# Patient Record
Sex: FEMALE | Race: WHITE | HISPANIC OR LATINO | Employment: FULL TIME | URBAN - METROPOLITAN AREA
[De-identification: names, ages, dates, MRNs, and addresses within clinical notes are randomized per-mention and may not be internally consistent; named-entity substitution may affect disease eponyms.]

---

## 2017-04-25 ENCOUNTER — OFFICE VISIT (OUTPATIENT)
Dept: PEDIATRICS | Facility: CLINIC | Age: 32
End: 2017-04-25
Payer: COMMERCIAL

## 2017-04-25 VITALS
HEIGHT: 69 IN | SYSTOLIC BLOOD PRESSURE: 138 MMHG | HEART RATE: 72 BPM | TEMPERATURE: 99 F | BODY MASS INDEX: 32.94 KG/M2 | WEIGHT: 222.4 LBS | DIASTOLIC BLOOD PRESSURE: 72 MMHG

## 2017-04-25 DIAGNOSIS — F33.1 MAJOR DEPRESSIVE DISORDER, RECURRENT EPISODE, MODERATE (H): Primary | ICD-10-CM

## 2017-04-25 DIAGNOSIS — F41.1 GAD (GENERALIZED ANXIETY DISORDER): ICD-10-CM

## 2017-04-25 PROCEDURE — 99203 OFFICE O/P NEW LOW 30 MIN: CPT | Performed by: INTERNAL MEDICINE

## 2017-04-25 RX ORDER — CETIRIZINE HYDROCHLORIDE, PSEUDOEPHEDRINE HYDROCHLORIDE 5; 120 MG/1; MG/1
TABLET, FILM COATED, EXTENDED RELEASE ORAL
COMMUNITY
Start: 2016-05-24 | End: 2017-09-05

## 2017-04-25 RX ORDER — ALBUTEROL SULFATE 90 UG/1
2 AEROSOL, METERED RESPIRATORY (INHALATION)
COMMUNITY
Start: 2016-12-13 | End: 2018-08-27

## 2017-04-25 RX ORDER — ALBUTEROL SULFATE 90 UG/1
AEROSOL, METERED RESPIRATORY (INHALATION)
COMMUNITY
Start: 2016-12-14 | End: 2017-04-25

## 2017-04-25 ASSESSMENT — ANXIETY QUESTIONNAIRES
5. BEING SO RESTLESS THAT IT IS HARD TO SIT STILL: MORE THAN HALF THE DAYS
7. FEELING AFRAID AS IF SOMETHING AWFUL MIGHT HAPPEN: MORE THAN HALF THE DAYS
3. WORRYING TOO MUCH ABOUT DIFFERENT THINGS: NEARLY EVERY DAY
1. FEELING NERVOUS, ANXIOUS, OR ON EDGE: MORE THAN HALF THE DAYS
IF YOU CHECKED OFF ANY PROBLEMS ON THIS QUESTIONNAIRE, HOW DIFFICULT HAVE THESE PROBLEMS MADE IT FOR YOU TO DO YOUR WORK, TAKE CARE OF THINGS AT HOME, OR GET ALONG WITH OTHER PEOPLE: EXTREMELY DIFFICULT
2. NOT BEING ABLE TO STOP OR CONTROL WORRYING: MORE THAN HALF THE DAYS
GAD7 TOTAL SCORE: 16
6. BECOMING EASILY ANNOYED OR IRRITABLE: NEARLY EVERY DAY

## 2017-04-25 ASSESSMENT — PATIENT HEALTH QUESTIONNAIRE - PHQ9: 5. POOR APPETITE OR OVEREATING: MORE THAN HALF THE DAYS

## 2017-04-25 NOTE — MR AVS SNAPSHOT
"              After Visit Summary   2017    Krystina Suazo    MRN: [id number]           Patient Information     Date Of Birth          1985        Visit Information        Provider Department      2017 2:30 PM Valeria Daly MD [facility]        Today's Diagnoses     Major depressive disorder, recurrent episode, moderate (H)    -  1    COURTNEY (generalized anxiety disorder)          Care Instructions    Let's start sertraline and follow up in 4-5 weeks.         Follow-ups after your visit        Who to contact     If you have questions or need follow up information about today's clinic visit or your schedule please contact [facility] directly at [phone].  Normal or non-critical lab and imaging results will be communicated to you by MyChart, letter or phone within 4 business days after the clinic has received the results. If you do not hear from us within 7 days, please contact the clinic through MyChart or phone. If you have a critical or abnormal lab result, we will notify you by phone as soon as possible.  Submit refill requests through imgfave or call your pharmacy and they will forward the refill request to us. Please allow 3 business days for your refill to be completed.          Additional Information About Your Visit        MyChart Information     imgfave lets you send messages to your doctor, view your test results, renew your prescriptions, schedule appointments and more. To sign up, go to [email or web address] . Click on \"Log in\" on the left side of the screen, which will take you to the Welcome page. Then click on \"Sign up Now\" on the right side of the page.     You will be asked to enter the access code listed below, as well as some personal information. Please follow the directions to create your username and password.     Your access code is: [email or web address]  Expires: 2017  3:31 PM     Your access code will  in 90 days. If you need help or a new " "code, please call your [facility] clinic or [phone].        Care EveryWhere ID     This is your Care EveryWhere ID. This could be used by other organizations to access your [facility] medical records  [id number]        Your Vitals Were     Pulse Temperature Height BMI (Body Mass Index)          72 99  F (37.2  C) (Oral) 5' 8.5\" (1.74 m) 33.32 kg/m2         Blood Pressure from Last 3 Encounters:   04/25/17 138/72    Weight from Last 3 Encounters:   04/25/17 222 lb 6.4 oz (100.9 kg)              Today, you had the following     No orders found for display         Today's Medication Changes          These changes are accurate as of: 4/25/17  3:31 PM.  If you have any questions, ask your nurse or doctor.               Start taking these medicines.        Dose/Directions    sertraline 50 MG tablet   Commonly known as:  ZOLOFT   Used for:  Major depressive disorder, recurrent episode, moderate (H), COURTNEY (generalized anxiety disorder)   Started by:  Valeria Daly MD        Take 1/2 tablet (25 mg) for 1-2 weeks, then increase to 1 tablet orally daily   Quantity:  30 tablet   Refills:  1            Where to get your medicines      These medications were sent to [facility] [address] [place], [place] - [address]  [address] [address]     Phone:  [phone]     sertraline 50 MG tablet                Primary Care Provider    None Specified       No primary provider on file.        Thank you!     Thank you for choosing [facility]  for your care. Our goal is always to provide you with excellent care. Hearing back from our patients is one way we can continue to improve our services. Please take a few minutes to complete the written survey that you may receive in the mail after your visit with us. Thank you!             Your Updated Medication List - Protect others around you: Learn how to safely use, store and throw away your medicines at www.disposemymeds.org.          This list is " accurate as of: 4/25/17  3:31 PM.  Always use your most recent med list.                   Brand Name Dispense Instructions for use    albuterol 108 (90 BASE) MCG/ACT Inhaler    PROAIR HFA/PROVENTIL HFA/VENTOLIN HFA     Inhale 2 puffs into the lungs       cetirizine-psuedoePHEDrine 5-120 MG per 12 hr tablet    zyrTEC-D         omeprazole 20 MG CR capsule    priLOSEC         sertraline 50 MG tablet    ZOLOFT    30 tablet    Take 1/2 tablet (25 mg) for 1-2 weeks, then increase to 1 tablet orally daily

## 2017-04-25 NOTE — PROGRESS NOTES
SUBJECTIVE:                                                    Krystina Suazo is a 31 year old female who presents to clinic today for the following health issues:      New Patient/Transfer of Care    Abnormal Mood Symptoms     Onset: 5-6 years    Description:   Depression: YES  Anxiety: YES    Accompanying Signs & Symptoms:  Still participating in activities that you used to enjoy: YES  Fatigue: no  Irritability: YES  Difficulty concentrating: no   Changes in appetite: no  Problems with sleep: YES- a little  Heart racing/beating fast : no  Thoughts of hurting yourself or others: none     History:   Recent stress: YES-  Job and fostering dog  Prior depression hospitalization: None  Family history of depression: YES- Mother and Father  Family history of anxiety: YES- possibly      Precipitating factors:   Alcohol/drug use: no    Alleviating factors:  Citalopram helped in the past but went off because would like to get preganant       Therapies Tried and outcome: Celexa (Citalopram) 30 mg.  Went off citalopram completely 2 months ago. Has noticed things have worsened.          Problem list and histories reviewed & adjusted, as indicated.  Additional history: as documented    There is no problem list on file for this patient.    No past surgical history on file.    Social History   Substance Use Topics     Smoking status: Never Smoker     Smokeless tobacco: Not on file     Alcohol use Yes     Family History   Problem Relation Age of Onset     Esophageal Cancer Mother 57     Asthma Father            Reviewed and updated as needed this visit by clinical staff  Allergies       Reviewed and updated as needed this visit by Provider         ROS:  Constitutional, HEENT, cardiovascular, pulmonary, GI, , musculoskeletal, neuro, skin, endocrine and psych systems are negative, except as otherwise noted.    OBJECTIVE:                                                    /72 (Cuff Size: Adult Large)  Pulse 72  Temp 99  " F (37.2  C) (Oral)  Ht 5' 8.5\" (1.74 m)  Wt 222 lb 6.4 oz (100.9 kg)  BMI 33.32 kg/m2  Body mass index is 33.32 kg/(m^2).  GENERAL: healthy, alert and no distress  NECK: no adenopathy, no asymmetry, masses, or scars and thyroid normal to palpation  RESP: lungs clear to auscultation - no rales, rhonchi or wheezes  CV: regular rate and rhythm, normal S1 S2, no S3 or S4, no murmur, click or rub, no peripheral edema and peripheral pulses strong  ABDOMEN: soft, nontender, no hepatosplenomegaly, no masses and bowel sounds normal  PSYCH: mentation appears normal, affect normal/bright    Diagnostic Test Results:  none      ASSESSMENT/PLAN:                                                      1. Major depressive disorder, recurrent episode, moderate (H)  Has been off sertraline and has not been doing well. Will restart  - sertraline (ZOLOFT) 50 MG tablet; Take 1/2 tablet (25 mg) for 1-2 weeks, then increase to 1 tablet orally daily  Dispense: 30 tablet; Refill: 1    2. COURTNEY (generalized anxiety disorder)    - sertraline (ZOLOFT) 50 MG tablet; Take 1/2 tablet (25 mg) for 1-2 weeks, then increase to 1 tablet orally daily  Dispense: 30 tablet; Refill: 1    Patient Instructions   Let's start sertraline and follow up in 4-5 weeks.       Valeria Biswas MD  [facility]  "

## 2017-04-25 NOTE — NURSING NOTE
"Chief Complaint   Patient presents with     Establish Care     depression       Initial /72 (Cuff Size: Adult Large)  Pulse 72  Temp 99  F (37.2  C) (Oral)  Ht 5' 8.5\" (1.74 m)  Wt 222 lb 6.4 oz (100.9 kg)  BMI 33.32 kg/m2 Estimated body mass index is 33.32 kg/(m^2) as calculated from the following:    Height as of this encounter: 5' 8.5\" (1.74 m).    Weight as of this encounter: 222 lb 6.4 oz (100.9 kg).  Medication Reconciliation: complete   Franca Pereira LPN    "

## 2017-04-26 ASSESSMENT — ASTHMA QUESTIONNAIRES: ACT_TOTALSCORE: 25

## 2017-04-26 ASSESSMENT — PATIENT HEALTH QUESTIONNAIRE - PHQ9: SUM OF ALL RESPONSES TO PHQ QUESTIONS 1-9: 15

## 2017-04-26 ASSESSMENT — ANXIETY QUESTIONNAIRES: GAD7 TOTAL SCORE: 16

## 2017-05-11 ENCOUNTER — TELEPHONE (OUTPATIENT)
Dept: NURSING | Facility: CLINIC | Age: 32
End: 2017-05-11

## 2017-05-11 NOTE — TELEPHONE ENCOUNTER
Type of outreach:  talked with patient   Health Maintenance Due   Topic Date Due     TETANUS IMMUNIZATION (SYSTEM ASSIGNED)  09/04/2001     PAP SCREENING Q3 YR (SYSTEM ASSIGNED)  09/04/2004     Pap was done 12/13/16 at [facility].  Franca Pereira LPN

## 2017-05-19 ENCOUNTER — RADIANT APPOINTMENT (OUTPATIENT)
Dept: GENERAL RADIOLOGY | Facility: CLINIC | Age: 32
End: 2017-05-19
Attending: PHYSICIAN ASSISTANT
Payer: COMMERCIAL

## 2017-05-19 ENCOUNTER — OFFICE VISIT (OUTPATIENT)
Dept: PEDIATRICS | Facility: CLINIC | Age: 32
End: 2017-05-19
Payer: COMMERCIAL

## 2017-05-19 VITALS
TEMPERATURE: 99.5 F | DIASTOLIC BLOOD PRESSURE: 70 MMHG | WEIGHT: 220 LBS | SYSTOLIC BLOOD PRESSURE: 125 MMHG | BODY MASS INDEX: 32.96 KG/M2 | HEART RATE: 98 BPM

## 2017-05-19 DIAGNOSIS — S96.912A STRAIN OF LEFT FOOT, INITIAL ENCOUNTER: Primary | ICD-10-CM

## 2017-05-19 DIAGNOSIS — M79.672 LEFT FOOT PAIN: ICD-10-CM

## 2017-05-19 PROCEDURE — 73630 X-RAY EXAM OF FOOT: CPT | Mod: LT

## 2017-05-19 PROCEDURE — 99213 OFFICE O/P EST LOW 20 MIN: CPT | Performed by: PHYSICIAN ASSISTANT

## 2017-05-19 ASSESSMENT — PAIN SCALES - GENERAL: PAINLEVEL: EXTREME PAIN (8)

## 2017-05-19 NOTE — PATIENT INSTRUCTIONS
Foot Sprain             What is a foot sprain?   A foot sprain is an injury that causes a stretch or tear in one or more ligaments in the foot. Ligaments are strong bands of tissue that connect bones to bones.   How does it occur?   A foot sprain occurs by twisting or bending the foot. This can happen if you stumble on an uneven surface, land awkwardly from a jump, or from kicking an object that doesn't move easily.   What are the symptoms?   Pain, swelling, and tenderness in the foot. You may have difficulty walking.   How is it diagnosed?   Your healthcare provider will review your symptoms, ask how you injured your foot, and examine you. Your provider may want to get an X-ray of your foot. The X-ray will be normal if you have a sprain.   How is it treated?   To treat this condition:   Put an ice pack, gel pack, or package of frozen vegetables, wrapped in a cloth on the area every 3 to 4 hours, for up to 20 minutes at a time.   Raise your foot on a pillow when you sit or lie down.   Use an elastic bandage around your foot as directed by your provider.   Use crutches until you can walk without pain.   Take an anti-inflammatory such as ibuprofen, or other medicine as directed by your provider. Nonsteroidal anti-inflammatory medicines (NSAIDs) may cause stomach bleeding and other problems. These risks increase with age. Read the label and take as directed. Unless recommended by your healthcare provider, do not take for more than 10 days.   Follow your provider's instructions for doing exercises to help you recover.   How long will the effects last?   The length of recovery depends on many factors such as your age, health, and if you have had a previous foot injury. Recovery time also depends on the severity of the sprain. A mild foot sprain may recover within a few weeks, whereas a severe foot sprain may take 6 weeks or longer to recover.   When can I return to my normal activities?   Everyone recovers  from an injury at a different rate. Return to your activity depends on how soon your foot recovers, not by how many days or weeks it has been since your injury has occurred. In general, the longer you have symptoms before you start treatment, the longer it will take to get better. The goal of rehabilitation is to return to your normal activities as soon as is safely possible. If you return too soon you may worsen your injury.   You may safely return to your normal activities when, starting from the top of the list and progressing to the end, each of the following is true:   You have full range of motion in the injured foot compared to the uninjured foot.   You have full strength of the injured foot compared to the uninjured foot.   You can walk straight ahead without pain or limping.   How can I prevent a foot sprain?   Unfortunately, most foot sprains occur during accidents that are not preventable. However, it is important to wear proper fitting footwear and to avoid running or playing on uneven surfaces.     Published by [employer].  This content is reviewed periodically and is subject to change as new health information becomes available. The information is intended to inform and educate and is not a replacement for medical evaluation, advice, diagnosis or treatment by a healthcare professional.

## 2017-05-19 NOTE — PROGRESS NOTES
SUBJECTIVE:                                                    Krystina Suazo is a 31 year old female who presents to clinic today for the following health issues:     Joint Pain     Onset: 05/18/2017    Description:   Location: lower left foot  Character: Sharp    Intensity: severe    Progression of Symptoms: same    Accompanying Signs & Symptoms:  Other symptoms: no swelling, redness, bruising  Painful with weight bearing and walking/movement     History:   Previous similar pain: no       Precipitating factors:   Trauma or overuse: yes--twisted left foot     Alleviating factors:  Improved by: ice       Therapies Tried and outcome: ibu    Desk job: sitting    Walking  ROS:  ROS otherwise negative    OBJECTIVE:                                                    /70 (BP Location: Right arm, Patient Position: Chair, Cuff Size: Adult Large)  Pulse 98  Temp 99.5  F (37.5  C)  Wt 220 lb (99.8 kg)  BMI 32.96 kg/m2  Body mass index is 32.96 kg/(m^2).   GENERAL: alert, no distress  Foot Exam:  LEFT  Inspection:  no swelling, redness, bruising  Tender: over the 1st MTP joint and 1st distal MT  Range of Motion: pain with ROM of toes    Diagnostic test results:  No results found for this or any previous visit (from the past 24 hour(s)).     ASSESSMENT/PLAN:                                                    (S96.913B) Strain of left foot, initial encounter  (primary encounter diagnosis)  Comment: RICE, wear post op shoe. Call if symptoms persist or worsen.   Plan: order for DME          (M79.672) Left foot pain  Comment:   Plan: XR Foot Left G/E 3 Views          See Patient Instructions    David Dean PA-C  [facility]

## 2017-05-19 NOTE — MR AVS SNAPSHOT
After Visit Summary   5/19/2017    Krystina Suazo    MRN: [id number]           Patient Information     Date Of Birth          1985        Visit Information        Provider Department      5/19/2017 12:50 PM David Dean PA-C [facility]        Today's Diagnoses     Left foot pain    -  1      Care Instructions                   Foot Sprain             What is a foot sprain?   A foot sprain is an injury that causes a stretch or tear in one or more ligaments in the foot. Ligaments are strong bands of tissue that connect bones to bones.   How does it occur?   A foot sprain occurs by twisting or bending the foot. This can happen if you stumble on an uneven surface, land awkwardly from a jump, or from kicking an object that doesn't move easily.   What are the symptoms?   Pain, swelling, and tenderness in the foot. You may have difficulty walking.   How is it diagnosed?   Your healthcare provider will review your symptoms, ask how you injured your foot, and examine you. Your provider may want to get an X-ray of your foot. The X-ray will be normal if you have a sprain.   How is it treated?   To treat this condition:   Put an ice pack, gel pack, or package of frozen vegetables, wrapped in a cloth on the area every 3 to 4 hours, for up to 20 minutes at a time.   Raise your foot on a pillow when you sit or lie down.   Use an elastic bandage around your foot as directed by your provider.   Use crutches until you can walk without pain.   Take an anti-inflammatory such as ibuprofen, or other medicine as directed by your provider. Nonsteroidal anti-inflammatory medicines (NSAIDs) may cause stomach bleeding and other problems. These risks increase with age. Read the label and take as directed. Unless recommended by your healthcare provider, do not take for more than 10 days.   Follow your provider's instructions for doing exercises to help you recover.   How long will the effects  last?   The length of recovery depends on many factors such as your age, health, and if you have had a previous foot injury. Recovery time also depends on the severity of the sprain. A mild foot sprain may recover within a few weeks, whereas a severe foot sprain may take 6 weeks or longer to recover.   When can I return to my normal activities?   Everyone recovers from an injury at a different rate. Return to your activity depends on how soon your foot recovers, not by how many days or weeks it has been since your injury has occurred. In general, the longer you have symptoms before you start treatment, the longer it will take to get better. The goal of rehabilitation is to return to your normal activities as soon as is safely possible. If you return too soon you may worsen your injury.   You may safely return to your normal activities when, starting from the top of the list and progressing to the end, each of the following is true:   You have full range of motion in the injured foot compared to the uninjured foot.   You have full strength of the injured foot compared to the uninjured foot.   You can walk straight ahead without pain or limping.   How can I prevent a foot sprain?   Unfortunately, most foot sprains occur during accidents that are not preventable. However, it is important to wear proper fitting footwear and to avoid running or playing on uneven surfaces.     Published by [employer].  This content is reviewed periodically and is subject to change as new health information becomes available. The information is intended to inform and educate and is not a replacement for medical evaluation, advice, diagnosis or treatment by a healthcare professional.        Follow-ups after your visit        Your next 10 appointments already scheduled     May 23, 2017  2:50 PM CDT   Office Visit with Valeria Daly MD   [facility] [place] ([facility] [place])    [address]  [address]  [place]  [place] [id number] [phone]           Bring a current list of meds and any records pertaining to this visit.  For Physicals, please bring immunization records and any forms needing to be filled out.  Please arrive 10 minutes early to complete paperwork.              Who to contact     If you have questions or need follow up information about today's clinic visit or your schedule please contact [facility] directly at [phone].  Normal or non-critical lab and imaging results will be communicated to you by [employer], letter or phone within 4 business days after the clinic has received the results. If you do not hear from us within 7 days, please contact the clinic through haku or phone. If you have a critical or abnormal lab result, we will notify you by phone as soon as possible.  Submit refill requests through haku or call your pharmacy and they will forward the refill request to us. Please allow 3 business days for your refill to be completed.          Additional Information About Your Visit        haku Information     haku gives you secure access to your electronic health record. If you see a primary care provider, you can also send messages to your care team and make appointments. If you have questions, please call your primary care clinic.  If you do not have a primary care provider, please call [phone] and they will assist you.        Care EveryWhere ID     This is your Care EveryWhere ID. This could be used by other organizations to access your [place] medical records  [id number]        Your Vitals Were     Pulse Temperature BMI (Body Mass Index)             98 99.5  F (37.5  C) 32.96 kg/m2          Blood Pressure from Last 3 Encounters:   05/19/17 125/70   04/25/17 138/72    Weight from Last 3 Encounters:   05/19/17 220 lb (99.8 kg)   04/25/17 222 lb 6.4 oz (100.9 kg)               Primary Care Provider Office Phone # Fax #    Valeria Daly [phone]        [place] [facility] [address] [place] [address]        Thank you!     Thank you for choosing [facility]  for your care. Our goal is always to provide you with excellent care. Hearing back from our patients is one way we can continue to improve our services. Please take a few minutes to complete the written survey that you may receive in the mail after your visit with us. Thank you!             Your Updated Medication List - Protect others around you: Learn how to safely use, store and throw away your medicines at www.disposemymeds.org.          This list is accurate as of: 5/19/17  1:59 PM.  Always use your most recent med list.                   Brand Name Dispense Instructions for use    albuterol 108 (90 BASE) MCG/ACT Inhaler    PROAIR HFA/PROVENTIL HFA/VENTOLIN HFA     Inhale 2 puffs into the lungs       cetirizine-psuedoePHEDrine 5-120 MG per 12 hr tablet    zyrTEC-D         omeprazole 20 MG CR capsule    priLOSEC         sertraline 50 MG tablet    ZOLOFT    30 tablet    Take 1/2 tablet (25 mg) for 1-2 weeks, then increase to 1 tablet orally daily

## 2017-05-23 ENCOUNTER — OFFICE VISIT (OUTPATIENT)
Dept: PEDIATRICS | Facility: CLINIC | Age: 32
End: 2017-05-23
Payer: COMMERCIAL

## 2017-05-23 ENCOUNTER — TELEPHONE (OUTPATIENT)
Dept: PEDIATRICS | Facility: CLINIC | Age: 32
End: 2017-05-23

## 2017-05-23 VITALS
BODY MASS INDEX: 32.98 KG/M2 | DIASTOLIC BLOOD PRESSURE: 64 MMHG | SYSTOLIC BLOOD PRESSURE: 118 MMHG | TEMPERATURE: 99.2 F | HEART RATE: 72 BPM | HEIGHT: 69 IN | WEIGHT: 222.7 LBS

## 2017-05-23 DIAGNOSIS — J31.0 CHRONIC RHINITIS: ICD-10-CM

## 2017-05-23 DIAGNOSIS — F33.1 MAJOR DEPRESSIVE DISORDER, RECURRENT EPISODE, MODERATE (H): Primary | ICD-10-CM

## 2017-05-23 DIAGNOSIS — Z88.7 HISTORY OF ALLERGY TO VACCINE: ICD-10-CM

## 2017-05-23 DIAGNOSIS — F41.1 GAD (GENERALIZED ANXIETY DISORDER): ICD-10-CM

## 2017-05-23 PROCEDURE — 99213 OFFICE O/P EST LOW 20 MIN: CPT | Performed by: INTERNAL MEDICINE

## 2017-05-23 RX ORDER — CETIRIZINE HYDROCHLORIDE 10 MG/1
10 TABLET ORAL EVERY EVENING
Qty: 90 TABLET | Refills: 11 | Status: SHIPPED | OUTPATIENT
Start: 2017-05-23 | End: 2018-08-27

## 2017-05-23 RX ORDER — CETIRIZINE HYDROCHLORIDE, PSEUDOEPHEDRINE HYDROCHLORIDE 5; 120 MG/1; MG/1
TABLET, FILM COATED, EXTENDED RELEASE ORAL
Qty: 28 TABLET | Status: CANCELLED | OUTPATIENT
Start: 2017-05-23

## 2017-05-23 ASSESSMENT — PATIENT HEALTH QUESTIONNAIRE - PHQ9: 5. POOR APPETITE OR OVEREATING: MORE THAN HALF THE DAYS

## 2017-05-23 ASSESSMENT — ANXIETY QUESTIONNAIRES
6. BECOMING EASILY ANNOYED OR IRRITABLE: SEVERAL DAYS
2. NOT BEING ABLE TO STOP OR CONTROL WORRYING: MORE THAN HALF THE DAYS
1. FEELING NERVOUS, ANXIOUS, OR ON EDGE: MORE THAN HALF THE DAYS
IF YOU CHECKED OFF ANY PROBLEMS ON THIS QUESTIONNAIRE, HOW DIFFICULT HAVE THESE PROBLEMS MADE IT FOR YOU TO DO YOUR WORK, TAKE CARE OF THINGS AT HOME, OR GET ALONG WITH OTHER PEOPLE: SOMEWHAT DIFFICULT
GAD7 TOTAL SCORE: 10
7. FEELING AFRAID AS IF SOMETHING AWFUL MIGHT HAPPEN: NOT AT ALL
5. BEING SO RESTLESS THAT IT IS HARD TO SIT STILL: SEVERAL DAYS
3. WORRYING TOO MUCH ABOUT DIFFERENT THINGS: MORE THAN HALF THE DAYS

## 2017-05-23 NOTE — TELEPHONE ENCOUNTER
Patient calls back, she is advised, referral info given and sent ot patient via my-chart per her request.    Lolis Dukes RN  Message handled by Nurse Triage.

## 2017-05-23 NOTE — TELEPHONE ENCOUNTER
We talked to [facility] and they do test for pertussis allergy.  Referral put in by Dr Daly for [facility] for Dr Bearden for testing for pertussis allergy.  Please give her number to call and schedule for this.  Franca Pereira LPN

## 2017-05-23 NOTE — NURSING NOTE
"Chief Complaint   Patient presents with     Recheck Medication       Initial /64 (Cuff Size: Adult Regular)  Pulse 72  Temp 99.2  F (37.3  C) (Oral)  Ht 5' 8.5\" (1.74 m)  Wt 222 lb 11.2 oz (101 kg)  BMI 33.37 kg/m2 Estimated body mass index is 33.37 kg/(m^2) as calculated from the following:    Height as of this encounter: 5' 8.5\" (1.74 m).    Weight as of this encounter: 222 lb 11.2 oz (101 kg).  Medication Reconciliation: complete   Franca Pereira LPN    "

## 2017-05-23 NOTE — PROGRESS NOTES
"  SUBJECTIVE:                                                    Krystina Suazo is a 31 year old female who presents to clinic today for the following health issues:        Depression and Anxiety Follow-Up    Status since last visit: Improved     Other associated symptoms:None    Complicating factors:     Significant life event: No     Current substance abuse: None    PHQ-9 SCORE 4/25/2017   Total Score 15     COURTNEY-7 SCORE 4/25/2017   Total Score 16        PHQ-9  English      PHQ-9   Any Language     GAD7       Amount of exercise or physical activity: 4-5 days/week for an average of 30-45 minutes    Problems taking medications regularly: No    Medication side effects: none    Diet: regular (no restrictions)    Problem list and histories reviewed & adjusted, as indicated.  Additional history: as documented    Patient Active Problem List   Diagnosis     Chronic rhinitis     COURTNEY (generalized anxiety disorder)     Major depressive disorder, recurrent episode, moderate (H)     History reviewed. No pertinent surgical history.    Social History   Substance Use Topics     Smoking status: Never Smoker     Smokeless tobacco: Not on file     Alcohol use Yes     Family History   Problem Relation Age of Onset     Esophageal Cancer Mother 57     Asthma Father            Reviewed and updated as needed this visit by clinical staff  Tobacco  Allergies  Meds  Med Hx  Surg Hx  Fam Hx  Soc Hx      Reviewed and updated as needed this visit by Provider         ROS:  Constitutional, cardiovascular, pulmonary, gi and gu systems are negative, except as otherwise noted.    OBJECTIVE:                                                    /64 (Cuff Size: Adult Regular)  Pulse 72  Temp 99.2  F (37.3  C) (Oral)  Ht 5' 8.5\" (1.74 m)  Wt 222 lb 11.2 oz (101 kg)  BMI 33.37 kg/m2  Body mass index is 33.37 kg/(m^2).  GENERAL: healthy, alert and no distress  PSYCH: mentation appears normal, affect normal/bright    Diagnostic Test " Results:  none      ASSESSMENT/PLAN:                                                      1. Major depressive disorder, recurrent episode, moderate (H)  Increase dose slightly.  - sertraline (ZOLOFT) 50 MG tablet; Take 1.5 tablets (75 mg) by mouth daily  Dispense: 45 tablet; Refill: 1    2. COURTNEY (generalized anxiety disorder)    - sertraline (ZOLOFT) 50 MG tablet; Take 1.5 tablets (75 mg) by mouth daily  Dispense: 45 tablet; Refill: 1    3. Chronic rhinitis    - cetirizine (ZYRTEC) 10 MG tablet; Take 1 tablet (10 mg) by mouth every evening  Dispense: 90 tablet; Refill: 11    4. History of allergy to vaccine  Had allergic reaction presumably to pertussis vaccine.  - ALLERGY/ASTHMA ADULT REFERRAL    Patient Instructions   Try plain zyrtec.  Increase sertraline to 75 mg daily.   Phone or office visit in 4 weeks.      Valeria Biswas MD  [facility]

## 2017-05-24 ENCOUNTER — TELEPHONE (OUTPATIENT)
Dept: PEDIATRICS | Facility: CLINIC | Age: 32
End: 2017-05-24

## 2017-05-24 ASSESSMENT — ANXIETY QUESTIONNAIRES: GAD7 TOTAL SCORE: 10

## 2017-05-24 ASSESSMENT — PATIENT HEALTH QUESTIONNAIRE - PHQ9: SUM OF ALL RESPONSES TO PHQ QUESTIONS 1-9: 7

## 2017-05-24 NOTE — TELEPHONE ENCOUNTER
Type of outreach:  talked to patient  Health Maintenance Due   Topic Date Due     TETANUS IMMUNIZATION (SYSTEM ASSIGNED)  09/04/2001     PAP SCREENING Q3 YR (SYSTEM ASSIGNED)  09/04/2004     Pap done 12/13/16   Will my chart to find out where?  Franca Pereira LPN

## 2017-06-12 ENCOUNTER — TRANSFERRED RECORDS (OUTPATIENT)
Dept: HEALTH INFORMATION MANAGEMENT | Facility: CLINIC | Age: 32
End: 2017-06-12

## 2017-06-28 ENCOUNTER — E-VISIT (OUTPATIENT)
Dept: PEDIATRICS | Facility: CLINIC | Age: 32
End: 2017-06-28
Payer: COMMERCIAL

## 2017-06-28 DIAGNOSIS — F41.1 GAD (GENERALIZED ANXIETY DISORDER): ICD-10-CM

## 2017-06-28 DIAGNOSIS — F33.1 MAJOR DEPRESSIVE DISORDER, RECURRENT EPISODE, MODERATE (H): ICD-10-CM

## 2017-06-28 PROCEDURE — 99444 ZZC PHYSICIAN ONLINE EVALUATION & MANAGEMENT SERVICE: CPT | Performed by: INTERNAL MEDICINE

## 2017-09-05 ENCOUNTER — OFFICE VISIT (OUTPATIENT)
Dept: PEDIATRICS | Facility: CLINIC | Age: 32
End: 2017-09-05
Payer: COMMERCIAL

## 2017-09-05 VITALS
HEART RATE: 83 BPM | SYSTOLIC BLOOD PRESSURE: 118 MMHG | WEIGHT: 232.1 LBS | TEMPERATURE: 98.7 F | DIASTOLIC BLOOD PRESSURE: 68 MMHG | HEIGHT: 69 IN | BODY MASS INDEX: 34.38 KG/M2

## 2017-09-05 DIAGNOSIS — L40.9 SCALP PSORIASIS: Primary | ICD-10-CM

## 2017-09-05 PROCEDURE — 99213 OFFICE O/P EST LOW 20 MIN: CPT | Performed by: INTERNAL MEDICINE

## 2017-09-05 RX ORDER — BETAMETHASONE DIPROPIONATE 0.5 MG/G
LOTION TOPICAL
Qty: 60 ML | Refills: 0 | Status: SHIPPED | OUTPATIENT
Start: 2017-09-05 | End: 2018-01-05 | Stop reason: ALTCHOICE

## 2017-09-05 NOTE — PROGRESS NOTES
"  SUBJECTIVE:   Krystina Suazo is a 32 year old female who presents to clinic today for the following health issues:  Eczema on scalp     Skin tag on anterior neck    Problem list and histories reviewed & adjusted, as indicated.  Additional history: as documented    Patient Active Problem List   Diagnosis     Chronic rhinitis     COURTNEY (generalized anxiety disorder)     Major depressive disorder, recurrent episode, moderate (H)     History reviewed. No pertinent surgical history.    Social History   Substance Use Topics     Smoking status: Never Smoker     Smokeless tobacco: Never Used     Alcohol use Yes     Family History   Problem Relation Age of Onset     Esophageal Cancer Mother 57     Asthma Father              Reviewed and updated as needed this visit by clinical staff       Reviewed and updated as needed this visit by Provider         ROS:  Constitutional, HEENT, cardiovascular, pulmonary, gi and gu systems are negative, except as otherwise noted.      OBJECTIVE:   /68 (Cuff Size: Adult Large)  Pulse 83  Temp 98.7  F (37.1  C) (Oral)  Ht 5' 8.5\" (1.74 m)  Wt 232 lb 1.6 oz (105.3 kg)  BMI 34.78 kg/m2  Body mass index is 34.78 kg/(m^2).  GENERAL: healthy, alert and no distress  SKIN: 3 mm papule anterior neck with flaking and irritation. Typical pattern of psoriasis over scalp with purplish base and silvery scale.  Diagnostic Test Results:  none     ASSESSMENT/PLAN:     1. Scalp psoriasis  Plan treatment with topical steroid and follow up with dermatologist.  - betamethasone dipropionate (DIPROSONE) 0.05 % lotion; Apply sparingly to affected area twice daily as needed.  Do not apply to face.  Dispense: 60 mL; Refill: 0  - DERMATOLOGY REFERRAL    Patient Instructions   Start using the steroid solution on your scalp.  Call for an appointment with dermatology. If you can get in within 2 weeks, keep using the solution.  If your appointment is further out, you may want to stop using the solution after " you are better so they can see the rash on you scalp.    I prefer to have derm remove your skin tag because it looks irritated.      Valeria Biswas MD  [facility] [place]

## 2017-09-05 NOTE — PATIENT INSTRUCTIONS
Start using the steroid solution on your scalp.  Call for an appointment with dermatology. If you can get in within 2 weeks, keep using the solution.  If your appointment is further out, you may want to stop using the solution after you are better so they can see the rash on you scalp.    I prefer to have derm remove your skin tag because it looks irritated.

## 2017-09-05 NOTE — MR AVS SNAPSHOT
After Visit Summary   9/5/2017    Krystina Suazo    MRN: [id number]           Patient Information     Date Of Birth          1985        Visit Information        Provider Department      9/5/2017 11:10 AM Valeria Daly MD [facility]        Today's Diagnoses     Scalp psoriasis    -  1      Care Instructions    Start using the steroid solution on your scalp.  Call for an appointment with dermatology. If you can get in within 2 weeks, keep using the solution.  If your appointment is further out, you may want to stop using the solution after you are better so they can see the rash on you scalp.    I prefer to have derm remove your skin tag because it looks irritated.          Follow-ups after your visit        Additional Services     DERMATOLOGY REFERRAL       Your provider has referred you to: FMG: [facility] - [place] [phone]  FM[facility]: [facility] - [place] [phone]   [email or web address]  FHN: [facility] - [place] [phone]   [email or web address]  N: [facility] - [place] [phone]   [email or web address]  [facility] - [place] [phone]  [email or web address]    Please be aware that coverage of these services is subject to the terms and limitations of your health insurance plan.  Call member services at your health plan with any benefit or coverage questions.      Please bring the following with you to your appointment:    (1) Any X-Rays, CTs or MRIs which have been performed.  Contact the facility where they were done to arrange for  prior to your scheduled appointment.    (2) List of current medications  (3) This referral request   (4) Any documents/labs given to you for this referral                  Who to contact     If you have questions or need follow up information about  "today's clinic visit or your schedule please contact [facility] [place] directly at [phone].  Normal or non-critical lab and imaging results will be communicated to you by MyChart, letter or phone within 4 business days after the clinic has received the results. If you do not hear from us within 7 days, please contact the clinic through [facility] or phone. If you have a critical or abnormal lab result, we will notify you by phone as soon as possible.  Submit refill requests through [facility] or call your pharmacy and they will forward the refill request to us. Please allow 3 business days for your refill to be completed.          Additional Information About Your Visit        [facility] Information     [facility] gives you secure access to your electronic health record. If you see a primary care provider, you can also send messages to your care team and make appointments. If you have questions, please call your primary care clinic.  If you do not have a primary care provider, please call [phone] and they will assist you.        Care EveryWhere ID     This is your Care EveryWhere ID. This could be used by other organizations to access your [facility] medical records  [id number]        Your Vitals Were     Pulse Temperature Height BMI (Body Mass Index)          83 98.7  F (37.1  C) (Oral) 5' 8.5\" (1.74 m) 34.78 kg/m2         Blood Pressure from Last 3 Encounters:   09/05/17 118/68   05/23/17 118/64   05/19/17 125/70    Weight from Last 3 Encounters:   09/05/17 232 lb 1.6 oz (105.3 kg)   05/23/17 222 lb 11.2 oz (101 kg)   05/19/17 220 lb (99.8 kg)              We Performed the Following     DERMATOLOGY REFERRAL          Today's Medication Changes          These changes are accurate as of: 9/5/17 12:22 PM.  If you have any questions, ask your nurse or doctor.               Start taking these medicines.        Dose/Directions    betamethasone dipropionate 0.05 % lotion   Commonly known as:  DIPROSONE   Used for:  " Scalp psoriasis   Started by:  Valeria Daly MD        Apply sparingly to affected area twice daily as needed.  Do not apply to face.   Quantity:  60 mL   Refills:  0            Where to get your medicines      These medications were sent to [facility] [place] - [place] - [address]   [address], [place] [address]     Phone:  [phone]     betamethasone dipropionate 0.05 % lotion                Primary Care Provider Office Phone # Fax #    Valeria Daly [phone]       [address] [place] [address]        Equal Access to Services     [facility]: Hadii aad ku hadasho Soomaali, waaxda luqadaha, qaybta kaalmada adeegyada, waxay abigailin hayaan adeheri guillaume . So [facility] [phone].    ATENCIÓN: Si habla español, tiene a wesley disposición servicios gratuitos de asistencia lingüística. Llame al [phone].    We comply with applicable federal civil rights laws and [place] laws. We do not discriminate on the basis of race, color, national origin, age, disability sex, sexual orientation or gender identity.            Thank you!     Thank you for choosing [facility]  for your care. Our goal is always to provide you with excellent care. Hearing back from our patients is one way we can continue to improve our services. Please take a few minutes to complete the written survey that you may receive in the mail after your visit with us. Thank you!             Your Updated Medication List - Protect others around you: Learn how to safely use, store and throw away your medicines at www.disposemymeds.org.          This list is accurate as of: 9/5/17 12:22 PM.  Always use your most recent med list.                   Brand Name Dispense Instructions for use Diagnosis    albuterol 108 (90 BASE) MCG/ACT Inhaler    PROAIR HFA/PROVENTIL HFA/VENTOLIN HFA     Inhale 2 puffs into the lungs        betamethasone dipropionate 0.05  % lotion    DIPROSONE    60 mL    Apply sparingly to affected area twice daily as needed.  Do not apply to face.    Scalp psoriasis       cetirizine 10 MG tablet    zyrTEC    90 tablet    Take 1 tablet (10 mg) by mouth every evening    Chronic rhinitis       omeprazole 20 MG CR capsule    priLOSEC          sertraline 50 MG tablet    ZOLOFT    135 tablet    Take 1.5 tablets (75 mg) by mouth daily    COURTNEY (generalized anxiety disorder), Major depressive disorder, recurrent episode, moderate (H)

## 2017-09-05 NOTE — NURSING NOTE
"Chief Complaint   Patient presents with     Derm Problem     skin tag on anterior neck, possible eczema on scalp       Initial /68 (Cuff Size: Adult Large)  Pulse 83  Temp 98.7  F (37.1  C) (Oral)  Ht 5' 8.5\" (1.74 m)  Wt 232 lb 1.6 oz (105.3 kg)  BMI 34.78 kg/m2 Estimated body mass index is 34.78 kg/(m^2) as calculated from the following:    Height as of this encounter: 5' 8.5\" (1.74 m).    Weight as of this encounter: 232 lb 1.6 oz (105.3 kg).  Medication Reconciliation: complete   Franca Pereira LPN    "

## 2017-09-19 ENCOUNTER — OFFICE VISIT (OUTPATIENT)
Dept: FAMILY MEDICINE | Facility: CLINIC | Age: 32
End: 2017-09-19
Payer: COMMERCIAL

## 2017-09-19 DIAGNOSIS — L40.9 SCALP PSORIASIS: Primary | ICD-10-CM

## 2017-09-19 DIAGNOSIS — L30.9 ECZEMA, UNSPECIFIED TYPE: ICD-10-CM

## 2017-09-19 PROCEDURE — 99214 OFFICE O/P EST MOD 30 MIN: CPT | Performed by: FAMILY MEDICINE

## 2017-09-19 RX ORDER — TRIAMCINOLONE ACETONIDE 1 MG/G
CREAM TOPICAL
Qty: 80 G | Refills: 0 | Status: SHIPPED | OUTPATIENT
Start: 2017-09-19 | End: 2017-11-20

## 2017-09-19 RX ORDER — KETOCONAZOLE 20 MG/ML
SHAMPOO TOPICAL
Qty: 120 ML | Refills: 11 | Status: SHIPPED | OUTPATIENT
Start: 2017-09-19 | End: 2017-11-20

## 2017-09-19 RX ORDER — CLOBETASOL PROPIONATE 0.5 MG/ML
SOLUTION TOPICAL 2 TIMES DAILY
Qty: 50 ML | Refills: 3 | Status: SHIPPED | OUTPATIENT
Start: 2017-09-19 | End: 2018-10-31

## 2017-09-19 NOTE — PATIENT INSTRUCTIONS
FUTURE APPOINTMENTS  Follow up in 3 week(s).    TOPICAL STEROID INSTRUCTIONS - for control of scalp psoriasis  Clobetasol propionate 0.05% solution.    Apply a few drops and rub into the affected areas on the scalp, two times per day for 1 week.    After the first week, decrease to once daily application for 2 weeks    Not to be used on face or groin.    SHAMPOO INSTRUCTIONS - for control of scalp psoriasis  Ketoconazole 2% shampoo. Wash hair twice a week with this shampoo, applying it to damp skin. Leave on for 5 minutes before rinsing.    During the other days of the week that you shampoo your hair, use one of the following shampoos. Consider alternating use of shampoos with different active ingredients every 4 week(s).   Coal Tar shampoos : Neutrogena T/Gel, Denorex, DHS Tar, Ionil-T, Tegrin, X-Seb T, Zetar  Zinc Pyrithione shampoos : Head & Shoulders, Denorex Advance Formula, DHS Zinc, Zincon  Salicylic Acid shampoos : Neutrogena T/Sal, DHS Sal, Ionil, P&S, Sebulex, X-Seb  Selenium Sulfide shampoos : Selsun Blue shampoo  Sulfur shampoos : Sebulex      DRY SKIN INSTRUCTIONS - for control of eczema on arms  Routine use of moisturizer is important for healthy, resilient skin.    Twice daily use of a moisturizer such as over-the-counter (OTC) CeraVe moisturizer cream (in the jar), OTC Cetaphil moisturizer cream or OTC Vanicream. CeraVe products contain ceramides and filaggrin proteins that can help to maintain the body's moisture layer.    Always apply moisturizer after washing, within 3 minutes of drying off for best effect.    Do not overuse soap. Just apply soap on the groin and armpits, unless you have been sweating extensively. Recommended products include OTC unscented Dove sensitive skin or OTC Vanicream cleansing bar.    Good facial cleansers include OTC CeraVe hydrating facial cleanser or OTC Cetaphil daily facial cleanser.    Avoid use of scented products and/or antistatic dryer sheets.    TOPICAL STEROID  "INSTRUCTIONS - for control of eczema   Triamcinolone 0.1% cream.    Apply an amount equal to half of a fingertip (0.25 g) to the affected area(s) on the arms/legs/trunk, two times per day for 10-14 days.    \"Fingertip Amount\"      Not to be used on face or groin.    Topical steroid use is for short-term treatment only. If after initial treatment, you are using this for prolonged periods of time, return to clinic for re-evaluation of treatment.    Keep in mind to also regularly use moisturizer, as this preventative measure can help maintain your skin's natural moisture barrier.  "

## 2017-09-19 NOTE — PROGRESS NOTES
[facility] - PRIMARY CARE SKIN    CC : Psoriasis   SUBJECTIVE:                                                    Krystina Suazo is a 32 year old female who presents to clinic today for referral consultation of suspected scalp psoriasis that started years ago, but she has had decreasing control since a flare-up over summer 2017. Other patchy areas have developed on the arms and legs, but these are not itchy.    Locations : Scalp.  Onset : in childhood.  Pruritic : extremely itchy of scalp at onset two weeks ago, but itchiness has improved.  Associated symptoms : flaking, itching, redness and scaling.  Symptoms appear to be : improving with use of topical steroid.  Aggravating factors : none identified.  Relieving factors : none identified.    Therapies tried : Rx: betamethasone dipropionate 0.05% lotion begun two weeks ago. OTC T/Gel T/Sal shampoos. Scalpicin to control itchiness.  Response to treatment : Topical steroid has alleviated much itchiness and rash.  Side effects : None.  Products used : Pantene shampoo - she typically washes her hair daily    Personal Medical History  Skin Cancer : NO  Eczema Psoriasis Rosacea Autoimmune   YES - eczema in childhood on elbows and knees and current involvement area on back of scalp ?YES NO NO     Family Medical History  Skin Cancer : NO  Eczema Psoriasis Rosacea Autoimmune   NO NO NO NO     Occupation : desk work (indoor).    Patient Active Problem List   Diagnosis     Chronic rhinitis     COURTNEY (generalized anxiety disorder)     Major depressive disorder, recurrent episode, moderate (H)       History reviewed. No pertinent past medical history. History reviewed. No pertinent surgical history.   Social History   Substance Use Topics     Smoking status: Never Smoker     Smokeless tobacco: Never Used     Alcohol use Yes    Family History     Problem (# of Occurrences) Relation (Name,Age of Onset)    Asthma (1) Father    Esophageal Cancer (1) Mother (57)            Prescription Medications as of 9/19/2017             clobetasol (TEMOVATE) 0.05 % external solution Apply topically 2 times daily Apply to affected areas on scalp bid as needed    ketoconazole (NIZORAL) 2 % shampoo Apply to the affected area on scalp and wash off after 5 minutes.    betamethasone dipropionate (DIPROSONE) 0.05 % lotion Apply sparingly to affected area twice daily as needed.  Do not apply to face.    sertraline (ZOLOFT) 50 MG tablet Take 1.5 tablets (75 mg) by mouth daily    cetirizine (ZYRTEC) 10 MG tablet Take 1 tablet (10 mg) by mouth every evening    omeprazole (PRILOSEC) 20 MG CR capsule     albuterol (PROAIR HFA/PROVENTIL HFA/VENTOLIN HFA) 108 (90 BASE) MCG/ACT Inhaler Inhale 2 puffs into the lungs          No Known Allergies     CONSTITUTIONAL:NEGATIVE for fever, chills, change in weight  INTEGUMENTARY/SKIN: POSITIVE for pruritis, rash and scaling  MUSCULOSKELETAL:NEGATIVE for arthralgias and myalgia  ROS : 14 point review of systems was negative except the symptoms listed above in the HPI.    This document serves as a record of the services and decisions personally performed and made by Ewa Bacon MD. It was created on her behalf by Rhys Jiang, a trained medical scribe.  The creation of this document is based on the scribe's personal observations and the provider's statements to the medical scribe.  Rhys Jiang, September 19, 2017 3:46 PM      OBJECTIVE:                                                    GENERAL: healthy, alert and no distress  SKIN: Briggs Skin Type - II.  Scalp, Face, Neck, Arms, Hands and Fingers were examined. The dermatoscope was used to help evaluate pigmented lesions.  Skin Pertinent Findings:  Scalp : Scaly, indurated plaque on occipital scalp and extending onto the parietal scalp.    Arms : Small maculopapular patches on the left and right flexor surfaces    Elbows, knees : Clear.    Nails : No pitting.    Diagnostic Test Results:  none     MDM : discussed  spectrum of psoriasis, treatment options for control, vitamin D supplementation.      ASSESSMENT:                                                      Encounter Diagnoses   Name Primary?     Scalp psoriasis Yes     Eczema, unspecified type          PLAN:                                                    Patient Instructions   FUTURE APPOINTMENTS  Follow up in 3 week(s).    TOPICAL STEROID INSTRUCTIONS - for control of scalp psoriasis  Clobetasol propionate 0.05% solution.    Apply a few drops and rub into the affected areas on the scalp, two times per day for 1 week.    After the first week, decrease to once daily application for 2 weeks    Not to be used on face or groin.    SHAMPOO INSTRUCTIONS - for control of scalp psoriasis  Ketoconazole 2% shampoo. Wash hair twice a week with this shampoo, applying it to damp skin. Leave on for 5 minutes before rinsing.    During the other days of the week that you shampoo your hair, use one of the following shampoos. Consider alternating use of shampoos with different active ingredients every 4 week(s).   Coal Tar shampoos : Neutrogena T/Gel, Denorex, DHS Tar, Ionil-T, Tegrin, X-Seb T, Zetar  Zinc Pyrithione shampoos : Head & Shoulders, Denorex Advance Formula, DHS Zinc, Zincon  Salicylic Acid shampoos : Neutrogena T/Sal, DHS Sal, Ionil, P&S, Sebulex, X-Seb  Selenium Sulfide shampoos : Selsun Blue shampoo  Sulfur shampoos : Sebulex      DRY SKIN INSTRUCTIONS - for control of eczema on arms  Routine use of moisturizer is important for healthy, resilient skin.    Twice daily use of a moisturizer such as over-the-counter (OTC) CeraVe moisturizer cream (in the jar), OTC Cetaphil moisturizer cream or OTC Vanicream. CeraVe products contain ceramides and filaggrin proteins that can help to maintain the body's moisture layer.    Always apply moisturizer after washing, within 3 minutes of drying off for best effect.    Do not overuse soap. Just apply soap on the groin and armpits,  "unless you have been sweating extensively. Recommended products include OTC unscented Dove sensitive skin or OTC Vanicream cleansing bar.    Good facial cleansers include OTC CeraVe hydrating facial cleanser or OTC Cetaphil daily facial cleanser.    Avoid use of scented products and/or antistatic dryer sheets.    TOPICAL STEROID INSTRUCTIONS - for control of eczema   Triamcinolone 0.1% cream.    Apply an amount equal to half of a fingertip (0.25 g) to the affected area(s) on the arms/legs/trunk, two times per day for 10-14 days.    \"Fingertip Amount\"      Not to be used on face or groin.    Topical steroid use is for short-term treatment only. If after initial treatment, you are using this for prolonged periods of time, return to clinic for re-evaluation of treatment.    Keep in mind to also regularly use moisturizer, as this preventative measure can help maintain your skin's natural moisture barrier.        PROCEDURES:                                                    None.    TT : 25 minutes.  CT : 15 minutes. Discussion regarding etiology, spectrum of psoriasis, treatment options, aggravating factors.      The information in this document, created by the medical scribe for me, accurately reflects the services I personally performed and the decisions made by me. I have reviewed and approved this document for accuracy prior to leaving the patient care area.  Ewa Bacon MD September 19, 2017 3:46 PM  [facility] - PRIMARY CARE SKIN  "

## 2017-10-30 ENCOUNTER — TELEPHONE (OUTPATIENT)
Dept: FAMILY MEDICINE | Facility: CLINIC | Age: 32
End: 2017-10-30

## 2017-10-30 ENCOUNTER — OFFICE VISIT (OUTPATIENT)
Dept: FAMILY MEDICINE | Facility: CLINIC | Age: 32
End: 2017-10-30
Payer: COMMERCIAL

## 2017-10-30 DIAGNOSIS — L40.9 SCALP PSORIASIS: Primary | ICD-10-CM

## 2017-10-30 PROCEDURE — 99213 OFFICE O/P EST LOW 20 MIN: CPT | Performed by: FAMILY MEDICINE

## 2017-10-30 RX ORDER — SALICYLIC ACID 6 MG/100ML
SHAMPOO TOPICAL
Qty: 177 ML | Refills: 3 | Status: SHIPPED | OUTPATIENT
Start: 2017-10-30 | End: 2017-11-20

## 2017-10-30 NOTE — TELEPHONE ENCOUNTER
[facility] sent a fax stating that Salicyclic AC 6 % Shampoo is non formulary and not available for them to order       They would lik to have this changed to a formulary alternative that they have  Access to.    please advise.    Tina FRAGOSO RN  [facility]  [phone]

## 2017-10-30 NOTE — PROGRESS NOTES
[facility] - PRIMARY CARE SKIN    CC : scalp psoriasis   SUBJECTIVE:                                                    Krystina Suazo is a 32 year old female who presents to clinic today for follow up of chronic scalp psoriasis that started years ago, but with decreasing control since a flare-up over summer 2017. Scalp itchiness and scaling have improved overall over the last 1 month. Some itchiness has recurred in the last couple of days with decreased frequency of clobetasol propionate 0.05% solution application and stress from recent travel.    Treatment plan since 9/19/17:  Scalp - clobetasol solution, ketoconazole 2% shampoo alternated with OTC T/Sal 3% salicylic acid shampoo and Head & Shoulders shampoo.  Arms - triamcinolone 0.1% cream     For complete history, please review office visit notes from 9/19/17    Personal Medical History  Skin Cancer : NO  Eczema Psoriasis Rosacea Autoimmune   YES - eczema in childhood on elbows and knees and current involvement area on back of scalp ?YES NO NO     Family Medical History  Skin Cancer : NO  Eczema Psoriasis Rosacea Autoimmune   NO NO NO NO     Occupation : desk work (indoor).    Patient Active Problem List   Diagnosis     Chronic rhinitis     COURTNEY (generalized anxiety disorder)     Major depressive disorder, recurrent episode, moderate (H)       History reviewed. No pertinent past medical history. History reviewed. No pertinent surgical history.   Social History   Substance Use Topics     Smoking status: Never Smoker     Smokeless tobacco: Never Used     Alcohol use Yes    Family History     Problem (# of Occurrences) Relation (Name,Age of Onset)    Asthma (1) Father    Esophageal Cancer (1) Mother (57)           Prescription Medications as of 10/30/2017             clobetasol (TEMOVATE) 0.05 % external solution Apply topically 2 times daily Apply to affected areas on scalp bid as needed    ketoconazole (NIZORAL) 2 % shampoo Apply to the affected area on  scalp and wash off after 5 minutes.    betamethasone dipropionate (DIPROSONE) 0.05 % lotion Apply sparingly to affected area twice daily as needed.  Do not apply to face.    sertraline (ZOLOFT) 50 MG tablet Take 1.5 tablets (75 mg) by mouth daily    cetirizine (ZYRTEC) 10 MG tablet Take 1 tablet (10 mg) by mouth every evening    omeprazole (PRILOSEC) 20 MG CR capsule     albuterol (PROAIR HFA/PROVENTIL HFA/VENTOLIN HFA) 108 (90 BASE) MCG/ACT Inhaler Inhale 2 puffs into the lungs    triamcinolone (KENALOG) 0.1 % cream Apply to affected areas on arms, legs or trunk bid 10-14 consecutive days, not to be used on face or groin          No Known Allergies     CONSTITUTIONAL:NEGATIVE for fever, chills, change in weight  INTEGUMENTARY/SKIN: POSITIVE for pruritis, rash and scaling  ROS : 14 point review of systems was negative except the symptoms listed above in the HPI.    This document serves as a record of the services and decisions personally performed and made by Ewa Bacon MD. It was created on her behalf by Rhys Jiang, a trained medical scribe.  The creation of this document is based on the scribe's personal observations and the provider's statements to the medical scribe.  Rhys Jiang, October 30, 2017 12:24 PM      OBJECTIVE:                                                    GENERAL: healthy, alert and no distress  SKIN: Briggs Skin Type - II.  Scalp, Face, Neck, Arms, Hands and Fingers were examined. The dermatoscope was used to help evaluate pigmented lesions.  Skin Pertinent Findings:  Scalp : Thick crusting on right and mid-occipital scalp. Lighter scaling on parietal scalp.    Diagnostic Test Results:  none           ASSESSMENT:                                                      Encounter Diagnosis   Name Primary?     Scalp psoriasis Yes         PLAN:                                                    Patient Instructions   FUTURE APPOINTMENTS  Follow up in 3 week(s).    SHAMPOO  Alternate between  salicylic acid 6% shampoo and T/Gel shampoo every other day for 3 weeks.  Discontinue ketoconazole 2% and T/Sal shampoos    TOPICAL STEROID  Continue applying clobetasol propionate 0.05% solution every other night.        PROCEDURES:                                                    None.    TT : 20 minutes.  CT : 15 minutes. Discussion regarding etiology, spectrum of psoriasis, treatment options, aggravating factors.      The information in this document, created by the medical scribe for me, accurately reflects the services I personally performed and the decisions made by me. I have reviewed and approved this document for accuracy prior to leaving the patient care area.  Ewa Bacon MD October 30, 2017 12:20 PM  [facility] - PRIMARY CARE SKIN

## 2017-10-30 NOTE — TELEPHONE ENCOUNTER
Pharmacist says they do not have any good options for the scalp- they only have gel or cream for psoriasis   Shampoo was 70$ even if they could get it and patient would not pay it  He can see that she has already tried ketoconazole shampoo-  Only option is steroids that pharmacy can recommend    Tina FRAGOSO RN  [facility] Skin [facility] [place] [facility]  [phone]

## 2017-10-30 NOTE — MR AVS SNAPSHOT
After Visit Summary   10/30/2017    Krystina Suazo    MRN: [id number]           Patient Information     Date Of Birth          1985        Visit Information        Provider Department      10/30/2017 12:20 PM Michelle Bacon MD [facility] Primary Care Skin        Today's Diagnoses     Scalp psoriasis    -  1      Care Instructions    FUTURE APPOINTMENTS  Follow up in 3 week(s).    SHAMPOO  Alternate between salicylic acid 6% shampoo and T/Gel shampoo every other day for 3 weeks.  Discontinue ketoconazole 2% and T/Sal shampoos    TOPICAL STEROID  Continue applying clobetasol propionate 0.05% solution every other night.          Follow-ups after your visit        Who to contact     If you have questions or need follow up information about today's clinic visit or your schedule please contact [facility] PRIMARY CARE SKIN directly at [phone].  Normal or non-critical lab and imaging results will be communicated to you by MyChart, letter or phone within 4 business days after the clinic has received the results. If you do not hear from us within 7 days, please contact the clinic through [employer] or phone. If you have a critical or abnormal lab result, we will notify you by phone as soon as possible.  Submit refill requests through [employer] or call your pharmacy and they will forward the refill request to us. Please allow 3 business days for your refill to be completed.          Additional Information About Your Visit        MyChart Information     [employer] gives you secure access to your electronic health record. If you see a primary care provider, you can also send messages to your care team and make appointments. If you have questions, please call your primary care clinic.  If you do not have a primary care provider, please call [phone] and they will assist you.        Care EveryWhere ID     This is your Care EveryWhere ID. This could be used by other organizations to  access your [place] medical records  [id number]         Blood Pressure from Last 3 Encounters:   09/05/17 118/68   05/23/17 118/64   05/19/17 125/70    Weight from Last 3 Encounters:   09/05/17 232 lb 1.6 oz (105.3 kg)   05/23/17 222 lb 11.2 oz (101 kg)   05/19/17 220 lb (99.8 kg)              Today, you had the following     No orders found for display         Today's Medication Changes          These changes are accurate as of: 10/30/17 12:29 PM.  If you have any questions, ask your nurse or doctor.               Start taking these medicines.        Dose/Directions    Salicylic Acid 6 % Sham   Used for:  Scalp psoriasis   Started by:  Michelle Bacon MD        Use to affected areas on scalp every other day   Quantity:  177 mL   Refills:  3            Where to get your medicines      These medications were sent to [id number]  [place] - [address]  [address], [place] [address]     Phone:  [phone]     Salicylic Acid 6 % Sham                Primary Care Provider Office Phone # Fax #    Valeria Daly [phone] [address] [place] [address]        Equal Access to Services     JOSE ANGEL KURTZ AH: Hadii sanjiv marion hadasho Soomaali, waaxda luqadaha, qaybta kaalmada adeegyada, ruth marquez. So [facility] [phone].    ATENCIÓN: Si habla español, tiene a wesley disposición servicios gratuitos de asistencia lingüística. Llame al [phone].    We comply with applicable federal civil rights laws and [place] laws. We do not discriminate on the basis of race, color, national origin, age, disability, sex, sexual orientation, or gender identity.            Thank you!     Thank you for choosing [facility] - PRIMARY CARE SKIN  for your care. Our goal is always to provide you with excellent care. Hearing back from our patients is one way we can continue to improve our services. Please take a few minutes to  complete the written survey that you may receive in the mail after your visit with us. Thank you!             Your Updated Medication List - Protect others around you: Learn how to safely use, store and throw away your medicines at www.disposemymeds.org.          This list is accurate as of: 10/30/17 12:29 PM.  Always use your most recent med list.                   Brand Name Dispense Instructions for use Diagnosis    albuterol 108 (90 BASE) MCG/ACT Inhaler    PROAIR HFA/PROVENTIL HFA/VENTOLIN HFA     Inhale 2 puffs into the lungs        betamethasone dipropionate 0.05 % lotion    DIPROSONE    60 mL    Apply sparingly to affected area twice daily as needed.  Do not apply to face.    Scalp psoriasis       cetirizine 10 MG tablet    zyrTEC    90 tablet    Take 1 tablet (10 mg) by mouth every evening    Chronic rhinitis       clobetasol 0.05 % external solution    TEMOVATE    50 mL    Apply topically 2 times daily Apply to affected areas on scalp bid as needed    Scalp psoriasis       ketoconazole 2 % shampoo    NIZORAL    120 mL    Apply to the affected area on scalp and wash off after 5 minutes.    Scalp psoriasis       omeprazole 20 MG CR capsule    priLOSEC          Salicylic Acid 6 % Sham     177 mL    Use to affected areas on scalp every other day    Scalp psoriasis       sertraline 50 MG tablet    ZOLOFT    135 tablet    Take 1.5 tablets (75 mg) by mouth daily    COURTNEY (generalized anxiety disorder), Major depressive disorder, recurrent episode, moderate (H)       triamcinolone 0.1 % cream    KENALOG    80 g    Apply to affected areas on arms, legs or trunk bid 10-14 consecutive days, not to be used on face or groin    Eczema, unspecified type

## 2017-10-30 NOTE — PATIENT INSTRUCTIONS
FUTURE APPOINTMENTS  Follow up in 3 week(s).    SHAMPOO  Alternate between salicylic acid 6% shampoo and T/Gel shampoo every other day for 3 weeks.  Discontinue ketoconazole 2% and T/Sal shampoos    TOPICAL STEROID  Continue applying clobetasol propionate 0.05% solution every other night.

## 2017-10-30 NOTE — LETTER
10/30/2017         RE: Krystina Suazo  [address]  [place] [address]        Dear Colleague,    Thank you for referring your patient, Krystina Suazo, to the [facility] - PRIMARY CARE SKIN. Please see a copy of my visit note below.    [facility] - PRIMARY CARE SKIN    CC : scalp psoriasis   SUBJECTIVE:                                                    Krystina Suazo is a 32 year old female who presents to clinic today for follow up of chronic scalp psoriasis that started years ago, but with decreasing control since a flare-up over summer 2017. Scalp itchiness and scaling have improved overall over the last 1 month. Some itchiness has recurred in the last couple of days with decreased frequency of clobetasol propionate 0.05% solution application and stress from recent travel.    Treatment plan since 9/19/17:  Scalp - clobetasol solution, ketoconazole 2% shampoo alternated with OTC T/Sal 3% salicylic acid shampoo and Head & Shoulders shampoo.  Arms - triamcinolone 0.1% cream     For complete history, please review office visit notes from 9/19/17    Personal Medical History  Skin Cancer : NO  Eczema Psoriasis Rosacea Autoimmune   YES - eczema in childhood on elbows and knees and current involvement area on back of scalp ?YES NO NO     Family Medical History  Skin Cancer : NO  Eczema Psoriasis Rosacea Autoimmune   NO NO NO NO     Occupation : desk work (indoor).    Patient Active Problem List   Diagnosis     Chronic rhinitis     COURTNEY (generalized anxiety disorder)     Major depressive disorder, recurrent episode, moderate (H)       History reviewed. No pertinent past medical history. History reviewed. No pertinent surgical history.   Social History   Substance Use Topics     Smoking status: Never Smoker     Smokeless tobacco: Never Used     Alcohol use Yes    Family History     Problem (# of Occurrences) Relation (Name,Age of Onset)    Asthma (1) Father    Esophageal Cancer (1) Mother (57)            Prescription Medications as of 10/30/2017             clobetasol (TEMOVATE) 0.05 % external solution Apply topically 2 times daily Apply to affected areas on scalp bid as needed    ketoconazole (NIZORAL) 2 % shampoo Apply to the affected area on scalp and wash off after 5 minutes.    betamethasone dipropionate (DIPROSONE) 0.05 % lotion Apply sparingly to affected area twice daily as needed.  Do not apply to face.    sertraline (ZOLOFT) 50 MG tablet Take 1.5 tablets (75 mg) by mouth daily    cetirizine (ZYRTEC) 10 MG tablet Take 1 tablet (10 mg) by mouth every evening    omeprazole (PRILOSEC) 20 MG CR capsule     albuterol (PROAIR HFA/PROVENTIL HFA/VENTOLIN HFA) 108 (90 BASE) MCG/ACT Inhaler Inhale 2 puffs into the lungs    triamcinolone (KENALOG) 0.1 % cream Apply to affected areas on arms, legs or trunk bid 10-14 consecutive days, not to be used on face or groin          No Known Allergies     CONSTITUTIONAL:NEGATIVE for fever, chills, change in weight  INTEGUMENTARY/SKIN: POSITIVE for pruritis, rash and scaling  ROS : 14 point review of systems was negative except the symptoms listed above in the HPI.    This document serves as a record of the services and decisions personally performed and made by Ewa Bacon MD. It was created on her behalf by Rhys Jiang, a trained medical scribe.  The creation of this document is based on the scribe's personal observations and the provider's statements to the medical scribe.  Rhys Jiang, October 30, 2017 12:24 PM      OBJECTIVE:                                                    GENERAL: healthy, alert and no distress  SKIN: Briggs Skin Type - II.  Scalp, Face, Neck, Arms, Hands and Fingers were examined. The dermatoscope was used to help evaluate pigmented lesions.  Skin Pertinent Findings:  Scalp : Thick crusting on right and mid-occipital scalp. Lighter scaling on parietal scalp.    Diagnostic Test Results:  none           ASSESSMENT:                                                       Encounter Diagnosis   Name Primary?     Scalp psoriasis Yes         PLAN:                                                    Patient Instructions   FUTURE APPOINTMENTS  Follow up in 3 week(s).    SHAMPOO  Alternate between salicylic acid 6% shampoo and T/Gel shampoo every other day for 3 weeks.  Discontinue ketoconazole 2% and T/Sal shampoos    TOPICAL STEROID  Continue applying clobetasol propionate 0.05% solution every other night.        PROCEDURES:                                                    None.    TT : 20 minutes.  CT : 15 minutes. Discussion regarding etiology, spectrum of psoriasis, treatment options, aggravating factors.      The information in this document, created by the medical scribe for me, accurately reflects the services I personally performed and the decisions made by me. I have reviewed and approved this document for accuracy prior to leaving the patient care area.  Eaw Bacon MD October 30, 2017 12:20 PM  [facility] - PRIMARY CARE SKIN    Again, thank you for allowing me to participate in the care of your patient.        Sincerely,        Michelle Bacon MD

## 2017-10-31 NOTE — TELEPHONE ENCOUNTER
Jordi, Michelle Singh MD  P En Nurse Pool       Caller: Unspecified (Yesterday,  3:02 PM)                     Tina,      Please call Krystina and let her know that they are no good alternatives that her insurance will cover.        Recommend : using the T Sal shampoo and alternate with the T gel shampoo.     Thank you,    Michelle Bacon M.D.       Left message on answering machine for patient to call back.  [employer]rogen[employer] T- Sal or T- gel    Tina FRAGOSO RN  [facility] Skin [facility]  [phone]

## 2017-11-01 NOTE — TELEPHONE ENCOUNTER
Patient notified of  PCP's message, no further questions.  Tina Bush RN  [facility]  [phone]

## 2017-11-01 NOTE — TELEPHONE ENCOUNTER
Left message on answering machine for patient to call back.    Tina FRAGOSORN  [facility] Skin [facility]  [phone]

## 2017-11-20 ENCOUNTER — OFFICE VISIT (OUTPATIENT)
Dept: FAMILY MEDICINE | Facility: CLINIC | Age: 32
End: 2017-11-20
Payer: COMMERCIAL

## 2017-11-20 DIAGNOSIS — L40.9 SCALP PSORIASIS: Primary | ICD-10-CM

## 2017-11-20 PROCEDURE — 99213 OFFICE O/P EST LOW 20 MIN: CPT | Performed by: FAMILY MEDICINE

## 2017-11-20 RX ORDER — CLOBETASOL PROPIONATE 0.5 MG/G
AEROSOL, FOAM TOPICAL
Qty: 50 G | Refills: 3 | Status: SHIPPED | OUTPATIENT
Start: 2017-11-20 | End: 2019-09-26

## 2017-11-20 NOTE — PROGRESS NOTES
"[facility] - PRIMARY CARE SKIN    CC : scalp psoriasis   SUBJECTIVE:                                                    Krystina Suazo is a 32 year old female who presents to clinic today for follow up of chronic scalp psoriasis that started years ago, but with decreasing control since a flare-up over summer 2017.     Scalp itchiness and scaling have continued to diminish but still present. She reports that symptoms appear to be \"managed\".    Treatment plan since 9/19/17:  Scalp - clobetasol propionate 0.05% solution applied every other night; alternated use of T/Gel and T/Sal shampoo    Previous therapies tried:  ketoconazole 2% shampoo alternated with OTC T/Sal 3% salicylic acid shampoo and Head & Shoulders shampoo.    For complete history, please review office visit notes from 9/19/17    Personal Medical History  Skin Cancer : NO  Eczema Psoriasis Rosacea Autoimmune   YES - eczema in childhood on elbows and knees and current involvement area on back of scalp ?YES NO NO     Family Medical History  Skin Cancer : NO  Eczema Psoriasis Rosacea Autoimmune   NO NO NO NO     Occupation : desk work (indoor).    Patient Active Problem List   Diagnosis     Chronic rhinitis     COURTNEY (generalized anxiety disorder)     Major depressive disorder, recurrent episode, moderate (H)       History reviewed. No pertinent past medical history. History reviewed. No pertinent surgical history.   Social History   Substance Use Topics     Smoking status: Never Smoker     Smokeless tobacco: Never Used     Alcohol use Yes    Family History     Problem (# of Occurrences) Relation (Name,Age of Onset)    Asthma (1) Father    Esophageal Cancer (1) Mother (57)           Prescription Medications as of 11/20/2017             clobetasol (TEMOVATE) 0.05 % external solution Apply topically 2 times daily Apply to affected areas on scalp bid as needed    betamethasone dipropionate (DIPROSONE) 0.05 % lotion Apply sparingly to affected area twice " daily as needed.  Do not apply to face.    sertraline (ZOLOFT) 50 MG tablet Take 1.5 tablets (75 mg) by mouth daily    cetirizine (ZYRTEC) 10 MG tablet Take 1 tablet (10 mg) by mouth every evening    omeprazole (PRILOSEC) 20 MG CR capsule     albuterol (PROAIR HFA/PROVENTIL HFA/VENTOLIN HFA) 108 (90 BASE) MCG/ACT Inhaler Inhale 2 puffs into the lungs          No Known Allergies     CONSTITUTIONAL:NEGATIVE for fever, chills, change in weight  INTEGUMENTARY/SKIN: POSITIVE for pruritis, rash and scaling  ROS : 14 point review of systems was negative except the symptoms listed above in the HPI.    This document serves as a record of the services and decisions personally performed and made by Ewa Bacon MD. It was created on her behalf by Rhys Jiang, a trained medical scribe.  The creation of this document is based on the scribe's personal observations and the provider's statements to the medical scribe.  Rhys Jiang, November 20, 2017 8:02 AM      OBJECTIVE:                                                    GENERAL: healthy, alert and no distress  SKIN: Briggs Skin Type - II.  Scalp, Face, Neck were examined. The dermatoscope was used to help evaluate pigmented lesions.  Skin Pertinent Findings:  Scalp : Erythema and moderate scaling on the right occipital scalp and on right side of scalp.    Diagnostic Test Results:  none           ASSESSMENT:                                                      Encounter Diagnosis   Name Primary?     Scalp psoriasis Yes         PLAN:                                                    Patient Instructions   FUTURE APPOINTMENTS  Follow up in 6 weeks.    Continue alternating use of T/Gel and T/Sal shampoos.    Pause use of clobetasol propionate 0.05% solution at this time, but do not discard it.    TOPICAL STEROID  1. Apply clobetasol propionate 0.05% foam twice daily for 7 days.  2. After 1 week, decrease frequency to once daily three days of the week (can be consecutive  days).        PROCEDURES:                                                    None.    TT : 20 minutes.  CT : 15 minutes. Discussion regarding etiology, spectrum of psoriasis, treatment options, aggravating factors.      The information in this document, created by the medical scribe for me, accurately reflects the services I personally performed and the decisions made by me. I have reviewed and approved this document for accuracy prior to leaving the patient care area.  Ewa Bacon MD November 20, 2017 8:02 AM  [facility] - PRIMARY CARE SKIN

## 2017-11-20 NOTE — PATIENT INSTRUCTIONS
FUTURE APPOINTMENTS  Follow up in 6 weeks.    Continue alternating use of T/Gel and T/Sal shampoos.    Pause use of clobetasol propionate 0.05% solution at this time, but do not discard it.    TOPICAL STEROID  1. Apply clobetasol propionate 0.05% foam twice daily for 7 days.  2. After 1 week, decrease frequency to once daily three days of the week (can be consecutive days).

## 2017-11-20 NOTE — LETTER
"    11/20/2017         RE: Krystina Suazo  [address] [place] [address]        Dear Colleague,    Thank you for referring your patient, Krystina Suazo, to the [facility] - PRIMARY CARE SKIN. Please see a copy of my visit note below.    [facility] - PRIMARY CARE SKIN    CC : scalp psoriasis   SUBJECTIVE:                                                    Krystina Suaoz is a 32 year old female who presents to clinic today for follow up of chronic scalp psoriasis that started years ago, but with decreasing control since a flare-up over summer 2017.     Scalp itchiness and scaling have continued to diminish but still present. She reports that symptoms appear to be \"managed\".    Treatment plan since 9/19/17:  Scalp - clobetasol propionate 0.05% solution applied every other night; alternated use of T/Gel and T/Sal shampoo    Previous therapies tried:  ketoconazole 2% shampoo alternated with OTC T/Sal 3% salicylic acid shampoo and Head & Shoulders shampoo.    For complete history, please review office visit notes from 9/19/17    Personal Medical History  Skin Cancer : NO  Eczema Psoriasis Rosacea Autoimmune   YES - eczema in childhood on elbows and knees and current involvement area on back of scalp ?YES NO NO     Family Medical History  Skin Cancer : NO  Eczema Psoriasis Rosacea Autoimmune   NO NO NO NO     Occupation : desk work (indoor).    Patient Active Problem List   Diagnosis     Chronic rhinitis     COURTNEY (generalized anxiety disorder)     Major depressive disorder, recurrent episode, moderate (H)       History reviewed. No pertinent past medical history. History reviewed. No pertinent surgical history.   Social History   Substance Use Topics     Smoking status: Never Smoker     Smokeless tobacco: Never Used     Alcohol use Yes    Family History     Problem (# of Occurrences) Relation (Name,Age of Onset)    Asthma (1) Father    Esophageal Cancer (1) Mother (57)           Prescription " Medications as of 11/20/2017             clobetasol (TEMOVATE) 0.05 % external solution Apply topically 2 times daily Apply to affected areas on scalp bid as needed    betamethasone dipropionate (DIPROSONE) 0.05 % lotion Apply sparingly to affected area twice daily as needed.  Do not apply to face.    sertraline (ZOLOFT) 50 MG tablet Take 1.5 tablets (75 mg) by mouth daily    cetirizine (ZYRTEC) 10 MG tablet Take 1 tablet (10 mg) by mouth every evening    omeprazole (PRILOSEC) 20 MG CR capsule     albuterol (PROAIR HFA/PROVENTIL HFA/VENTOLIN HFA) 108 (90 BASE) MCG/ACT Inhaler Inhale 2 puffs into the lungs          No Known Allergies     CONSTITUTIONAL:NEGATIVE for fever, chills, change in weight  INTEGUMENTARY/SKIN: POSITIVE for pruritis, rash and scaling  ROS : 14 point review of systems was negative except the symptoms listed above in the HPI.    This document serves as a record of the services and decisions personally performed and made by Ewa Bacon MD. It was created on her behalf by Rhys Jiang, a trained medical scribe.  The creation of this document is based on the scribe's personal observations and the provider's statements to the medical scribe.  Rhys Jiang, November 20, 2017 8:02 AM      OBJECTIVE:                                                    GENERAL: healthy, alert and no distress  SKIN: Briggs Skin Type - II.  Scalp, Face, Neck were examined. The dermatoscope was used to help evaluate pigmented lesions.  Skin Pertinent Findings:  Scalp : Erythema and moderate scaling on the right occipital scalp and on right side of scalp.    Diagnostic Test Results:  none           ASSESSMENT:                                                      Encounter Diagnosis   Name Primary?     Scalp psoriasis Yes         PLAN:                                                    Patient Instructions   FUTURE APPOINTMENTS  Follow up in 6 weeks.    Continue alternating use of T/Gel and T/Sal shampoos.    Pause use of  clobetasol propionate 0.05% solution at this time, but do not discard it.    TOPICAL STEROID  1. Apply clobetasol propionate 0.05% foam twice daily for 7 days.  2. After 1 week, decrease frequency to once daily three days of the week (can be consecutive days).        PROCEDURES:                                                    None.    TT : 20 minutes.  CT : 15 minutes. Discussion regarding etiology, spectrum of psoriasis, treatment options, aggravating factors.      The information in this document, created by the medical scribe for me, accurately reflects the services I personally performed and the decisions made by me. I have reviewed and approved this document for accuracy prior to leaving the patient care area.  Ewa Bacon MD November 20, 2017 8:02 AM  [facility] - PRIMARY CARE SKIN    Again, thank you for allowing me to participate in the care of your patient.        Sincerely,        Michelle Bacon MD

## 2017-11-20 NOTE — MR AVS SNAPSHOT
After Visit Summary   11/20/2017    Krystina Suazo    MRN: [id number]           Patient Information     Date Of Birth          1985        Visit Information        Provider Department      11/20/2017 8:00 AM Michelle Bacon MD [facility] Primary Care Skin        Today's Diagnoses     Scalp psoriasis    -  1      Care Instructions    FUTURE APPOINTMENTS  Follow up in 6 weeks.    Continue alternating use of T/Gel and T/Sal shampoos.    Pause use of clobetasol propionate 0.05% solution at this time, but do not discard it.    TOPICAL STEROID  1. Apply clobetasol propionate 0.05% foam twice daily for 7 days.  2. After 1 week, decrease frequency to once daily three days of the week (can be consecutive days).          Follow-ups after your visit        Who to contact     If you have questions or need follow up information about today's clinic visit or your schedule please contact [facility] PRIMARY CARE SKIN directly at [phone].  Normal or non-critical lab and imaging results will be communicated to you by TradeSynchart, letter or phone within 4 business days after the clinic has received the results. If you do not hear from us within 7 days, please contact the clinic through BioIQt or phone. If you have a critical or abnormal lab result, we will notify you by phone as soon as possible.  Submit refill requests through SecondMarket or call your pharmacy and they will forward the refill request to us. Please allow 3 business days for your refill to be completed.          Additional Information About Your Visit        TradeSynchart Information     Second[employer] gives you secure access to your electronic health record. If you see a primary care provider, you can also send messages to your care team and make appointments. If you have questions, please call your primary care clinic.  If you do not have a primary care provider, please call [phone] and they will assist you.        Care  EveryWhere ID     This is your Care EveryWhere ID. This could be used by other organizations to access your [facility] medical records  [id number]         Blood Pressure from Last 3 Encounters:   09/05/17 118/68   05/23/17 118/64   05/19/17 125/70    Weight from Last 3 Encounters:   09/05/17 232 lb 1.6 oz (105.3 kg)   05/23/17 222 lb 11.2 oz (101 kg)   05/19/17 220 lb (99.8 kg)              Today, you had the following     No orders found for display         Today's Medication Changes          These changes are accurate as of: 11/20/17  8:14 AM.  If you have any questions, ask your nurse or doctor.               These medicines have changed or have updated prescriptions.        Dose/Directions    * clobetasol 0.05 % external solution   Commonly known as:  TEMOVATE   This may have changed:  Another medication with the same name was added. Make sure you understand how and when to take each.   Used for:  Scalp psoriasis   Changed by:  Michelle Bacon MD        Apply topically 2 times daily Apply to affected areas on scalp bid as needed   Quantity:  50 mL   Refills:  3       * clobetasol propionate 0.05 % Foam   This may have changed:  You were already taking a medication with the same name, and this prescription was added. Make sure you understand how and when to take each.   Used for:  Scalp psoriasis   Changed by:  Michelle Bacon MD        Apply sparingly to affected area twice daily as needed.  Do not apply to face.   Quantity:  50 g   Refills:  3       * Notice:  This list has 2 medication(s) that are the same as other medications prescribed for you. Read the directions carefully, and ask your doctor or other care provider to review them with you.         Where to get your medicines      These medications were sent to [id number]  [place] - [address]  [address], [place] [address]     Phone:  [phone]     clobetasol propionate 0.05 % Foam                 Primary Care Provider Office Phone # Fax #    Valeria Daly [phone] [address] [place] [address]        Equal Access to Services     LEANDRAJOSE ANGEL GOMEZELDA : Hillary sanjiv marion indigo Velasquez, waarnaudda luqerik, qaybta kanoni fermin, ruth alonsowalt jimmy. So [facility] [phone].    ATENCIÓN: Si habla español, tiene a wesley disposición servicios gratuitos de asistencia lingüística. Llame al [phone].    We comply with applicable federal civil rights laws and [place] laws. We do not discriminate on the basis of race, color, national origin, age, disability, sex, sexual orientation, or gender identity.            Thank you!     Thank you for choosing [facility] - PRIMARY CARE SKIN  for your care. Our goal is always to provide you with excellent care. Hearing back from our patients is one way we can continue to improve our services. Please take a few minutes to complete the written survey that you may receive in the mail after your visit with us. Thank you!             Your Updated Medication List - Protect others around you: Learn how to safely use, store and throw away your medicines at www.disposemymeds.org.          This list is accurate as of: 11/20/17  8:14 AM.  Always use your most recent med list.                   Brand Name Dispense Instructions for use Diagnosis    albuterol 108 (90 BASE) MCG/ACT Inhaler    PROAIR HFA/PROVENTIL HFA/VENTOLIN HFA     Inhale 2 puffs into the lungs        betamethasone dipropionate 0.05 % lotion    DIPROSONE    60 mL    Apply sparingly to affected area twice daily as needed.  Do not apply to face.    Scalp psoriasis       cetirizine 10 MG tablet    zyrTEC    90 tablet    Take 1 tablet (10 mg) by mouth every evening    Chronic rhinitis       * clobetasol 0.05 % external solution    TEMOVATE    50 mL    Apply topically 2 times daily Apply to affected areas on scalp bid as needed    Scalp psoriasis       * clobetasol  propionate 0.05 % Foam     50 g    Apply sparingly to affected area twice daily as needed.  Do not apply to face.    Scalp psoriasis       omeprazole 20 MG CR capsule    priLOSEC          sertraline 50 MG tablet    ZOLOFT    135 tablet    Take 1.5 tablets (75 mg) by mouth daily    COURTNEY (generalized anxiety disorder), Major depressive disorder, recurrent episode, moderate (H)       * Notice:  This list has 2 medication(s) that are the same as other medications prescribed for you. Read the directions carefully, and ask your doctor or other care provider to review them with you.

## 2018-01-05 ENCOUNTER — OFFICE VISIT (OUTPATIENT)
Dept: FAMILY MEDICINE | Facility: CLINIC | Age: 33
End: 2018-01-05
Payer: COMMERCIAL

## 2018-01-05 DIAGNOSIS — L40.9 SCALP PSORIASIS: Primary | ICD-10-CM

## 2018-01-05 PROCEDURE — 99213 OFFICE O/P EST LOW 20 MIN: CPT | Performed by: FAMILY MEDICINE

## 2018-01-05 NOTE — PROGRESS NOTES
[facility] - PRIMARY CARE SKIN    CC : scalp psoriasis   SUBJECTIVE:                                                    Krystina Suazo is a 32 year old female who presents to clinic today for follow up of chronic scalp psoriasis that started years ago, but with decreasing control since a flare-up over summer 2017.    Current treatment : change on 11/20/17 from clobetasol propionate 0.05% solution to clobetasol propionate 0.05% foam applied every 3 days. Continued use of T/Gel and T/Sal shampoos on a daily basis, but recently every 2 days when weather has been cold.  Response to treatment : Scaling and itchiness symptoms have improved.  Side effects noted : None    Previous therapies tried:  ketoconazole 2% shampoo alternated with OTC T/Sal 3% salicylic acid shampoo and Head & Shoulders shampoo.    For complete history, please review office visit notes from 9/19/17    Personal Medical History  Skin Cancer : NO  Eczema Psoriasis Rosacea Autoimmune   YES - eczema in childhood on elbows and knees and current involvement area on back of scalp ?YES NO NO     Family Medical History  Skin Cancer : NO  Eczema Psoriasis Rosacea Autoimmune   NO NO NO NO     Occupation : desk work (indoor).    Patient Active Problem List   Diagnosis     Chronic rhinitis     COURTNEY (generalized anxiety disorder)     Major depressive disorder, recurrent episode, moderate (H)       History reviewed. No pertinent past medical history. History reviewed. No pertinent surgical history.   Social History   Substance Use Topics     Smoking status: Never Smoker     Smokeless tobacco: Never Used     Alcohol use Yes    Family History     Problem (# of Occurrences) Relation (Name,Age of Onset)    Asthma (1) Father    Esophageal Cancer (1) Mother (57)           Prescription Medications as of 1/5/2018             clobetasol propionate 0.05 % FOAM Apply sparingly to affected area twice daily as needed.  Do not apply to face.    clobetasol (TEMOVATE) 0.05 %  external solution Apply topically 2 times daily Apply to affected areas on scalp bid as needed    sertraline (ZOLOFT) 50 MG tablet Take 1.5 tablets (75 mg) by mouth daily    cetirizine (ZYRTEC) 10 MG tablet Take 1 tablet (10 mg) by mouth every evening    omeprazole (PRILOSEC) 20 MG CR capsule     albuterol (PROAIR HFA/PROVENTIL HFA/VENTOLIN HFA) 108 (90 BASE) MCG/ACT Inhaler Inhale 2 puffs into the lungs          No Known Allergies     CONSTITUTIONAL:NEGATIVE for fever, chills, change in weight  INTEGUMENTARY/SKIN: POSITIVE for pruritis, rash and scaling  ROS : 14 point review of systems was negative except the symptoms listed above in the HPI.    This document serves as a record of the services and decisions personally performed and made by Ewa Bacon MD. It was created on her behalf by Rhys Jiang, a trained medical scribe.  The creation of this document is based on the scribe's personal observations and the provider's statements to the medical scribe.  Rhys Jiang, January 5, 2018 3:59 PM      OBJECTIVE:                                                    GENERAL: healthy, alert and no distress  SKIN: Briggs Skin Type - II.  Scalp, Face, Neck were examined. The dermatoscope was used to help evaluate pigmented lesions.  Skin Pertinent Findings:  Scalp : Some erythema, minimal scaling on occipital scalp.          ASSESSMENT:                                                      Encounter Diagnosis   Name Primary?     Scalp psoriasis Yes         PLAN:                                                    Patient Instructions   FUTURE APPOINTMENTS  Follow up in 3 months for re-evaluation of control of psoriasis.    Continue applying clobetasol propionate 0.05% foam every 2-3 days as needed for control of symptoms.  Continue alternating between use of T/Gel and T/Sal shampoo.        PROCEDURES:                                                    None.    TT : 20 minutes.  CT : 15 minutes. Discussion regarding  etiology, spectrum of psoriasis, treatment options, aggravating factors.      The information in this document, created by the medical scribe for me, accurately reflects the services I personally performed and the decisions made by me. I have reviewed and approved this document for accuracy prior to leaving the patient care area.  Ewa Bacon MD January 5, 2018 3:59 PM  [facility] - PRIMARY CARE SKIN

## 2018-01-05 NOTE — LETTER
1/5/2018         RE: Krystina Suazo  [address] [place] [address]        Dear Colleague,    Thank you for referring your patient, Krystina Suazo, to the [facility] - PRIMARY CARE SKIN. Please see a copy of my visit note below.    [facility] - PRIMARY CARE SKIN    CC : scalp psoriasis   SUBJECTIVE:                                                    Krystina Suazo is a 32 year old female who presents to clinic today for follow up of chronic scalp psoriasis that started years ago, but with decreasing control since a flare-up over summer 2017.    Current treatment : change on 11/20/17 from clobetasol propionate 0.05% solution to clobetasol propionate 0.05% foam applied every 3 days. Continued use of T/Gel and T/Sal shampoos on a daily basis, but recently every 2 days when weather has been cold.  Response to treatment : Scaling and itchiness symptoms have improved.  Side effects noted : None    Previous therapies tried:  ketoconazole 2% shampoo alternated with OTC T/Sal 3% salicylic acid shampoo and Head & Shoulders shampoo.    For complete history, please review office visit notes from 9/19/17    Personal Medical History  Skin Cancer : NO  Eczema Psoriasis Rosacea Autoimmune   YES - eczema in childhood on elbows and knees and current involvement area on back of scalp ?YES NO NO     Family Medical History  Skin Cancer : NO  Eczema Psoriasis Rosacea Autoimmune   NO NO NO NO     Occupation : desk work (indoor).    Patient Active Problem List   Diagnosis     Chronic rhinitis     COURTNEY (generalized anxiety disorder)     Major depressive disorder, recurrent episode, moderate (H)       History reviewed. No pertinent past medical history. History reviewed. No pertinent surgical history.   Social History   Substance Use Topics     Smoking status: Never Smoker     Smokeless tobacco: Never Used     Alcohol use Yes    Family History     Problem (# of Occurrences) Relation (Name,Age of Onset)    Asthma (1)  Father    Esophageal Cancer (1) Mother (57)           Prescription Medications as of 1/5/2018             clobetasol propionate 0.05 % FOAM Apply sparingly to affected area twice daily as needed.  Do not apply to face.    clobetasol (TEMOVATE) 0.05 % external solution Apply topically 2 times daily Apply to affected areas on scalp bid as needed    sertraline (ZOLOFT) 50 MG tablet Take 1.5 tablets (75 mg) by mouth daily    cetirizine (ZYRTEC) 10 MG tablet Take 1 tablet (10 mg) by mouth every evening    omeprazole (PRILOSEC) 20 MG CR capsule     albuterol (PROAIR HFA/PROVENTIL HFA/VENTOLIN HFA) 108 (90 BASE) MCG/ACT Inhaler Inhale 2 puffs into the lungs          No Known Allergies     CONSTITUTIONAL:NEGATIVE for fever, chills, change in weight  INTEGUMENTARY/SKIN: POSITIVE for pruritis, rash and scaling  ROS : 14 point review of systems was negative except the symptoms listed above in the HPI.    This document serves as a record of the services and decisions personally performed and made by Ewa Bacon MD. It was created on her behalf by Rhys Jiang, a trained medical scribe.  The creation of this document is based on the scribe's personal observations and the provider's statements to the medical scribe.  Rhys Jiang, January 5, 2018 3:59 PM      OBJECTIVE:                                                    GENERAL: healthy, alert and no distress  SKIN: Briggs Skin Type - II.  Scalp, Face, Neck were examined. The dermatoscope was used to help evaluate pigmented lesions.  Skin Pertinent Findings:  Scalp : Some erythema, minimal scaling on occipital scalp.          ASSESSMENT:                                                      Encounter Diagnosis   Name Primary?     Scalp psoriasis Yes         PLAN:                                                    Patient Instructions   FUTURE APPOINTMENTS  Follow up in 3 months for re-evaluation of control of psoriasis.    Continue applying clobetasol propionate 0.05% foam  every 2-3 days as needed for control of symptoms.  Continue alternating between use of T/Gel and T/Sal shampoo.        PROCEDURES:                                                    None.    TT : 20 minutes.  CT : 15 minutes. Discussion regarding etiology, spectrum of psoriasis, treatment options, aggravating factors.      The information in this document, created by the medical scribe for me, accurately reflects the services I personally performed and the decisions made by me. I have reviewed and approved this document for accuracy prior to leaving the patient care area.  Ewa Bacon MD January 5, 2018 3:59 PM  [facility] - PRIMARY CARE SKIN    Again, thank you for allowing me to participate in the care of your patient.        Sincerely,        Michelle Bacon MD

## 2018-01-05 NOTE — PATIENT INSTRUCTIONS
FUTURE APPOINTMENTS  Follow up in 3 months for re-evaluation of control of psoriasis.    Continue applying clobetasol propionate 0.05% foam every 2-3 days as needed for control of symptoms.  Continue alternating between use of T/Gel and T/Sal shampoo.

## 2018-01-05 NOTE — MR AVS SNAPSHOT
After Visit Summary   1/5/2018    Krystina Suazo    MRN: [id number]           Patient Information     Date Of Birth          1985        Visit Information        Provider Department      1/5/2018 4:00 PM Michelle Bacon MD [facility]        Today's Diagnoses     Scalp psoriasis    -  1      Care Instructions    FUTURE APPOINTMENTS  Follow up in 3 months for re-evaluation of control of psoriasis.    Continue applying clobetasol propionate 0.05% foam every 2-3 days as needed for control of symptoms.  Continue alternating between use of T/Gel and T/Sal shampoo.          Follow-ups after your visit        Who to contact     If you have questions or need follow up information about today's clinic visit or your schedule please contact [facility] directly at [phone].  Normal or non-critical lab and imaging results will be communicated to you by MyChart, letter or phone within 4 business days after the clinic has received the results. If you do not hear from us within 7 days, please contact the clinic through MyChart or phone. If you have a critical or abnormal lab result, we will notify you by phone as soon as possible.  Submit refill requests through [employer] or call your pharmacy and they will forward the refill request to us. Please allow 3 business days for your refill to be completed.          Additional Information About Your Visit        MyChart Information     [employer] gives you secure access to your electronic health record. If you see a primary care provider, you can also send messages to your care team and make appointments. If you have questions, please call your primary care clinic.  If you do not have a primary care provider, please call [phone] and they will assist you.        Care EveryWhere ID     This is your Care EveryWhere ID. This could be used by other organizations to access your [facility]  records  [id number]         Blood Pressure from Last 3 Encounters:   09/05/17 118/68   05/23/17 118/64   05/19/17 125/70    Weight from Last 3 Encounters:   09/05/17 232 lb 1.6 oz (105.3 kg)   05/23/17 222 lb 11.2 oz (101 kg)   05/19/17 220 lb (99.8 kg)              Today, you had the following     No orders found for display         Today's Medication Changes          These changes are accurate as of: 1/5/18  4:05 PM.  If you have any questions, ask your nurse or doctor.               Stop taking these medicines if you haven't already. Please contact your care team if you have questions.     betamethasone dipropionate 0.05 % lotion   Commonly known as:  DIPROSONE   Stopped by:  Michelle Bacon MD                    Primary Care Provider Office Phone # Fax #    Valeria Daly [phone] [address] [place] [address]        Equal Access to Services     [facility] AH: Hadii aad ku hadasho Soomaali, waaxda luqadaha, qaybta kaalmada adeegyada, waxay idiin haydavidn christopher guillaume . So [facility] [phone].    ATENCIÓN: Si habla español, tiene a wesley disposición servicios gratuitos de asistencia lingüística. [facility] [phone].    We comply with applicable federal civil rights laws and [place] laws. We do not discriminate on the basis of race, color, national origin, age, disability, sex, sexual orientation, or gender identity.            Thank you!     Thank you for choosing [facility] - PRIMARY CARE [facility]  for your care. Our goal is always to provide you with excellent care. Hearing back from our patients is one way we can continue to improve our services. Please take a few minutes to complete the written survey that you may receive in the mail after your visit with us. Thank you!             Your Updated Medication List - Protect others around you: Learn how to safely use, store and throw away your medicines at www.disposemymeds.org.          This list  is accurate as of: 1/5/18  4:05 PM.  Always use your most recent med list.                   Brand Name Dispense Instructions for use Diagnosis    albuterol 108 (90 BASE) MCG/ACT Inhaler    PROAIR HFA/PROVENTIL HFA/VENTOLIN HFA     Inhale 2 puffs into the lungs        cetirizine 10 MG tablet    zyrTEC    90 tablet    Take 1 tablet (10 mg) by mouth every evening    Chronic rhinitis       * clobetasol 0.05 % external solution    TEMOVATE    50 mL    Apply topically 2 times daily Apply to affected areas on scalp bid as needed    Scalp psoriasis       * clobetasol propionate 0.05 % Foam     50 g    Apply sparingly to affected area twice daily as needed.  Do not apply to face.    Scalp psoriasis       omeprazole 20 MG CR capsule    priLOSEC          sertraline 50 MG tablet    ZOLOFT    135 tablet    Take 1.5 tablets (75 mg) by mouth daily    COURTNEY (generalized anxiety disorder), Major depressive disorder, recurrent episode, moderate (H)       * Notice:  This list has 2 medication(s) that are the same as other medications prescribed for you. Read the directions carefully, and ask your doctor or other care provider to review them with you.

## 2018-01-25 ENCOUNTER — MYC MEDICAL ADVICE (OUTPATIENT)
Dept: FAMILY MEDICINE | Facility: CLINIC | Age: 33
End: 2018-01-25

## 2018-01-25 DIAGNOSIS — L40.9 SCALP PSORIASIS: Primary | ICD-10-CM

## 2018-01-25 RX ORDER — FLUOCINOLONE ACETONIDE 0.11 MG/ML
2 OIL TOPICAL DAILY
Qty: 118 ML | Refills: 3 | Status: SHIPPED | OUTPATIENT
Start: 2018-01-25 | End: 2018-10-31

## 2018-01-27 DIAGNOSIS — F41.1 GAD (GENERALIZED ANXIETY DISORDER): ICD-10-CM

## 2018-01-27 DIAGNOSIS — F33.1 MAJOR DEPRESSIVE DISORDER, RECURRENT EPISODE, MODERATE (H): ICD-10-CM

## 2018-01-28 NOTE — TELEPHONE ENCOUNTER
"Requested Prescriptions   Pending Prescriptions Disp Refills     sertraline (ZOLOFT) 50 MG tablet [Pharmacy Med Name: SERTRALINE 50MG TAB]  Last Written Prescription Date:  7/3/2017  Last Fill Quantity: 135 tablet,  # refills: 1   Last Office Visit with [facility] provider:  1/5/2018   Future Office Visit:    Next 5 appointments (look out 90 days)     Feb 13, 2018  9:30 AM CST   New Prenatal with EA PRENATAL NURSE   [facility]    [address] [place] [address]  [address]  [facility] [address]   [phone]            Feb 23, 2018  2:45 PM CST   New Prenatal with Otilia Jacobo MD   [facility]    [address] [place] [address]  [address]  [facility] [address]   [phone]                  135 tablet 1     Sig: TAKE ONE & ONE-HALF TABLETS BY MOUTH ONCE DAILY    SSRIs Protocol Failed    1/27/2018  1:31 PM       Failed - PHQ-9 score less than 5 in past 6 months    The PHQ-9 criteria is meant to fail. It requires a PHQ-9 score review  PHQ-9 SCORE 4/25/2017 5/23/2017   Total Score 15 7     COURTNEY-7 SCORE 4/25/2017 5/23/2017   Total Score 16 10          Passed - Patient is age 18 or older       Passed - No active pregnancy on record       Passed - No positive pregnancy test in last 12 months       Passed - Recent (6 mo) or future visit with authorizing provider's specialty    Patient had office visit in the last 6 months or has a visit in the next 30 days with authorizing provider.  See \"Patient Info\" tab in inbasket, or \"Choose Columns\" in Meds & Orders section of the refill encounter.              "

## 2018-01-31 ENCOUNTER — MYC REFILL (OUTPATIENT)
Dept: PEDIATRICS | Facility: CLINIC | Age: 33
End: 2018-01-31

## 2018-01-31 ENCOUNTER — MYC MEDICAL ADVICE (OUTPATIENT)
Dept: PEDIATRICS | Facility: CLINIC | Age: 33
End: 2018-01-31

## 2018-01-31 DIAGNOSIS — F41.1 GAD (GENERALIZED ANXIETY DISORDER): ICD-10-CM

## 2018-01-31 DIAGNOSIS — F33.1 MAJOR DEPRESSIVE DISORDER, RECURRENT EPISODE, MODERATE (H): ICD-10-CM

## 2018-01-31 ASSESSMENT — PATIENT HEALTH QUESTIONNAIRE - PHQ9
10. IF YOU CHECKED OFF ANY PROBLEMS, HOW DIFFICULT HAVE THESE PROBLEMS MADE IT FOR YOU TO DO YOUR WORK, TAKE CARE OF THINGS AT HOME, OR GET ALONG WITH OTHER PEOPLE: SOMEWHAT DIFFICULT
SUM OF ALL RESPONSES TO PHQ QUESTIONS 1-9: 1
SUM OF ALL RESPONSES TO PHQ QUESTIONS 1-9: 1

## 2018-01-31 NOTE — TELEPHONE ENCOUNTER
Message from [place]:  Original authorizing provider: Valeria Biswas MD    Krystina Suazo would like a refill of the following medications:  sertraline (ZOLOFT) 50 MG tablet [Valeria Biswas MD]    Preferred pharmacy: [facility] - [place] - [address]    Comment:  Hello! Just checking in on a request for prescription renewal. I believe my pharmacy has been in touch, but suggested I let you know about this as well. Please let me know if you need anything more from me. Thank you! Krystina

## 2018-01-31 NOTE — TELEPHONE ENCOUNTER
PHQ-9 score:    PHQ-9 SCORE 1/31/2018   Total Score MyChart 1 (Minimal depression)   Total Score 1       Patient completed PHQ-9 today-patient is pregnant.  Routing refill request to provider for review/approval because:  Patient is pregnant.  Patient is due for appointment  ( was due in December) has an appointment scheduled with OB, not PCP, please advise.  Lolis Dukes RN  Message handled by Nurse Triage.

## 2018-01-31 NOTE — TELEPHONE ENCOUNTER
Per e-visit addressing sertraline on 6/28/17, appointment in person in 6 months-patient advise she is due for an appointment.  Patient advised appointment id due and PHQ-9 sent to patient.    Due for PHQ-9-sent to patient.  awaiting responses.  PHQ-9 SCORE 4/25/2017 5/23/2017   Total Score 15 7       Lolis Dukes RN  Message handled by Nurse Triage.

## 2018-01-31 NOTE — TELEPHONE ENCOUNTER
PHQ-9 sent to patient.  Appointment reminded sent to patient.  Lolis Dukes RN  Message handled by Nurse Triage.

## 2018-01-31 NOTE — TELEPHONE ENCOUNTER
PHQ-9 SCORE 4/25/2017 5/23/2017 1/31/2018   Total Score MyChart - - 1 (Minimal depression)   Total Score 15 7 1     Patient completed PHQ-9 , score 1 today as improved from previous.    Routing refill request to provider for review/approval because:  Patient is pregnant-due for an appointment, scheduled with OB, not PCP.  Already sent to PCP to address the refill.    Lolis Dukes RN  Message handled by Nurse Triage.

## 2018-02-01 ASSESSMENT — PATIENT HEALTH QUESTIONNAIRE - PHQ9: SUM OF ALL RESPONSES TO PHQ QUESTIONS 1-9: 1

## 2018-02-01 NOTE — TELEPHONE ENCOUNTER
Appointment scheduled for 2/13 with provider. This was refilled x 1 yesterday.  This is a 90 day request. Sent back as declined.  Mary Bridges, RN  Triage Nurse

## 2018-02-13 ENCOUNTER — OFFICE VISIT (OUTPATIENT)
Dept: PEDIATRICS | Facility: CLINIC | Age: 33
End: 2018-02-13
Payer: COMMERCIAL

## 2018-02-13 ENCOUNTER — PRENATAL OFFICE VISIT (OUTPATIENT)
Dept: NURSING | Facility: CLINIC | Age: 33
End: 2018-02-13
Payer: COMMERCIAL

## 2018-02-13 VITALS
SYSTOLIC BLOOD PRESSURE: 126 MMHG | HEART RATE: 75 BPM | HEIGHT: 69 IN | BODY MASS INDEX: 34.23 KG/M2 | TEMPERATURE: 99.3 F | OXYGEN SATURATION: 97 % | WEIGHT: 231.1 LBS | DIASTOLIC BLOOD PRESSURE: 70 MMHG

## 2018-02-13 VITALS
BODY MASS INDEX: 34.21 KG/M2 | HEIGHT: 69 IN | HEART RATE: 76 BPM | DIASTOLIC BLOOD PRESSURE: 40 MMHG | SYSTOLIC BLOOD PRESSURE: 84 MMHG | WEIGHT: 231 LBS

## 2018-02-13 DIAGNOSIS — F41.1 GAD (GENERALIZED ANXIETY DISORDER): ICD-10-CM

## 2018-02-13 DIAGNOSIS — R55 PRE-SYNCOPE: Primary | ICD-10-CM

## 2018-02-13 DIAGNOSIS — F33.1 MAJOR DEPRESSIVE DISORDER, RECURRENT EPISODE, MODERATE (H): ICD-10-CM

## 2018-02-13 DIAGNOSIS — Z34.01 ENCOUNTER FOR SUPERVISION OF NORMAL FIRST PREGNANCY IN FIRST TRIMESTER: Primary | ICD-10-CM

## 2018-02-13 DIAGNOSIS — Z23 NEED FOR PROPHYLACTIC VACCINATION AND INOCULATION AGAINST INFLUENZA: ICD-10-CM

## 2018-02-13 PROBLEM — Z34.00 SUPERVISION OF NORMAL FIRST PREGNANCY: Status: ACTIVE | Noted: 2018-02-13

## 2018-02-13 LAB
ABO + RH BLD: NORMAL
ABO + RH BLD: NORMAL
BLD GP AB SCN SERPL QL: NORMAL
BLOOD BANK CMNT PATIENT-IMP: NORMAL
ERYTHROCYTE [DISTWIDTH] IN BLOOD BY AUTOMATED COUNT: 11.3 % (ref 10–15)
HCT VFR BLD AUTO: 37.6 % (ref 35–47)
HGB BLD-MCNC: 13.3 G/DL (ref 11.7–15.7)
MCH RBC QN AUTO: 33.3 PG (ref 26.5–33)
MCHC RBC AUTO-ENTMCNC: 35.4 G/DL (ref 31.5–36.5)
MCV RBC AUTO: 94 FL (ref 78–100)
PLATELET # BLD AUTO: 230 10E9/L (ref 150–450)
RBC # BLD AUTO: 3.99 10E12/L (ref 3.8–5.2)
SPECIMEN EXP DATE BLD: NORMAL
WBC # BLD AUTO: 7.9 10E9/L (ref 4–11)

## 2018-02-13 PROCEDURE — 86762 RUBELLA ANTIBODY: CPT | Performed by: OBSTETRICS & GYNECOLOGY

## 2018-02-13 PROCEDURE — 93000 ELECTROCARDIOGRAM COMPLETE: CPT | Performed by: INTERNAL MEDICINE

## 2018-02-13 PROCEDURE — 87086 URINE CULTURE/COLONY COUNT: CPT | Performed by: OBSTETRICS & GYNECOLOGY

## 2018-02-13 PROCEDURE — 87340 HEPATITIS B SURFACE AG IA: CPT | Performed by: OBSTETRICS & GYNECOLOGY

## 2018-02-13 PROCEDURE — 90471 IMMUNIZATION ADMIN: CPT | Performed by: INTERNAL MEDICINE

## 2018-02-13 PROCEDURE — 86901 BLOOD TYPING SEROLOGIC RH(D): CPT | Performed by: OBSTETRICS & GYNECOLOGY

## 2018-02-13 PROCEDURE — 85027 COMPLETE CBC AUTOMATED: CPT | Performed by: OBSTETRICS & GYNECOLOGY

## 2018-02-13 PROCEDURE — 99207 ZZC NO CHARGE NURSE ONLY: CPT

## 2018-02-13 PROCEDURE — 86850 RBC ANTIBODY SCREEN: CPT | Performed by: OBSTETRICS & GYNECOLOGY

## 2018-02-13 PROCEDURE — 99214 OFFICE O/P EST MOD 30 MIN: CPT | Mod: 25 | Performed by: INTERNAL MEDICINE

## 2018-02-13 PROCEDURE — 86900 BLOOD TYPING SEROLOGIC ABO: CPT | Performed by: OBSTETRICS & GYNECOLOGY

## 2018-02-13 PROCEDURE — 87389 HIV-1 AG W/HIV-1&-2 AB AG IA: CPT | Performed by: OBSTETRICS & GYNECOLOGY

## 2018-02-13 PROCEDURE — 90686 IIV4 VACC NO PRSV 0.5 ML IM: CPT | Performed by: INTERNAL MEDICINE

## 2018-02-13 PROCEDURE — 86780 TREPONEMA PALLIDUM: CPT | Performed by: OBSTETRICS & GYNECOLOGY

## 2018-02-13 PROCEDURE — 36415 COLL VENOUS BLD VENIPUNCTURE: CPT | Performed by: OBSTETRICS & GYNECOLOGY

## 2018-02-13 RX ORDER — PRENATAL VIT/IRON FUM/FOLIC AC 27MG-0.8MG
1 TABLET ORAL DAILY
Qty: 100 TABLET | Refills: 3
Start: 2018-02-13 | End: 2022-09-07

## 2018-02-13 ASSESSMENT — ANXIETY QUESTIONNAIRES
7. FEELING AFRAID AS IF SOMETHING AWFUL MIGHT HAPPEN: NOT AT ALL
2. NOT BEING ABLE TO STOP OR CONTROL WORRYING: NOT AT ALL
1. FEELING NERVOUS, ANXIOUS, OR ON EDGE: SEVERAL DAYS
GAD7 TOTAL SCORE: 1
6. BECOMING EASILY ANNOYED OR IRRITABLE: NOT AT ALL
1. FEELING NERVOUS, ANXIOUS, OR ON EDGE: SEVERAL DAYS
IF YOU CHECKED OFF ANY PROBLEMS ON THIS QUESTIONNAIRE, HOW DIFFICULT HAVE THESE PROBLEMS MADE IT FOR YOU TO DO YOUR WORK, TAKE CARE OF THINGS AT HOME, OR GET ALONG WITH OTHER PEOPLE: NOT DIFFICULT AT ALL
5. BEING SO RESTLESS THAT IT IS HARD TO SIT STILL: NOT AT ALL
IF YOU CHECKED OFF ANY PROBLEMS ON THIS QUESTIONNAIRE, HOW DIFFICULT HAVE THESE PROBLEMS MADE IT FOR YOU TO DO YOUR WORK, TAKE CARE OF THINGS AT HOME, OR GET ALONG WITH OTHER PEOPLE: NOT DIFFICULT AT ALL
3. WORRYING TOO MUCH ABOUT DIFFERENT THINGS: NOT AT ALL
5. BEING SO RESTLESS THAT IT IS HARD TO SIT STILL: NOT AT ALL
3. WORRYING TOO MUCH ABOUT DIFFERENT THINGS: NOT AT ALL
2. NOT BEING ABLE TO STOP OR CONTROL WORRYING: NOT AT ALL
6. BECOMING EASILY ANNOYED OR IRRITABLE: NOT AT ALL
GAD7 TOTAL SCORE: 1
7. FEELING AFRAID AS IF SOMETHING AWFUL MIGHT HAPPEN: NOT AT ALL

## 2018-02-13 ASSESSMENT — PATIENT HEALTH QUESTIONNAIRE - PHQ9
5. POOR APPETITE OR OVEREATING: NOT AT ALL
5. POOR APPETITE OR OVEREATING: NOT AT ALL

## 2018-02-13 NOTE — PROGRESS NOTES
SUBJECTIVE:   Krystina Suazo is a 32 year old female who presents to clinic today for the following health issues:      Depression and Anxiety Follow-Up    Status since last visit: No change    Other associated symptoms:is pregnant, a little anxious about first child    Complicating factors:     Significant life event: No     Current substance abuse: None    PHQ-9 5/23/2017 1/31/2018 2/13/2018   Total Score 7 1 2   Q9: Suicide Ideation Not at all Not at all Not at all     COURTNEY-7 SCORE 4/25/2017 5/23/2017 2/13/2018   Total Score 16 10 1     doing well  PHQ-9  English  PHQ-9   Any Language  COURTNEY-7  Suicide Assessment Five-step Evaluation and Treatment (SAFE-T)    Amount of exercise or physical activity: pregnant, feeling nauseated, and sick    Problems taking medications regularly: No    Medication side effects: none    Diet: regular (no restrictions)    Tends to faint easily with blood work. Has been like this since a teenager. Was in today to see nurse for her first OB visit an was getting ready to go down for blood work. Was a little overwhelmed and felt like she was going to faint. Drank some juice and felt better. Did fine with blood draw in lab.     Happens usually only with bloodwork, even happened once at the eye doctor when her eyes were dilated. Had 1 episode of syncope when she found out bad news about a house she was buying that required significant expensive work.     Problem list and histories reviewed & adjusted, as indicated.  Additional history: as documented    Patient Active Problem List   Diagnosis     Chronic rhinitis     COURTNEY (generalized anxiety disorder)     Major depressive disorder, recurrent episode, moderate (H)     Supervision of normal first pregnancy     Past Surgical History:   Procedure Laterality Date     CARPAL TUNNEL RELEASE RT/LT Bilateral 2013     SINUS SURGERY  2014     TONSILLECTOMY  2014       Social History   Substance Use Topics     Smoking status: Never Smoker      "Smokeless tobacco: Never Used     Alcohol use No     Family History   Problem Relation Age of Onset     Esophageal Cancer Mother 57     DIABETES Mother      Hypertension Mother      Asthma Father          Current Outpatient Prescriptions   Medication Sig Dispense Refill     Prenatal Vit-Fe Fumarate-FA (PRENATAL MULTIVITAMIN PLUS IRON) 27-0.8 MG TABS per tablet Take 1 tablet by mouth daily 100 tablet 3     sertraline (ZOLOFT) 50 MG tablet Take 1.5 tablets (75 mg) by mouth daily 135 tablet 1     [DISCONTINUED] sertraline (ZOLOFT) 50 MG tablet Take 1.5 tablets (75 mg) by mouth daily 45 tablet 0     Fluocinolone Acetonide Scalp 0.01 % OIL oil Apply 2 mLs topically daily To scalp (Patient not taking: Reported on 2/13/2018) 118 mL 3     clobetasol propionate 0.05 % FOAM Apply sparingly to affected area twice daily as needed.  Do not apply to face. (Patient not taking: Reported on 2/13/2018) 50 g 3     clobetasol (TEMOVATE) 0.05 % external solution Apply topically 2 times daily Apply to affected areas on scalp bid as needed 50 mL 3     cetirizine (ZYRTEC) 10 MG tablet Take 1 tablet (10 mg) by mouth every evening (Patient not taking: Reported on 2/13/2018) 90 tablet 11     albuterol (PROAIR HFA/PROVENTIL HFA/VENTOLIN HFA) 108 (90 BASE) MCG/ACT Inhaler Inhale 2 puffs into the lungs         Reviewed and updated as needed this visit by clinical staff  Tobacco  Allergies  Meds  Med Hx  Surg Hx  Fam Hx  Soc Hx      Reviewed and updated as needed this visit by Provider         ROS:  Constitutional, HEENT, cardiovascular, pulmonary, GI, , musculoskeletal, neuro, skin, endocrine and psych systems are negative, except as otherwise noted.    OBJECTIVE:     /70 (Cuff Size: Adult Regular)  Pulse 75  Temp 99.3  F (37.4  C) (Oral)  Ht 5' 9\" (1.753 m)  Wt 231 lb 1.6 oz (104.8 kg)  LMP 12/20/2017  SpO2 97%  BMI 34.13 kg/m2  Body mass index is 34.13 kg/(m^2).  GENERAL: healthy, alert and no distress  EYES: Eyes grossly " normal to inspection, PERRL and conjunctivae and sclerae normal  HENT: ear canals and TM's normal, nose and mouth without ulcers or lesions  NECK: no adenopathy, no asymmetry, masses, or scars and thyroid normal to palpation  RESP: lungs clear to auscultation - no rales, rhonchi or wheezes  CV: regular rate and rhythm, normal S1 S2, no S3 or S4, no murmur, click or rub, no peripheral edema and peripheral pulses strong  ABDOMEN: soft, nontender, no hepatosplenomegaly, no masses and bowel sounds normal  MS: no gross musculoskeletal defects noted, no edema  NEURO: Normal strength and tone, mentation intact and speech normal  PSYCH: mentation appears normal, affect normal/bright    Diagnostic Test Results:    EKG: NSR, no st/t/changes    Results for orders placed or performed in visit on 02/13/18 (from the past 24 hour(s))   CBC with Platelets   Result Value Ref Range    WBC 7.9 4.0 - 11.0 10e9/L    RBC Count 3.99 3.8 - 5.2 10e12/L    Hemoglobin 13.3 11.7 - 15.7 g/dL    Hematocrit 37.6 35.0 - 47.0 %    MCV 94 78 - 100 fl    MCH 33.3 (H) 26.5 - 33.0 pg    MCHC 35.4 31.5 - 36.5 g/dL    RDW 11.3 10.0 - 15.0 %    Platelet Count 230 150 - 450 10e9/L   ABO/RH Type and Screen   Result Value Ref Range    ABO O     RH(D) Pos     Antibody Screen Neg     Test Valid Only At [facility]        Specimen Expires 02/16/2018        ASSESSMENT/PLAN:     1. Pre-syncope  Since her teenage years. Usually only happens with blood draws. Feels lightheaded and like she will pass out. Only had 1 syncopal episode years ago when she received bad news. EKG today unremarkable. No anemia. Given longstanding condition, will hold off on further evaluation at this time. Offered consultation with cardiologist, suspect vasovagal syncope primarily with blood draws, as today. If she has episodes of dizziness without explanation she will let me know.  - EKG 12-lead complete w/read - Clinics    2. COURTNEY (generalized anxiety disorder)  Plan continue  current dose. Symptoms well controlled, but is having some mild anxiety regarding her pregnancy. Discussed risks of SSRI in pregnancy balanced by risks of untreated depression and anxiety. Consider trial on lower dose in 2nd/3rd trimesters if she does well.  - sertraline (ZOLOFT) 50 MG tablet; Take 1.5 tablets (75 mg) by mouth daily  Dispense: 135 tablet; Refill: 1    3. Major depressive disorder, recurrent episode, moderate (H)  As above  - sertraline (ZOLOFT) 50 MG tablet; Take 1.5 tablets (75 mg) by mouth daily  Dispense: 135 tablet; Refill: 1    4. Need for prophylactic vaccination and inoculation against influenza  - FLU VAC, SPLIT VIRUS IM > 3 YO (QUADRIVALENT) [96089]  - Vaccine Administration, Initial [41334]    Patient Instructions   If you have more episodes of lightheadedness that are at unexpected times, let me know right away.    Let's keep your sertraline at 75 mg daily for now.  When you are into your second trimester, if you start feeling even better control, we could try decreasing to 50 mg through the rest of your pregnancy.   After delivery, we should strongly consider automatically increasing back to 75 mg daily.          Valeria Biswas MD  [facility]    Injectable Influenza Immunization Documentation    1.  Is the person to be vaccinated sick today?   No    2. Does the person to be vaccinated have an allergy to a component   of the vaccine?   No  Egg Allergy Algorithm Link    3. Has the person to be vaccinated ever had a serious reaction   to influenza vaccine in the past?   No    4. Has the person to be vaccinated ever had Guillain-Barré syndrome?   No    Form completed by Marquita BENJAMIN

## 2018-02-13 NOTE — PROGRESS NOTES
"Chief Complaint   Patient presents with     Prenatal Care     New Prenatal Nurse aliciat   7w6d  Estimated Date of Delivery: Sep 26, 2018      Initial BP (!) 84/40 (BP Location: Right arm, Cuff Size: Adult Large)  Pulse 76  Ht 5' 9\" (1.753 m)  Wt 231 lb (104.8 kg)  LMP 12/20/2017  BMI 34.11 kg/m2 Estimated body mass index is 34.11 kg/(m^2) as calculated from the following:    Height as of this encounter: 5' 9\" (1.753 m).    Weight as of this encounter: 231 lb (104.8 kg).  Medication Reconciliation: complete     Patient is accompanied by . Prenatal book and folder (containing standard educational hand-outs and brochures) given to patient. Information in folder reviewed. Questions answered.     Discussed and gave written information on options available for 1st and 2nd trimester screening, CVS, amniocentesis, AFP, and QUAD screen plus optimal time-frame for testing. Brochure given on optional screening available to determine Cystic Fibrosis carrier status. Pt instructed to check with insurance regarding coverage of these optional tests. Pt advised to call back if she desires testing or has any questions or concerns.    Prenatal labs obtained. New prenatal visit scheduled on 2/23/18 with Dr. Jacobo.  Sania Horn RN          "

## 2018-02-13 NOTE — PATIENT INSTRUCTIONS
If you have more episodes of lightheadedness that are at unexpected times, let me know right away.    Let's keep your sertraline at 75 mg daily for now.  When you are into your second trimester, if you start feeling even better control, we could try decreasing to 50 mg through the rest of your pregnancy.   After delivery, we should strongly consider automatically increasing back to 75 mg daily.

## 2018-02-13 NOTE — NURSING NOTE
"Chief Complaint   Patient presents with     RECHECK       Initial /70 (Cuff Size: Adult Regular)  Pulse 75  Temp 99.3  F (37.4  C) (Oral)  Ht 5' 9\" (1.753 m)  Wt 231 lb 1.6 oz (104.8 kg)  LMP 12/20/2017  SpO2 97%  BMI 34.13 kg/m2 Estimated body mass index is 34.13 kg/(m^2) as calculated from the following:    Height as of this encounter: 5' 9\" (1.753 m).    Weight as of this encounter: 231 lb 1.6 oz (104.8 kg).  Medication Reconciliation: complete   Franca Pereira LPN    "

## 2018-02-13 NOTE — MR AVS SNAPSHOT
After Visit Summary   2/13/2018    Krystina Suazo    MRN: [id number]           Patient Information     Date Of Birth          1985        Visit Information        Provider Department      2/13/2018 9:30 AM EA PRENATAL NURSE [facility]        Today's Diagnoses     Encounter for supervision of normal first pregnancy in first trimester    -  1       Follow-ups after your visit        Your next 10 appointments already scheduled     Feb 13, 2018 11:10 AM CST   MyChart Long with Valeria Daly MD   [facility] ([facility])    [address] [place] [address]  [address]  [facility] [address]   [phone]            Feb 15, 2018 10:30 AM CST   (Arrive by 10:15 AM)   Ultrasound with OXUS1   [facility] ([facility])    [address] [id number] [phone]            Feb 23, 2018  2:45 PM CST   New Prenatal with Otilia Jacobo MD   [facility] ([facility])    [address] [place] [address]  [address]  [facility] [address]   [phone]              Future tests that were ordered for you today     Open Future Orders        Priority Expected Expires Ordered    US OB < 14 weeks, single,  for dating ([id number]) Routine  5/14/2018 2/13/2018            Who to contact     If you have questions or need follow up information about today's clinic visit or your schedule please contact [facility] directly at [phone].  Normal or non-critical lab and imaging results will be communicated to you by MyChart, letter or phone within 4 business days after the clinic has received the results. If you do not hear from us within 7 days, please contact the clinic through MyChart or phone. If you have a critical or abnormal lab result, we will notify you by phone as soon as possible.  Submit refill requests through [employer] or call your pharmacy and they will forward the  "refill request to us. Please allow 3 business days for your refill to be completed.          Additional Information About Your Visit        [employer] Information     [employer] gives you secure access to your electronic health record. If you see a primary care provider, you can also send messages to your care team and make appointments. If you have questions, please call your primary care clinic.  If you do not have a primary care provider, please call [phone] and they will assist you.        Care EveryWhere ID     This is your Care EveryWhere ID. This could be used by other organizations to access your [place] medical records  [id number]        Your Vitals Were     Pulse Height Last Period BMI (Body Mass Index)          76 5' 9\" (1.753 m) 12/20/2017 34.11 kg/m2         Blood Pressure from Last 3 Encounters:   02/13/18 (!) 84/40   09/05/17 118/68   05/23/17 118/64    Weight from Last 3 Encounters:   02/13/18 231 lb (104.8 kg)   09/05/17 232 lb 1.6 oz (105.3 kg)   05/23/17 222 lb 11.2 oz (101 kg)              We Performed the Following     ABO/RH Type and Screen     Anti Treponema     CBC with Platelets     Hepatitis B surface antigen     HIV Antigen Antibody Combo     Rubella Antibody IgG Quantitative     Urine Culture Aerobic Bacterial          Today's Medication Changes          These changes are accurate as of 2/13/18 10:55 AM.  If you have any questions, ask your nurse or doctor.               Start taking these medicines.        Dose/Directions    prenatal multivitamin plus iron 27-0.8 MG Tabs per tablet   Used for:  Encounter for supervision of normal first pregnancy in first trimester        Dose:  1 tablet   Take 1 tablet by mouth daily   Quantity:  100 tablet   Refills:  3            Where to get your medicines      Some of these will need a paper prescription and others can be bought over the counter.  Ask your nurse if you have questions.     You don't need a prescription for these medications     " prenatal multivitamin plus iron 27-0.8 MG Tabs per tablet                Primary Care Provider Office Phone # Fax #    Valeria Daly [phone]       [facility] [place] [address]        Equal Access to Services     LEANDRAJOSE ANGEL TESSIE : Hadii aad ku hadnayano Soomaali, waaxda luqadaha, qaybta kaalmada adeegyada, ruth abigailin hayaan lennoxheri wilson latomerwalt . So [facility] [phone].    ATENCIÓN: Si habla español, tiene a wesley disposición servicios gratuitos de asistencia lingüística. [facility] [phone].    We comply with applicable federal civil rights laws and [place] laws. We do not discriminate on the basis of race, color, national origin, age, disability, sex, sexual orientation, or gender identity.            Thank you!     Thank you for choosing [facility] [place]  for your care. Our goal is always to provide you with excellent care. Hearing back from our patients is one way we can continue to improve our services. Please take a few minutes to complete the written survey that you may receive in the mail after your visit with us. Thank you!             Your Updated Medication List - Protect others around you: Learn how to safely use, store and throw away your medicines at www.disposemymeds.org.          This list is accurate as of 2/13/18 10:55 AM.  Always use your most recent med list.                   Brand Name Dispense Instructions for use Diagnosis    albuterol 108 (90 BASE) MCG/ACT Inhaler    PROAIR HFA/PROVENTIL HFA/VENTOLIN HFA     Inhale 2 puffs into the lungs        cetirizine 10 MG tablet    zyrTEC    90 tablet    Take 1 tablet (10 mg) by mouth every evening    Chronic rhinitis       * clobetasol 0.05 % external solution    TEMOVATE    50 mL    Apply topically 2 times daily Apply to affected areas on scalp bid as needed    Scalp psoriasis       * clobetasol propionate 0.05 % Foam     50 g    Apply sparingly to affected area twice daily as needed.  Do not apply to face.     Scalp psoriasis       Fluocinolone Acetonide Scalp 0.01 % Oil oil     118 mL    Apply 2 mLs topically daily To scalp    Scalp psoriasis       prenatal multivitamin plus iron 27-0.8 MG Tabs per tablet     100 tablet    Take 1 tablet by mouth daily    Encounter for supervision of normal first pregnancy in first trimester       sertraline 50 MG tablet    ZOLOFT    45 tablet    Take 1.5 tablets (75 mg) by mouth daily    COURTNEY (generalized anxiety disorder), Major depressive disorder, recurrent episode, moderate (H)       * Notice:  This list has 2 medication(s) that are the same as other medications prescribed for you. Read the directions carefully, and ask your doctor or other care provider to review them with you.

## 2018-02-13 NOTE — MR AVS SNAPSHOT
After Visit Summary   2/13/2018    Krystina Suazo    MRN: [id number]           Patient Information     Date Of Birth          1985        Visit Information        Provider Department      2/13/2018 11:10 AM Valeria Daly MD [facility]        Today's Diagnoses     Pre-syncope    -  1    Need for prophylactic vaccination and inoculation against influenza        COURTNEY (generalized anxiety disorder)        Major depressive disorder, recurrent episode, moderate (H)          Care Instructions    If you have more episodes of lightheadedness that are at unexpected times, let me know right away.    Let's keep your sertraline at 75 mg daily for now.  When you are into your second trimester, if you start feeling even better control, we could try decreasing to 50 mg through the rest of your pregnancy.   After delivery, we should strongly consider automatically increasing back to 75 mg daily.              Follow-ups after your visit        Your next 10 appointments already scheduled     Feb 15, 2018 10:30 AM CST   (Arrive by 10:15 AM)   Ultrasound with OXUS1   [facility] ([facility])    [address] [address] [phone]            Feb 23, 2018  2:45 PM CST   New Prenatal with Otilia Jacobo MD   [facility] ([facility])    [address] [address] [phone]              Future tests that were ordered for you today     Open Future Orders        Priority Expected Expires Ordered    US OB < 14 weeks, single,  for dating ([id number]) Routine  5/14/2018 2/13/2018            Who to contact     If you have questions or need follow up information about today's clinic visit or your schedule please contact [facility] directly at [phone].  Normal or non-critical lab and imaging results will be communicated to you by MyChart, letter or phone  "within 4 business days after the clinic has received the results. If you do not hear from us within 7 days, please contact the clinic through [employer] or phone. If you have a critical or abnormal lab result, we will notify you by phone as soon as possible.  Submit refill requests through [employer] or call your pharmacy and they will forward the refill request to us. Please allow 3 business days for your refill to be completed.          Additional Information About Your Visit        [employer] Information     [employer] gives you secure access to your electronic health record. If you see a primary care provider, you can also send messages to your care team and make appointments. If you have questions, please call your primary care clinic.  If you do not have a primary care provider, please call [phone] and they will assist you.        Care EveryWhere ID     This is your Care EveryWhere ID. This could be used by other organizations to access your [place] medical records  [id number]        Your Vitals Were     Pulse Temperature Height Last Period Pulse Oximetry BMI (Body Mass Index)    75 99.3  F (37.4  C) (Oral) 5' 9\" (1.753 m) 12/20/2017 97% 34.13 kg/m2       Blood Pressure from Last 3 Encounters:   02/13/18 126/70   02/13/18 (!) 84/40   09/05/17 118/68    Weight from Last 3 Encounters:   02/13/18 231 lb 1.6 oz (104.8 kg)   02/13/18 231 lb (104.8 kg)   09/05/17 232 lb 1.6 oz (105.3 kg)              We Performed the Following     EKG 12-lead complete w/read - Clinics     FLU VAC, SPLIT VIRUS IM > 3 YO (QUADRIVALENT) [99596]     Vaccine Administration, Initial [85774]          Today's Medication Changes          These changes are accurate as of 2/13/18  1:13 PM.  If you have any questions, ask your nurse or doctor.               Start taking these medicines.        Dose/Directions    prenatal multivitamin plus iron 27-0.8 MG Tabs per tablet   Used for:  Encounter for supervision of normal first pregnancy in first " trimester        Dose:  1 tablet   Take 1 tablet by mouth daily   Quantity:  100 tablet   Refills:  3            Where to get your medicines      These medications were sent to [id number] - [place], [place] - [address]  [address] [facility] [address]     Phone:  [phone]     sertraline 50 MG tablet         Some of these will need a paper prescription and others can be bought over the counter.  Ask your nurse if you have questions.     You don't need a prescription for these medications     prenatal multivitamin plus iron 27-0.8 MG Tabs per tablet                Primary Care Provider Office Phone # Fax #    Valeria Daly [phone] [address] [place] [address]        Equal Access to Services     [facility] : Hadii sanjiv lunao Somadelyn, waaxda luqadaha, qaybta kaalmada adeegyada, ruth guillaume . So [facility] [phone].    ATENCIÓN: Si habla español, tiene a wesley disposición servicios gratuitos de asistencia lingüística. [facility] [phone].    We comply with applicable federal civil rights laws and [place] laws. We do not discriminate on the basis of race, color, national origin, age, disability, sex, sexual orientation, or gender identity.            Thank you!     Thank you for choosing [facility]  for your care. Our goal is always to provide you with excellent care. Hearing back from our patients is one way we can continue to improve our services. Please take a few minutes to complete the written survey that you may receive in the mail after your visit with us. Thank you!             Your Updated Medication List - Protect others around you: Learn how to safely use, store and throw away your medicines at www.disposemymeds.org.          This list is accurate as of 2/13/18  1:13 PM.  Always use your most recent med list.                   Brand Name Dispense Instructions for use Diagnosis    albuterol  108 (90 BASE) MCG/ACT Inhaler    PROAIR HFA/PROVENTIL HFA/VENTOLIN HFA     Inhale 2 puffs into the lungs        cetirizine 10 MG tablet    zyrTEC    90 tablet    Take 1 tablet (10 mg) by mouth every evening    Chronic rhinitis       * clobetasol 0.05 % external solution    TEMOVATE    50 mL    Apply topically 2 times daily Apply to affected areas on scalp bid as needed    Scalp psoriasis       * clobetasol propionate 0.05 % Foam     50 g    Apply sparingly to affected area twice daily as needed.  Do not apply to face.    Scalp psoriasis       Fluocinolone Acetonide Scalp 0.01 % Oil oil     118 mL    Apply 2 mLs topically daily To scalp    Scalp psoriasis       prenatal multivitamin plus iron 27-0.8 MG Tabs per tablet     100 tablet    Take 1 tablet by mouth daily    Encounter for supervision of normal first pregnancy in first trimester       sertraline 50 MG tablet    ZOLOFT    135 tablet    Take 1.5 tablets (75 mg) by mouth daily    COURTNEY (generalized anxiety disorder), Major depressive disorder, recurrent episode, moderate (H)       * Notice:  This list has 2 medication(s) that are the same as other medications prescribed for you. Read the directions carefully, and ask your doctor or other care provider to review them with you.

## 2018-02-14 LAB
BACTERIA SPEC CULT: NORMAL
HBV SURFACE AG SERPL QL IA: NONREACTIVE
HIV 1+2 AB+HIV1 P24 AG SERPL QL IA: NONREACTIVE
RUBV IGG SERPL IA-ACNC: 70 IU/ML
SPECIMEN SOURCE: NORMAL
T PALLIDUM IGG+IGM SER QL: NEGATIVE

## 2018-02-14 ASSESSMENT — PATIENT HEALTH QUESTIONNAIRE - PHQ9: SUM OF ALL RESPONSES TO PHQ QUESTIONS 1-9: 2

## 2018-02-14 ASSESSMENT — ANXIETY QUESTIONNAIRES: GAD7 TOTAL SCORE: 1

## 2018-02-15 DIAGNOSIS — Z34.01 ENCOUNTER FOR SUPERVISION OF NORMAL FIRST PREGNANCY IN FIRST TRIMESTER: ICD-10-CM

## 2018-02-19 ENCOUNTER — MYC MEDICAL ADVICE (OUTPATIENT)
Dept: PEDIATRICS | Facility: CLINIC | Age: 33
End: 2018-02-19

## 2018-02-19 NOTE — TELEPHONE ENCOUNTER
Risk cannot be ruled out    Either studies in animals have revealed adverse effects on the fetus (teratogenic or embryocidal effects or other) and there are no controlled studies in women or studies in women and animals are not available. Drugs should be given only if the potential benefits justify the potential risk to the fetus.

## 2018-02-23 ENCOUNTER — PRENATAL OFFICE VISIT (OUTPATIENT)
Dept: OBGYN | Facility: CLINIC | Age: 33
End: 2018-02-23
Payer: COMMERCIAL

## 2018-02-23 VITALS
HEIGHT: 69 IN | WEIGHT: 234 LBS | HEART RATE: 76 BPM | DIASTOLIC BLOOD PRESSURE: 60 MMHG | BODY MASS INDEX: 34.66 KG/M2 | SYSTOLIC BLOOD PRESSURE: 122 MMHG

## 2018-02-23 DIAGNOSIS — O09.529 SUPERVISION OF HIGH-RISK PREGNANCY OF ELDERLY MULTIGRAVIDA: Primary | ICD-10-CM

## 2018-02-23 DIAGNOSIS — D25.9 UTERINE LEIOMYOMA, UNSPECIFIED LOCATION: ICD-10-CM

## 2018-02-23 PROBLEM — R55 VASOVAGAL SYNCOPE: Status: ACTIVE | Noted: 2018-02-23

## 2018-02-23 PROCEDURE — 87491 CHLMYD TRACH DNA AMP PROBE: CPT | Performed by: OBSTETRICS & GYNECOLOGY

## 2018-02-23 PROCEDURE — 87591 N.GONORRHOEAE DNA AMP PROB: CPT | Performed by: OBSTETRICS & GYNECOLOGY

## 2018-02-23 PROCEDURE — 99207 ZZC FIRST OB VISIT: CPT | Performed by: OBSTETRICS & GYNECOLOGY

## 2018-02-23 NOTE — MR AVS SNAPSHOT
After Visit Summary   2/23/2018    Krystina Suazo    MRN: [id number]           Patient Information     Date Of Birth          1985        Visit Information        Provider Department      2/23/2018 2:45 PM Otilia Jacobo MD [facility]        Today's Diagnoses     Supervision of high-risk pregnancy of elderly multigravida    -  1    Uterine leiomyoma, unspecified location           Follow-ups after your visit        Your next 10 appointments already scheduled     Mar 21, 2018 11:15 AM CDT   ESTABLISHED PRENATAL with Otilia Jacobo MD   [facility]    [address]  [facility] [address]   [phone]            Apr 20, 2018  1:45 PM CDT   ESTABLISHED PRENATAL with Otilia Jacobo MD   [facility] ([facility]    [address] [place] [address]  [address]  [facility] [id number] [phone]            May 24, 2018  4:00 PM CDT   ESTABLISHED PRENATAL with Otilia Jacobo MD   [facility] ([facility])    [address] [place] [address]  [address]  [facility] [address] [phone]              Who to contact     If you have questions or need follow up information about today's clinic visit or your schedule please contact [facility] directly at [phone].  Normal or non-critical lab and imaging results will be communicated to you by MyChart, letter or phone within 4 business days after the clinic has received the results. If you do not hear from us within 7 days, please contact the clinic through MyChart or phone. If you have a critical or abnormal lab result, we will notify you by phone as soon as possible.  Submit refill requests through [facility] or call your pharmacy and they will forward the refill request to us. Please allow 3 business days for your refill to be completed.          Additional Information About Your Visit        [facility]  "Information     [employer] gives you secure access to your electronic health record. If you see a primary care provider, you can also send messages to your care team and make appointments. If you have questions, please call your primary care clinic.  If you do not have a primary care provider, please call [phone] and they will assist you.        Care EveryWhere ID     This is your Care EveryWhere ID. This could be used by other organizations to access your [place] medical records  [id number]        Your Vitals Were     Pulse Height Last Period BMI (Body Mass Index)          76 5' 9\" (1.753 m) 12/20/2017 34.56 kg/m2         Blood Pressure from Last 3 Encounters:   02/23/18 122/60   02/13/18 126/70   02/13/18 (!) 84/40    Weight from Last 3 Encounters:   02/23/18 234 lb (106.1 kg)   02/13/18 231 lb 1.6 oz (104.8 kg)   02/13/18 231 lb (104.8 kg)              We Performed the Following     CHLAMYDIA TRACHOMATIS PCR     NEISSERIA GONORRHOEA PCR        Primary Care Provider Office Phone # Fax #    Valeria Daly [phone] [address] [place] [address]        Equal Access to Services     [facility] AH: Hadii aad ku hadasho Soomaali, waaxda luqadaha, qaybta kaalmada adeegyada, ruth zhao haydavidn christopher guillaume . So [facility] [phone].    ATENCIÓN: Si habla español, tiene a wesley disposición servicios gratuitos de asistencia lingüística. Llame al [phone].    We comply with applicable federal civil rights laws and [place] laws. We do not discriminate on the basis of race, color, national origin, age, disability, sex, sexual orientation, or gender identity.            Thank you!     Thank you for choosing [facility]  for your care. Our goal is always to provide you with excellent care. Hearing back from our patients is one way we can continue to improve our services. Please take a few minutes to complete the written survey that you may receive in the mail " after your visit with us. Thank you!             Your Updated Medication List - Protect others around you: Learn how to safely use, store and throw away your medicines at www.disposemymeds.org.          This list is accurate as of 2/23/18  4:56 PM.  Always use your most recent med list.                   Brand Name Dispense Instructions for use Diagnosis    albuterol 108 (90 BASE) MCG/ACT Inhaler    PROAIR HFA/PROVENTIL HFA/VENTOLIN HFA     Inhale 2 puffs into the lungs        cetirizine 10 MG tablet    zyrTEC    90 tablet    Take 1 tablet (10 mg) by mouth every evening    Chronic rhinitis       * clobetasol 0.05 % external solution    TEMOVATE    50 mL    Apply topically 2 times daily Apply to affected areas on scalp bid as needed    Scalp psoriasis       * clobetasol propionate 0.05 % Foam     50 g    Apply sparingly to affected area twice daily as needed.  Do not apply to face.    Scalp psoriasis       Fluocinolone Acetonide Scalp 0.01 % Oil oil     118 mL    Apply 2 mLs topically daily To scalp    Scalp psoriasis       omeprazole 20 MG CR capsule    priLOSEC     TAKE ONE CAPSULE BY MOUTH ONCE DAILY        prenatal multivitamin plus iron 27-0.8 MG Tabs per tablet     100 tablet    Take 1 tablet by mouth daily    Encounter for supervision of normal first pregnancy in first trimester       sertraline 50 MG tablet    ZOLOFT    135 tablet    Take 1.5 tablets (75 mg) by mouth daily    COURTNEY (generalized anxiety disorder), Major depressive disorder, recurrent episode, moderate (H)       * Notice:  This list has 2 medication(s) that are the same as other medications prescribed for you. Read the directions carefully, and ask your doctor or other care provider to review them with you.

## 2018-02-23 NOTE — PROGRESS NOTES
SUBJECTIVE: Krystina Suazo is an 32 year old female   Obstetric History       T0      L0     SAB0   TAB0   Ectopic0   Multiple0   Live Births0     here for initial OB visit.    Past Medical History of Father of Baby: No significant medical history    Employment: , full time at the [employer]    Dating: consistent with dates on first trimester scan     Past Medical History:   Diagnosis Date     Asthma      Depression           Past Surgical History:   Procedure Laterality Date     CARPAL TUNNEL RELEASE RT/LT Bilateral 2013     SINUS SURGERY  2014     TONSILLECTOMY  2014          Family History   Problem Relation Age of Onset     Esophageal Cancer Mother 57     DIABETES Mother      Hypertension Mother      Asthma Father          Social History     Social History     Marital status:      Spouse name: Yakov     Number of children: N/A     Years of education: 16     Occupational History           Social History Main Topics     Smoking status: Never Smoker     Smokeless tobacco: Never Used     Alcohol use No     Drug use: No     Sexual activity: Yes     Partners: Male     Other Topics Concern     Not on file     Social History Narrative       Review of Systems:   CONSTITUTIONAL:NEGATIVE for fever, chills, change in weight  INTEGUMENTARY/SKIN: NEGATIVE for worrisome rashes, moles or lesions  RESP:NEGATIVE for significant cough or SOB  BREAST: NEGATIVE for masses, tenderness or discharge  CV: NEGATIVE for chest pain, palpitations or peripheral edema  GI: NEGATIVE for abdominal pain, heartburn, or change in bowel habits: +nausea  : negative for, dysuria, vaginal discharge and bleeding  MUSCULOSKELETAL: NEGATIVE for significant arthralgias or myalgia  NEURO: NEGATIVE for weakness, dizziness or paresthesias  PSYCHIATRIC: NEGATIVE for changes in mood or affect    EXAM: LMP 2017  GENERAL APPEARANCE: healthy, alert and no distress  NECK: no adenopathy, no  asymmetry, masses, or scars and thyroid normal to palpation  RESP: lungs clear to auscultation - no rales, rhonchi or wheezes  BREAST: normal without masses, tenderness or nipple discharge and no palpable axillary masses or adenopathy  CV: regular rates and rhythm, normal S1 S2, no S3 or S4 and no murmur, click or rub -  ABDOMEN:  soft, nontender, no HSM or masses and bowel sounds normal      [employer] exam:  Perineum: Intact;   Vulva: Normal;  Vagina: Normal mucosa, no discharge,   Cervix: Nulliparous, closed, mobile,  no discharge;  Uterus: 10-12  weeks,  Normal shape, position and consistency;   Adnexa: Normal;  Rectum: Normal without lesion or mass;   Bony Pelvis: Adequate.     ASSESSMENT/ PLAN:  Follow up in 4 weeks.  Normal exercise.  Normal sexual activity.  Prenatal vitamins.  Anticipated weight gain: obese BMI (>30): 11-20 lbs (0.5lbs/wk)  Patient counselled on prenatal testing options to include 1st trimester screening, cell-free fetal DNA testing, quad screen, cystic fibrosis screening, and age related testing as appropriate for aneuploiding including possibly chorionic villus sampling, amniocentesis, level II ultrasound.   Patient undecided; will notify us with desired testing or follow-up at next ob visit.     This encounter was dictated using voice-recognition software; undetected errors may be present.    Otilia Jacobo M.D.

## 2018-02-23 NOTE — NURSING NOTE
"Chief Complaint   Patient presents with     Prenatal Care     NPN provider visit   10w5d  Discuss lotions/ scalp treatment rx'd by Derm    initial /60  Pulse 76  Ht 5' 9\" (1.753 m)  Wt 234 lb (106.1 kg)  LMP 12/20/2017  BMI 34.56 kg/m2 Estimated body mass index is 34.56 kg/(m^2) as calculated from the following:    Height as of this encounter: 5' 9\" (1.753 m).    Weight as of this encounter: 234 lb (106.1 kg).  BP completed using cuff size large.  Anjali Hamilton CMA    "

## 2018-02-25 LAB
C TRACH DNA SPEC QL NAA+PROBE: NEGATIVE
N GONORRHOEA DNA SPEC QL NAA+PROBE: NEGATIVE
SPECIMEN SOURCE: NORMAL
SPECIMEN SOURCE: NORMAL

## 2018-03-09 ENCOUNTER — ALLIED HEALTH/NURSE VISIT (OUTPATIENT)
Dept: NURSING | Facility: CLINIC | Age: 33
End: 2018-03-09
Payer: COMMERCIAL

## 2018-03-09 DIAGNOSIS — O09.529 SUPERVISION OF HIGH-RISK PREGNANCY OF ELDERLY MULTIGRAVIDA: Primary | ICD-10-CM

## 2018-03-09 PROCEDURE — 36415 COLL VENOUS BLD VENIPUNCTURE: CPT | Performed by: OBSTETRICS & GYNECOLOGY

## 2018-03-09 PROCEDURE — 99000 SPECIMEN HANDLING OFFICE-LAB: CPT | Performed by: OBSTETRICS & GYNECOLOGY

## 2018-03-09 PROCEDURE — 40000791 ZZHCL STATISTIC VERIFI PRENATAL TRISOMY 21,18,13: Mod: 90 | Performed by: OBSTETRICS & GYNECOLOGY

## 2018-03-09 PROCEDURE — 99207 ZZC NO CHARGE NURSE ONLY: CPT

## 2018-03-15 ENCOUNTER — TELEPHONE (OUTPATIENT)
Dept: OBGYN | Facility: CLINIC | Age: 33
End: 2018-03-15

## 2018-03-15 NOTE — TELEPHONE ENCOUNTER
Results received from [facility] testing in [facility]..  Testing done:  Innatal Prenatal Screen    Action:  LM for pt to cb to report NORMAL results.    Gender:  Will ask pt if they wish to know the gender.  **gender is MALE**      Please route to MD as an FYI after patient has been advised.      Sanjuanita Del Toro RN

## 2018-03-15 NOTE — TELEPHONE ENCOUNTER
Patient called back.  Notified of normal results.  Does not want to know gender at this time.  Sania Horn RN

## 2018-03-21 ENCOUNTER — MYC MEDICAL ADVICE (OUTPATIENT)
Dept: OBGYN | Facility: CLINIC | Age: 33
End: 2018-03-21

## 2018-03-21 ENCOUNTER — PRENATAL OFFICE VISIT (OUTPATIENT)
Dept: OBGYN | Facility: CLINIC | Age: 33
End: 2018-03-21
Payer: COMMERCIAL

## 2018-03-21 VITALS
DIASTOLIC BLOOD PRESSURE: 60 MMHG | SYSTOLIC BLOOD PRESSURE: 120 MMHG | WEIGHT: 232 LBS | HEART RATE: 78 BPM | BODY MASS INDEX: 34.26 KG/M2

## 2018-03-21 DIAGNOSIS — D25.9 UTERINE LEIOMYOMA, UNSPECIFIED LOCATION: ICD-10-CM

## 2018-03-21 DIAGNOSIS — O09.529 SUPERVISION OF HIGH-RISK PREGNANCY OF ELDERLY MULTIGRAVIDA: Primary | ICD-10-CM

## 2018-03-21 DIAGNOSIS — Z20.828 PARVOVIRUS EXPOSURE: Primary | ICD-10-CM

## 2018-03-21 PROCEDURE — 99207 ZZC PRENATAL VISIT: CPT | Performed by: OBSTETRICS & GYNECOLOGY

## 2018-03-21 NOTE — NURSING NOTE
"Chief Complaint   Patient presents with     Prenatal Care     14 weeks 3 days- concerns of headaches        Initial /60 (BP Location: Left arm, Patient Position: Sitting, Cuff Size: Adult Large)  Pulse 78  Wt 232 lb (105.2 kg)  LMP 12/20/2017  Breastfeeding? No  BMI 34.26 kg/m2 Estimated body mass index is 34.26 kg/(m^2) as calculated from the following:    Height as of 2/23/18: 5' 9\" (1.753 m).    Weight as of this encounter: 232 lb (105.2 kg).  Medication Reconciliation: complete     14w3d  Mac Duran CMA       "

## 2018-03-21 NOTE — MR AVS SNAPSHOT
After Visit Summary   3/21/2018    Krystina Suazo    MRN: [id number]           Patient Information     Date Of Birth          1985        Visit Information        Provider Department      3/21/2018 11:15 AM Otilia Jacobo MD [facility]        Today's Diagnoses     Supervision of high-risk pregnancy of elderly multigravida    -  1    Uterine leiomyoma, unspecified location           Follow-ups after your visit        Additional Services     MAT FETAL MED CTR REFERRAL-PREGNANCY       >> Patient may proceed with recommendations for further testing as directed by the Maternal Fetal Medicine Specialist >>    >> If requesting Fetal Echo: MFM will determine appropriate location for exam due to indication.    >> If requesting Lung Maturity Amnio:  If results indicate fetal lung maturity, induction or C/S is recommended within 36 hours.  Please schedule accordingly.     Dear Patient:   Please be aware that coverage of these services is subject to the terms and limitations of your health insurance plan.  Call member services at your health plan with any benefit or coverage questions.      Please bring the following to your appointment:    >>  Any x-rays, CTs or MRIs which have been performed.  Contact the facility where they were done to arrange for  prior to your scheduled appointment.  Any new CT, MRI or other procedures ordered by your specialist must be performed at a [facility] facility or coordinated by your clinic's referral office.  >>  List of current medications   >>  This referral request   >>  Any documents/labs given to you for this referral                  Your next 10 appointments already scheduled     Apr 20, 2018 11:00 AM CDT   Genetic Counseling with RH GEN COUNSELOR 1   [facility] Maternal Fetal Medicine [facility] ([facility])    [address] [facility] [address]  [facility] [address]   [phone]            Apr 20, 2018 11:45 AM CDT   (Arrive  by 11:30 AM)   VASILIY US COMP with [id number]   [facility] Maternal Fetal Medicine Ultrasound - [facility] ([facility])    [address]  [facility] [id number] [phone]           Wear comfortable clothes and leave your valuables at home.            Apr 20, 2018 12:15 PM CDT   Radiology [facility]   [facility] Maternal Fetal Medicine - [facility])    [address]  [facility] [id number] [phone]           Please arrive at the time given for your first appointment. This visit is used internally to schedule the physician's time during your ultrasound.            Apr 20, 2018  1:45 PM CDT   ESTABLISHED PRENATAL with Otilia Jacobo MD   [facility] ([facility]    [address] [place] [address]  [address]  [place] [address] [phone]            May 24, 2018  4:00 PM CDT   ESTABLISHED PRENATAL with Otilia Jacobo MD   [facility] ([facility]    [address] [place] [address]  [address]  [place] [address] [phone]              Who to contact     If you have questions or need follow up information about today's clinic visit or your schedule please contact [facility] directly at [phone].  Normal or non-critical lab and imaging results will be communicated to you by MyChart, letter or phone within 4 business days after the clinic has received the results. If you do not hear from us within 7 days, please contact the clinic through [employer] or phone. If you have a critical or abnormal lab result, we will notify you by phone as soon as possible.  Submit refill requests through [employer] or call your pharmacy and they will forward the refill request to us. Please allow 3 business days for your refill to be completed.          Additional Information About Your Visit        TopChalksharFactor 14 Information     [employer] gives you secure access to your electronic health  record. If you see a primary care provider, you can also send messages to your care team and make appointments. If you have questions, please call your primary care clinic.  If you do not have a primary care provider, please call [phone] and they will assist you.        Care EveryWhere ID     This is your Care EveryWhere ID. This could be used by other organizations to access your [facility] medical records  [id number]        Your Vitals Were     Pulse Last Period Breastfeeding? BMI (Body Mass Index)          78 12/20/2017 No 34.26 kg/m2         Blood Pressure from Last 3 Encounters:   03/21/18 120/60   02/23/18 122/60   02/13/18 126/70    Weight from Last 3 Encounters:   03/21/18 232 lb (105.2 kg)   02/23/18 234 lb (106.1 kg)   02/13/18 231 lb 1.6 oz (104.8 kg)              We Performed the Following     MAT FETAL MED CTR REFERRAL-PREGNANCY        Primary Care Provider Office Phone # Fax #    Valeria Daly [phone] [address] [place] [address]        Equal Access to Services     [facility]: Hadii aad ku hadashneela Somadelyn, waaxda luqadaha, qaybta kaalmada zander, ruth guillaume . So [facility] [phone].    ATENCIÓN: Si habla español, tiene a wesley disposición servicios gratuitos de asistencia lingüística. [facility] [phone].    We comply with applicable federal civil rights laws and [place] laws. We do not discriminate on the basis of race, color, national origin, age, disability, sex, sexual orientation, or gender identity.            Thank you!     Thank you for choosing [facility]  for your care. Our goal is always to provide you with excellent care. Hearing back from our patients is one way we can continue to improve our services. Please take a few minutes to complete the written survey that you may receive in the mail after your visit with us. Thank you!             Your Updated Medication List - Protect others  around you: Learn how to safely use, store and throw away your medicines at www.disposemymeds.org.          This list is accurate as of 3/21/18 11:59 PM.  Always use your most recent med list.                   Brand Name Dispense Instructions for use Diagnosis    albuterol 108 (90 BASE) MCG/ACT Inhaler    PROAIR HFA/PROVENTIL HFA/VENTOLIN HFA     Inhale 2 puffs into the lungs        cetirizine 10 MG tablet    zyrTEC    90 tablet    Take 1 tablet (10 mg) by mouth every evening    Chronic rhinitis       * clobetasol 0.05 % external solution    TEMOVATE    50 mL    Apply topically 2 times daily Apply to affected areas on scalp bid as needed    Scalp psoriasis       * clobetasol propionate 0.05 % Foam     50 g    Apply sparingly to affected area twice daily as needed.  Do not apply to face.    Scalp psoriasis       Fluocinolone Acetonide Scalp 0.01 % Oil oil     118 mL    Apply 2 mLs topically daily To scalp    Scalp psoriasis       prenatal multivitamin plus iron 27-0.8 MG Tabs per tablet     100 tablet    Take 1 tablet by mouth daily    Encounter for supervision of normal first pregnancy in first trimester       sertraline 50 MG tablet    ZOLOFT    135 tablet    Take 1.5 tablets (75 mg) by mouth daily    COURTNEY (generalized anxiety disorder), Major depressive disorder, recurrent episode, moderate (H)       * Notice:  This list has 2 medication(s) that are the same as other medications prescribed for you. Read the directions carefully, and ask your doctor or other care provider to review them with you.

## 2018-03-23 DIAGNOSIS — Z20.828 PARVOVIRUS EXPOSURE: ICD-10-CM

## 2018-03-23 PROCEDURE — 36415 COLL VENOUS BLD VENIPUNCTURE: CPT | Performed by: OBSTETRICS & GYNECOLOGY

## 2018-03-23 PROCEDURE — 86747 PARVOVIRUS ANTIBODY: CPT | Mod: 90 | Performed by: OBSTETRICS & GYNECOLOGY

## 2018-03-23 PROCEDURE — 99000 SPECIMEN HANDLING OFFICE-LAB: CPT | Performed by: OBSTETRICS & GYNECOLOGY

## 2018-03-24 LAB
B19V IGG SER IA-ACNC: 6.04 IV
B19V IGM SER IA-ACNC: 0.1 IV

## 2018-04-09 NOTE — PROGRESS NOTES
CC: Here for routine prenatal visit @ 14w3d       HPI:  Doing well, denies bleeding, cramping.      Exam:   See OB flowsheet    ASSESSMENT/PLAN  Krystina Suazo is a 32 year old   @ 14w3d.   AMA, nl NIPT.    Fibroid uterus, 7.5 cm fundal myoma.   Will order comprehensive scan for evaluation due to AMA, fibroid uterus.    Declines MSAFP.    1)  Return to clinic 4 wk       This encounter was dictated using voice-recognition software; undetected errors may be present.    Otilia Jacobo M.D.

## 2018-04-17 ENCOUNTER — PRE VISIT (OUTPATIENT)
Dept: MATERNAL FETAL MEDICINE | Facility: CLINIC | Age: 33
End: 2018-04-17

## 2018-04-20 ENCOUNTER — PRENATAL OFFICE VISIT (OUTPATIENT)
Dept: OBGYN | Facility: CLINIC | Age: 33
End: 2018-04-20
Payer: COMMERCIAL

## 2018-04-20 ENCOUNTER — HOSPITAL ENCOUNTER (OUTPATIENT)
Dept: ULTRASOUND IMAGING | Facility: CLINIC | Age: 33
Discharge: HOME | End: 2018-04-20
Attending: OBSTETRICS & GYNECOLOGY | Admitting: OBSTETRICS & GYNECOLOGY
Payer: COMMERCIAL

## 2018-04-20 ENCOUNTER — OFFICE VISIT (OUTPATIENT)
Dept: MATERNAL FETAL MEDICINE | Facility: CLINIC | Age: 33
End: 2018-04-20
Attending: OBSTETRICS & GYNECOLOGY
Payer: COMMERCIAL

## 2018-04-20 VITALS
SYSTOLIC BLOOD PRESSURE: 104 MMHG | HEART RATE: 84 BPM | WEIGHT: 239.4 LBS | BODY MASS INDEX: 35.35 KG/M2 | DIASTOLIC BLOOD PRESSURE: 66 MMHG

## 2018-04-20 DIAGNOSIS — O09.519 SUPERVISION OF HIGH-RISK PREGNANCY OF ELDERLY PRIMIGRAVIDA: Primary | ICD-10-CM

## 2018-04-20 DIAGNOSIS — O26.90 PREGNANCY RELATED CONDITION, ANTEPARTUM: ICD-10-CM

## 2018-04-20 DIAGNOSIS — O43.199 MARGINAL INSERTION OF UMBILICAL CORD AFFECTING MANAGEMENT OF MOTHER: Primary | ICD-10-CM

## 2018-04-20 PROCEDURE — 76811 OB US DETAILED SNGL FETUS: CPT

## 2018-04-20 PROCEDURE — 99207 ZZC COMPLICATED OB VISIT: CPT | Performed by: OBSTETRICS & GYNECOLOGY

## 2018-04-20 PROCEDURE — 96040 ZZH GENETIC COUNSELING, EACH 30 MINUTES: CPT | Mod: ZF | Performed by: GENETIC COUNSELOR, MS

## 2018-04-20 NOTE — MR AVS SNAPSHOT
After Visit Summary   4/20/2018    Krystina Suazo    MRN: [id number]           Patient Information     Date Of Birth          1985        Visit Information        Provider Department      4/20/2018 11:00 AM Janet Balderas GC [facility] Maternal Fetal Medicine [facility]        Today's Diagnoses     Pregnancy related condition, antepartum           Follow-ups after your visit        Your next 10 appointments already scheduled     Jun 29, 2018  1:30 PM CDT   (Arrive by 1:15 PM)   [facility] US COMPRE SINGLE F/U with MFMUSR2   [facility] Maternal Fetal Medicine Ultrasound - [facility] ([facility])    [address]  [facility] [address] [phone]           Wear comfortable clothes and leave your valuables at home.            Jun 29, 2018  2:00 PM CDT   [facility]iology [facility]   [facility] Maternal Fetal Medicine [facility] ([facility])    [address]  [facility] [address] [phone]           Please arrive at the time given for your first appointment. This visit is used internally to schedule the physician's time during your ultrasound.              Future tests that were ordered for you today     Open Future Orders        Priority Expected Expires Ordered    M US Comprehensive Single F/U Routine  4/20/2019 4/20/2018            Who to contact     If you have questions or need follow up information about today's clinic visit or your schedule please contact [facility] MATERNAL FETAL [facility] directly at [phone].  Normal or non-critical lab and imaging results will be communicated to you by MyChart, letter or phone within 4 business days after the clinic has received the results. If you do not hear from us within 7 days, please contact the clinic through MyChart or phone. If you have a critical or abnormal lab result, we will notify you by phone as soon as possible.  Submit refill requests through [employer] or call your  pharmacy and they will forward the refill request to us. Please allow 3 business days for your refill to be completed.          Additional Information About Your Visit        [employer]art Information     [employer] gives you secure access to your electronic health record. If you see a primary care provider, you can also send messages to your care team and make appointments. If you have questions, please call your primary care clinic.  If you do not have a primary care provider, please call [phone] and they will assist you.        Care EveryWhere ID     This is your Care EveryWhere ID. This could be used by other organizations to access your [place] medical records  [id number]        Your Vitals Were     Last Period                   12/20/2017            Blood Pressure from Last 3 Encounters:   04/20/18 104/66   03/21/18 120/60   02/23/18 122/60    Weight from Last 3 Encounters:   04/20/18 108.6 kg (239 lb 6.4 oz)   03/21/18 105.2 kg (232 lb)   02/23/18 106.1 kg (234 lb)              We Performed the Following     [facility] Genetic Counseling        Primary Care Provider Office Phone # Fax #    Valeria Daly [phone] [address] [place] [address]        Equal Access to Services     [facility]: Hadii sanjiv lunao Somadelyn, waaxda luqadaha, qaybta kaalmada adeheriyada, ruth marquez. So [facility] [phone].    ATENCIÓN: Si habla español, tiene a wesley disposición servicios gratuitos de asistencia lingüística. Llame al [phone].    We comply with applicable federal civil rights laws and [place] laws. We do not discriminate on the basis of race, color, national origin, age, disability, sex, sexual orientation, or gender identity.            Thank you!     Thank you for choosing [facility]  for your care. Our goal is always to provide you with excellent care. Hearing back from our patients is one way we can continue to  improve our services. Please take a few minutes to complete the written survey that you may receive in the mail after your visit with us. Thank you!             Your Updated Medication List - Protect others around you: Learn how to safely use, store and throw away your medicines at www.disposemymeds.org.          This list is accurate as of 4/20/18  2:01 PM.  Always use your most recent med list.                   Brand Name Dispense Instructions for use Diagnosis    albuterol 108 (90 Base) MCG/ACT Inhaler    PROAIR HFA/PROVENTIL HFA/VENTOLIN HFA     Inhale 2 puffs into the lungs        cetirizine 10 MG tablet    zyrTEC    90 tablet    Take 1 tablet (10 mg) by mouth every evening    Chronic rhinitis       * clobetasol 0.05 % external solution    TEMOVATE    50 mL    Apply topically 2 times daily Apply to affected areas on scalp bid as needed    Scalp psoriasis       * clobetasol propionate 0.05 % Foam     50 g    Apply sparingly to affected area twice daily as needed.  Do not apply to face.    Scalp psoriasis       Fluocinolone Acetonide Scalp 0.01 % Oil oil     118 mL    Apply 2 mLs topically daily To scalp    Scalp psoriasis       prenatal multivitamin plus iron 27-0.8 MG Tabs per tablet     100 tablet    Take 1 tablet by mouth daily    Encounter for supervision of normal first pregnancy in first trimester       sertraline 50 MG tablet    ZOLOFT    135 tablet    Take 1.5 tablets (75 mg) by mouth daily    COURTNEY (generalized anxiety disorder), Major depressive disorder, recurrent episode, moderate (H)       * Notice:  This list has 2 medication(s) that are the same as other medications prescribed for you. Read the directions carefully, and ask your doctor or other care provider to review them with you.

## 2018-04-20 NOTE — PROGRESS NOTES
[facility]  Genetic Counseling Consult    Patient:  Krystina Suazo YOB: 1985   Date of Service:  18      Krystina Suazo was seen at the [facility] for genetic consultation as part of her appointment for comprehensive ultrasound.  The indication for genetic counseling is advanced maternal age.       Impression/Plan:   Krystina had a cell-free fetal DNA test earlier in pregnancy, which was normal. Krystina had a comprehensive (level II) ultrasound today.  Please see the ultrasound report for further details.The patient declines genetic amniocentesis today.    Pregnancy History:   /Parity:    Age at Delivery: 33 year old  PABLO: 2018, by Ultrasound  Gestational Age: 18w5d    No significant complications or exposures were reported in the current pregnancy.    Family History:   A three-generation pedigree was obtained, and is scanned under the  Media  tab. The reported family history is negative for multiple miscarriages, stillbirths, birth defects, mental retardation, known genetic conditions, and consanguinity.       Carrier Screening:   The patient and her partner are of  ancestry. The patient also reports  ancestry.    Cystic fibrosis is an autosomal recessive genetic condition that occurs with increased frequency in individuals of  ancestry and carrier screening for this condition is available.  In addition,  screening in the [facility] includes cystic fibrosis.    Expanded carrier screening for mutations in a large panel of genes associated with autosomal recessive conditions including cystic fibrosis, spinal muscular atrophy, and others, is now available.    The patient has declined the carrier screening options reviewed today.       Risk Assessment for Chromosome Conditions:   We explained that the risk for fetal chromosome abnormalities increases with  maternal age. We discussed specific features of common chromosome abnormalities, including Down syndrome, trisomy 13, trisomy 18, and sex chromosome trisomies.    At age 33 at delivery, the midtrimester risk to have a baby with Down syndrome is 1 in 274.     At age 33 at delivery, the midtrimester risk to have a baby with any chromosome abnormality is 1 in 135.     Krystina had aneuploidy screening earlier in pregnancy.    Non-invasive Prenatal Testing (NIPT)    Maternal plasma cell-free DNA testing    Screens for fetal trisomy 21, trisomy 13, trisomy 18, and sex chromosome aneuploidy    Krystina had an Innatal test earlier in pregnancy; we reviewed the results today, which are normal for chromosome 13, chromosome 18 and chromosome 21 (no aneuploidy detected)    Given the accuracy of this test, these results greatly decrease the chance for certain fetal chromosome abnormalities    We discussed the limitations of normal NIPT results       Testing Options:   We discussed the following options:  Comprehensive (Level II) ultrasound: Detailed ultrasound performed between 18-22 weeks gestation to screen for major birth defects and markers for aneuploidy.     Genetic Amniocentesis    Invasive procedure typically performed in the second trimester by which amniotic fluid is obtained for the purpose of chromosome analysis and/or other prenatal genetic analysis    Diagnostic results; >99% sensitivity for fetal chromosome abnormalities    AFAFP measurement tests for open neural tube defects     We reviewed the benefits and limitations of this testing.  Screening tests provide a risk assessment specific to the pregnancy for certain fetal chromosome abnormalities, but cannot definitively diagnose or exclude a fetal chromosome abnormality.  Follow-up genetic counseling and consideration of diagnostic testing is recommended with any abnormal screening result.Diagnostic tests carry inherent risks- including risk of miscarriage-  that require careful consideration.  These tests can detect fetal chromosome abnormalities with greater than 99% certainty.  Results can be compromised by maternal cell contamination or mosaicism, and are limited by the resolution of cytogenetic G-banding technology.  There is no screening nor diagnostic test that can detect all forms of birth defects or mental disability.    It was a pleasure to be involved with Krystina s care. Face-to-face time of the meeting was 25 minutes.    Janet Balderas MS, [facility]  Maternal Fetal Medicine  [facility]  Phone:[phone]  Email: [email or web address]

## 2018-04-20 NOTE — MR AVS SNAPSHOT
After Visit Summary   4/20/2018    Krystina Suazo    MRN: [id number]           Patient Information     Date Of Birth          1985        Visit Information        Provider Department      4/20/2018 12:15 PM Sofiya Hawk MD [facility] Maternal Fetal Medicine [facility]        Today's Diagnoses     Marginal insertion of umbilical cord affecting management of mother    -  1       Follow-ups after your visit        Your next 10 appointments already scheduled     Apr 20, 2018  1:45 PM CDT   ESTABLISHED PRENATAL with Otilia Jacobo MD   [facility] ([facility])    [address] [place] [address]  [address]  [facility] [address]   [phone]            Jun 29, 2018  1:30 PM CDT   (Arrive by 1:15 PM)   MFM US COMPRE SINGLE F/U with [id number]   [facility] Maternal Fetal Medicine Ultrasound - [facility])    [address]  [facility] [id number] [phone]           Wear comfortable clothes and leave your valuables at home.            Jun 29, 2018  2:00 PM CDT   Radiology [facility]   [facility] Maternal Fetal Medicine [facility] ([facility])    [address]  [facility] [address] [phone]           Please arrive at the time given for your first appointment. This visit is used internally to schedule the physician's time during your ultrasound.              Future tests that were ordered for you today     Open Future Orders        Priority Expected Expires Ordered    MFM US Comprehensive Single F/U Routine  4/20/2019 4/20/2018            Who to contact     If you have questions or need follow up information about today's clinic visit or your schedule please contact [facility] MATERNAL FETAL MEDICINE [facility] directly at [phone].  Normal or non-critical lab and imaging results will be communicated to you by MyChart, letter or phone within 4 business days after the clinic has received the  results. If you do not hear from us within 7 days, please contact the clinic through H-art (WPP) or phone. If you have a critical or abnormal lab result, we will notify you by phone as soon as possible.  Submit refill requests through H-art (WPP) or call your pharmacy and they will forward the refill request to us. Please allow 3 business days for your refill to be completed.          Additional Information About Your Visit        LOG607harSecond & Fourth Information     H-art (WPP) gives you secure access to your electronic health record. If you see a primary care provider, you can also send messages to your care team and make appointments. If you have questions, please call your primary care clinic.  If you do not have a primary care provider, please call [phone] and they will assist you.        Care EveryWhere ID     This is your Care EveryWhere ID. This could be used by other organizations to access your [place] medical records  [id number]        Your Vitals Were     Last Period                   12/20/2017            Blood Pressure from Last 3 Encounters:   03/21/18 120/60   02/23/18 122/60   02/13/18 126/70    Weight from Last 3 Encounters:   03/21/18 105.2 kg (232 lb)   02/23/18 106.1 kg (234 lb)   02/13/18 104.8 kg (231 lb 1.6 oz)               Primary Care Provider Office Phone # Fax #    Valeria Daly [phone] [address] [place] [address]        Equal Access to Services     [facility] AH: Hadii aad ku hadasho Soomaali, waaxda luqadaha, qaybta kaalmada adeegyada, ruth guillaume . So [facility] [phone].    ATENCIÓN: Si habla español, tiene a wesley disposición servicios gratuitos de asistencia lingüística. Llame al [phone].    We comply with applicable federal civil rights laws and [place] laws. We do not discriminate on the basis of race, color, national origin, age, disability, sex, sexual orientation, or gender identity.            Thank you!     Thank  you for choosing [facility]  for your care. Our goal is always to provide you with excellent care. Hearing back from our patients is one way we can continue to improve our services. Please take a few minutes to complete the written survey that you may receive in the mail after your visit with us. Thank you!             Your Updated Medication List - Protect others around you: Learn how to safely use, store and throw away your medicines at www.disposemymeds.org.          This list is accurate as of 4/20/18  1:38 PM.  Always use your most recent med list.                   Brand Name Dispense Instructions for use Diagnosis    albuterol 108 (90 Base) MCG/ACT Inhaler    PROAIR HFA/PROVENTIL HFA/VENTOLIN HFA     Inhale 2 puffs into the lungs        cetirizine 10 MG tablet    zyrTEC    90 tablet    Take 1 tablet (10 mg) by mouth every evening    Chronic rhinitis       * clobetasol 0.05 % external solution    TEMOVATE    50 mL    Apply topically 2 times daily Apply to affected areas on scalp bid as needed    Scalp psoriasis       * clobetasol propionate 0.05 % Foam     50 g    Apply sparingly to affected area twice daily as needed.  Do not apply to face.    Scalp psoriasis       Fluocinolone Acetonide Scalp 0.01 % Oil oil     118 mL    Apply 2 mLs topically daily To scalp    Scalp psoriasis       prenatal multivitamin plus iron 27-0.8 MG Tabs per tablet     100 tablet    Take 1 tablet by mouth daily    Encounter for supervision of normal first pregnancy in first trimester       sertraline 50 MG tablet    ZOLOFT    135 tablet    Take 1.5 tablets (75 mg) by mouth daily    COUTRNEY (generalized anxiety disorder), Major depressive disorder, recurrent episode, moderate (H)       * Notice:  This list has 2 medication(s) that are the same as other medications prescribed for you. Read the directions carefully, and ask your doctor or other care provider to review them with you.

## 2018-04-20 NOTE — PROGRESS NOTES
CC: Here for routine prenatal visit @ 18w5d       HPI:  Doing well, denies bleeding, cramping.      Exam:   See OB flowsheet    ASSESSMENT/PLAN  Krystina Suazo is a 32 year old   @ 18w5d.   AMA, normal NIPT.   Comprehensive scan done today with normal fetus; marginal cord insertion seen and repeat scan at [facility] scheduled at 28 wks.    Fibroid not noted on report; no fibroid noted on images.    Findings discussed with patient, likely some significant regression in size.    Will have another opportunity for evaluation at 28 wks.     I discussed with the patient that I will be leaving  in ; alternate providers for her future care were discussed.        1)  Return to clinic 4 wks      This encounter was dictated using voice-recognition software; undetected errors may be present.    Otilia Jacobo M.D.

## 2018-04-20 NOTE — MR AVS SNAPSHOT
After Visit Summary   4/20/2018    Krystina Suazo    MRN: [id number]           Patient Information     Date Of Birth          1985        Visit Information        Provider Department      4/20/2018 1:45 PM Otilia Jacobo MD [facility]        Today's Diagnoses     Supervision of high-risk pregnancy of elderly primigravida    -  1      Care Instructions    Call the clinic ([facility] [phone], [place] [phone]) right away with any of the following concerns:    1.   Regular tightening or contractions every 10 minutes, that do not go away with rest and hydration    2.   Vaginal bleeding.      Continue your prenatal vitamins daily.    Avoid lying flat on your back for a prolonged period.    Please call with any additional concerns that your have, and continue your prenatal visits every four weeks!              Follow-ups after your visit        Your next 10 appointments already scheduled     May 11, 2018  1:15 PM CDT   ESTABLISHED PRENATAL with Otilia Jacobo MD   [facility] ([facility])    [address]  [address]  [facility] [id number] [phone]            Jun 29, 2018  1:30 PM CDT   (Arrive by 1:15 PM)   MFM US COMPRE SINGLE F/U with [id number]   [facility] Maternal Fetal Medicine Ultrasound - [facility])    [address]  [facility] [address] [phone]           Wear comfortable clothes and leave your valuables at home.            Jun 29, 2018  2:00 PM CDT   Radiology MD with  VASILIY BELL   [facility] Maternal Fetal Medicine - [facility])    [address]  [facility] [address] [phone]           Please arrive at the time given for your first appointment. This visit is used internally to schedule the physician's time during your ultrasound.              Future tests that were ordered for you today     Open Future Orders        Priority  Expected Expires Ordered    MFM US Comprehensive Single F/U Routine  4/20/2019 4/20/2018            Who to contact     If you have questions or need follow up information about today's clinic visit or your schedule please contact [facility] directly at [phone].  Normal or non-critical lab and imaging results will be communicated to you by MyChart, letter or phone within 4 business days after the clinic has received the results. If you do not hear from us within 7 days, please contact the clinic through Recloghart or phone. If you have a critical or abnormal lab result, we will notify you by phone as soon as possible.  Submit refill requests through LeanWagon or call your pharmacy and they will forward the refill request to us. Please allow 3 business days for your refill to be completed.          Additional Information About Your Visit        Recloghart Information     LeanWagon gives you secure access to your electronic health record. If you see a primary care provider, you can also send messages to your care team and make appointments. If you have questions, please call your primary care clinic.  If you do not have a primary care provider, please call [phone] and they will assist you.        Care EveryWhere ID     This is your Care EveryWhere ID. This could be used by other organizations to access your [place] medical records  [id number]        Your Vitals Were     Pulse Last Period BMI (Body Mass Index)             84 12/20/2017 35.35 kg/m2          Blood Pressure from Last 3 Encounters:   04/20/18 104/66   03/21/18 120/60   02/23/18 122/60    Weight from Last 3 Encounters:   04/20/18 239 lb 6.4 oz (108.6 kg)   03/21/18 232 lb (105.2 kg)   02/23/18 234 lb (106.1 kg)              Today, you had the following     No orders found for display       Primary Care Provider Office Phone # Fax #    Valeria Daly [phone] [address] [place] [address]         Equal Access to Services     [facility] : Hadii aad ku hadnayanneela Annali, waarnaudda luqerik, qaybta kaemyruth michael. So [facility] [phone].    ATENCIÓN: Si habla bhavin, tiene a wesley disposición servicios gratuitos de asistencia lingüística. Charmaineame al [phone].    We comply with applicable federal civil rights laws and [place] laws. We do not discriminate on the basis of race, color, national origin, age, disability, sex, sexual orientation, or gender identity.            Thank you!     Thank you for choosing [facility]  for your care. Our goal is always to provide you with excellent care. Hearing back from our patients is one way we can continue to improve our services. Please take a few minutes to complete the written survey that you may receive in the mail after your visit with us. Thank you!             Your Updated Medication List - Protect others around you: Learn how to safely use, store and throw away your medicines at www.disposemymeds.org.          This list is accurate as of 4/20/18  2:37 PM.  Always use your most recent med list.                   Brand Name Dispense Instructions for use Diagnosis    albuterol 108 (90 Base) MCG/ACT Inhaler    PROAIR HFA/PROVENTIL HFA/VENTOLIN HFA     Inhale 2 puffs into the lungs        cetirizine 10 MG tablet    zyrTEC    90 tablet    Take 1 tablet (10 mg) by mouth every evening    Chronic rhinitis       * clobetasol 0.05 % external solution    TEMOVATE    50 mL    Apply topically 2 times daily Apply to affected areas on scalp bid as needed    Scalp psoriasis       * clobetasol propionate 0.05 % Foam     50 g    Apply sparingly to affected area twice daily as needed.  Do not apply to face.    Scalp psoriasis       Fluocinolone Acetonide Scalp 0.01 % Oil oil     118 mL    Apply 2 mLs topically daily To scalp    Scalp psoriasis       prenatal multivitamin plus iron 27-0.8 MG Tabs per tablet     100 tablet     Take 1 tablet by mouth daily    Encounter for supervision of normal first pregnancy in first trimester       sertraline 50 MG tablet    ZOLOFT    135 tablet    Take 1.5 tablets (75 mg) by mouth daily    COURTNEY (generalized anxiety disorder), Major depressive disorder, recurrent episode, moderate (H)       * Notice:  This list has 2 medication(s) that are the same as other medications prescribed for you. Read the directions carefully, and ask your doctor or other care provider to review them with you.

## 2018-04-20 NOTE — PATIENT INSTRUCTIONS
Call the clinic ([facility] [phone], [facility] [phone]) right away with any of the following concerns:    1.   Regular tightening or contractions every 10 minutes, that do not go away with rest and hydration    2.   Vaginal bleeding.      Continue your prenatal vitamins daily.    Avoid lying flat on your back for a prolonged period.    Please call with any additional concerns that your have, and continue your prenatal visits every four weeks!

## 2018-04-20 NOTE — NURSING NOTE
"Chief Complaint   Patient presents with     Prenatal Care   u/s done today at [facility]  Here today with spouse    initial /66  Pulse 84  Wt 239 lb 6.4 oz (108.6 kg)  LMP 12/20/2017  BMI 35.35 kg/m2 Estimated body mass index is 35.35 kg/(m^2) as calculated from the following:    Height as of 2/23/18: 5' 9\" (1.753 m).    Weight as of this encounter: 239 lb 6.4 oz (108.6 kg).  BP completed using cuff size large.  Anjali Hamilton CMA    "

## 2018-05-11 ENCOUNTER — PRENATAL OFFICE VISIT (OUTPATIENT)
Dept: OBGYN | Facility: CLINIC | Age: 33
End: 2018-05-11
Payer: COMMERCIAL

## 2018-05-11 VITALS
SYSTOLIC BLOOD PRESSURE: 124 MMHG | BODY MASS INDEX: 34.87 KG/M2 | WEIGHT: 236.1 LBS | HEART RATE: 76 BPM | DIASTOLIC BLOOD PRESSURE: 56 MMHG

## 2018-05-11 DIAGNOSIS — O09.529 SUPERVISION OF HIGH-RISK PREGNANCY OF ELDERLY MULTIGRAVIDA: Primary | ICD-10-CM

## 2018-05-11 PROCEDURE — 99207 ZZC COMPLICATED OB VISIT: CPT | Performed by: OBSTETRICS & GYNECOLOGY

## 2018-05-11 NOTE — PROGRESS NOTES
CC: Here for routine prenatal visit @ 21w5d.      HPI:  Doing well, denies bleeding, cramping.      Exam:   See OB flowsheet    ASSESSMENT/PLAN  Krystina Suazo is a 32 year old   @ 21w5d.   AMA, normal NIPT.   Comprehensive scan done today with normal fetus; marginal cord insertion seen and repeat scan at [facility] scheduled at 28 wks.    Fibroid not noted on report; no fibroid noted on images.    Findings discussed with patient, likely some significant regression in size vs uterine contraction noted on previous scan.    Will have another opportunity for evaluation at 28 wks.    1)  Return to clinic 4 wks      This encounter was dictated using voice-recognition software; undetected errors may be present.    Otilia Jacobo M.D.

## 2018-05-11 NOTE — NURSING NOTE
"Chief Complaint   Patient presents with     Prenatal Care     Forms     [facility] forms to be faxed [phone]   21w5d  Planning to travel for work next week  Here with spouse    initial /56  Pulse 76  Wt 236 lb 1.6 oz (107.1 kg)  LMP 12/20/2017  BMI 34.87 kg/m2 Estimated body mass index is 34.87 kg/(m^2) as calculated from the following:    Height as of 2/23/18: 5' 9\" (1.753 m).    Weight as of this encounter: 236 lb 1.6 oz (107.1 kg).  BP completed using cuff size large.  Anjali Hamilton CMA    "

## 2018-05-11 NOTE — MR AVS SNAPSHOT
After Visit Summary   5/11/2018    Krystina Suazo    MRN: [id number]           Patient Information     Date Of Birth          1985        Visit Information        Provider Department      5/11/2018 1:15 PM Otilia Jacobo MD [facility]        Today's Diagnoses     Supervision of high-risk pregnancy of elderly multigravida    -  1       Follow-ups after your visit        Your next 10 appointments already scheduled     Jun 14, 2018  2:45 PM CDT   [facility]   [facility]    [address] [facility] [address]   [phone]            Jun 29, 2018  1:30 PM CDT   MFM US COMPRE SINGLE F/U with VASILIYUSFORTINO   [facility] Maternal Fetal Medicine Ultrasound - [facility])    [address]  [facility] [address]   [phone]           Wear comfortable clothes and leave your valuables at home.            Jun 29, 2018  2:00 PM CDT   Radiology MD with  VASILIY BELL   [facility] Maternal Fetal Medicine [facility])    [address]  [facility] [id number] [phone]           Please arrive at the time given for your first appointment. This visit is used internally to schedule the physician's time during your ultrasound.            Jul 17, 2018  3:30 PM CDT   [facility] Established Prenatal with [facility] Tran MD   [facility]    [address] [facility] [address]   [phone]              Who to contact     If you have questions or need follow up information about today's clinic visit or your schedule please contact [facility] directly at [phone].  Normal or non-critical lab and imaging results will be communicated to you by MyChart, letter or phone within 4 business days after the clinic has received the results. If you  do not hear from us within 7 days, please contact the clinic through Invodo or phone. If you have a critical or abnormal lab result, we will notify you by phone as soon as possible.  Submit refill requests through Invodo or call your pharmacy and they will forward the refill request to us. Please allow 3 business days for your refill to be completed.          Additional Information About Your Visit        Tomo Claseshart Information     Invodo gives you secure access to your electronic health record. If you see a primary care provider, you can also send messages to your care team and make appointments. If you have questions, please call your primary care clinic.  If you do not have a primary care provider, please call [phone] and they will assist you.        Care EveryWhere ID     This is your Care EveryWhere ID. This could be used by other organizations to access your [place] medical records  [id number]        Your Vitals Were     Pulse Last Period BMI (Body Mass Index)             76 12/20/2017 34.87 kg/m2          Blood Pressure from Last 3 Encounters:   05/11/18 124/56   04/20/18 104/66   03/21/18 120/60    Weight from Last 3 Encounters:   05/11/18 236 lb 1.6 oz (107.1 kg)   04/20/18 239 lb 6.4 oz (108.6 kg)   03/21/18 232 lb (105.2 kg)              Today, you had the following     No orders found for display       Primary Care Provider Office Phone # Fax #    Valeria Daly [phone] [address] [place] [address]        Equal Access to Services     [facility] : Hadii aad ku hadasho Soomaali, waaxda luqadaha, qaybta kaalmada adeegyada, ruth marquez. So [facility] [phone].    ATENCIÓN: Si habla español, tiene a wesley disposición servicios gratuitos de asistencia lingüística. Llame al [phone].    We comply with applicable federal civil rights laws and [place] laws. We do not discriminate on the basis of race, color, national origin,  age, disability, sex, sexual orientation, or gender identity.            Thank you!     Thank you for choosing [facility]  for your care. Our goal is always to provide you with excellent care. Hearing back from our patients is one way we can continue to improve our services. Please take a few minutes to complete the written survey that you may receive in the mail after your visit with us. Thank you!             Your Updated Medication List - Protect others around you: Learn how to safely use, store and throw away your medicines at www.disposemymeds.org.          This list is accurate as of 5/11/18  1:42 PM.  Always use your most recent med list.                   Brand Name Dispense Instructions for use Diagnosis    albuterol 108 (90 Base) MCG/ACT Inhaler    PROAIR HFA/PROVENTIL HFA/VENTOLIN HFA     Inhale 2 puffs into the lungs        cetirizine 10 MG tablet    zyrTEC    90 tablet    Take 1 tablet (10 mg) by mouth every evening    Chronic rhinitis       * clobetasol 0.05 % external solution    TEMOVATE    50 mL    Apply topically 2 times daily Apply to affected areas on scalp bid as needed    Scalp psoriasis       * clobetasol propionate 0.05 % Foam     50 g    Apply sparingly to affected area twice daily as needed.  Do not apply to face.    Scalp psoriasis       Fluocinolone Acetonide Scalp 0.01 % Oil oil     118 mL    Apply 2 mLs topically daily To scalp    Scalp psoriasis       prenatal multivitamin plus iron 27-0.8 MG Tabs per tablet     100 tablet    Take 1 tablet by mouth daily    Encounter for supervision of normal first pregnancy in first trimester       sertraline 50 MG tablet    ZOLOFT    135 tablet    Take 1.5 tablets (75 mg) by mouth daily    COURTNEY (generalized anxiety disorder), Major depressive disorder, recurrent episode, moderate (H)       * Notice:  This list has 2 medication(s) that are the same as other medications prescribed for you. Read the directions carefully, and ask your doctor  or other care provider to review them with you.

## 2018-05-21 ENCOUNTER — MYC MEDICAL ADVICE (OUTPATIENT)
Dept: PEDIATRICS | Facility: CLINIC | Age: 33
End: 2018-05-21

## 2018-05-21 DIAGNOSIS — M54.5 ACUTE LOW BACK PAIN, UNSPECIFIED BACK PAIN LATERALITY, WITH SCIATICA PRESENCE UNSPECIFIED: Primary | ICD-10-CM

## 2018-05-24 ENCOUNTER — THERAPY VISIT (OUTPATIENT)
Dept: PHYSICAL THERAPY | Facility: CLINIC | Age: 33
End: 2018-05-24
Payer: COMMERCIAL

## 2018-05-24 DIAGNOSIS — O26.899 LOW BACK PAIN DURING PREGNANCY: Primary | ICD-10-CM

## 2018-05-24 DIAGNOSIS — M54.50 LOW BACK PAIN DURING PREGNANCY: Primary | ICD-10-CM

## 2018-05-24 PROCEDURE — 97112 NEUROMUSCULAR REEDUCATION: CPT | Mod: GP | Performed by: PHYSICAL THERAPIST

## 2018-05-24 PROCEDURE — 97110 THERAPEUTIC EXERCISES: CPT | Mod: GP | Performed by: PHYSICAL THERAPIST

## 2018-05-24 PROCEDURE — 97161 PT EVAL LOW COMPLEX 20 MIN: CPT | Mod: GP | Performed by: PHYSICAL THERAPIST

## 2018-05-24 NOTE — PROGRESS NOTES
[facility] Initial Evaluation  Subjective:  Patient is a 32 year old female presenting with rehab back hpi. The history is provided by the patient. No  was used.   Krystina Suazo is a 32 year old female with a lumbar condition.  Condition occurred with:  A fall/slip (and current pregnancy).  Condition occurred: for unknown reasons.  This is a recurrent condition  History of low back injury as a teenager so has had on/off pain over the years. However, now is currently pregnant 24 weeks (PABLO 09/16/18) and the week of 05/17/18 was traveling for work, standing prolonged by rivera, different bed and LBP flared-up. Was needing help with getting dressed etc. Now is starting to feel better and moving better. WORSE with transitional movements in bed, sitting in certain chairs, walking. BETTER with rest. Pain was ~ 8-9/10 last week, this week 2-4/10. Goal is to get a HEP to manage LBP so doesn't keep re-occurring. .    Patient reports pain:  Central lumbar spine, lumbar spine left and lumbar spine right.    Pain is described as aching and sharp and is constant and reported as 2/10 and 4/10.  Associated symptoms:  Pregnancy. Pain is worse in the P.M..     Since onset symptoms are rapidly improving.        General health as reported by patient is good.  Pertinent medical history includes:  Currently pregnant, depression and asthma.  Medical allergies: no.  Other surgeries include:  Orthopedic surgery and other (B carpal tunnel, tonsils, sinus).  Current medications:  Anti-depressants (prenatal vitamins).  Current occupation is [employer].                                    Objective:  Standing Alignment:    Cervical/Thoracic:  Thoracic kyphosis increased  Shoulder/UE:  Rounded shoulders  Lumbar:  Lordosis decr                           Lumbar/SI Evaluation  ROM:    AROM Lumbar:   Flexion:          To shins, mild strain, better with 5 reps.   Ext:                    Max  loss, sharp central LBP   Side Bend:        Left:  WNL    Right:  WNL  Rotation:           Left:     Right:   Side Glide:        Left:     Right:         Strength: Posterior pelvic tilt and bridge not painful, fair control   Lumbar Myotomes:  not assessed            Lumbar DTR's:  not assessed        Lumbar Dermtomes:  not assessed                Neural Tension/Mobility:  Lumbar:  Normal        Lumbar Palpation:    Tenderness present at Left:    Erector Spinae and PSIS  Tenderness present at Right: Erector Spinae and PSIS                                                     General     ROS    Assessment/Plan:    Patient is a 32 year old female with lumbar complaints.    Patient has the following significant findings with corresponding treatment plan.                Diagnosis 1:  LBP pregnancy  Pain -  hot/cold therapy, manual therapy, splint/taping/bracing/orthotics, self management, education, directional preference exercise and home program  Decreased ROM/flexibility - manual therapy and therapeutic exercise  Decreased joint mobility - manual therapy and therapeutic exercise  Decreased strength - therapeutic exercise and therapeutic activities  Decreased proprioception - neuro re-education and therapeutic activities  Inflammation - cold therapy and self management/home program  Impaired gait - gait training  Impaired muscle performance - neuro re-education  Decreased function - therapeutic activities  Impaired posture - neuro re-education    Therapy Evaluation Codes:   1) History comprised of:   Personal factors that impact the plan of care:      Time since onset of symptoms.    Comorbidity factors that impact the plan of care are:      Current pregnancy.     Medications impacting care: None.  2) Examination of Body Systems comprised of:   Body structures and functions that impact the plan of care:      Lumbar spine.   Activity limitations that impact the plan of care are:      Sitting, Standing, Walking and  transitional movements.  3) Clinical presentation characteristics are:   Stable/Uncomplicated.  4) Decision-Making    Low complexity using standardized patient assessment instrument and/or measureable assessment of functional outcome.  Cumulative Therapy Evaluation is: Low complexity.    Previous and current functional limitations:  (See Goal Flow Sheet for this information)    Short term and Long term goals: (See Goal Flow Sheet for this information)     Communication ability:  Patient appears to be able to clearly communicate and understand verbal and written communication and follow directions correctly.  Treatment Explanation - The following has been discussed with the patient:   RX ordered/plan of care  Anticipated outcomes  Possible risks and side effects  This patient would benefit from PT intervention to resume normal activities.   Rehab potential is excellent.    Frequency:  2 X a month, once daily  Duration:  for 2 months  Discharge Plan:  Achieve all LTG.  Independent in home treatment program.  Reach maximal therapeutic benefit.    Please refer to the daily flowsheet for treatment today, total treatment time and time spent performing 1:1 timed codes.

## 2018-05-24 NOTE — MR AVS SNAPSHOT
After Visit Summary   5/24/2018    Krystina Suazo    MRN: [id number]           Patient Information     Date Of Birth          1985        Visit Information        Provider Department      5/24/2018 10:00 AM Madhavi William, PT [facility]        Today's Diagnoses     Low back pain during pregnancy    -  1       Follow-ups after your visit        Your next 10 appointments already scheduled     Jun 14, 2018  9:40 AM CDT   [facility] For Women Only with Isa East PTA   [facility] ([facility] [place]  )    [address]  [address]  [facility] [address]   [phone]            Jun 14, 2018  2:45 PM CDT   MyChart OB-GYN Established Prenatal with Sandy Tran MD   [facility] ([facility])    [address] [facility] [address] [phone]            Jun 28, 2018  9:40 AM CDT   [facility] For Women Only with ZAID VcaaM [facility] PT ([facility] [place]  )    [address]  [address] [place] [address]   [phone]            Jun 29, 2018  1:30 PM CDT   MFM US COMPRE SINGLE F/U with LOLYFMUSFORTINO   [facility] Ultrasound - [facility])    [address] [place] [id number] [phone]           Wear comfortable clothes and leave your valuables at home.            Jun 29, 2018  2:00 PM CDT   Radiology MD with  VASILIY BELL   [facility])    [address]  [facility] [address] [phone]           Please arrive at the time given for your first appointment. This visit is used internally to schedule the physician's time during your ultrasound.            Jul 10, 2018  2:30 PM CDT   ESTABLISHED PRENATAL with Sandy Tran MD   [facility] ([facility])    [address]  [facility] [address]   [phone]            Jul 16, 2018  10:25 AM CDT   [facility] For Women Only with Madhaviher William, PT   [facility] ([facility] [place]  )    [address]  [facility] [address]   [phone]            Jul 30, 2018 10:25 AM CDT   [facility] For Women Only with Madhavi William, PT   [facility] ([facility] [place]  )    [address]  [address]  [facility] [address]   [phone]              Who to contact     If you have questions or need follow up information about today's clinic visit or your schedule please contact [facility] directly at [phone].  Normal or non-critical lab and imaging results will be communicated to you by AFreezehart, letter or phone within 4 business days after the clinic has received the results. If you do not hear from us within 7 days, please contact the clinic through Content Ravent or phone. If you have a critical or abnormal lab result, we will notify you by phone as soon as possible.  Submit refill requests through Pebbles Interfaces or call your pharmacy and they will forward the refill request to us. Please allow 3 business days for your refill to be completed.          Additional Information About Your Visit        Pebbles Interfaces Information     P[employer] Interfaces gives you secure access to your electronic health record. If you see a primary care provider, you can also send messages to your care team and make appointments. If you have questions, please call your primary care clinic.  If you do not have a primary care provider, please call [phone] and they will assist you.        Care EveryWhere ID     This is your Care EveryWhere ID. This could be used by other organizations to access your [facility] medical records  [id number]        Your Vitals Were     Last Period                   12/20/2017            Blood Pressure from Last 3 Encounters:   05/11/18 124/56   04/20/18 104/66   03/21/18 120/60    Weight from Last 3 Encounters:   05/11/18 107.1 kg (236 lb 1.6 oz)   04/20/18 108.6 kg (239 lb 6.4 oz)    03/21/18 105.2 kg (232 lb)              We Performed the Following     HC PT EVAL, LOW COMPLEXITY     [facility] INITIAL EVAL REPORT     NEUROMUSCULAR RE-EDUCATION     THERAPEUTIC EXERCISES        Primary Care Provider Office Phone # Fax #    Valeria Daly [phone] [address] [place] [address]        Equal Access to Services     [facility]: Hadii aad ku hadasho Soomaali, waaxda luqadaha, qaybta kaalmada adeegyada, waxay idiin hayaan adeeg kharash la'aan . So [facility] [phone].    ATENCIÓN: Si habla español, tiene a wesley disposición servicios gratuitos de asistencia lingüística. Llame al [phone].    We comply with applicable federal civil rights laws and [place] laws. We do not discriminate on the basis of race, color, national origin, age, disability, sex, sexual orientation, or gender identity.            Thank you!     Thank you for choosing [facility]  for your care. Our goal is always to provide you with excellent care. Hearing back from our patients is one way we can continue to improve our services. Please take a few minutes to complete the written survey that you may receive in the mail after your visit with us. Thank you!             Your Updated Medication List - Protect others around you: Learn how to safely use, store and throw away your medicines at www.disposemymeds.org.          This list is accurate as of 5/24/18 10:52 AM.  Always use your most recent med list.                   Brand Name Dispense Instructions for use Diagnosis    albuterol 108 (90 Base) MCG/ACT Inhaler    PROAIR HFA/PROVENTIL HFA/VENTOLIN HFA     Inhale 2 puffs into the lungs        cetirizine 10 MG tablet    zyrTEC    90 tablet    Take 1 tablet (10 mg) by mouth every evening    Chronic rhinitis       * clobetasol 0.05 % external solution    TEMOVATE    50 mL    Apply topically 2 times daily Apply to affected areas on scalp bid as needed    Scalp psoriasis       *  clobetasol propionate 0.05 % Foam     50 g    Apply sparingly to affected area twice daily as needed.  Do not apply to face.    Scalp psoriasis       Fluocinolone Acetonide Scalp 0.01 % Oil oil     118 mL    Apply 2 mLs topically daily To scalp    Scalp psoriasis       prenatal multivitamin plus iron 27-0.8 MG Tabs per tablet     100 tablet    Take 1 tablet by mouth daily    Encounter for supervision of normal first pregnancy in first trimester       sertraline 50 MG tablet    ZOLOFT    135 tablet    Take 1.5 tablets (75 mg) by mouth daily    COURTNEY (generalized anxiety disorder), Major depressive disorder, recurrent episode, moderate (H)       * Notice:  This list has 2 medication(s) that are the same as other medications prescribed for you. Read the directions carefully, and ask your doctor or other care provider to review them with you.

## 2018-06-12 ENCOUNTER — OFFICE VISIT (OUTPATIENT)
Dept: OPTOMETRY | Facility: CLINIC | Age: 33
End: 2018-06-12
Payer: COMMERCIAL

## 2018-06-12 DIAGNOSIS — H10.13 ALLERGIC CONJUNCTIVITIS, BILATERAL: ICD-10-CM

## 2018-06-12 DIAGNOSIS — H52.13 MYOPIA OF BOTH EYES: Primary | ICD-10-CM

## 2018-06-12 PROCEDURE — 92004 COMPRE OPH EXAM NEW PT 1/>: CPT | Performed by: OPTOMETRIST

## 2018-06-12 PROCEDURE — 92015 DETERMINE REFRACTIVE STATE: CPT | Performed by: OPTOMETRIST

## 2018-06-12 RX ORDER — AZELASTINE HYDROCHLORIDE 0.5 MG/ML
1 SOLUTION/ DROPS OPHTHALMIC 2 TIMES DAILY
Qty: 1 BOTTLE | Refills: 6 | Status: SHIPPED | OUTPATIENT
Start: 2018-06-12 | End: 2018-08-27

## 2018-06-12 ASSESSMENT — EXTERNAL EXAM - RIGHT EYE: OD_EXAM: NORMAL

## 2018-06-12 ASSESSMENT — REFRACTION_WEARINGRX
OD_AXIS: 075
OS_SPHERE: -0.75
SPECS_TYPE: SVL
OD_SPHERE: -0.75
OD_CYLINDER: +0.50

## 2018-06-12 ASSESSMENT — REFRACTION_MANIFEST
OD_AXIS: 100
OD_CYLINDER: +0.25
OD_SPHERE: -1.00
OS_SPHERE: -0.75
OD_CYLINDER: +0.25
METHOD_AUTOREFRACTION: 1
OS_CYLINDER: SPHERE
OD_SPHERE: -1.25
OS_SPHERE: -1.00
OD_AXIS: 078

## 2018-06-12 ASSESSMENT — VISUAL ACUITY
OS_SC: 20/20
OD_CC: 20/20
OD_SC: 20/20
METHOD: SNELLEN - LINEAR
OS_CC: 20/20
OS_CC: 20/20
CORRECTION_TYPE: GLASSES
OD_CC: 20/20

## 2018-06-12 ASSESSMENT — EXTERNAL EXAM - LEFT EYE: OS_EXAM: NORMAL

## 2018-06-12 ASSESSMENT — CUP TO DISC RATIO
OD_RATIO: 0.2
OS_RATIO: 0.2

## 2018-06-12 ASSESSMENT — SLIT LAMP EXAM - LIDS
COMMENTS: NORMAL
COMMENTS: NORMAL

## 2018-06-12 ASSESSMENT — TONOMETRY
IOP_METHOD: APPLANATION
OD_IOP_MMHG: 14
OS_IOP_MMHG: 14

## 2018-06-12 ASSESSMENT — CONF VISUAL FIELD
OS_NORMAL: 1
OD_NORMAL: 1

## 2018-06-12 NOTE — PROGRESS NOTES
Chief Complaint   Patient presents with     COMPREHENSIVE EYE EXAM     Headaches and night driving      Here because glasses are broke    Last Eye Exam: 2yrs  Dilated Previously: Yes    What are you currently using to see?  glasses       Distance Vision Acuity: Satisfied with vision    Near Vision Acuity: Satisfied with vision while reading  with glasses    Eye Comfort: itchy  Do you use eye drops? : Yes: OTC allergy  Occupation or Hobbies: Computer/Reading  Works at [employer] as a        HPI    Symptoms:     Blurred vision   Itching                      Medical, surgical and family histories reviewed and updated 6/12/2018.       OBJECTIVE: See Ophthalmology exam    ASSESSMENT:    ICD-10-CM    1. Myopia of both eyes H52.13 EYE EXAM (SIMPLE-NONBILLABLE)     REFRACTION   2. Allergic conjunctivitis, bilateral H10.13 azelastine (OPTIVAR) 0.05 % SOLN ophthalmic solution      PLAN:   Discontinue cleareyes, use topical antihistamine two times daily or over the counter chilled artificial tears       Cleo Duran OD

## 2018-06-12 NOTE — LETTER
6/12/2018         RE: Krystina Suazo  [address]  [place] [address]        Dear Colleague,    Thank you for referring your patient, Krystina Suazo, to the [facility]. Please see a copy of my visit note below.    Chief Complaint   Patient presents with     COMPREHENSIVE EYE EXAM     Headaches and night driving      Here because glasses are broke    Last Eye Exam: 2yrs  Dilated Previously: Yes    What are you currently using to see?  glasses       Distance Vision Acuity: Satisfied with vision    Near Vision Acuity: Satisfied with vision while reading  with glasses    Eye Comfort: itchy  Do you use eye drops? : Yes: OTC allergy  Occupation or Hobbies: Computer/Reading  Works at [employer] as a        HPI    Symptoms:     Blurred vision   Itching                      Medical, surgical and family histories reviewed and updated 6/12/2018.       OBJECTIVE: See Ophthalmology exam    ASSESSMENT:    ICD-10-CM    1. Myopia of both eyes H52.13 EYE EXAM (SIMPLE-NONBILLABLE)     REFRACTION   2. Allergic conjunctivitis, bilateral H10.13 azelastine (OPTIVAR) 0.05 % SOLN ophthalmic solution      PLAN:   Discontinue cleareyes, use topical antihistamine two times daily or over the counter chilled artificial tears       Cleo Duran OD     Again, thank you for allowing me to participate in the care of your patient.        Sincerely,        Cleo Duran, OD

## 2018-06-12 NOTE — PATIENT INSTRUCTIONS
Using OTC Zaditor or Alaway ( or prescription pending cost)- 1 drop in both eyes 2 x day along with cold compresses  And frequent eye/ brow washing will help for itchy, watery eyes. Discontinue visine or clear eyes  Using continuously for 2 weeks will have better efficacy    You may also use chilled artificial tears during the day a couple of times   Very little prescription change in right eye, no change left eye

## 2018-06-12 NOTE — MR AVS SNAPSHOT
After Visit Summary   6/12/2018    Krystina Suazo    MRN: [id number]           Patient Information     Date Of Birth          1985        Visit Information        Provider Department      6/12/2018 10:40 AM Cleo Duran OD [facility]        Today's Diagnoses     Myopia of both eyes    -  1    Allergic conjunctivitis, bilateral          Care Instructions    Using OTC Zaditor or Alaway ( or prescription pending cost)- 1 drop in both eyes 2 x day along with cold compresses  And frequent eye/ brow washing will help for itchy, watery eyes. Discontinue visine or clear eyes  Using continuously for 2 weeks will have better efficacy    You may also use chilled artificial tears during the day a couple of times   Very little prescription change in right eye, no change left eye              Follow-ups after your visit        Your next 10 appointments already scheduled     Jun 14, 2018  2:45 PM CDT   MyChart OB-GYN Established Prenatal with Sandy Tran MD   [facility]    [address]  [facility] [address] [phone]            Jun 28, 2018  9:40 AM CDT   [facility] For Women Only with Isa East PTA   [facility] [place] [facility] ([facility] [place]  )    [address]  [address]  [facility] [address]   [phone]            Jun 29, 2018  1:30 PM CDT   MFM US COMPRE SINGLE F/U with [id number]   [facility]aternal [facility]etal Medicine Ultrasound - [facility])    [address]  [facility] [address] [phone]           Wear comfortable clothes and leave your valuables at home.            Jun 29, 2018  2:00 PM CDT   Radiology MD with  VASILIY BELL   [facility] - [facility])    [address]  [facility] [address] [phone]           Please arrive at the time given for your first appointment. This visit is used internally to  schedule the physician's time during your ultrasound.            Jul 10, 2018  2:30 PM CDT   ESTABLISHED PRENATAL with Sandy Tran MD   [facility]    [address]  [facility] [address] [phone]            Jul 16, 2018 10:25 AM CDT   [facility] For Women Only with Madhaviher Jimenezde, PT   [facility] [place] [facility] ([facility] [place]  )    [address]  [facility] [address]   [phone]            Jul 30, 2018 10:25 AM CDT   [facility] For Women Only with Madhavi Ivonne Condede, PT   [facility] [place] [facility] ([facility] [place]  )    [address]  [address]  [facility] [address]   [phone]              Who to contact     If you have questions or need follow up information about today's clinic visit or your schedule please contact [facility] directly at [phone].  Normal or non-critical lab and imaging results will be communicated to you by Staxxonhart, letter or phone within 4 business days after the clinic has received the results. If you do not hear from us within 7 days, please contact the clinic through SiSense or phone. If you have a critical or abnormal lab result, we will notify you by phone as soon as possible.  Submit refill requests through SiSense or call your pharmacy and they will forward the refill request to us. Please allow 3 business days for your refill to be completed.          Additional Information About Your Visit        SiSense Information     SiS[employer] gives you secure access to your electronic health record. If you see a primary care provider, you can also send messages to your care team and make appointments. If you have questions, please call your primary care clinic.  If you do not have a primary care provider, please call [phone] and they will assist you.        Care EveryWhere ID     This is your Care EveryWhere ID. This could be used by other organizations to access your [facility]  records  [id number]        Your Vitals Were     Last Period                   12/20/2017            Blood Pressure from Last 3 Encounters:   05/11/18 124/56   04/20/18 104/66   03/21/18 120/60    Weight from Last 3 Encounters:   05/11/18 107.1 kg (236 lb 1.6 oz)   04/20/18 108.6 kg (239 lb 6.4 oz)   03/21/18 105.2 kg (232 lb)              We Performed the Following     EYE EXAM (SIMPLE-NONBILLABLE)     REFRACTION          Today's Medication Changes          These changes are accurate as of 6/12/18 11:22 AM.  If you have any questions, ask your nurse or doctor.               Start taking these medicines.        Dose/Directions    azelastine 0.05 % Soln ophthalmic solution   Commonly known as:  OPTIVAR   Used for:  Allergic conjunctivitis, bilateral   Started by:  Cleo Duran, OD        Dose:  1 drop   Apply 1 drop to eye 2 times daily   Quantity:  1 Bottle   Refills:  6            Where to get your medicines      Some of these will need a paper prescription and others can be bought over the counter.  Ask your nurse if you have questions.     Bring a paper prescription for each of these medications     azelastine 0.05 % Soln ophthalmic solution                Primary Care Provider Office Phone # Fax #    Valeria Daly [phone]       [facility] [place] [address]        Equal Access to Services     [facility]: Hadii sanjiv marion hadasho Soomaali, waaxda luqadaha, qaybta kaalmada adeherber, ruth marquez. So [facility] [phone].    ATENCIÓN: Si habla español, tiene a wesley disposición servicios gratuitos de asistencia lingüística. Llame al [phone].    We comply with applicable federal civil rights laws and [place] laws. We do not discriminate on the basis of race, color, national origin, age, disability, sex, sexual orientation, or gender identity.            Thank you!     Thank you for choosing [facility] [place]  for your care. Our  goal is always to provide you with excellent care. Hearing back from our patients is one way we can continue to improve our services. Please take a few minutes to complete the written survey that you may receive in the mail after your visit with us. Thank you!             Your Updated Medication List - Protect others around you: Learn how to safely use, store and throw away your medicines at www.disposemymeds.org.          This list is accurate as of 6/12/18 11:22 AM.  Always use your most recent med list.                   Brand Name Dispense Instructions for use Diagnosis    albuterol 108 (90 Base) MCG/ACT Inhaler    PROAIR HFA/PROVENTIL HFA/VENTOLIN HFA     Inhale 2 puffs into the lungs        azelastine 0.05 % Soln ophthalmic solution    OPTIVAR    1 Bottle    Apply 1 drop to eye 2 times daily    Allergic conjunctivitis, bilateral       cetirizine 10 MG tablet    zyrTEC    90 tablet    Take 1 tablet (10 mg) by mouth every evening    Chronic rhinitis       * clobetasol 0.05 % external solution    TEMOVATE    50 mL    Apply topically 2 times daily Apply to affected areas on scalp bid as needed    Scalp psoriasis       * clobetasol propionate 0.05 % Foam     50 g    Apply sparingly to affected area twice daily as needed.  Do not apply to face.    Scalp psoriasis       Fluocinolone Acetonide Scalp 0.01 % Oil oil     118 mL    Apply 2 mLs topically daily To scalp    Scalp psoriasis       prenatal multivitamin plus iron 27-0.8 MG Tabs per tablet     100 tablet    Take 1 tablet by mouth daily    Encounter for supervision of normal first pregnancy in first trimester       sertraline 50 MG tablet    ZOLOFT    135 tablet    Take 1.5 tablets (75 mg) by mouth daily    COURTNEY (generalized anxiety disorder), Major depressive disorder, recurrent episode, moderate (H)       * Notice:  This list has 2 medication(s) that are the same as other medications prescribed for you. Read the directions carefully, and ask your doctor or other  care provider to review them with you.

## 2018-06-14 ENCOUNTER — PRENATAL OFFICE VISIT (OUTPATIENT)
Dept: OBGYN | Facility: CLINIC | Age: 33
End: 2018-06-14
Payer: COMMERCIAL

## 2018-06-14 VITALS — SYSTOLIC BLOOD PRESSURE: 128 MMHG | DIASTOLIC BLOOD PRESSURE: 60 MMHG | BODY MASS INDEX: 36.33 KG/M2 | WEIGHT: 246 LBS

## 2018-06-14 DIAGNOSIS — Z34.02 ENCOUNTER FOR SUPERVISION OF NORMAL FIRST PREGNANCY IN SECOND TRIMESTER: Primary | ICD-10-CM

## 2018-06-14 LAB
ERYTHROCYTE [DISTWIDTH] IN BLOOD BY AUTOMATED COUNT: 12.1 % (ref 10–15)
HCT VFR BLD AUTO: 32.3 % (ref 35–47)
HGB BLD-MCNC: 10.9 G/DL (ref 11.7–15.7)
MCH RBC QN AUTO: 33.2 PG (ref 26.5–33)
MCHC RBC AUTO-ENTMCNC: 33.7 G/DL (ref 31.5–36.5)
MCV RBC AUTO: 99 FL (ref 78–100)
PLATELET # BLD AUTO: 193 10E9/L (ref 150–450)
RBC # BLD AUTO: 3.28 10E12/L (ref 3.8–5.2)
WBC # BLD AUTO: 9.6 10E9/L (ref 4–11)

## 2018-06-14 PROCEDURE — 82950 GLUCOSE TEST: CPT | Performed by: OBSTETRICS & GYNECOLOGY

## 2018-06-14 PROCEDURE — 85027 COMPLETE CBC AUTOMATED: CPT | Performed by: OBSTETRICS & GYNECOLOGY

## 2018-06-14 PROCEDURE — 99207 ZZC PRENATAL VISIT: CPT | Performed by: OBSTETRICS & GYNECOLOGY

## 2018-06-14 PROCEDURE — 36415 COLL VENOUS BLD VENIPUNCTURE: CPT | Performed by: OBSTETRICS & GYNECOLOGY

## 2018-06-14 PROCEDURE — 86780 TREPONEMA PALLIDUM: CPT | Performed by: OBSTETRICS & GYNECOLOGY

## 2018-06-14 NOTE — NURSING NOTE
"Chief Complaint   Patient presents with     Prenatal Care       Initial /60  Wt 246 lb (111.6 kg)  LMP 2017  Breastfeeding? No  BMI 36.33 kg/m2 Estimated body mass index is 36.33 kg/(m^2) as calculated from the following:    Height as of 18: 5' 9\" (1.753 m).    Weight as of this encounter: 246 lb (111.6 kg).  BP completed using cuff size: regular          26w4d  + FM daily  - cramping or bleeding  - headache or vision changes  + heartburn occas.  + edema in feet - wearing compression socks  Marquita Ramirez LPN               "

## 2018-06-14 NOTE — PROGRESS NOTES
PROBLEM LIST  LABS: Opos/RI/normal NIPT    1. AMA (35 at [facility]): normal level II but marginal cord insertion. Follow up ultrasound planned at 28 weeks in [facility].    Good fetal movement, no leakage of fluid, no contractions or vaginal bleeding. Transfer of care form Dr. Jacobo. Glucose tolerance test today, Tdap next time.    Sandy Tran MD

## 2018-06-14 NOTE — MR AVS SNAPSHOT
After Visit Summary   6/14/2018    Krystina Suazo    MRN: [id number]           Patient Information     Date Of Birth          1985        Visit Information        Provider Department      6/14/2018 2:45 PM Sandy Tran MD [facility]        Today's Diagnoses     Encounter for supervision of normal first pregnancy in second trimester    -  1       Follow-ups after your visit        Your next 10 appointments already scheduled     Jun 28, 2018  9:40 AM CDT   LUIS ANTONIO For Women Only with Isa East PTA   LUIS ANTONIO CONOR BURNSYAMILET PT ([facility]  )    [address] [place] [address]  [address]  [facility] [address]   [phone]            Jun 29, 2018  1:30 PM CDT   MFM US COMPRE SINGLE F/U with MFMUSFORTINO   [facility]aternal Fetal Medicine Ultrasound - [facility])    [address]  [facility] [address]   [phone]           Wear comfortable clothes and leave your valuables at home.            Jun 29, 2018  2:00 PM CDT   Radiology MD with  VASILIY BELL   [facility]etal Medicine - [facility])    [address]  [facility] [address]   [phone]           Please arrive at the time given for your first appointment. This visit is used internally to schedule the physician's time during your ultrasound.            Jul 10, 2018  2:30 PM CDT   ESTABLISHED PRENATAL with Sandy Tran MD   [facility]    [address] [facility] [address]   [phone]            Jul 16, 2018 10:25 AM CDT   LUIS ANTONIO For Women Only with Madhavi Forestdale Eltonde, PT   LUIS ANTOINO CONOR AARON PT ([facility] [place]  )    [address]  [address]  [facility] [address]   [phone]            Jul 30, 2018 10:25 AM CDT   LUIS ANTONIO For Women Only with Madhavi Forestdale Weide, PT   LUIS ANTONIO RS GALEN PT ([facility] [place]  )    [address]  [facility] [address]   [phone]               Who to contact     If you have questions or need follow up information about today's clinic visit or your schedule please contact [facility] directly at [phone].  Normal or non-critical lab and imaging results will be communicated to you by MyChart, letter or phone within 4 business days after the clinic has received the results. If you do not hear from us within 7 days, please contact the clinic through Pyroliahart or phone. If you have a critical or abnormal lab result, we will notify you by phone as soon as possible.  Submit refill requests through MDCapsule or call your pharmacy and they will forward the refill request to us. Please allow 3 business days for your refill to be completed.          Additional Information About Your Visit        [employer] Information     MDCapsule gives you secure access to your electronic health record. If you see a primary care provider, you can also send messages to your care team and make appointments. If you have questions, please call your primary care clinic.  If you do not have a primary care provider, please call [phone] and they will assist you.        Care EveryWhere ID     This is your Care EveryWhere ID. This could be used by other organizations to access your [place] medical records  [id number]        Your Vitals Were     Last Period Breastfeeding? BMI (Body Mass Index)             12/20/2017 No 36.33 kg/m2          Blood Pressure from Last 3 Encounters:   06/14/18 128/60   05/11/18 124/56   04/20/18 104/66    Weight from Last 3 Encounters:   06/14/18 246 lb (111.6 kg)   05/11/18 236 lb 1.6 oz (107.1 kg)   04/20/18 239 lb 6.4 oz (108.6 kg)              We Performed the Following     CBC with platelets     Glucose tolerance, gest screen, 1 hour     Treponema Abs w Reflex to RPR and Titer        Primary Care Provider Office Phone # Fax #    Valeria Daly [phone] [address] [place] [address]         Equal Access to Services     [facility] : Hadii sanjiv marion indigo Velasquez, waarnaudda luqadaha, qaybta kaalmayobani fermin, ruth forrestolgasilvio marquez. So [facility] [phone].    ATENCIÓN: Si sidla bhavin, tiene a wesley disposición servicios gratuitos de asistencia lingüística. Charmaineame al [phone].    We comply with applicable federal civil rights laws and [place] laws. We do not discriminate on the basis of race, color, national origin, age, disability, sex, sexual orientation, or gender identity.            Thank you!     Thank you for choosing [facility]  for your care. Our goal is always to provide you with excellent care. Hearing back from our patients is one way we can continue to improve our services. Please take a few minutes to complete the written survey that you may receive in the mail after your visit with us. Thank you!             Your Updated Medication List - Protect others around you: Learn how to safely use, store and throw away your medicines at www.disposemymeds.org.          This list is accurate as of 6/14/18  4:50 PM.  Always use your most recent med list.                   Brand Name Dispense Instructions for use Diagnosis    albuterol 108 (90 Base) MCG/ACT Inhaler    PROAIR HFA/PROVENTIL HFA/VENTOLIN HFA     Inhale 2 puffs into the lungs        azelastine 0.05 % Soln ophthalmic solution    OPTIVAR    1 Bottle    Apply 1 drop to eye 2 times daily    Allergic conjunctivitis, bilateral       cetirizine 10 MG tablet    zyrTEC    90 tablet    Take 1 tablet (10 mg) by mouth every evening    Chronic rhinitis       * clobetasol 0.05 % external solution    TEMOVATE    50 mL    Apply topically 2 times daily Apply to affected areas on scalp bid as needed    Scalp psoriasis       * clobetasol propionate 0.05 % Foam     50 g    Apply sparingly to affected area twice daily as needed.  Do not apply to face.    Scalp psoriasis       Fluocinolone Acetonide Scalp 0.01 % Oil oil      118 mL    Apply 2 mLs topically daily To scalp    Scalp psoriasis       prenatal multivitamin plus iron 27-0.8 MG Tabs per tablet     100 tablet    Take 1 tablet by mouth daily    Encounter for supervision of normal first pregnancy in first trimester       sertraline 50 MG tablet    ZOLOFT    135 tablet    Take 1.5 tablets (75 mg) by mouth daily    COURTNEY (generalized anxiety disorder), Major depressive disorder, recurrent episode, moderate (H)       * Notice:  This list has 2 medication(s) that are the same as other medications prescribed for you. Read the directions carefully, and ask your doctor or other care provider to review them with you.

## 2018-06-15 LAB
GLUCOSE 1H P 50 G GLC PO SERPL-MCNC: 97 MG/DL (ref 60–129)
T PALLIDUM AB SER QL: NONREACTIVE

## 2018-06-21 ENCOUNTER — TELEPHONE (OUTPATIENT)
Dept: OBGYN | Facility: CLINIC | Age: 33
End: 2018-06-21

## 2018-06-21 NOTE — TELEPHONE ENCOUNTER
Patient calling with some concerns. She is 27w4d. She says she still feels movements, but she isn't sure if they are as often or as big of movements as before. She says she has been very stressed at work lately and that could be why. Talked patient though kick counts. She will call us if the counts are abnormal, or if she would like to stop by the clinic for a doppler check.      Sanjuanita Del Toro RN

## 2018-06-22 ENCOUNTER — HOSPITAL ENCOUNTER (OUTPATIENT)
Facility: CLINIC | Age: 33
Discharge: HOME | End: 2018-06-22
Attending: FAMILY MEDICINE | Admitting: FAMILY MEDICINE
Payer: COMMERCIAL

## 2018-06-22 VITALS
SYSTOLIC BLOOD PRESSURE: 117 MMHG | DIASTOLIC BLOOD PRESSURE: 60 MMHG | HEIGHT: 69 IN | BODY MASS INDEX: 35.99 KG/M2 | WEIGHT: 243 LBS | HEART RATE: 73 BPM | RESPIRATION RATE: 16 BRPM | TEMPERATURE: 98.3 F

## 2018-06-22 PROCEDURE — G0463 HOSPITAL OUTPT CLINIC VISIT: HCPCS

## 2018-06-22 RX ORDER — ONDANSETRON 2 MG/ML
4 INJECTION INTRAMUSCULAR; INTRAVENOUS EVERY 6 HOURS PRN
Status: DISCONTINUED | OUTPATIENT
Start: 2018-06-22 | End: 2018-06-22 | Stop reason: HOSPADM

## 2018-06-22 NOTE — TELEPHONE ENCOUNTER
27w5d    Pt calls back today.  Pt states she has been feeling movements, she just has felt less than what she was used to.  Not sure if it is due to stress.  She states she had about 4 movements in one hour but she is not sure if they are kicks or hiccups.  No bleeding or pains.      Pt mentions wanting to come for doppler.  Dr Tran, would you prefer pt to come in for FHT check with nurse, see you, or go to L&D?    Neda SWEET R.N.  [facility]

## 2018-06-22 NOTE — IP AVS SNAPSHOT
[facility] Labor and Delivery    [address]    [facility] [address]    Phone:  [phone]    Fax:  [phone]                                       After Visit Summary   6/22/2018    Krystina Suazo    MRN: [id number]           After Visit Summary Signature Page     I have received my discharge instructions, and my questions have been answered. I have discussed any challenges I see with this plan with the nurse or doctor.    ..........................................................................................................................................  Patient/Patient Representative Signature      ..........................................................................................................................................  Patient Representative Print Name and Relationship to Patient    ..................................................               ................................................  Date                                            Time    ..........................................................................................................................................  Reviewed by Signature/Title    ...................................................              ..............................................  Date                                                            Time

## 2018-06-22 NOTE — DISCHARGE INSTRUCTIONS
Discharge Instruction for Undelivered Patients      You were seen for: decreased fetal movement  We Consulted:  Dr Zamudio  You had (Test or Medicine): fetal monitoring    Diet:   Regular      Activity:  Usual activity     Call your provider if you notice:  Swelling in your face or increased swelling in your hands or legs.  Headaches that are not relieved by Tylenol (acetaminophen).  Changes in your vision (blurring: seeing spots or stars.)  Nausea (sick to your stomach) and vomiting (throwing up).   Weight gain of 5 pounds or more per week.  Heartburn that doesn't go away.  Signs of bladder infection: pain when you urinate (use the toilet), need to go more often and more urgently.  The bag of givens (rupture of membranes) breaks, or you notice leaking in your underwear.  Bright red blood in your underwear.  Abdominal (lower belly) or stomach pain.  For first baby: Contractions (tightening) less than 5 minutes apart for one hour or more.  *If less than 34 weeks: Contractions (tightenings) more than 6 times in one hour.  Increase or change in vaginal discharge (note the color and amount)    Follow-up:  As scheduled

## 2018-06-22 NOTE — TELEPHONE ENCOUNTER
Spoke with pt, she will go to L&D.  L&D aware.    Routed to on call as james.    Neda SWEET R.N.  [facility]

## 2018-06-22 NOTE — PLAN OF CARE
Data: [facility] at 1255  Reason for maternal/fetal assessment per patient is Decreased Fetal Movement  . Patient is a . Prenatal record reviewed.      Obstetric History       T0      L0     SAB0   TAB0   Ectopic0   Multiple0   Live Births0       # Outcome Date GA Lbr Daryn/2nd Weight Sex Delivery Anes PTL Lv   1 Current                  Medical History:   Past Medical History:   Diagnosis Date     Asthma      Depression    . Gestational Age 27w5d. VSS. Cervix: not assessed.  Fetal movement present. Patient denies cramping, backache, vaginal discharge, pelvic pressure, UTI symptoms, GI problems, bloody show, vaginal bleeding, edema, headache, visual disturbances, epigastric or URQ pain, abdominal pain, rupture of membranes. Support persons Yakov is present.  Action: EFM on Triage assessment completed.  moderate variability, patient denies contractions abdomen is soft  Response: .Dr Zamudio in department, strip reviewed patient now is feeling baby movement ok to discharge,All printed out discharge papers verbally reviewed Patient verbalized understanding of education and verbalized agreement with plan. Discharged ambulatory at 1450

## 2018-06-22 NOTE — IP AVS SNAPSHOT
MRN:[id number]                      After Visit Summary   6/22/2018    Krystina Suazo    MRN: [id number]           Thank you!     Thank you for choosing [facility] for your care. Our goal is always to provide you with excellent care. Hearing back from our patients is one way we can continue to improve our services. Please take a few minutes to complete the written survey that you may receive in the mail after you visit. If you would like to speak to someone directly about your visit please contact Patient Relations at [phone]. Thank you!          Patient Information     Date Of Birth          1985        About your hospital stay     You were admitted on:  June 22, 2018 You last received care in the:  [facility] Labor and Delivery    You were discharged on:  June 22, 2018       Who to Call     For medical emergencies, please call 911.  For non-urgent questions about your medical care, please call your primary care provider or clinic, [phone]          Attending Provider     Provider Specialty    Mora Zamudio,  OB/Gyn       Primary Care Provider Office Phone # Fax #    Valeria Daly [phone]      Your next 10 appointments already scheduled     Jun 28, 2018  9:40 AM CDT   LUIS ANTONIO For Women Only with Isa East PTA   LUIS ANTONIO RS Sheridan PT ([facility]  )    [address]  [address]  [facility] [address]   [phone]            Jun 29, 2018  1:30 PM CDT   MFM US COMPRE SINGLE F/U with [id number]   [facility])    [address]  [facility] [address] [phone]           Wear comfortable clothes and leave your valuables at home.            Jun 29, 2018  2:00 PM CDT   Radiology MD with  VASILIY BELL   [facility])    [address]  [facility] [address] [phone]            Please arrive at the time given for your first appointment. This visit is used internally to schedule the physician's time during your ultrasound.            Jun 29, 2018  3:45 PM CDT   ESTABLISHED PRENATAL with Zita Kmi DO   [facility]    [address]  [facility] [address]   [phone]            Jul 10, 2018  2:30 PM CDT   ESTABLISHED PRENATAL with Sandy Tran MD   [facility])    [address]  [facility] [address]   [phone]            Jul 16, 2018 10:25 AM CDT   LUIS ANTONIO For Women Only with Madhaviher William, PT   LUIS ANTONIO RS BURNSYAMILET PT ([facility] [place]  )    [address] [address]   [phone]            Jul 26, 2018  3:45 PM CDT   ESTABLISHED PRENATAL with Sandy Tran MD   [facility])    [address]  [facility] [address]   [phone]            Jul 30, 2018 10:25 AM CDT   LUIS ANTONIO For Women Only with Madhaviher William, PT   LUIS ANTONIO RS BURNSYAMILET PT ([facility] [place]  )    [address] [facility]  [address] [place] [address]   [phone]            Aug 09, 2018  3:15 PM CDT   ESTABLISHED PRENATAL with Sandy Tran MD   [facility]    [address]  [facility] [address] [phone]              Further instructions from your care team       Discharge Instruction for Undelivered Patients      You were seen for: decreased fetal movement  We Consulted:  Dr Zamudio  You had (Test or Medicine): fetal monitoring    Diet:   Regular      Activity:  Usual activity     Call your provider if you notice:  Swelling in your face or increased swelling in your hands or legs.  Headaches that are not relieved by Tylenol (acetaminophen).  Changes in your vision (blurring: seeing spots or stars.)  Nausea (sick to your stomach) and  "vomiting (throwing up).   Weight gain of 5 pounds or more per week.  Heartburn that doesn't go away.  Signs of bladder infection: pain when you urinate (use the toilet), need to go more often and more urgently.  The bag of givens (rupture of membranes) breaks, or you notice leaking in your underwear.  Bright red blood in your underwear.  Abdominal (lower belly) or stomach pain.  For first baby: Contractions (tightening) less than 5 minutes apart for one hour or more.  *If less than 34 weeks: Contractions (tightenings) more than 6 times in one hour.  Increase or change in vaginal discharge (note the color and amount)    Follow-up:  As scheduled          Pending Results     No orders found from 6/20/2018 to 6/23/2018.            Admission Information     Date & Time Provider Department Dept. Phone    6/22/2018 Mora Zamudio, [facility] Labor and Delivery [phone]      Your Vitals Were     Blood Pressure Pulse Temperature Respirations Height Weight    117/60 73 98.3  F (36.8  C) (Oral) 16 1.753 m (5' 9\") 110.2 kg (243 lb)    Last Period BMI (Body Mass Index)                12/20/2017 35.88 kg/m2          "[employer] Information     [employer] gives you secure access to your electronic health record. If you see a primary care provider, you can also send messages to your care team and make appointments. If you have questions, please call your primary care clinic.  If you do not have a primary care provider, please call [phone] and they will assist you.        Care EveryWhere ID     This is your Care EveryWhere ID. This could be used by other organizations to access your [place] medical records  [id number]        Equal Access to Services     [facility] : Hadii sanjiv Velasquez, saman whitlock, qaruth brown. So [facility] [phone].    ATENCIÓN: Si habla español, tiene a wesley disposición servicios gratuitos de asistencia lingüística. Llame al " [phone].    We comply with applicable federal civil rights laws and [place] laws. We do not discriminate on the basis of race, color, national origin, age, disability, sex, sexual orientation, or gender identity.               Review of your medicines      UNREVIEWED medicines. Ask your doctor about these medicines        Dose / Directions    albuterol 108 (90 Base) MCG/ACT Inhaler   Commonly known as:  PROAIR HFA/PROVENTIL HFA/VENTOLIN HFA        Dose:  2 puff   Inhale 2 puffs into the lungs   Refills:  0       azelastine 0.05 % Soln ophthalmic solution   Commonly known as:  OPTIVAR   Used for:  Allergic conjunctivitis, bilateral        Dose:  1 drop   Apply 1 drop to eye 2 times daily   Quantity:  1 Bottle   Refills:  6       cetirizine 10 MG tablet   Commonly known as:  zyrTEC   Used for:  Chronic rhinitis        Dose:  10 mg   Take 1 tablet (10 mg) by mouth every evening   Quantity:  90 tablet   Refills:  11       * clobetasol 0.05 % external solution   Commonly known as:  TEMOVATE   Used for:  Scalp psoriasis        Apply topically 2 times daily Apply to affected areas on scalp bid as needed   Quantity:  50 mL   Refills:  3       * clobetasol propionate 0.05 % Foam   Used for:  Scalp psoriasis        Apply sparingly to affected area twice daily as needed.  Do not apply to face.   Quantity:  50 g   Refills:  3       Fluocinolone Acetonide Scalp 0.01 % Oil oil   Used for:  Scalp psoriasis        Dose:  2 mL   Apply 2 mLs topically daily To scalp   Quantity:  118 mL   Refills:  3       prenatal multivitamin plus iron 27-0.8 MG Tabs per tablet   Used for:  Encounter for supervision of normal first pregnancy in first trimester        Dose:  1 tablet   Take 1 tablet by mouth daily   Quantity:  100 tablet   Refills:  3       sertraline 50 MG tablet   Commonly known as:  ZOLOFT   Used for:  COURTNEY (generalized anxiety disorder), Major depressive disorder, recurrent episode, moderate (H)        Dose:  75 mg   Take  1.5 tablets (75 mg) by mouth daily   Quantity:  135 tablet   Refills:  1       * Notice:  This list has 2 medication(s) that are the same as other medications prescribed for you. Read the directions carefully, and ask your doctor or other care provider to review them with you.             Protect others around you: Learn how to safely use, store and throw away your medicines at www.disposemymeds.org.             Medication List: This is a list of all your medications and when to take them. Check marks below indicate your daily home schedule. Keep this list as a reference.      Medications           Morning Afternoon Evening Bedtime As Needed    albuterol 108 (90 Base) MCG/ACT Inhaler   Commonly known as:  PROAIR HFA/PROVENTIL HFA/VENTOLIN HFA   Inhale 2 puffs into the lungs                                azelastine 0.05 % Soln ophthalmic solution   Commonly known as:  OPTIVAR   Apply 1 drop to eye 2 times daily                                cetirizine 10 MG tablet   Commonly known as:  zyrTEC   Take 1 tablet (10 mg) by mouth every evening                                * clobetasol 0.05 % external solution   Commonly known as:  TEMOVATE   Apply topically 2 times daily Apply to affected areas on scalp bid as needed                                * clobetasol propionate 0.05 % Foam   Apply sparingly to affected area twice daily as needed.  Do not apply to face.                                Fluocinolone Acetonide Scalp 0.01 % Oil oil   Apply 2 mLs topically daily To scalp                                prenatal multivitamin plus iron 27-0.8 MG Tabs per tablet   Take 1 tablet by mouth daily                                sertraline 50 MG tablet   Commonly known as:  ZOLOFT   Take 1.5 tablets (75 mg) by mouth daily                                * Notice:  This list has 2 medication(s) that are the same as other medications prescribed for you. Read the directions carefully, and ask your doctor or other care provider  to review them with you.

## 2018-06-28 ENCOUNTER — THERAPY VISIT (OUTPATIENT)
Dept: PHYSICAL THERAPY | Facility: CLINIC | Age: 33
End: 2018-06-28
Payer: COMMERCIAL

## 2018-06-28 DIAGNOSIS — O26.893 LOW BACK PAIN DURING PREGNANCY IN THIRD TRIMESTER: ICD-10-CM

## 2018-06-28 DIAGNOSIS — M54.50 LOW BACK PAIN DURING PREGNANCY IN THIRD TRIMESTER: ICD-10-CM

## 2018-06-28 PROCEDURE — 97110 THERAPEUTIC EXERCISES: CPT | Mod: GP | Performed by: PHYSICAL THERAPY ASSISTANT

## 2018-06-28 NOTE — MR AVS SNAPSHOT
After Visit Summary   6/28/2018    Krystina Suazo    MRN: [id number]           Patient Information     Date Of Birth          1985        Visit Information        Provider Department      6/28/2018 9:40 AM Isa East PTA I[facility] PT        Today's Diagnoses     Low back pain during pregnancy in third trimester           Follow-ups after your visit        Your next 10 appointments already scheduled     Jun 29, 2018  1:30 PM CDT   MFM US COMPRE SINGLE F/U with [id number]   [facility] Maternal Fetal Medicine Ultrasound - [facility])    [address]  [facility] [id number] [phone]           Wear comfortable clothes and leave your valuables at home.            Jun 29, 2018  2:00 PM CDT   Radiology MD with  MFM MD   [facility] Maternal Fetal Medicine [facility] [place])    [address]  [facility] [id number] [phone]           Please arrive at the time given for your first appointment. This visit is used internally to schedule the physician's time during your ultrasound.            Jun 29, 2018  3:45 PM CDT   ESTABLISHED PRENATAL with Zita Kim DO   [facility]    [address]  [facility] [address]   [phone]            Jul 10, 2018  2:30 PM CDT   ESTABLISHED PRENATAL with Sandy Tran MD   [facility]    [address]  [facility] [address]   [phone]            Jul 26, 2018  3:45 PM CDT   ESTABLISHED PRENATAL with Sandy Tran MD   [facility]    [address]  [facility] [address]   [phone]            Aug 09, 2018  3:15 PM CDT   ESTABLISHED PRENATAL with Sandy Tran MD   [facility]    [address]  [facility] [address]    [phone]              Who to contact     If you have questions or need follow up information about today's clinic visit or your schedule please contact [facility] directly at [phone].  Normal or non-critical lab and imaging results will be communicated to you by MyChart, letter or phone within 4 business days after the clinic has received the results. If you do not hear from us within 7 days, please contact the clinic through MyChart or phone. If you have a critical or abnormal lab result, we will notify you by phone as soon as possible.  Submit refill requests through MyScreen or call your pharmacy and they will forward the refill request to us. Please allow 3 business days for your refill to be completed.          Additional Information About Your Visit        MyScreen Information     MyScreen gives you secure access to your electronic health record. If you see a primary care provider, you can also send messages to your care team and make appointments. If you have questions, please call your primary care clinic.  If you do not have a primary care provider, please call [phone] and they will assist you.        Care EveryWhere ID     This is your Care EveryWhere ID. This could be used by other organizations to access your [place] medical records  [id number]        Your Vitals Were     Last Period                   12/20/2017            Blood Pressure from Last 3 Encounters:   06/22/18 117/60   06/14/18 128/60   05/11/18 124/56    Weight from Last 3 Encounters:   06/22/18 110.2 kg (243 lb)   06/14/18 111.6 kg (246 lb)   05/11/18 107.1 kg (236 lb 1.6 oz)              We Performed the Following     Therapeutic Exercises        Primary Care Provider Office Phone # Fax #    Valeria Daly [phone] [address] [place] [address]        Equal Access to Services     CLAUDIA KURTZ : saman Galo, esau fermin,  ruht zhao daniel ornelas'aan ah. So [facility] [place] [phone].    ATENCIÓN: Si sidla bhavin, tiene a wesley disposición servicios gratuitos de asistencia lingüística. Marcelina odonnell [phone].    We comply with applicable federal civil rights laws and [place] laws. We do not discriminate on the basis of race, color, national origin, age, disability, sex, sexual orientation, or gender identity.            Thank you!     Thank you for choosing [facility]  for your care. Our goal is always to provide you with excellent care. Hearing back from our patients is one way we can continue to improve our services. Please take a few minutes to complete the written survey that you may receive in the mail after your visit with us. Thank you!             Your Updated Medication List - Protect others around you: Learn how to safely use, store and throw away your medicines at www.disposemymeds.org.          This list is accurate as of 6/28/18 10:04 AM.  Always use your most recent med list.                   Brand Name Dispense Instructions for use Diagnosis    albuterol 108 (90 Base) MCG/ACT Inhaler    PROAIR HFA/PROVENTIL HFA/VENTOLIN HFA     Inhale 2 puffs into the lungs        azelastine 0.05 % Soln ophthalmic solution    OPTIVAR    1 Bottle    Apply 1 drop to eye 2 times daily    Allergic conjunctivitis, bilateral       cetirizine 10 MG tablet    zyrTEC    90 tablet    Take 1 tablet (10 mg) by mouth every evening    Chronic rhinitis       * clobetasol 0.05 % external solution    TEMOVATE    50 mL    Apply topically 2 times daily Apply to affected areas on scalp bid as needed    Scalp psoriasis       * clobetasol propionate 0.05 % Foam     50 g    Apply sparingly to affected area twice daily as needed.  Do not apply to face.    Scalp psoriasis       Fluocinolone Acetonide Scalp 0.01 % Oil oil     118 mL    Apply 2 mLs topically daily To scalp    Scalp psoriasis       prenatal multivitamin plus iron 27-0.8 MG Tabs per  tablet     100 tablet    Take 1 tablet by mouth daily    Encounter for supervision of normal first pregnancy in first trimester       sertraline 50 MG tablet    ZOLOFT    135 tablet    Take 1.5 tablets (75 mg) by mouth daily    COURTNEY (generalized anxiety disorder), Major depressive disorder, recurrent episode, moderate (H)       * Notice:  This list has 2 medication(s) that are the same as other medications prescribed for you. Read the directions carefully, and ask your doctor or other care provider to review them with you.

## 2018-06-28 NOTE — PROGRESS NOTES
Subjective:  HPI  Oswestry Score: 14 %                 Objective:  System    Physical Exam    General     ROS    Assessment/Plan:    DISCHARGE REPORT    Progress reporting period is from 5/24/18 to 6/28/18.      SUBJECTIVE  Subjective changes noted by patient:  Patient reports that she has been feeling good for the last month even with traveling.  She states that she has a baby belt and that has been also helpful  Has not done many exercises due to traveling for work. No radiating pain in the lower extremities.  Current pain level is .  Current Pain level: 0/10    Previous pain level was:   Initial Pain level: 8/10   Changes in function:  Yes (See Goal flowsheet attached for changes in current functional level)     Adverse reaction to treatment or activity: None     OBJECTIVE  Changes noted in objective findings:  Yes, Patient is in her 3 trimester for pregnancy.  Reviewed all of her HEP.  BERYL score is a 14 and the Rashid is low.  Patient does have a good HEP.

## 2018-06-29 ENCOUNTER — HOSPITAL ENCOUNTER (OUTPATIENT)
Dept: ULTRASOUND IMAGING | Facility: CLINIC | Age: 33
Discharge: HOME | End: 2018-06-29
Attending: OBSTETRICS & GYNECOLOGY | Admitting: OBSTETRICS & GYNECOLOGY
Payer: COMMERCIAL

## 2018-06-29 ENCOUNTER — OFFICE VISIT (OUTPATIENT)
Dept: MATERNAL FETAL MEDICINE | Facility: CLINIC | Age: 33
End: 2018-06-29
Attending: OBSTETRICS & GYNECOLOGY
Payer: COMMERCIAL

## 2018-06-29 ENCOUNTER — PRENATAL OFFICE VISIT (OUTPATIENT)
Dept: OBGYN | Facility: CLINIC | Age: 33
End: 2018-06-29
Payer: COMMERCIAL

## 2018-06-29 VITALS
WEIGHT: 247 LBS | BODY MASS INDEX: 36.48 KG/M2 | DIASTOLIC BLOOD PRESSURE: 60 MMHG | HEART RATE: 84 BPM | SYSTOLIC BLOOD PRESSURE: 110 MMHG

## 2018-06-29 DIAGNOSIS — O43.199 MARGINAL INSERTION OF UMBILICAL CORD AFFECTING MANAGEMENT OF MOTHER: ICD-10-CM

## 2018-06-29 DIAGNOSIS — O43.193 MARGINAL INSERTION OF UMBILICAL CORD AFFECTING MANAGEMENT OF MOTHER IN THIRD TRIMESTER: ICD-10-CM

## 2018-06-29 DIAGNOSIS — D25.9 UTERINE LEIOMYOMA, UNSPECIFIED LOCATION: ICD-10-CM

## 2018-06-29 DIAGNOSIS — O43.199 MARGINAL INSERTION OF UMBILICAL CORD AFFECTING MANAGEMENT OF MOTHER: Primary | ICD-10-CM

## 2018-06-29 DIAGNOSIS — O09.529 SUPERVISION OF HIGH-RISK PREGNANCY OF ELDERLY MULTIGRAVIDA: Primary | ICD-10-CM

## 2018-06-29 PROCEDURE — 90471 IMMUNIZATION ADMIN: CPT | Performed by: OBSTETRICS & GYNECOLOGY

## 2018-06-29 PROCEDURE — 76816 OB US FOLLOW-UP PER FETUS: CPT

## 2018-06-29 PROCEDURE — 99207 ZZC PRENATAL VISIT: CPT | Performed by: OBSTETRICS & GYNECOLOGY

## 2018-06-29 PROCEDURE — 90715 TDAP VACCINE 7 YRS/> IM: CPT | Performed by: OBSTETRICS & GYNECOLOGY

## 2018-06-29 NOTE — MR AVS SNAPSHOT
After Visit Summary   6/29/2018    Krystina Suazo    MRN: [id number]           Patient Information     Date Of Birth          1985        Visit Information        Provider Department      6/29/2018 3:45 PM Zita Kim DO [facility]        Today's Diagnoses     Supervision of high-risk pregnancy of elderly multigravida    -  1    Marginal insertion of umbilical cord affecting management of mother in third trimester        Uterine leiomyoma, unspecified location          Care Instructions    Dietary sources of iron are found in meat, grains, fruits, and vegetables. For people who do not eat meat, good plant sources of iron include whole or enriched breads or grains, iron-fortified cereals, legumes, green leafy vegetables, dried fruits, soy products. To maximize absorption, iron-rich foods should not be consumed with coffee or tea. Taking vitamin C or drinking orange juice with high iron foods can further enhance absorption.            Follow-ups after your visit        Follow-up notes from your care team     Return in about 2 weeks (around 7/13/2018).      Your next 10 appointments already scheduled     Jul 10, 2018  2:30 PM CDT   ESTABLISHED PRENATAL with Sandy Tran MD   [facility] ([facility])    [address]  [facility] [address]   [phone]            Jul 26, 2018  3:45 PM CDT   ESTABLISHED PRENATAL with Sandy Tran MD   [facility] ([facility])    [address]  [facility] [address]   [phone]            Aug 09, 2018  3:15 PM CDT   ESTABLISHED PRENATAL with Sandy Tran MD   [facility] ([facility])    [address]  [facility] [address]   [phone]              Who to contact     If you have questions or need follow up information about today's clinic visit or your schedule please contact [facility] directly at [phone].  Normal or non-critical lab and imaging results will be communicated to you by MyChart, letter or phone within 4 business days after the clinic has received the results. If you do not hear from us within 7 days, please contact the clinic through Jootahart or phone. If you have a critical or abnormal lab result, we will notify you by phone as soon as possible.  Submit refill requests through Netheos or call your pharmacy and they will forward the refill request to us. Please allow 3 business days for your refill to be completed.          Additional Information About Your Visit        Jootahart Information     Netheos gives you secure access to your electronic health record. If you see a primary care provider, you can also send messages to your care team and make appointments. If you have questions, please call your primary care clinic.  If you do not have a primary care provider, please call [phone] and they will assist you.        Care EveryWhere ID     This is your Care EveryWhere ID. This could be used by other organizations to access your [place] medical records  [id number]        Your Vitals Were     Pulse Last Period BMI (Body Mass Index)             84 12/20/2017 36.48 kg/m2          Blood Pressure from Last 3 Encounters:   06/29/18 110/60   06/22/18 117/60   06/14/18 128/60    Weight from Last 3 Encounters:   06/29/18 247 lb (112 kg)   06/22/18 243 lb (110.2 kg)   06/14/18 246 lb (111.6 kg)              We Performed the Following     TDAP, IM (10 - 64 YRS) - Adacel        Primary Care Provider Office Phone # Fax #    Valeria Daly [phone] [address] [place] [address]        Equal Access to Services     [facility] : Hadii sanjiv Velasquez, watom whitlock, qaybta sylviaalruth chang. So [facility] [phone].    ATENCIÓN: Si habla español, tiene a wesley disposición servicios  laverne de asistencia lingüística. Marcelina odonnell [phone].    We comply with applicable federal civil rights laws and [place] laws. We do not discriminate on the basis of race, color, national origin, age, disability, sex, sexual orientation, or gender identity.            Thank you!     Thank you for choosing [facility]  for your care. Our goal is always to provide you with excellent care. Hearing back from our patients is one way we can continue to improve our services. Please take a few minutes to complete the written survey that you may receive in the mail after your visit with us. Thank you!             Your Updated Medication List - Protect others around you: Learn how to safely use, store and throw away your medicines at www.disposemymeds.org.          This list is accurate as of 6/29/18  4:32 PM.  Always use your most recent med list.                   Brand Name Dispense Instructions for use Diagnosis    albuterol 108 (90 Base) MCG/ACT Inhaler    PROAIR HFA/PROVENTIL HFA/VENTOLIN HFA     Inhale 2 puffs into the lungs        azelastine 0.05 % Soln ophthalmic solution    OPTIVAR    1 Bottle    Apply 1 drop to eye 2 times daily    Allergic conjunctivitis, bilateral       cetirizine 10 MG tablet    zyrTEC    90 tablet    Take 1 tablet (10 mg) by mouth every evening    Chronic rhinitis       * clobetasol 0.05 % external solution    TEMOVATE    50 mL    Apply topically 2 times daily Apply to affected areas on scalp bid as needed    Scalp psoriasis       * clobetasol propionate 0.05 % Foam     50 g    Apply sparingly to affected area twice daily as needed.  Do not apply to face.    Scalp psoriasis       Fluocinolone Acetonide Scalp 0.01 % Oil oil     118 mL    Apply 2 mLs topically daily To scalp    Scalp psoriasis       prenatal multivitamin plus iron 27-0.8 MG Tabs per tablet     100 tablet    Take 1 tablet by mouth daily    Encounter for supervision of normal first pregnancy in first  trimester       sertraline 50 MG tablet    ZOLOFT    135 tablet    Take 1.5 tablets (75 mg) by mouth daily    COURTNEY (generalized anxiety disorder), Major depressive disorder, recurrent episode, moderate (H)       * Notice:  This list has 2 medication(s) that are the same as other medications prescribed for you. Read the directions carefully, and ask your doctor or other care provider to review them with you.

## 2018-06-29 NOTE — PROGRESS NOTES
"Please see \"Imaging\" tab under \"Chart Review\" for details of today's US.    Meka Swann, DO    "

## 2018-06-29 NOTE — MR AVS SNAPSHOT
After Visit Summary   6/29/2018    Krystina Suazo    MRN: [id number]           Patient Information     Date Of Birth          1985        Visit Information        Provider Department      6/29/2018 2:00 PM Meka Swann,  [facility] Maternal Fetal Medicine [facility]        Today's Diagnoses     Marginal insertion of umbilical cord affecting management of mother    -  1       Follow-ups after your visit        Your next 10 appointments already scheduled     Jun 29, 2018  3:45 PM CDT   ESTABLISHED PRENATAL with Zita Kim DO   [facility]    [address]  [facility] [address]   [phone]            Jul 10, 2018  2:30 PM CDT   ESTABLISHED PRENATAL with Sandy Tran MD   [facility])    [address]  [facility] [address]   [phone]            Jul 26, 2018  3:45 PM CDT   ESTABLISHED PRENATAL with Sandy Tran MD   [facility])    [address]  [facility] [address]   [phone]            Aug 09, 2018  3:15 PM CDT   ESTABLISHED PRENATAL with Sandy Tran MD   [facility])    [address]  [facility] [address]   [phone]              Who to contact     If you have questions or need follow up information about today's clinic visit or your schedule please contact [facility] MATERNAL FETAL MEDIC[facility] directly at [phone].  Normal or non-critical lab and imaging results will be communicated to you by MyChart, letter or phone within 4 business days after the clinic has received the results. If you do not hear from us within 7 days, please contact the clinic through Airbritehart or phone. If you have a critical or abnormal lab result, we will notify you by phone as soon as possible.  Submit refill requests through [employer] or call  your pharmacy and they will forward the refill request to us. Please allow 3 business days for your refill to be completed.          Additional Information About Your Visit        SpaBookerhart Information     SpaBookerhart gives you secure access to your electronic health record. If you see a primary care provider, you can also send messages to your care team and make appointments. If you have questions, please call your primary care clinic.  If you do not have a primary care provider, please call [phone] and they will assist you.        Care EveryWhere ID     This is your Care EveryWhere ID. This could be used by other organizations to access your [place] medical records  [id number]        Your Vitals Were     Last Period                   12/20/2017            Blood Pressure from Last 3 Encounters:   06/22/18 117/60   06/14/18 128/60   05/11/18 124/56    Weight from Last 3 Encounters:   06/22/18 110.2 kg (243 lb)   06/14/18 111.6 kg (246 lb)   05/11/18 107.1 kg (236 lb 1.6 oz)              Today, you had the following     No orders found for display       Primary Care Provider Office Phone # Fax #    Valeria Daly [phone] [address] [place] [address]        Equal Access to Services     JOSE ANGEL KURTZ AH: Hadii aad ku hadasho Soomaali, waaxda luqadaha, qaybta kaalmada adeegyada, ruth marquez. So [facility] [phone].    ATENCIÓN: Si habla español, tiene a wesley disposición servicios gratuitos de asistencia lingüística. Llame al [phone].    We comply with applicable federal civil rights laws and [place] laws. We do not discriminate on the basis of race, color, national origin, age, disability, sex, sexual orientation, or gender identity.            Thank you!     Thank you for choosing [facility]  for your care. Our goal is always to provide you with excellent care. Hearing back from our patients is one way we can  continue to improve our services. Please take a few minutes to complete the written survey that you may receive in the mail after your visit with us. Thank you!             Your Updated Medication List - Protect others around you: Learn how to safely use, store and throw away your medicines at www.disposemymeds.org.          This list is accurate as of 6/29/18  2:21 PM.  Always use your most recent med list.                   Brand Name Dispense Instructions for use Diagnosis    albuterol 108 (90 Base) MCG/ACT Inhaler    PROAIR HFA/PROVENTIL HFA/VENTOLIN HFA     Inhale 2 puffs into the lungs        azelastine 0.05 % Soln ophthalmic solution    OPTIVAR    1 Bottle    Apply 1 drop to eye 2 times daily    Allergic conjunctivitis, bilateral       cetirizine 10 MG tablet    zyrTEC    90 tablet    Take 1 tablet (10 mg) by mouth every evening    Chronic rhinitis       * clobetasol 0.05 % external solution    TEMOVATE    50 mL    Apply topically 2 times daily Apply to affected areas on scalp bid as needed    Scalp psoriasis       * clobetasol propionate 0.05 % Foam     50 g    Apply sparingly to affected area twice daily as needed.  Do not apply to face.    Scalp psoriasis       Fluocinolone Acetonide Scalp 0.01 % Oil oil     118 mL    Apply 2 mLs topically daily To scalp    Scalp psoriasis       prenatal multivitamin plus iron 27-0.8 MG Tabs per tablet     100 tablet    Take 1 tablet by mouth daily    Encounter for supervision of normal first pregnancy in first trimester       sertraline 50 MG tablet    ZOLOFT    135 tablet    Take 1.5 tablets (75 mg) by mouth daily    COURTNEY (generalized anxiety disorder), Major depressive disorder, recurrent episode, moderate (H)       * Notice:  This list has 2 medication(s) that are the same as other medications prescribed for you. Read the directions carefully, and ask your doctor or other care provider to review them with you.

## 2018-06-29 NOTE — PATIENT INSTRUCTIONS
Dietary sources of iron are found in meat, grains, fruits, and vegetables. For people who do not eat meat, good plant sources of iron include whole or enriched breads or grains, iron-fortified cereals, legumes, green leafy vegetables, dried fruits, soy products. To maximize absorption, iron-rich foods should not be consumed with coffee or tea. Taking vitamin C or drinking orange juice with high iron foods can further enhance absorption.

## 2018-06-29 NOTE — NURSING NOTE
"Chief Complaint   Patient presents with     Prenatal Care     baby active and moving - M for follow-up US earlier today - Tdap     28w5d    Initial /60 (BP Location: Left arm, Patient Position: Chair, Cuff Size: Adult Large)  Pulse 84  Wt 247 lb (112 kg)  LMP 12/20/2017  BMI 36.48 kg/m2 Estimated body mass index is 36.48 kg/(m^2) as calculated from the following:    Height as of 6/22/18: 5' 9\" (1.753 m).    Weight as of this encounter: 247 lb (112 kg).  Medication Reconciliation: complete     Nurse assisted visit.  Ria Bauer MA.        "

## 2018-06-29 NOTE — PROGRESS NOTES
32 year old  at 28w5d weeks presents to the clinic for a routine prenatal visit.  Feeling well.  No vaginal bleeding, leakage of fluid, or contractions.    1. Supervision of high-risk pregnancy of elderly multigravida  - TDAP given  - Will be 33 years of age at due date  - Passed glucola, Hgb 10.9 -- discussed diet options high in iron, Vitamin C    2. Marginal insertion of umbilical cord affecting management of mother in third trimester  - Had MFM scan today, normal findings; future ultrasounds as clinical indicated    RTC 2 weeks.    Zita Kim,

## 2018-07-03 PROBLEM — M54.50 LOW BACK PAIN DURING PREGNANCY: Status: RESOLVED | Noted: 2018-05-24 | Resolved: 2018-07-03

## 2018-07-03 PROBLEM — O26.899 LOW BACK PAIN DURING PREGNANCY: Status: RESOLVED | Noted: 2018-05-24 | Resolved: 2018-07-03

## 2018-07-03 NOTE — PROGRESS NOTES
Subjective:  HPI  Oswestry Score: 14 %                 Objective:  System    Physical Exam    General     ROS    Assessment/Plan:    ASSESSMENT/PLAN  Updated problem list and treatment plan:   STG/LTGs have been met:  Yes (See Goal flow sheet completed today.)  Progress toward STG/LTGs have been made:  Yes (See Goal flow sheet completed today.)  Assessment of Progress: The patient's condition is improving.  Self Management Plans:  Patient is independent in a home treatment program.  Patient is independent in self management of symptoms.    Krystina continues to require the following intervention to meet STG and LT's:  PT intervention is no longer required to meet STG/LTG.    Recommendations:  This patient is ready to be discharged from therapy and continue their home treatment program.    Please refer to the daily flowsheet for treatment today, total treatment time and time spent performing 1:1 timed codes.

## 2018-07-10 ENCOUNTER — PRENATAL OFFICE VISIT (OUTPATIENT)
Dept: OBGYN | Facility: CLINIC | Age: 33
End: 2018-07-10
Payer: COMMERCIAL

## 2018-07-10 VITALS — BODY MASS INDEX: 37.07 KG/M2 | SYSTOLIC BLOOD PRESSURE: 130 MMHG | WEIGHT: 251 LBS | DIASTOLIC BLOOD PRESSURE: 70 MMHG

## 2018-07-10 DIAGNOSIS — O09.529 SUPERVISION OF HIGH-RISK PREGNANCY OF ELDERLY MULTIGRAVIDA: Primary | ICD-10-CM

## 2018-07-10 PROCEDURE — 99207 ZZC PRENATAL VISIT: CPT | Performed by: OBSTETRICS & GYNECOLOGY

## 2018-07-10 NOTE — MR AVS SNAPSHOT
After Visit Summary   7/10/2018    Krystina Suazo    MRN: [id number]           Patient Information     Date Of Birth          1985        Visit Information        Provider Department      7/10/2018 2:30 PM Sandy Tran MD [facility]        Today's Diagnoses     Supervision of high-risk pregnancy of elderly multigravida    -  1       Follow-ups after your visit        Your next 10 appointments already scheduled     Jul 26, 2018  3:45 PM CDT   [facility]    [address]  [facility] [id number] [phone]            Aug 09, 2018  3:15 PM CDT   ESTABLISHED PRENATAL with [facility]an MD   [facility]    [address]  [facility] [id number] [phone]              Who to contact     If you have questions or need follow up information about today's clinic visit or your schedule please contact [facility] directly at [phone].  Normal or non-critical lab and imaging results will be communicated to you by [employer], letter or phone within 4 business days after the clinic has received the results. If you do not hear from us within 7 days, please contact the clinic through [employer] or phone. If you have a critical or abnormal lab result, we will notify you by phone as soon as possible.  Submit refill requests through [employer] or call your pharmacy and they will forward the refill request to us. Please allow 3 business days for your refill to be completed.          Additional Information About Your Visit        [employer] Information     [employer] gives you secure access to your electronic health record. If you see a primary care provider, you can also send messages to your care team and make appointments. If you have questions, please call your primary care clinic.  If you do not have a primary  care provider, please call [phone] and they will assist you.        Care EveryWhere ID     This is your Care EveryWhere ID. This could be used by other organizations to access your [place] medical records  [id number]        Your Vitals Were     Last Period Breastfeeding? BMI (Body Mass Index)             12/20/2017 No 37.07 kg/m2          Blood Pressure from Last 3 Encounters:   07/10/18 130/70   06/29/18 110/60   06/22/18 117/60    Weight from Last 3 Encounters:   07/10/18 251 lb (113.9 kg)   06/29/18 247 lb (112 kg)   06/22/18 243 lb (110.2 kg)              Today, you had the following     No orders found for display       Primary Care Provider Office Phone # Fax #    Valeria Daly [phone] [address] [place] [address]        Equal Access to Services     [facility]: Hadii aad ku hadasho Soenriqueali, waaxda luqadaha, qaybta kaalmada adeegyada, ruth zhao haydavidn christopher guillaume . So [facility] [phone].    ATENCIÓN: Si habla español, tiene a wesley disposición servicios gratuitos de asistencia lingüística. Marcelina al [phone].    We comply with applicable federal civil rights laws and [place] laws. We do not discriminate on the basis of race, color, national origin, age, disability, sex, sexual orientation, or gender identity.            Thank you!     Thank you for choosing [facility]  for your care. Our goal is always to provide you with excellent care. Hearing back from our patients is one way we can continue to improve our services. Please take a few minutes to complete the written survey that you may receive in the mail after your visit with us. Thank you!             Your Updated Medication List - Protect others around you: Learn how to safely use, store and throw away your medicines at www.disposemymeds.org.          This list is accurate as of 7/10/18  4:00 PM.  Always use your most recent med list.                   Brand Name  Dispense Instructions for use Diagnosis    albuterol 108 (90 Base) MCG/ACT Inhaler    PROAIR HFA/PROVENTIL HFA/VENTOLIN HFA     Inhale 2 puffs into the lungs        azelastine 0.05 % Soln ophthalmic solution    OPTIVAR    1 Bottle    Apply 1 drop to eye 2 times daily    Allergic conjunctivitis, bilateral       cetirizine 10 MG tablet    zyrTEC    90 tablet    Take 1 tablet (10 mg) by mouth every evening    Chronic rhinitis       * clobetasol 0.05 % external solution    TEMOVATE    50 mL    Apply topically 2 times daily Apply to affected areas on scalp bid as needed    Scalp psoriasis       * clobetasol propionate 0.05 % Foam     50 g    Apply sparingly to affected area twice daily as needed.  Do not apply to face.    Scalp psoriasis       Fluocinolone Acetonide Scalp 0.01 % Oil oil     118 mL    Apply 2 mLs topically daily To scalp    Scalp psoriasis       prenatal multivitamin plus iron 27-0.8 MG Tabs per tablet     100 tablet    Take 1 tablet by mouth daily    Encounter for supervision of normal first pregnancy in first trimester       sertraline 50 MG tablet    ZOLOFT    135 tablet    Take 1.5 tablets (75 mg) by mouth daily    COURTNEY (generalized anxiety disorder), Major depressive disorder, recurrent episode, moderate (H)       * Notice:  This list has 2 medication(s) that are the same as other medications prescribed for you. Read the directions carefully, and ask your doctor or other care provider to review them with you.

## 2018-07-10 NOTE — NURSING NOTE
"Chief Complaint   Patient presents with     Prenatal Care       Initial /70  Wt 251 lb (113.9 kg)  LMP 2017  Breastfeeding? No  BMI 37.07 kg/m2 Estimated body mass index is 37.07 kg/(m^2) as calculated from the following:    Height as of 18: 5' 9\" (1.753 m).    Weight as of this encounter: 251 lb (113.9 kg).  BP completed using cuff size: regular        30w2d  + FM daily  - cramping or bleeding  + headache occas  + heartburn  + edema in feet/legs  - discharge or leaking of fluid  Marquita Ramirez LPN                "

## 2018-07-10 NOTE — PROGRESS NOTES
PROBLEM LIST  LABS: Opos/RI/normal NIPT    1. AMA (35 at EDC): normal level II but marginal cord insertion. Follow up ultrasound at 28 weeks within normal limits. Further US only as needed clinically per MFM.    Good fetal movement, no leakage of fluid, no contractions or vaginal bleeding. Follow up in 2 weeks.    Sandy Tran MD

## 2018-07-13 ENCOUNTER — DOCUMENTATION ONLY (OUTPATIENT)
Dept: OBGYN | Facility: CLINIC | Age: 33
End: 2018-07-13

## 2018-07-26 ENCOUNTER — PRENATAL OFFICE VISIT (OUTPATIENT)
Dept: OBGYN | Facility: CLINIC | Age: 33
End: 2018-07-26
Payer: COMMERCIAL

## 2018-07-26 VITALS — WEIGHT: 250 LBS | BODY MASS INDEX: 36.92 KG/M2 | SYSTOLIC BLOOD PRESSURE: 130 MMHG | DIASTOLIC BLOOD PRESSURE: 72 MMHG

## 2018-07-26 DIAGNOSIS — O09.529 SUPERVISION OF HIGH-RISK PREGNANCY OF ELDERLY MULTIGRAVIDA: Primary | ICD-10-CM

## 2018-07-26 PROCEDURE — 99207 ZZC PRENATAL VISIT: CPT | Performed by: OBSTETRICS & GYNECOLOGY

## 2018-07-26 NOTE — PROGRESS NOTES
"PROBLEM LIST  LABS: Opos/RI/normal NIPT    1. AMA (35 at EDC): normal level II but marginal cord insertion. Follow up ultrasound at 28 weeks within normal limits. Further US only as needed clinically per MFM.    Good fetal movement, no leakage of fluid, no contractions or vaginal bleeding. Concerned about \"easy fainting.\"  Discussed in detail for L&D. Follow up in 2 weeks.    Sandy Tran MD        "

## 2018-07-26 NOTE — NURSING NOTE
"Chief Complaint   Patient presents with     Prenatal Care       Initial /72  Wt 250 lb (113.4 kg)  LMP 2017  Breastfeeding? No  BMI 36.92 kg/m2 Estimated body mass index is 36.92 kg/(m^2) as calculated from the following:    Height as of 18: 5' 9\" (1.753 m).    Weight as of this encounter: 250 lb (113.4 kg).  BP completed using cuff size: regular          32w4d  + FM daily  - cramping or bleeding  - contractions  - headache or heartburn  Marquita Ramirez LPN               "

## 2018-07-26 NOTE — MR AVS SNAPSHOT
After Visit Summary   7/26/2018    Krystina Suazo    MRN: [id number]           Patient Information     Date Of Birth          1985        Visit Information        Provider Department      7/26/2018 3:45 PM Sandy Tran MD [facility]        Today's Diagnoses     Supervision of high-risk pregnancy of elderly multigravida    -  1       Follow-ups after your visit        Your next 10 appointments already scheduled     Aug 09, 2018  3:15 PM CDT   ESTABLISHED PRENATAL with Sandy Tran MD   [facility] ([facility])    [address]  [facility] [address]   [phone]            Aug 16, 2018  2:15 PM CDT   ESTABLISHED PRENATAL with Sandy Tran MD   [facility] ([facility])    [address]  [facility] [address]   [phone]            Aug 23, 2018  2:00 PM CDT   ESTABLISHED PRENATAL with Sandy Tran MD   [facility] ([facility])    [address]  [facility] [address]   [phone]            Aug 30, 2018  3:30 PM CDT   ESTABLISHED PRENATAL with Sandy Tran MD   [facility] ([facility])    [address]  [facility] [address]   [phone]            Sep 04, 2018  2:00 PM CDT   MyChart OB-GYN Established Prenatal with Sandy Tran MD   [facility] ([facility])    [address]  [facility] [address]   [phone]            Sep 13, 2018  3:00 PM CDT   MyChart OB-GYN Established Prenatal with Sandy Tran MD   [facility] ([facility])    [address]  [facility] [address]   [phone]              Who to contact     If you have questions or need follow up information about today's clinic visit or your schedule please contact [facility] directly at  [phone].  Normal or non-critical lab and imaging results will be communicated to you by [employer], letter or phone within 4 business days after the clinic has received the results. If you do not hear from us within 7 days, please contact the clinic through [employer] or phone. If you have a critical or abnormal lab result, we will notify you by phone as soon as possible.  Submit refill requests through [employer] or call your pharmacy and they will forward the refill request to us. Please allow 3 business days for your refill to be completed.          Additional Information About Your Visit        [employer] Information     [employer] gives you secure access to your electronic health record. If you see a primary care provider, you can also send messages to your care team and make appointments. If you have questions, please call your primary care clinic.  If you do not have a primary care provider, please call [phone] and they will assist you.        Care EveryWhere ID     This is your Care EveryWhere ID. This could be used by other organizations to access your [place] medical records  [id number]        Your Vitals Were     Last Period Breastfeeding? BMI (Body Mass Index)             12/20/2017 No 36.92 kg/m2          Blood Pressure from Last 3 Encounters:   07/26/18 130/72   07/10/18 130/70   06/29/18 110/60    Weight from Last 3 Encounters:   07/26/18 250 lb (113.4 kg)   07/10/18 251 lb (113.9 kg)   06/29/18 247 lb (112 kg)              Today, you had the following     No orders found for display       Primary Care Provider Office Phone # Fax #    Valeria Daly [phone] [address] [place] [address]        Equal Access to Services     [facility] : Hadii sanjiv Velasquez, saman whitlock, ruth han. So [facility] [phone].    ATENCIÓN: Si habla español, tiene a wesley disposición servicios gratuitos de  asistencia lingüística. Marcelina al [phone].    We comply with applicable federal civil rights laws and [place] laws. We do not discriminate on the basis of race, color, national origin, age, disability, sex, sexual orientation, or gender identity.            Thank you!     Thank you for choosing [facility]  for your care. Our goal is always to provide you with excellent care. Hearing back from our patients is one way we can continue to improve our services. Please take a few minutes to complete the written survey that you may receive in the mail after your visit with us. Thank you!             Your Updated Medication List - Protect others around you: Learn how to safely use, store and throw away your medicines at www.disposemymeds.org.          This list is accurate as of 7/26/18  4:29 PM.  Always use your most recent med list.                   Brand Name Dispense Instructions for use Diagnosis    albuterol 108 (90 Base) MCG/ACT Inhaler    PROAIR HFA/PROVENTIL HFA/VENTOLIN HFA     Inhale 2 puffs into the lungs        azelastine 0.05 % Soln ophthalmic solution    OPTIVAR    1 Bottle    Apply 1 drop to eye 2 times daily    Allergic conjunctivitis, bilateral       cetirizine 10 MG tablet    zyrTEC    90 tablet    Take 1 tablet (10 mg) by mouth every evening    Chronic rhinitis       * clobetasol 0.05 % external solution    TEMOVATE    50 mL    Apply topically 2 times daily Apply to affected areas on scalp bid as needed    Scalp psoriasis       * clobetasol propionate 0.05 % Foam     50 g    Apply sparingly to affected area twice daily as needed.  Do not apply to face.    Scalp psoriasis       Fluocinolone Acetonide Scalp 0.01 % Oil oil     118 mL    Apply 2 mLs topically daily To scalp    Scalp psoriasis       prenatal multivitamin plus iron 27-0.8 MG Tabs per tablet     100 tablet    Take 1 tablet by mouth daily    Encounter for supervision of normal first pregnancy in first trimester        sertraline 50 MG tablet    ZOLOFT    135 tablet    Take 1.5 tablets (75 mg) by mouth daily    COURTNEY (generalized anxiety disorder), Major depressive disorder, recurrent episode, moderate (H)       * Notice:  This list has 2 medication(s) that are the same as other medications prescribed for you. Read the directions carefully, and ask your doctor or other care provider to review them with you.

## 2018-08-09 ENCOUNTER — PRENATAL OFFICE VISIT (OUTPATIENT)
Dept: OBGYN | Facility: CLINIC | Age: 33
End: 2018-08-09
Payer: COMMERCIAL

## 2018-08-09 VITALS — WEIGHT: 257 LBS | DIASTOLIC BLOOD PRESSURE: 76 MMHG | SYSTOLIC BLOOD PRESSURE: 134 MMHG | BODY MASS INDEX: 37.95 KG/M2

## 2018-08-09 DIAGNOSIS — O09.529 SUPERVISION OF HIGH-RISK PREGNANCY OF ELDERLY MULTIGRAVIDA: Primary | ICD-10-CM

## 2018-08-09 DIAGNOSIS — O43.193 MARGINAL INSERTION OF UMBILICAL CORD AFFECTING MANAGEMENT OF MOTHER IN THIRD TRIMESTER: ICD-10-CM

## 2018-08-09 PROCEDURE — 99207 ZZC PRENATAL VISIT: CPT | Performed by: OBSTETRICS & GYNECOLOGY

## 2018-08-09 NOTE — PROGRESS NOTES
PROBLEM LIST  LABS: Opos/RI/normal NIPT    1. AMA (35 at EDC): normal level II but marginal cord insertion. Follow up ultrasound at 28 weeks within normal limits. Further US only as needed clinically per MFM.    Good fetal movement, no leakage of fluid, no contractions or vaginal bleeding. Discussed signs and symptoms of preeclampsia and labor. Follow up in 1 week.    Sandy Tran MD

## 2018-08-09 NOTE — NURSING NOTE
"Chief Complaint   Patient presents with     Prenatal Care       Initial /76  Wt 257 lb (116.6 kg)  LMP 2017  Breastfeeding? No  BMI 37.95 kg/m2 Estimated body mass index is 37.95 kg/(m^2) as calculated from the following:    Height as of 18: 5' 9\" (1.753 m).    Weight as of this encounter: 257 lb (116.6 kg).  BP completed using cuff size: regular        34w4d  + FM daily  + cramping  - bleeding or leaking of fluid  + mild heartburn  Marquita Ramirez LPN               "

## 2018-08-09 NOTE — MR AVS SNAPSHOT
After Visit Summary   8/9/2018    Krystina Suazo    MRN: [id number]           Patient Information     Date Of Birth          1985        Visit Information        Provider Department      8/9/2018 3:15 PM Sandy Tran MD [facility]        Today's Diagnoses     Supervision of high-risk pregnancy of elderly multigravida    -  1    Marginal insertion of umbilical cord affecting management of mother in third trimester           Follow-ups after your visit        Your next 10 appointments already scheduled     Aug 16, 2018  2:15 PM CDT   ESTABLISHED PRENATAL with Sandy Tran MD   [facility] ([facility])    [address]  [facility] [address]   [phone]            Aug 23, 2018  2:00 PM CDT   ESTABLISHED PRENATAL with Sandy Tran MD   [facility] ([facility])    [address]  [facility] [address]   [phone]            Aug 30, 2018  3:30 PM CDT   ESTABLISHED PRENATAL with Sandy Tran MD   [facility] ([facility])    [address]  [facility] [address]   [phone]            Sep 04, 2018  2:00 PM CDT   MyChart OB-GYN Established Prenatal with Sandy Tran MD   [facility] ([facility])    [address]  [facility] [address]   [phone]            Sep 13, 2018  3:00 PM CDT   MyChart OB-GYN Established Prenatal with Sandy Tran MD   [facility] ([facility])    [address]  [facility] [address]   [phone]              Who to contact     If you have questions or need follow up information about today's clinic visit or your schedule please contact [facility] directly at [phone].  Normal or non-critical lab and imaging results will be communicated to you by MyChart, letter or phone within 4  business days after the clinic has received the results. If you do not hear from us within 7 days, please contact the clinic through [employer] or phone. If you have a critical or abnormal lab result, we will notify you by phone as soon as possible.  Submit refill requests through [employer] or call your pharmacy and they will forward the refill request to us. Please allow 3 business days for your refill to be completed.          Additional Information About Your Visit        [employer] Information     [employer] gives you secure access to your electronic health record. If you see a primary care provider, you can also send messages to your care team and make appointments. If you have questions, please call your primary care clinic.  If you do not have a primary care provider, please call [phone] and they will assist you.        Care EveryWhere ID     This is your Care EveryWhere ID. This could be used by other organizations to access your [place] medical records  [id number]        Your Vitals Were     Last Period Breastfeeding? BMI (Body Mass Index)             12/20/2017 No 37.95 kg/m2          Blood Pressure from Last 3 Encounters:   08/09/18 134/76   07/26/18 130/72   07/10/18 130/70    Weight from Last 3 Encounters:   08/09/18 257 lb (116.6 kg)   07/26/18 250 lb (113.4 kg)   07/10/18 251 lb (113.9 kg)              Today, you had the following     No orders found for display       Primary Care Provider Office Phone # Fax #    Valeria Daly [phone] [address] [place] [address]        Equal Access to Services     [facility]: Hadii aad ku hadasho Soomaali, waaxda luqadaha, qaybta kaalmada adeegyada, ruth marquez. So [facility] [phone].    ATENCIÓN: Si habla español, tiene a wesley disposición servicios gratuitos de asistencia lingüística. Llame al [phone].    We comply with applicable federal civil rights laws and [place] laws. We do not  discriminate on the basis of race, color, national origin, age, disability, sex, sexual orientation, or gender identity.            Thank you!     Thank you for choosing [facility]  for your care. Our goal is always to provide you with excellent care. Hearing back from our patients is one way we can continue to improve our services. Please take a few minutes to complete the written survey that you may receive in the mail after your visit with us. Thank you!             Your Updated Medication List - Protect others around you: Learn how to safely use, store and throw away your medicines at www.disposemymeds.org.          This list is accurate as of 8/9/18  3:37 PM.  Always use your most recent med list.                   Brand Name Dispense Instructions for use Diagnosis    albuterol 108 (90 Base) MCG/ACT Inhaler    PROAIR HFA/PROVENTIL HFA/VENTOLIN HFA     Inhale 2 puffs into the lungs        azelastine 0.05 % Soln ophthalmic solution    OPTIVAR    1 Bottle    Apply 1 drop to eye 2 times daily    Allergic conjunctivitis, bilateral       cetirizine 10 MG tablet    zyrTEC    90 tablet    Take 1 tablet (10 mg) by mouth every evening    Chronic rhinitis       * clobetasol 0.05 % external solution    TEMOVATE    50 mL    Apply topically 2 times daily Apply to affected areas on scalp bid as needed    Scalp psoriasis       * clobetasol propionate 0.05 % Foam     50 g    Apply sparingly to affected area twice daily as needed.  Do not apply to face.    Scalp psoriasis       Fluocinolone Acetonide Scalp 0.01 % Oil oil     118 mL    Apply 2 mLs topically daily To scalp    Scalp psoriasis       prenatal multivitamin plus iron 27-0.8 MG Tabs per tablet     100 tablet    Take 1 tablet by mouth daily    Encounter for supervision of normal first pregnancy in first trimester       sertraline 50 MG tablet    ZOLOFT    135 tablet    Take 1.5 tablets (75 mg) by mouth daily    COURTNEY (generalized anxiety disorder), Major  depressive disorder, recurrent episode, moderate (H)       * Notice:  This list has 2 medication(s) that are the same as other medications prescribed for you. Read the directions carefully, and ask your doctor or other care provider to review them with you.

## 2018-08-13 ENCOUNTER — TELEPHONE (OUTPATIENT)
Dept: OBGYN | Facility: CLINIC | Age: 33
End: 2018-08-13

## 2018-08-13 ENCOUNTER — ALLIED HEALTH/NURSE VISIT (OUTPATIENT)
Dept: NURSING | Facility: CLINIC | Age: 33
End: 2018-08-13
Payer: COMMERCIAL

## 2018-08-13 VITALS — SYSTOLIC BLOOD PRESSURE: 132 MMHG | DIASTOLIC BLOOD PRESSURE: 62 MMHG

## 2018-08-13 DIAGNOSIS — O09.529 SUPERVISION OF HIGH-RISK PREGNANCY OF ELDERLY MULTIGRAVIDA: Primary | ICD-10-CM

## 2018-08-13 PROCEDURE — 99207 ZZC NO CHARGE NURSE ONLY: CPT

## 2018-08-13 NOTE — TELEPHONE ENCOUNTER
35w1d    Pt calls to state she had HA last night, she said it was pretty bad.  Does not have HA today just a little nauseous.     Scheduled for nurse only BP check today in [place].    Neda SWEET R.N.  [facility]

## 2018-08-13 NOTE — MR AVS SNAPSHOT
After Visit Summary   8/13/2018    Krystina Suazo    MRN: [id number]           Patient Information     Date Of Birth          1985        Visit Information        Provider Department      8/13/2018 2:45 PM RI OB NURSE [facility]        Today's Diagnoses     Supervision of high-risk pregnancy of elderly multigravida    -  1       Follow-ups after your visit        Your next 10 appointments already scheduled     Aug 16, 2018  2:15 PM CDT   ESTABLISHED PRENATAL with Sandy Tran MD   [facility] ([facility])    [address]  [address] [place] [address]   [phone]            Aug 23, 2018  2:00 PM CDT   ESTABLISHED PRENATAL with Sandy Tran MD   [facility] ([facility])    [address]  [address] [place] [id number] [phone]            Aug 27, 2018  2:30 PM CDT   SHORT with Valeria Daly MD   [facility] ([facility])    [address] [place] [address] [address]  [facility] [address]   [phone]            Aug 30, 2018  3:30 PM CDT   ESTABLISHED PRENATAL with Sandy Tran MD   [facility] ([facility])    [address]  [address] [place] [id number] [phone]            Sep 04, 2018  2:00 PM CDT   MyChart OB-GYN Established Prenatal with Sandy Tran MD   [facility] ([facility])    [address]  [address] [place] [id number] [phone]            Sep 13, 2018  3:00 PM CDT   MyChart OB-GYN Established Prenatal with Sandy Tran MD   [facility] ([facility])    [address]  [address] [place] [id number] [phone]              Who to contact     If you have questions or need follow up information about today's clinic visit or your schedule please contact [place]  [facility] directly at [phone].  Normal or non-critical lab and imaging results will be communicated to you by MyChart, letter or phone within 4 business days after the clinic has received the results. If you do not hear from us within 7 days, please contact the clinic through [employer] or phone. If you have a critical or abnormal lab result, we will notify you by phone as soon as possible.  Submit refill requests through [employer] or call your pharmacy and they will forward the refill request to us. Please allow 3 business days for your refill to be completed.          Additional Information About Your Visit        [employer] Information     [employer] gives you secure access to your electronic health record. If you see a primary care provider, you can also send messages to your care team and make appointments. If you have questions, please call your primary care clinic.  If you do not have a primary care provider, please call [phone] and they will assist you.        Care EveryWhere ID     This is your Care EveryWhere ID. This could be used by other organizations to access your [place] medical records  [id number]        Your Vitals Were     Last Period                   12/20/2017            Blood Pressure from Last 3 Encounters:   08/13/18 132/62   08/09/18 134/76   07/26/18 130/72    Weight from Last 3 Encounters:   08/09/18 257 lb (116.6 kg)   07/26/18 250 lb (113.4 kg)   07/10/18 251 lb (113.9 kg)              Today, you had the following     No orders found for display       Primary Care Provider Office Phone # Fax #    Valeria Daly [phone] [address] [place] [address]        Equal Access to Services     [facility] AH: Hadii sanjiv Velasquez, waaxda luqadaha, qaybta kaalruth chang. So [facility] [phone].    ATENCIÓN: Si habla español, tiene a wesley disposición servicios gratuitos de asistencia  lingüísticaMeka Hewitt al [phone].    We comply with applicable federal civil rights laws and [place] laws. We do not discriminate on the basis of race, color, national origin, age, disability, sex, sexual orientation, or gender identity.            Thank you!     Thank you for choosing [facility]  for your care. Our goal is always to provide you with excellent care. Hearing back from our patients is one way we can continue to improve our services. Please take a few minutes to complete the written survey that you may receive in the mail after your visit with us. Thank you!             Your Updated Medication List - Protect others around you: Learn how to safely use, store and throw away your medicines at www.disposemymeds.org.          This list is accurate as of 8/13/18  3:37 PM.  Always use your most recent med list.                   Brand Name Dispense Instructions for use Diagnosis    albuterol 108 (90 Base) MCG/ACT inhaler    PROAIR HFA/PROVENTIL HFA/VENTOLIN HFA     Inhale 2 puffs into the lungs        azelastine 0.05 % ophthalmic solution    OPTIVAR    1 Bottle    Apply 1 drop to eye 2 times daily    Allergic conjunctivitis, bilateral       cetirizine 10 MG tablet    zyrTEC    90 tablet    Take 1 tablet (10 mg) by mouth every evening    Chronic rhinitis       * clobetasol 0.05 % external solution    TEMOVATE    50 mL    Apply topically 2 times daily Apply to affected areas on scalp bid as needed    Scalp psoriasis       * clobetasol propionate 0.05 % Foam     50 g    Apply sparingly to affected area twice daily as needed.  Do not apply to face.    Scalp psoriasis       Fluocinolone Acetonide Scalp 0.01 % Oil oil     118 mL    Apply 2 mLs topically daily To scalp    Scalp psoriasis       prenatal multivitamin plus iron 27-0.8 MG Tabs per tablet     100 tablet    Take 1 tablet by mouth daily    Encounter for supervision of normal first pregnancy in first trimester       sertraline 50 MG  tablet    ZOLOFT    135 tablet    Take 1.5 tablets (75 mg) by mouth daily    COURTNEY (generalized anxiety disorder), Major depressive disorder, recurrent episode, moderate (H)       * Notice:  This list has 2 medication(s) that are the same as other medications prescribed for you. Read the directions carefully, and ask your doctor or other care provider to review them with you.

## 2018-08-14 NOTE — TELEPHONE ENCOUNTER
Pt calling back. She is feeling good today and denies HA or nausea. She has an appt on Thursday.      Sanjuanita Del Toro RN

## 2018-08-14 NOTE — TELEPHONE ENCOUNTER
Please call her this morning and see how she is feeling. If any headache or nausea, I'd like her to be seen today. Thanks.  Sandy Tran MD

## 2018-08-16 ENCOUNTER — PRENATAL OFFICE VISIT (OUTPATIENT)
Dept: OBGYN | Facility: CLINIC | Age: 33
End: 2018-08-16
Payer: COMMERCIAL

## 2018-08-16 VITALS — WEIGHT: 258 LBS | DIASTOLIC BLOOD PRESSURE: 72 MMHG | BODY MASS INDEX: 38.1 KG/M2 | SYSTOLIC BLOOD PRESSURE: 136 MMHG

## 2018-08-16 DIAGNOSIS — Z34.03 ENCOUNTER FOR SUPERVISION OF NORMAL FIRST PREGNANCY IN THIRD TRIMESTER: Primary | ICD-10-CM

## 2018-08-16 PROCEDURE — 87186 SC STD MICRODIL/AGAR DIL: CPT | Performed by: OBSTETRICS & GYNECOLOGY

## 2018-08-16 PROCEDURE — 87653 STREP B DNA AMP PROBE: CPT | Performed by: OBSTETRICS & GYNECOLOGY

## 2018-08-16 PROCEDURE — 99207 ZZC PRENATAL VISIT: CPT | Performed by: OBSTETRICS & GYNECOLOGY

## 2018-08-16 NOTE — PROGRESS NOTES
PROBLEM LIST  LABS: Opos/RI/normal NIPT    1. AMA (35 at EDC): normal level II but marginal cord insertion. Follow up ultrasound at 28 weeks within normal limits. Further US only as needed clinically per MFM.    Good fetal movement, no leakage of fluid, no contractions or vaginal bleeding. Discussed signs and symptoms of preeclampsia and labor. Headache this week once, nothing since. Group B Strep today. Signs of symptoms of labor reviewed, including when to call or come in for evaluation. Follow up in 1 week.    Sandy Tran MD

## 2018-08-16 NOTE — MR AVS SNAPSHOT
After Visit Summary   8/16/2018    Krystina Suazo    MRN: [id number]           Patient Information     Date Of Birth          1985        Visit Information        Provider Department      8/16/2018 2:15 PM Sandy Tran MD [facility]        Today's Diagnoses     Encounter for supervision of normal first pregnancy in third trimester    -  1       Follow-ups after your visit        Your next 10 appointments already scheduled     Aug 23, 2018  2:00 PM CDT   ESTABLISHED PRENATAL with Sandy Tran MD   [facility] ([facility])    [address]  [address]  [facility] [address]   [phone]            Aug 27, 2018  2:30 PM CDT   SHORT with Valeria Daly MD   [facility] ([facility])    [address]  [address]  [facility] [address]   [phone]            Aug 30, 2018  3:30 PM CDT   ESTABLISHED PRENATAL with Sandy Tran MD   [facility] ([facility])    [address]  [address]  [facility] [address]   [phone]            Sep 04, 2018  2:00 PM CDT   MyChart OB-GYN Established Prenatal with Sandy Tran MD   [facility] ([facility])    [address]  [address]  [facility] [address] [phone]            Sep 13, 2018  3:00 PM CDT   MyChart OB-GYN Established Prenatal with Sandy Tran MD   [facility] ([facility])    [address]  [address]  [facility] [address] [phone]              Who to contact     If you have questions or need follow up information about today's clinic visit or your schedule please contact [facility] directly at [phone].  Normal or non-critical lab and imaging results will be communicated to you by MyChart, letter or phone within 4 business days after the clinic has received  the results. If you do not hear from us within 7 days, please contact the clinic through [employer] or phone. If you have a critical or abnormal lab result, we will notify you by phone as soon as possible.  Submit refill requests through [employer] or call your pharmacy and they will forward the refill request to us. Please allow 3 business days for your refill to be completed.          Additional Information About Your Visit        Information [employer] Information     [employer] gives you secure access to your electronic health record. If you see a primary care provider, you can also send messages to your care team and make appointments. If you have questions, please call your primary care clinic.  If you do not have a primary care provider, please call [phone] and they will assist you.        Care EveryWhere ID     This is your Care EveryWhere ID. This could be used by other organizations to access your [place] medical records  [id number]        Your Vitals Were     Last Period Breastfeeding? BMI (Body Mass Index)             12/20/2017 No 38.1 kg/m2          Blood Pressure from Last 3 Encounters:   08/16/18 136/72   08/13/18 132/62   08/09/18 134/76    Weight from Last 3 Encounters:   08/16/18 258 lb (117 kg)   08/09/18 257 lb (116.6 kg)   07/26/18 250 lb (113.4 kg)              We Performed the Following     Group B strep PCR        Primary Care Provider Office Phone # Fax #    Valeria SALTY MD [place] [phone] [address] [place] [address]        Equal Access to Services     [facility] : Hadii aad ku hadasho Soomaali, waaxda luqadaha, qaybta kaalmada zander, ruth guillaume . So [facility] [phone].    ATENCIÓN: Si habla español, tiene a wesley disposición servicios gratuitos de asistencia lingüística. Llame al [phone].    We comply with applicable federal civil rights laws and [place] laws. We do not discriminate on the basis of race, color, national origin,  age, disability, sex, sexual orientation, or gender identity.            Thank you!     Thank you for choosing [facility]  for your care. Our goal is always to provide you with excellent care. Hearing back from our patients is one way we can continue to improve our services. Please take a few minutes to complete the written survey that you may receive in the mail after your visit with us. Thank you!             Your Updated Medication List - Protect others around you: Learn how to safely use, store and throw away your medicines at www.disposemymeds.org.          This list is accurate as of 8/16/18  3:17 PM.  Always use your most recent med list.                   Brand Name Dispense Instructions for use Diagnosis    albuterol 108 (90 Base) MCG/ACT inhaler    PROAIR HFA/PROVENTIL HFA/VENTOLIN HFA     Inhale 2 puffs into the lungs        azelastine 0.05 % ophthalmic solution    OPTIVAR    1 Bottle    Apply 1 drop to eye 2 times daily    Allergic conjunctivitis, bilateral       cetirizine 10 MG tablet    zyrTEC    90 tablet    Take 1 tablet (10 mg) by mouth every evening    Chronic rhinitis       * clobetasol 0.05 % external solution    TEMOVATE    50 mL    Apply topically 2 times daily Apply to affected areas on scalp bid as needed    Scalp psoriasis       * clobetasol propionate 0.05 % Foam     50 g    Apply sparingly to affected area twice daily as needed.  Do not apply to face.    Scalp psoriasis       Fluocinolone Acetonide Scalp 0.01 % Oil oil     118 mL    Apply 2 mLs topically daily To scalp    Scalp psoriasis       prenatal multivitamin plus iron 27-0.8 MG Tabs per tablet     100 tablet    Take 1 tablet by mouth daily    Encounter for supervision of normal first pregnancy in first trimester       sertraline 50 MG tablet    ZOLOFT    135 tablet    Take 1.5 tablets (75 mg) by mouth daily    COURTNEY (generalized anxiety disorder), Major depressive disorder, recurrent episode, moderate (H)       *  Notice:  This list has 2 medication(s) that are the same as other medications prescribed for you. Read the directions carefully, and ask your doctor or other care provider to review them with you.

## 2018-08-16 NOTE — NURSING NOTE
"Chief Complaint   Patient presents with     Prenatal Care       Initial /72  Wt 258 lb (117 kg)  LMP 2017  Breastfeeding? No  BMI 38.1 kg/m2 Estimated body mass index is 38.1 kg/(m^2) as calculated from the following:    Height as of 18: 5' 9\" (1.753 m).    Weight as of this encounter: 258 lb (117 kg).  BP completed using cuff size: regular        35w4d  + FM daily  - bleeding  + cramping  - contractions  - leaking of fluid  + edema  + headache on  - migraine  Marquita Ramirez LPN               "

## 2018-08-17 LAB
GP B STREP DNA SPEC QL NAA+PROBE: POSITIVE
SPECIMEN SOURCE: ABNORMAL

## 2018-08-20 LAB
BACTERIA SPEC CULT: ABNORMAL
SPECIMEN SOURCE: ABNORMAL

## 2018-08-23 ENCOUNTER — PRENATAL OFFICE VISIT (OUTPATIENT)
Dept: OBGYN | Facility: CLINIC | Age: 33
End: 2018-08-23
Payer: COMMERCIAL

## 2018-08-23 VITALS — BODY MASS INDEX: 38.4 KG/M2 | SYSTOLIC BLOOD PRESSURE: 134 MMHG | DIASTOLIC BLOOD PRESSURE: 70 MMHG | WEIGHT: 260 LBS

## 2018-08-23 DIAGNOSIS — O09.529 SUPERVISION OF HIGH-RISK PREGNANCY OF ELDERLY MULTIGRAVIDA: Primary | ICD-10-CM

## 2018-08-23 PROCEDURE — 99207 ZZC PRENATAL VISIT: CPT | Performed by: OBSTETRICS & GYNECOLOGY

## 2018-08-23 NOTE — NURSING NOTE
"Chief Complaint   Patient presents with     Prenatal Care       Initial /70  Wt 260 lb (117.9 kg)  LMP 2017  Breastfeeding? No  BMI 38.4 kg/m2 Estimated body mass index is 38.4 kg/(m^2) as calculated from the following:    Height as of 18: 5' 9\" (1.753 m).    Weight as of this encounter: 260 lb (117.9 kg).  BP completed using cuff size: regular        36w4d  + FM daily  - bleeding  + mild cramping  + edema in hands/feet including pain  - leaking of fluid  + heartburn  - headache  Marquita Ramirez LPN                 "

## 2018-08-23 NOTE — PROGRESS NOTES
PROBLEM LIST  LABS: Opos/RI/normal noninvasive prenatal testing Group B Strep positive.    1. AMA (33 at EDC): normal level II but marginal cord insertion. Follow up ultrasound at 28 weeks within normal limits. Further US only as needed clinically per MFM.    Good fetal movement, no leakage of fluid, no contractions or vaginal bleeding.  Discussed Group B Strep positive. Follow up weekly.    Sandy Tran MD

## 2018-08-23 NOTE — MR AVS SNAPSHOT
After Visit Summary   8/23/2018    Krystina Suazo    MRN: [id number]           Patient Information     Date Of Birth          1985        Visit Information        Provider Department      8/23/2018 2:00 PM Sandy Tran MD [facility]        Today's Diagnoses     Supervision of high-risk pregnancy of elderly multigravida    -  1       Follow-ups after your visit        Your next 10 appointments already scheduled     Aug 27, 2018  2:30 PM CDT   SHORT with Valeria Daly MD   [facility] ([facility])    [address]  [address]  [facility] [address]   [phone]            Aug 30, 2018  3:30 PM CDT   ESTABLISHED PRENATAL with Sandy Tran MD   [facility] ([facility])    [address]  [address] [place] [address]   [phone]            Sep 04, 2018  2:00 PM CDT   MyChart OB-GYN Established Prenatal with Sandy Tran MD   [facility] ([facility])    [address]  [address] [place] [id number] [phone]            Sep 13, 2018  3:00 PM CDT   MyChart OB-GYN Established Prenatal with Sandy Tran MD   [facility] ([facility])    [address]  [address] [place] [id number] [phone]              Who to contact     If you have questions or need follow up information about today's clinic visit or your schedule please contact [facility] directly at [phone].  Normal or non-critical lab and imaging results will be communicated to you by MyChart, letter or phone within 4 business days after the clinic has received the results. If you do not hear from us within 7 days, please contact the clinic through MyChart or phone. If you have a critical or abnormal lab result, we will notify you by phone as soon as possible.  Submit refill requests through  "[employer] or call your pharmacy and they will forward the refill request to us. Please allow 3 business days for your refill to be completed.          Additional Information About Your Visit        MyChart Information     "[employer] gives you secure access to your electronic health record. If you see a primary care provider, you can also send messages to your care team and make appointments. If you have questions, please call your primary care clinic.  If you do not have a primary care provider, please call [phone] and they will assist you.        Care EveryWhere ID     This is your Care EveryWhere ID. This could be used by other organizations to access your [place] medical records  [id number]        Your Vitals Were     Last Period Breastfeeding? BMI (Body Mass Index)             12/20/2017 No 38.4 kg/m2          Blood Pressure from Last 3 Encounters:   08/23/18 134/70   08/16/18 136/72   08/13/18 132/62    Weight from Last 3 Encounters:   08/23/18 260 lb (117.9 kg)   08/16/18 258 lb (117 kg)   08/09/18 257 lb (116.6 kg)              Today, you had the following     No orders found for display       Primary Care Provider Office Phone # Fax #    Valeria Daly [phone] [address] [place] [address]        Equal Access to Services     [facility] : Hadii sanjiv ku hadasho Soomaali, waaxda luqadaha, qaybta kaalmada adeegyada, ruth marquez. So [place] [phone].    ATENCIÓN: Si habla español, tiene a wesley disposición servicios gratuitos de asistencia lingüística. ame al [phone].    We comply with applicable federal civil rights laws and [place] laws. We do not discriminate on the basis of race, color, national origin, age, disability, sex, sexual orientation, or gender identity.            Thank you!     Thank you for choosing [facility]  for your care. Our goal is always to provide you with excellent care. Hearing back  from our patients is one way we can continue to improve our services. Please take a few minutes to complete the written survey that you may receive in the mail after your visit with us. Thank you!             Your Updated Medication List - Protect others around you: Learn how to safely use, store and throw away your medicines at www.disposemymeds.org.          This list is accurate as of 8/23/18  2:28 PM.  Always use your most recent med list.                   Brand Name Dispense Instructions for use Diagnosis    albuterol 108 (90 Base) MCG/ACT inhaler    PROAIR HFA/PROVENTIL HFA/VENTOLIN HFA     Inhale 2 puffs into the lungs        azelastine 0.05 % ophthalmic solution    OPTIVAR    1 Bottle    Apply 1 drop to eye 2 times daily    Allergic conjunctivitis, bilateral       cetirizine 10 MG tablet    zyrTEC    90 tablet    Take 1 tablet (10 mg) by mouth every evening    Chronic rhinitis       * clobetasol 0.05 % external solution    TEMOVATE    50 mL    Apply topically 2 times daily Apply to affected areas on scalp bid as needed    Scalp psoriasis       * clobetasol propionate 0.05 % Foam     50 g    Apply sparingly to affected area twice daily as needed.  Do not apply to face.    Scalp psoriasis       Fluocinolone Acetonide Scalp 0.01 % Oil oil     118 mL    Apply 2 mLs topically daily To scalp    Scalp psoriasis       prenatal multivitamin plus iron 27-0.8 MG Tabs per tablet     100 tablet    Take 1 tablet by mouth daily    Encounter for supervision of normal first pregnancy in first trimester       sertraline 50 MG tablet    ZOLOFT    135 tablet    Take 1.5 tablets (75 mg) by mouth daily    COURTNEY (generalized anxiety disorder), Major depressive disorder, recurrent episode, moderate (H)       * Notice:  This list has 2 medication(s) that are the same as other medications prescribed for you. Read the directions carefully, and ask your doctor or other care provider to review them with you.

## 2018-08-27 ENCOUNTER — OFFICE VISIT (OUTPATIENT)
Dept: PEDIATRICS | Facility: CLINIC | Age: 33
End: 2018-08-27
Payer: COMMERCIAL

## 2018-08-27 VITALS
WEIGHT: 261.9 LBS | HEART RATE: 81 BPM | DIASTOLIC BLOOD PRESSURE: 74 MMHG | BODY MASS INDEX: 38.79 KG/M2 | TEMPERATURE: 98.8 F | OXYGEN SATURATION: 97 % | HEIGHT: 69 IN | SYSTOLIC BLOOD PRESSURE: 134 MMHG

## 2018-08-27 DIAGNOSIS — F41.1 GAD (GENERALIZED ANXIETY DISORDER): ICD-10-CM

## 2018-08-27 DIAGNOSIS — J45.20 MILD INTERMITTENT ASTHMA WITHOUT COMPLICATION: ICD-10-CM

## 2018-08-27 DIAGNOSIS — F33.1 MAJOR DEPRESSIVE DISORDER, RECURRENT EPISODE, MODERATE (H): ICD-10-CM

## 2018-08-27 PROCEDURE — 99213 OFFICE O/P EST LOW 20 MIN: CPT | Performed by: INTERNAL MEDICINE

## 2018-08-27 RX ORDER — ALBUTEROL SULFATE 90 UG/1
2 AEROSOL, METERED RESPIRATORY (INHALATION) EVERY 4 HOURS PRN
Qty: 1 INHALER | Refills: 11 | Status: SHIPPED | OUTPATIENT
Start: 2018-08-27 | End: 2021-04-26

## 2018-08-27 ASSESSMENT — ANXIETY QUESTIONNAIRES
6. BECOMING EASILY ANNOYED OR IRRITABLE: NOT AT ALL
7. FEELING AFRAID AS IF SOMETHING AWFUL MIGHT HAPPEN: NOT AT ALL
1. FEELING NERVOUS, ANXIOUS, OR ON EDGE: NOT AT ALL
IF YOU CHECKED OFF ANY PROBLEMS ON THIS QUESTIONNAIRE, HOW DIFFICULT HAVE THESE PROBLEMS MADE IT FOR YOU TO DO YOUR WORK, TAKE CARE OF THINGS AT HOME, OR GET ALONG WITH OTHER PEOPLE: NOT DIFFICULT AT ALL
3. WORRYING TOO MUCH ABOUT DIFFERENT THINGS: NOT AT ALL
5. BEING SO RESTLESS THAT IT IS HARD TO SIT STILL: NOT AT ALL
GAD7 TOTAL SCORE: 1
2. NOT BEING ABLE TO STOP OR CONTROL WORRYING: NOT AT ALL

## 2018-08-27 ASSESSMENT — PATIENT HEALTH QUESTIONNAIRE - PHQ9: 5. POOR APPETITE OR OVEREATING: SEVERAL DAYS

## 2018-08-27 NOTE — PROGRESS NOTES
SUBJECTIVE:   Krystina Suazo is a 32 year old female who presents to clinic today for the following health issues:      Depression and Anxiety Follow-Up    Status since last visit: No change    Other associated symptoms:None    Complicating factors:     Significant life event: No     Current substance abuse: None    PHQ-9 1/31/2018 2/13/2018 2/13/2018   Total Score 1 2 2   Q9: Suicide Ideation Not at all Not at all Not at all     COURTNEY-7 SCORE 5/23/2017 2/13/2018 2/13/2018   Total Score 10 1 1     In the past two weeks have you had thoughts of suicide or self-harm?  No.    Do you have concerns about your personal safety or the safety of others?   No  PHQ-9  English  PHQ-9   Any Language  COURTNEY-7  Suicide Assessment Five-step Evaluation and Treatment (SAFE-T)  Asthma Follow-Up    Was ACT completed today?    Yes    ACT Total Scores 8/27/2018   ACT TOTAL SCORE (Goal Greater than or Equal to 20) 25   In the past 12 months, how many times did you visit the emergency room for your asthma without being admitted to the hospital? 0   In the past 12 months, how many times were you hospitalized overnight because of your asthma? 0       Recent asthma triggers that patient is dealing with: None        Amount of exercise or physical activity: 6-7 days/week for an average of 30-minutes, walking dog twice per day    Problems taking medications regularly: No    Medication side effects: none    Diet: healthy diet    Problem list and histories reviewed & adjusted, as indicated.  Additional history: as documented    Patient Active Problem List   Diagnosis     Chronic rhinitis     COURTNEY (generalized anxiety disorder)     Major depressive disorder, recurrent episode, moderate (H)     Supervision of normal first pregnancy     Supervision of high-risk pregnancy of elderly multigravida     Uterine leiomyoma, unspecified location     Allergic rhinitis     Mild intermittent asthma without complication     Vasovagal syncope     Past  "Surgical History:   Procedure Laterality Date     CARPAL TUNNEL RELEASE RT/LT Bilateral 2013     SINUS SURGERY  2014     TONSILLECTOMY  2014       Social History   Substance Use Topics     Smoking status: Never Smoker     Smokeless tobacco: Never Used     Alcohol use No     Family History   Problem Relation Age of Onset     Esophageal Cancer Mother 57     Diabetes Mother      Hypertension Mother      Asthma Father      Glaucoma No family hx of      Macular Degeneration No family hx of            Reviewed and updated as needed this visit by clinical staff  Tobacco  Allergies  Meds  Med Hx  Surg Hx  Fam Hx  Soc Hx      Reviewed and updated as needed this visit by Provider         ROS:  Constitutional, HEENT, cardiovascular, pulmonary, GI, , musculoskeletal, neuro, skin, endocrine and psych systems are negative, except as otherwise noted.    OBJECTIVE:     /74  Pulse 81  Temp 98.8  F (37.1  C) (Oral)  Ht 5' 9\" (1.753 m)  Wt 261 lb 14.4 oz (118.8 kg)  LMP 12/20/2017  SpO2 97%  BMI 38.68 kg/m2  Body mass index is 38.68 kg/(m^2).  GENERAL: healthy, alert and no distress  CV: regular rate and rhythm, normal S1 S2, no S3 or S4, no murmur, click or rub, no peripheral edema with compression socks in place  PSYCH: mentation appears normal, affect normal/bright    Diagnostic Test Results:  none     ASSESSMENT/PLAN:     1. COURTNEY (generalized anxiety disorder)  Stable and doing well. Plan continue current dose. Will be bringing back to clinic here. Plan close follow up postpartum of mood.  - sertraline (ZOLOFT) 50 MG tablet; Take 1.5 tablets (75 mg) by mouth daily Patient will call when needs filled.  Dispense: 135 tablet; Refill: 1    2. Major depressive disorder, recurrent episode, moderate (H)    - sertraline (ZOLOFT) 50 MG tablet; Take 1.5 tablets (75 mg) by mouth daily Patient will call when needs filled.  Dispense: 135 tablet; Refill: 1    3. Mild intermittent asthma without complication  Refilled. No " symptoms at all during pregnancy.  - albuterol (PROAIR HFA/PROVENTIL HFA/VENTOLIN HFA) 108 (90 Base) MCG/ACT inhaler; Inhale 2 puffs into the lungs every 4 hours as needed for shortness of breath / dyspnea or wheezing  Dispense: 1 Inhaler; Refill: 11    Patient Instructions   Let's keep your medication the same.        Valeria Daly MD  [facility]

## 2018-08-27 NOTE — LETTER
My Asthma Action Plan  Name: Krystina Suazo   YOB: 1985  Date: 8/27/2018   My doctor: Valeria Daly MD   My clinic: [facility]        My Control Medicine: None  My Rescue Medicine: Albuterol (Proair/Ventolin/Proventil) inhaler as needed   My Asthma Severity: intermittent  Avoid your asthma triggers: none at present  None            GREEN ZONE   Good Control    I feel good    No cough or wheeze    Can work, sleep and play without asthma symptoms       Take your asthma control medicine every day.     1. If exercise triggers your asthma, take your rescue medication    15 minutes before exercise or sports, and    During exercise if you have asthma symptoms  2. Spacer to use with inhaler: If you have a spacer, make sure to use it with your inhaler             YELLOW ZONE Getting Worse  I have ANY of these:    I do not feel good    Cough or wheeze    Chest feels tight    Wake up at night   1. Keep taking your Green Zone medications  2. Start taking your rescue medicine:    every 20 minutes for up to 1 hour. Then every 4 hours for 24-48 hours.  3. If you stay in the Yellow Zone for more than 12-24 hours, contact your doctor.  4. If you do not return to the Green Zone in 12-24 hours or you get worse, start taking your oral steroid medicine if prescribed by your provider.           RED ZONE Medical Alert - Get Help  I have ANY of these:    I feel awful    Medicine is not helping    Breathing getting harder    Trouble walking or talking    Nose opens wide to breathe       1. Take your rescue medicine NOW  2. If your provider has prescribed an oral steroid medicine, start taking it NOW  3. Call your doctor NOW  4. If you are still in the Red Zone after 20 minutes and you have not reached your doctor:    Take your rescue medicine again and    Call 911 or go to the emergency room right away    See your regular doctor within 2 weeks of an Emergency Room or Urgent Care visit for follow-up  treatment.          Annual Reminders:  Meet with Asthma Educator,  Flu Shot in the Fall, consider Pneumonia Vaccination for patients with asthma (aged 19 and older).    Pharmacy: [facility] [place] [facility] [address]                      Asthma Triggers  How To Control Things That Make Your Asthma Worse    Triggers are things that make your asthma worse.  Look at the list below to help you find your triggers and what you can do about them.  You can help prevent asthma flare-ups by staying away from your triggers.      Trigger                                                          What you can do   Cigarette Smoke  Tobacco smoke can make asthma worse. Do not allow smoking in your home, car or around you.  Be sure no one smokes at a child s day care or school.  If you smoke, ask your health care provider for ways to help you quit.  Ask family members to quit too.  Ask your health care provider for a referral to Quit Plan to help you quit smoking, or call 8-072-534-PLAN.     Colds, Flu, Bronchitis  These are common triggers of asthma. Wash your hands often.  Don t touch your eyes, nose or mouth.  Get a flu shot every year.     Dust Mites  These are tiny bugs that live in cloth or carpet. They are too small to see. Wash sheets and blankets in hot water every week.   Encase pillows and mattress in dust mite proof covers.  Avoid having carpet if you can. If you have carpet, vacuum weekly.   Use a dust mask and HEPA vacuum.   Pollen and Outdoor Mold  Some people are allergic to trees, grass, or weed pollen, or molds. Try to keep your windows closed.  Limit time out doors when pollen count is high.   Ask you health care provider about taking medicine during allergy season.     Animal Dander  Some people are allergic to skin flakes, urine or saliva from pets with fur or feathers. Keep pets with fur or feathers out of your home.    If you can t keep the pet outdoors, then keep the pet out of your  bedroom.  Keep the bedroom door closed.  Keep pets off cloth furniture and away from stuffed toys.     Mice, Rats, and Cockroaches  Some people are allergic to the waste from these pests.   Cover food and garbage.  Clean up spills and food crumbs.  Store grease in the refrigerator.   Keep food out of the bedroom.   Indoor Mold  This can be a trigger if your home has high moisture. Fix leaking faucets, pipes, or other sources of water.   Clean moldy surfaces.  Dehumidify basement if it is damp and smelly.   Smoke, Strong Odors, and Sprays  These can reduce air quality. Stay away from strong odors and sprays, such as perfume, powder, hair spray, paints, smoke incense, paint, cleaning products, candles and new carpet.   Exercise or Sports  Some people with asthma have this trigger. Be active!  Ask your doctor about taking medicine before sports or exercise to prevent symptoms.    Warm up for 5-10 minutes before and after sports or exercise.     Other Triggers of Asthma  Cold air:  Cover your nose and mouth with a scarf.  Sometimes laughing or crying can be a trigger.  Some medicines and food can trigger asthma.

## 2018-08-27 NOTE — MR AVS SNAPSHOT
After Visit Summary   8/27/2018    Krystina Suazo    MRN: [id number]           Patient Information     Date Of Birth          1985        Visit Information        Provider Department      8/27/2018 2:30 PM Valeria Daly MD [facility]        Today's Diagnoses     COURTNEY (generalized anxiety disorder)        Major depressive disorder, recurrent episode, moderate (H)        Mild intermittent asthma without complication          Care Instructions    Let's keep your medication the same.            Follow-ups after your visit        Follow-up notes from your care team     Return in about 3 months (around 11/27/2018) for Follow Up Visit.      Your next 10 appointments already scheduled     Aug 30, 2018  3:30 PM CDT   [facility]    [address]  [address] [place] [id number] [phone]            Sep 04, 2018  2:00 PM CDT   MyChart OB-GYN Established Prenatal with Sandy Tran MD   [facility]    [address]  [address] [place] [id number] [phone]            Sep 13, 2018  3:00 PM CDT   MyChart OB-GYN Established Prenatal with Sandy Tran MD   [facility]    [address]  [address] [place] [id number] [phone]              Who to contact     If you have questions or need follow up information about today's clinic visit or your schedule please contact [facility] directly at [phone].  Normal or non-critical lab and imaging results will be communicated to you by MyChart, letter or phone within 4 business days after the clinic has received the results. If you do not hear from us within 7 days, please contact the clinic through MyChart or phone. If you have a critical or abnormal lab result, we will notify you by  "phone as soon as possible.  Submit refill requests through [employer] or call your pharmacy and they will forward the refill request to us. Please allow 3 business days for your refill to be completed.          Additional Information About Your Visit        [employer] Information     [employer] gives you secure access to your electronic health record. If you see a primary care provider, you can also send messages to your care team and make appointments. If you have questions, please call your primary care clinic.  If you do not have a primary care provider, please call [phone] and they will assist you.        Care EveryWhere ID     This is your Care EveryWhere ID. This could be used by other organizations to access your [place] medical records  [id number]        Your Vitals Were     Pulse Temperature Height Last Period Pulse Oximetry BMI (Body Mass Index)    81 98.8  F (37.1  C) (Oral) 5' 9\" (1.753 m) 12/20/2017 97% 38.68 kg/m2       Blood Pressure from Last 3 Encounters:   08/27/18 134/74   08/23/18 134/70   08/16/18 136/72    Weight from Last 3 Encounters:   08/27/18 261 lb 14.4 oz (118.8 kg)   08/23/18 260 lb (117.9 kg)   08/16/18 258 lb (117 kg)              Today, you had the following     No orders found for display         Today's Medication Changes          These changes are accurate as of 8/27/18  3:46 PM.  If you have any questions, ask your nurse or doctor.               These medicines have changed or have updated prescriptions.        Dose/Directions    albuterol 108 (90 Base) MCG/ACT inhaler   Commonly known as:  PROAIR HFA/PROVENTIL HFA/VENTOLIN HFA   This may have changed:    - when to take this  - reasons to take this   Used for:  Mild intermittent asthma without complication   Changed by:  Valeria Daly MD        Dose:  2 puff   Inhale 2 puffs into the lungs every 4 hours as needed for shortness of breath / dyspnea or wheezing   Quantity:  1 Inhaler   Refills:  11       sertraline 50 MG tablet "   Commonly known as:  ZOLOFT   This may have changed:  additional instructions   Used for:  COURTNEY (generalized anxiety disorder), Major depressive disorder, recurrent episode, moderate (H)   Changed by:  Valeria Daly MD        Dose:  75 mg   Take 1.5 tablets (75 mg) by mouth daily Patient will call when needs filled.   Quantity:  135 tablet   Refills:  1            Where to get your medicines      These medications were sent to [facility] [place] [facility] [place] - [address]   [address], [place] [address]     Phone:  [phone]     albuterol 108 (90 Base) MCG/ACT inhaler    sertraline 50 MG tablet                Primary Care Provider Office Phone # Fax #    Valeria Daly [phone]       [address] [place] [address]        Equal Access to Services     : Hadii sanjiv marion hadasho Soomaali, waaxda luqadaha, qaybta kaalmada adeegyada, ruth guillaume . So [facility] [phone].    ATENCIÓN: Si habla español, tiene a wesley disposición servicios gratuitos de asistencia lingüística. [facility] [phone].    We comply with applicable federal civil rights laws and [place] laws. We do not discriminate on the basis of race, color, national origin, age, disability, sex, sexual orientation, or gender identity.            Thank you!     Thank you for choosing [facility]  for your care. Our goal is always to provide you with excellent care. Hearing back from our patients is one way we can continue to improve our services. Please take a few minutes to complete the written survey that you may receive in the mail after your visit with us. Thank you!             Your Updated Medication List - Protect others around you: Learn how to safely use, store and throw away your medicines at www.disposemymeds.org.          This list is accurate as of 8/27/18  3:46 PM.  Always use your most recent med list.                    Brand Name Dispense Instructions for use Diagnosis    albuterol 108 (90 Base) MCG/ACT inhaler    PROAIR HFA/PROVENTIL HFA/VENTOLIN HFA    1 Inhaler    Inhale 2 puffs into the lungs every 4 hours as needed for shortness of breath / dyspnea or wheezing    Mild intermittent asthma without complication       * clobetasol 0.05 % external solution    TEMOVATE    50 mL    Apply topically 2 times daily Apply to affected areas on scalp bid as needed    Scalp psoriasis       * clobetasol propionate 0.05 % Foam     50 g    Apply sparingly to affected area twice daily as needed.  Do not apply to face.    Scalp psoriasis       Fluocinolone Acetonide Scalp 0.01 % Oil oil     118 mL    Apply 2 mLs topically daily To scalp    Scalp psoriasis       prenatal multivitamin plus iron 27-0.8 MG Tabs per tablet     100 tablet    Take 1 tablet by mouth daily    Encounter for supervision of normal first pregnancy in first trimester       sertraline 50 MG tablet    ZOLOFT    135 tablet    Take 1.5 tablets (75 mg) by mouth daily Patient will call when needs filled.    COURTNEY (generalized anxiety disorder), Major depressive disorder, recurrent episode, moderate (H)       * Notice:  This list has 2 medication(s) that are the same as other medications prescribed for you. Read the directions carefully, and ask your doctor or other care provider to review them with you.

## 2018-08-27 NOTE — LETTER
My Depression Action Plan  Name: Krystina Suazo   Date of Birth 1985  Date: 8/27/2018    My doctor: Valeria Daly   My clinic: [address]  [address]  [facility] [address] [phone]          GREEN    ZONE   Good Control    What it looks like:     Things are going generally well. You have normal up s and down s. You may even feel depressed from time to time, but bad moods usually last less than a day.   What you need to do:  1. Continue to care for yourself (see self care plan)  2. Check your depression survival kit and update it as needed  3. Follow your physician s recommendations including any medication.  4. Do not stop taking medication unless you consult with your physician first.           YELLOW         ZONE Getting Worse    What it looks like:     Depression is starting to interfere with your life.     It may be hard to get out of bed; you may be starting to isolate yourself from others.    Symptoms of depression are starting to last most all day and this has happened for several days.     You may have suicidal thoughts but they are not constant.   What you need to do:     1. Call your care team, your response to treatment will improve if you keep your care team informed of your progress. Yellow periods are signs an adjustment may need to be made.     2. Continue your self-care, even if you have to fake it!    3. Talk to someone in your support network    4. Open up your depression survival kit           RED    ZONE Medical Alert - Get Help    What it looks like:     Depression is seriously interfering with your life.     You may experience these or other symptoms: You can t get out of bed most days, can t work or engage in other necessary activities, you have trouble taking care of basic hygiene, or basic responsibilities, thoughts of suicide or death that will not go away, self-injurious behavior.     What you need to  do:  1. Call your care team and request a same-day appointment. If they are not available (weekends or after hours) call your local crisis line, emergency room or 911.            Depression Self Care Plan / Survival Kit    Self-Care for Depression  Here s the deal. Your body and mind are really not as separate as most people think.  What you do and think affects how you feel and how you feel influences what you do and think. This means if you do things that people who feel good do, it will help you feel better.  Sometimes this is all it takes.  There is also a place for medication and therapy depending on how severe your depression is, so be sure to consult with your medical provider and/ or Behavioral Health Consultant if your symptoms are worsening or not improving.     In order to better manage my stress, I will:    Exercise  Get some form of exercise, every day. This will help reduce pain and release endorphins, the  feel good  chemicals in your brain. This is almost as good as taking antidepressants!  This is not the same as joining a gym and then never going! (they count on that by the way ) It can be as simple as just going for a walk or doing some gardening, anything that will get you moving.      Hygiene   Maintain good hygiene (Get out of bed in the morning, Make your bed, Brush your teeth, Take a shower, and Get dressed like you were going to work, even if you are unemployed).  If your clothes don't fit try to get ones that do.    Diet  I will strive to eat foods that are good for me, drink plenty of water, and avoid excessive sugar, caffeine, alcohol, and other mood-altering substances.  Some foods that are helpful in depression are: complex carbohydrates, B vitamins, flaxseed, fish or fish oil, fresh fruits and vegetables.    Psychotherapy  I agree to participate in Individual Therapy (if recommended).    Medication  If prescribed medications, I agree to take them.  Missing doses can result in serious  side effects.  I understand that drinking alcohol, or other illicit drug use, may cause potential side effects.  I will not stop my medication abruptly without first discussing it with my provider.    Staying Connected With Others  I will stay in touch with my friends, family members, and my primary care provider/team.    Use your imagination  Be creative.  We all have a creative side; it doesn t matter if it s oil painting, sand castles, or mud pies! This will also kick up the endorphins.    Witness Beauty  (AKA stop and smell the roses) Take a look outside, even in mid-winter. Notice colors, textures. Watch the squirrels and birds.     Service to others  Be of service to others.  There is always someone else in need.  By helping others we can  get out of ourselves  and remember the really important things.  This also provides opportunities for practicing all the other parts of the program.    Humor  Laugh and be silly!  Adjust your TV habits for less news and crime-drama and more comedy.    Control your stress  Try breathing deep, massage therapy, biofeedback, and meditation. Find time to relax each day.     My support system    Clinic Contact:  Phone number:    Contact 1:  Phone number:    Contact 2:  Phone number:    Yazdanism/:  Phone number:    Therapist:  Phone number:    Local crisis center:    Phone number:    Other community support:  Phone number:

## 2018-08-28 ASSESSMENT — ASTHMA QUESTIONNAIRES: ACT_TOTALSCORE: 25

## 2018-08-28 ASSESSMENT — PATIENT HEALTH QUESTIONNAIRE - PHQ9: SUM OF ALL RESPONSES TO PHQ QUESTIONS 1-9: 2

## 2018-08-28 ASSESSMENT — ANXIETY QUESTIONNAIRES: GAD7 TOTAL SCORE: 1

## 2018-08-30 ENCOUNTER — PRENATAL OFFICE VISIT (OUTPATIENT)
Dept: OBGYN | Facility: CLINIC | Age: 33
End: 2018-08-30
Payer: COMMERCIAL

## 2018-08-30 ENCOUNTER — HOSPITAL ENCOUNTER (OUTPATIENT)
Facility: CLINIC | Age: 33
Discharge: HOME | End: 2018-08-30
Attending: OBSTETRICS & GYNECOLOGY | Admitting: OBSTETRICS & GYNECOLOGY
Payer: COMMERCIAL

## 2018-08-30 VITALS
HEART RATE: 88 BPM | TEMPERATURE: 98.8 F | DIASTOLIC BLOOD PRESSURE: 67 MMHG | BODY MASS INDEX: 38.95 KG/M2 | WEIGHT: 263 LBS | RESPIRATION RATE: 20 BRPM | SYSTOLIC BLOOD PRESSURE: 142 MMHG | HEIGHT: 69 IN

## 2018-08-30 VITALS — SYSTOLIC BLOOD PRESSURE: 154 MMHG | BODY MASS INDEX: 38.84 KG/M2 | WEIGHT: 263 LBS | DIASTOLIC BLOOD PRESSURE: 68 MMHG

## 2018-08-30 DIAGNOSIS — O09.529 SUPERVISION OF HIGH-RISK PREGNANCY OF ELDERLY MULTIGRAVIDA: Primary | ICD-10-CM

## 2018-08-30 PROBLEM — I10 HYPERTENSION: Status: ACTIVE | Noted: 2018-08-30

## 2018-08-30 LAB
ALT SERPL W P-5'-P-CCNC: 19 U/L (ref 0–50)
ANION GAP SERPL CALCULATED.3IONS-SCNC: 7 MMOL/L (ref 3–14)
AST SERPL W P-5'-P-CCNC: 15 U/L (ref 0–45)
BUN SERPL-MCNC: 13 MG/DL (ref 7–30)
CALCIUM SERPL-MCNC: 8.5 MG/DL (ref 8.5–10.1)
CHLORIDE SERPL-SCNC: 107 MMOL/L (ref 94–109)
CO2 SERPL-SCNC: 22 MMOL/L (ref 20–32)
CREAT SERPL-MCNC: 0.64 MG/DL (ref 0.52–1.04)
CREAT UR-MCNC: 42 MG/DL
ERYTHROCYTE [DISTWIDTH] IN BLOOD BY AUTOMATED COUNT: 12.7 % (ref 10–15)
GFR SERPL CREATININE-BSD FRML MDRD: >90 ML/MIN/1.7M2
GLUCOSE SERPL-MCNC: 98 MG/DL (ref 70–99)
HCT VFR BLD AUTO: 37.1 % (ref 35–47)
HGB BLD-MCNC: 12.7 G/DL (ref 11.7–15.7)
MCH RBC QN AUTO: 33.8 PG (ref 26.5–33)
MCHC RBC AUTO-ENTMCNC: 34.2 G/DL (ref 31.5–36.5)
MCV RBC AUTO: 99 FL (ref 78–100)
PLATELET # BLD AUTO: 205 10E9/L (ref 150–450)
POTASSIUM SERPL-SCNC: 4.2 MMOL/L (ref 3.4–5.3)
PROT UR-MCNC: 0.08 G/L
PROT/CREAT 24H UR: 0.19 G/G CR (ref 0–0.2)
RBC # BLD AUTO: 3.76 10E12/L (ref 3.8–5.2)
SODIUM SERPL-SCNC: 136 MMOL/L (ref 133–144)
WBC # BLD AUTO: 8.1 10E9/L (ref 4–11)

## 2018-08-30 PROCEDURE — 85027 COMPLETE CBC AUTOMATED: CPT | Performed by: OBSTETRICS & GYNECOLOGY

## 2018-08-30 PROCEDURE — 80048 BASIC METABOLIC PNL TOTAL CA: CPT | Performed by: OBSTETRICS & GYNECOLOGY

## 2018-08-30 PROCEDURE — 84450 TRANSFERASE (AST) (SGOT): CPT | Performed by: OBSTETRICS & GYNECOLOGY

## 2018-08-30 PROCEDURE — 99207 ZZC PRENATAL VISIT: CPT | Performed by: OBSTETRICS & GYNECOLOGY

## 2018-08-30 PROCEDURE — 36415 COLL VENOUS BLD VENIPUNCTURE: CPT | Performed by: OBSTETRICS & GYNECOLOGY

## 2018-08-30 PROCEDURE — G0463 HOSPITAL OUTPT CLINIC VISIT: HCPCS

## 2018-08-30 PROCEDURE — 84460 ALANINE AMINO (ALT) (SGPT): CPT | Performed by: OBSTETRICS & GYNECOLOGY

## 2018-08-30 PROCEDURE — 84156 ASSAY OF PROTEIN URINE: CPT | Performed by: OBSTETRICS & GYNECOLOGY

## 2018-08-30 NOTE — PROGRESS NOTES
PROBLEM LIST  LABS: Opos/RI/normal noninvasive prenatal testing Group B Strep positive.    1. AMA (33 at EDC): normal level II but marginal cord insertion. Follow up ultrasound at 28 weeks within normal limits. Further US only as needed clinically per [facility].    Good fetal movement, no leakage of fluid, no contractions or vaginal bleeding. Hypertensive today with brisk reflexes. To L&D for evaluation, labs, serial blood pressures, monitoring. IOL versus close outpatient follow up discussed, and decision will be made based on labs and blood pressure readings.    Sandy Tran MD

## 2018-08-30 NOTE — PLAN OF CARE
Data: [facility] at 1615   Reason for maternal/fetal assessment per patient is elevated BP's and increased swelling. Patient is a . Prenatal record reviewed.      Obstetric History       T0      L0     SAB0   TAB0   Ectopic0   Multiple0   Live Births0       # Outcome Date GA Lbr Daryn/2nd Weight Sex Delivery Anes PTL Lv   1 Current                  Medical History:   Past Medical History:   Diagnosis Date     Asthma      Depression    . Gestational Age 37w4d. VSS. Cervix: exam done in office  Fetal movement present. Patient denies cramping, backache, vaginal discharge, pelvic pressure, UTI symptoms, GI problems, bloody show, vaginal bleeding, edema, headache, visual disturbances, epigastric or URQ pain, abdominal pain, rupture of membranes.   Action:  EFM/EUM on  Triage assessment completed. Serial BP's , lab work ordered, 's moderate varability, + accelerations, no decelerations, no contractions,  Response: . Patient verbalized understanding of education and verbalized agreement with plan. Phone update to Dr Rubin with labs and BP's, watch BP's  another hour or 2 if ok range can discharge

## 2018-08-30 NOTE — IP AVS SNAPSHOT
MRN:[id number]                      After Visit Summary   8/30/2018    Krystina Suazo    MRN: [id number]           Thank you!     Thank you for choosing [facility] for your care. Our goal is always to provide you with excellent care. Hearing back from our patients is one way we can continue to improve our services. Please take a few minutes to complete the written survey that you may receive in the mail after you visit. If you would like to speak to someone directly about your visit please contact Patient Relations at [phone]. Thank you!          Patient Information     Date Of Birth          1985        About your hospital stay     You were admitted on:  August 30, 2018 You last received care in the:  [facility] Labor and Delivery    You were discharged on:  August 30, 2018       Who to Call     For medical emergencies, please call 911.  For non-urgent questions about your medical care, please call your primary care provider or clinic, [phone]          Attending Provider     Provider Specialty    Charles Rubin MD OB/Gyn       Primary Care Provider Office Phone # Fax #    Valeria Daly [phone]      Your next 10 appointments already scheduled     Sep 04, 2018  2:00 PM CDT   [facility]    [address]  [address] [place] [address]   [phone]            Sep 13, 2018  3:00 PM CDT   [facility] Established Prenatal with [facility] Tran MD   [facility]    [address]  [address] [place] [address]   [phone]              Further instructions from your care team       Discharge Instruction for Undelivered Patients      You were seen for: Pre eclampsia evaluation  We Consulted: Dr. Rubin  You had (Test or Medicine):Blood pressures, Blood  "and urine lab tests     Diet:   Drink 8 to 12 glasses of liquids (milk, juice, water) every day.  You may eat meals and snacks.     Activity:  Call your doctor or nurse midwife if your baby is moving less than usual.     Call your provider if you notice:  Swelling in your face or increased swelling in your hands or legs.  Headaches that are not relieved by Tylenol (acetaminophen).  Changes in your vision (blurring: seeing spots or stars.)  Nausea (sick to your stomach) and vomiting (throwing up).   Weight gain of 5 pounds or more per week.  Heartburn that doesn't go away.  Signs of bladder infection: pain when you urinate (use the toilet), need to go more often and more urgently.  The bag of givens (rupture of membranes) breaks, or you notice leaking in your underwear.  Bright red blood in your underwear.  Abdominal (lower belly) or stomach pain.  For first baby: Contractions (tightening) less than 5 minutes apart for one hour or more.  Increase or change in vaginal discharge (note the color and amount)      Follow-up:  As scheduled in the clinic or call sooner with questions or concerns          Pending Results     No orders found from 8/28/2018 to 8/31/2018.            Admission Information     Date & Time Provider Department Dept. Phone    8/30/2018 Charles Rubin MD [facility] Labor and Delivery [phone]      Your Vitals Were     Blood Pressure Pulse Temperature Respirations Height Weight    135/73 88 98.8  F (37.1  C) (Oral) 20 1.753 m (5' 9\") 119.3 kg (263 lb)    Last Period BMI (Body Mass Index)                12/20/2017 38.84 kg/m2          Bixhart Information     [employer] gives you secure access to your electronic health record. If you see a primary care provider, you can also send messages to your care team and make appointments. If you have questions, please call your primary care clinic.  If you do not have a primary care provider, please call [phone] and they will assist you.        Care " EveryWhere ID     This is your Care EveryWhere ID. This could be used by other organizations to access your [place] medical records  [id number]        Equal Access to Services     CLAUDIA KURTZ : Hillary Velasquez, saman whitlock, aurepat wardemyyobani highherber, waxgill abigailin hayaawalt highheri wilson markell marquez. So [facility] [phone].    ATENCIÓN: Si habla español, tiene a wesley disposición servicios gratuitos de asistencia lingüística. Llame al [phone].    We comply with applicable federal civil rights laws and [place] laws. We do not discriminate on the basis of race, color, national origin, age, disability, sex, sexual orientation, or gender identity.               Review of your medicines      UNREVIEWED medicines. Ask your doctor about these medicines        Dose / Directions    albuterol 108 (90 Base) MCG/ACT inhaler   Commonly known as:  PROAIR HFA/PROVENTIL HFA/VENTOLIN HFA   Used for:  Mild intermittent asthma without complication        Dose:  2 puff   Inhale 2 puffs into the lungs every 4 hours as needed for shortness of breath / dyspnea or wheezing   Quantity:  1 Inhaler   Refills:  11       * clobetasol 0.05 % external solution   Commonly known as:  TEMOVATE   Used for:  Scalp psoriasis        Apply topically 2 times daily Apply to affected areas on scalp bid as needed   Quantity:  50 mL   Refills:  3       * clobetasol propionate 0.05 % Foam   Used for:  Scalp psoriasis        Apply sparingly to affected area twice daily as needed.  Do not apply to face.   Quantity:  50 g   Refills:  3       Fluocinolone Acetonide Scalp 0.01 % Oil oil   Used for:  Scalp psoriasis        Dose:  2 mL   Apply 2 mLs topically daily To scalp   Quantity:  118 mL   Refills:  3       prenatal multivitamin plus iron 27-0.8 MG Tabs per tablet   Used for:  Encounter for supervision of normal first pregnancy in first trimester        Dose:  1 tablet   Take 1 tablet by mouth daily   Quantity:  100 tablet   Refills:  3        sertraline 50 MG tablet   Commonly known as:  ZOLOFT   Used for:  COURTNEY (generalized anxiety disorder), Major depressive disorder, recurrent episode, moderate (H)        Dose:  75 mg   Take 1.5 tablets (75 mg) by mouth daily Patient will call when needs filled.   Quantity:  135 tablet   Refills:  1       * Notice:  This list has 2 medication(s) that are the same as other medications prescribed for you. Read the directions carefully, and ask your doctor or other care provider to review them with you.             Protect others around you: Learn how to safely use, store and throw away your medicines at www.Visanteemymeds.org.             Medication List: This is a list of all your medications and when to take them. Check marks below indicate your daily home schedule. Keep this list as a reference.      Medications           Morning Afternoon Evening Bedtime As Needed    albuterol 108 (90 Base) MCG/ACT inhaler   Commonly known as:  PROAIR HFA/PROVENTIL HFA/VENTOLIN HFA   Inhale 2 puffs into the lungs every 4 hours as needed for shortness of breath / dyspnea or wheezing                                * clobetasol 0.05 % external solution   Commonly known as:  TEMOVATE   Apply topically 2 times daily Apply to affected areas on scalp bid as needed                                * clobetasol propionate 0.05 % Foam   Apply sparingly to affected area twice daily as needed.  Do not apply to face.                                Fluocinolone Acetonide Scalp 0.01 % Oil oil   Apply 2 mLs topically daily To scalp                                prenatal multivitamin plus iron 27-0.8 MG Tabs per tablet   Take 1 tablet by mouth daily                                sertraline 50 MG tablet   Commonly known as:  ZOLOFT   Take 1.5 tablets (75 mg) by mouth daily Patient will call when needs filled.                                * Notice:  This list has 2 medication(s) that are the same as other medications prescribed for you. Read the  directions carefully, and ask your doctor or other care provider to review them with you.

## 2018-08-30 NOTE — NURSING NOTE
"Chief Complaint   Patient presents with     Prenatal Care       Initial /68  Wt 263 lb (119.3 kg)  LMP 2017  Breastfeeding? No  BMI 38.84 kg/m2 Estimated body mass index is 38.84 kg/(m^2) as calculated from the following:    Height as of 18: 5' 9\" (1.753 m).    Weight as of this encounter: 263 lb (119.3 kg).  BP completed using cuff size: regular          37w4d  + FM daily  - bleeding or leaking of fluid  + edema in legs increased  + slight headache today  - contractions  Marquita Ramirez LPN               "

## 2018-08-30 NOTE — IP AVS SNAPSHOT
[facility] Labor and Delivery    [address]    [facility] [address]    Phone:  [phone]    Fax:  [phone]                                       After Visit Summary   8/30/2018    Krystina Suazo    MRN: [id number]           After Visit Summary Signature Page     I have received my discharge instructions, and my questions have been answered. I have discussed any challenges I see with this plan with the nurse or doctor.    ..........................................................................................................................................  Patient/Patient Representative Signature      ..........................................................................................................................................  Patient Representative Print Name and Relationship to Patient    ..................................................               ................................................  Date                                            Time    ..........................................................................................................................................  Reviewed by Signature/Title    ...................................................              ..............................................  Date                                                            Time          22EPIC Rev 08/18

## 2018-08-30 NOTE — MR AVS SNAPSHOT
After Visit Summary   8/30/2018    Krystina Suazo    MRN: [id number]           Patient Information     Date Of Birth          1985        Visit Information        Provider Department      8/30/2018 3:30 PM Sandy Tran MD [facility]        Today's Diagnoses     Supervision of high-risk pregnancy of elderly multigravida    -  1       Follow-ups after your visit        Your next 10 appointments already scheduled     Sep 04, 2018  2:00 PM CDT   [facility] Established Prenatal with Sandy Tran MD   [facility] ([facility])    [address]  [address]  [facility] [id number] [phone]            Sep 13, 2018  3:00 PM CDT   [facility] Established Prenatal with Sandy Tran MD   [facility] ([facility])    [address]  [address]  [facility] [id number] [phone]              Who to contact     If you have questions or need follow up information about today's clinic visit or your schedule please contact [facility] directly at [phone].  Normal or non-critical lab and imaging results will be communicated to you by MyChart, letter or phone within 4 business days after the clinic has received the results. If you do not hear from us within 7 days, please contact the clinic through Veeboxhart or phone. If you have a critical or abnormal lab result, we will notify you by phone as soon as possible.  Submit refill requests through [employer] or call your pharmacy and they will forward the refill request to us. Please allow 3 business days for your refill to be completed.          Additional Information About Your Visit        MyChart Information     [employer] gives you secure access to your electronic health record. If you see a primary care provider, you can also send messages to your care team and make appointments. If you have questions, please call  your primary care clinic.  If you do not have a primary care provider, please call [phone] and they will assist you.        Care EveryWhere ID     This is your Care EveryWhere ID. This could be used by other organizations to access your [place] medical records  [id number]        Your Vitals Were     Last Period Breastfeeding? BMI (Body Mass Index)             12/20/2017 No 38.84 kg/m2          Blood Pressure from Last 3 Encounters:   08/30/18 165/85   08/30/18 154/68   08/27/18 134/74    Weight from Last 3 Encounters:   08/30/18 263 lb (119.3 kg)   08/30/18 263 lb (119.3 kg)   08/27/18 261 lb 14.4 oz (118.8 kg)              Today, you had the following     No orders found for display       Primary Care Provider Office Phone # Fax #    Valeria Daly [phone] [address] [place] [address]        Equal Access to Services     [facility] AH: Hadii aad ku hadasho Soomaali, waaxda luqadaha, qaybta kaalmada adeegyada, waxay idiin hayaan lennoxeg adrianearasilvio la'arlette . So [facility] [phone].    ATENCIÓN: Si habla español, tiene a wesley disposición servicios gratuitos de asistencia lingüística. Llame al [phone].    We comply with applicable federal civil rights laws and [place] laws. We do not discriminate on the basis of race, color, national origin, age, disability, sex, sexual orientation, or gender identity.            Thank you!     Thank you for choosing [facility]  for your care. Our goal is always to provide you with excellent care. Hearing back from our patients is one way we can continue to improve our services. Please take a few minutes to complete the written survey that you may receive in the mail after your visit with us. Thank you!             Your Updated Medication List - Protect others around you: Learn how to safely use, store and throw away your medicines at www.disposemymeds.org.          This list is accurate as of 8/30/18  3:55 PM.   Always use your most recent med list.                   Brand Name Dispense Instructions for use Diagnosis    albuterol 108 (90 Base) MCG/ACT inhaler    PROAIR HFA/PROVENTIL HFA/VENTOLIN HFA    1 Inhaler    Inhale 2 puffs into the lungs every 4 hours as needed for shortness of breath / dyspnea or wheezing    Mild intermittent asthma without complication       * clobetasol 0.05 % external solution    TEMOVATE    50 mL    Apply topically 2 times daily Apply to affected areas on scalp bid as needed    Scalp psoriasis       * clobetasol propionate 0.05 % Foam     50 g    Apply sparingly to affected area twice daily as needed.  Do not apply to face.    Scalp psoriasis       Fluocinolone Acetonide Scalp 0.01 % Oil oil     118 mL    Apply 2 mLs topically daily To scalp    Scalp psoriasis       prenatal multivitamin plus iron 27-0.8 MG Tabs per tablet     100 tablet    Take 1 tablet by mouth daily    Encounter for supervision of normal first pregnancy in first trimester       sertraline 50 MG tablet    ZOLOFT    135 tablet    Take 1.5 tablets (75 mg) by mouth daily Patient will call when needs filled.    COURTNEY (generalized anxiety disorder), Major depressive disorder, recurrent episode, moderate (H)       * Notice:  This list has 2 medication(s) that are the same as other medications prescribed for you. Read the directions carefully, and ask your doctor or other care provider to review them with you.

## 2018-08-31 ENCOUNTER — TELEPHONE (OUTPATIENT)
Dept: OBGYN | Facility: CLINIC | Age: 33
End: 2018-08-31

## 2018-08-31 ENCOUNTER — ALLIED HEALTH/NURSE VISIT (OUTPATIENT)
Dept: NURSING | Facility: CLINIC | Age: 33
End: 2018-08-31
Payer: COMMERCIAL

## 2018-08-31 VITALS — DIASTOLIC BLOOD PRESSURE: 76 MMHG | SYSTOLIC BLOOD PRESSURE: 138 MMHG

## 2018-08-31 DIAGNOSIS — O16.3 ELEVATED BLOOD PRESSURE AFFECTING PREGNANCY IN THIRD TRIMESTER, ANTEPARTUM: ICD-10-CM

## 2018-08-31 DIAGNOSIS — O09.529 SUPERVISION OF HIGH-RISK PREGNANCY OF ELDERLY MULTIGRAVIDA: Primary | ICD-10-CM

## 2018-08-31 PROCEDURE — 99207 ZZC NO CHARGE NURSE ONLY: CPT

## 2018-08-31 NOTE — TELEPHONE ENCOUNTER
Pt advised. Nurse only appt scheduled for 115 today in [facility].   ALBERTA Jaquez RN

## 2018-08-31 NOTE — PLAN OF CARE
Data: Patient assessed in the [facility] for hypertension.  Cervical exam not examined.  Membranes intact.  Contractions none.  Action:  Presumed adequate fetal oxygenation documented (see flow record). Discharge instructions reviewed.  Patient instructed to report change in fetal movement, vaginal leaking of fluid or bleeding, abdominal pain, or any concerns related to the pregnancy to her nurse/physician.    Response: Orders to discharge home per Charles Rubin.  Patient verbalized understanding of education and verbalized agreement with plan. Discharged to home at 1915.

## 2018-08-31 NOTE — DISCHARGE INSTRUCTIONS
Discharge Instruction for Undelivered Patients      You were seen for: Pre eclampsia evaluation  We Consulted: Dr. Rubin  You had (Test or Medicine):Blood pressures, Blood and urine lab tests     Diet:   Drink 8 to 12 glasses of liquids (milk, juice, water) every day.  You may eat meals and snacks.     Activity:  Call your doctor or nurse midwife if your baby is moving less than usual.     Call your provider if you notice:  Swelling in your face or increased swelling in your hands or legs.  Headaches that are not relieved by Tylenol (acetaminophen).  Changes in your vision (blurring: seeing spots or stars.)  Nausea (sick to your stomach) and vomiting (throwing up).   Weight gain of 5 pounds or more per week.  Heartburn that doesn't go away.  Signs of bladder infection: pain when you urinate (use the toilet), need to go more often and more urgently.  The bag of givens (rupture of membranes) breaks, or you notice leaking in your underwear.  Bright red blood in your underwear.  Abdominal (lower belly) or stomach pain.  For first baby: Contractions (tightening) less than 5 minutes apart for one hour or more.  Increase or change in vaginal discharge (note the color and amount)      Follow-up:  As scheduled in the clinic or call sooner with questions or concerns

## 2018-08-31 NOTE — TELEPHONE ENCOUNTER
EDDA for pt to cb.  Advised we want to see her today for BP check.    Neda SWEET R.N.  [facility]

## 2018-08-31 NOTE — MR AVS SNAPSHOT
After Visit Summary   8/31/2018    Krystina Suazo    MRN: [id number]           Patient Information     Date Of Birth          1985        Visit Information        Provider Department      8/31/2018 1:15 PM RI OB NURSE [facility]        Today's Diagnoses     Supervision of high-risk pregnancy of elderly multigravida    -  1    Elevated blood pressure affecting pregnancy in third trimester, antepartum           Follow-ups after your visit        Your next 10 appointments already scheduled     Sep 13, 2018  3:00 PM CDT   [facility] OB-GYN Established Prenatal with Sandy Tran MD   [facility] ([facility])    [address]  [address]  [facility] [address]   [phone]            Oct 23, 2018  1:00 PM CDT   [facility] OB-GYN Post-Partum with Sandy Tran MD   [facility] ([facility])    [address]  [address]  [facility] [id number] [phone]              Who to contact     If you have questions or need follow up information about today's clinic visit or your schedule please contact [facility] directly at [phone].  Normal or non-critical lab and imaging results will be communicated to you by MyChart, letter or phone within 4 business days after the clinic has received the results. If you do not hear from us within 7 days, please contact the clinic through YouGotListingshart or phone. If you have a critical or abnormal lab result, we will notify you by phone as soon as possible.  Submit refill requests through [employer] or call your pharmacy and they will forward the refill request to us. Please allow 3 business days for your refill to be completed.          Additional Information About Your Visit        MyChart Information     [employer] gives you secure access to your electronic health record. If you see a primary care provider, you can also send messages to your care  team and make appointments. If you have questions, please call your primary care clinic.  If you do not have a primary care provider, please call [phone] and they will assist you.        Care EveryWhere ID     This is your Care EveryWhere ID. This could be used by other organizations to access your [place] medical records  [id number]        Your Vitals Were     Last Period                   12/20/2017            Blood Pressure from Last 3 Encounters:   09/09/18 132/60   09/04/18 140/86   08/31/18 138/76    Weight from Last 3 Encounters:   09/04/18 265 lb (120.2 kg)   09/04/18 261 lb (118.4 kg)   08/30/18 263 lb (119.3 kg)              Today, you had the following     No orders found for display       Primary Care Provider Office Phone # Fax #    Valeria Daly [phone] [address] [place] [address]        Equal Access to Services     [facility] AH: Hadii aad ku hadasho Soomaali, waaxda luqadaha, qaybta kaalmada adeegyada, waxay abigailin haydavidn christopher kharasilvio guillaume . So [facility] [phone].    ATENCIÓN: Si habla español, tiene a wesley disposición servicios gratuitos de asistencia lingüística. Llame al [phone].    We comply with applicable federal civil rights laws and [place] laws. We do not discriminate on the basis of race, color, national origin, age, disability, sex, sexual orientation, or gender identity.            Thank you!     Thank you for choosing [facility]  for your care. Our goal is always to provide you with excellent care. Hearing back from our patients is one way we can continue to improve our services. Please take a few minutes to complete the written survey that you may receive in the mail after your visit with us. Thank you!             Your Updated Medication List - Protect others around you: Learn how to safely use, store and throw away your medicines at www.disposemymeds.org.          This list is accurate as of 8/31/18 11:59  PM.  Always use your most recent med list.                   Brand Name Dispense Instructions for use Diagnosis    albuterol 108 (90 Base) MCG/ACT inhaler    PROAIR HFA/PROVENTIL HFA/VENTOLIN HFA    1 Inhaler    Inhale 2 puffs into the lungs every 4 hours as needed for shortness of breath / dyspnea or wheezing    Mild intermittent asthma without complication       * clobetasol 0.05 % external solution    TEMOVATE    50 mL    Apply topically 2 times daily Apply to affected areas on scalp bid as needed    Scalp psoriasis       * clobetasol propionate 0.05 % Foam     50 g    Apply sparingly to affected area twice daily as needed.  Do not apply to face.    Scalp psoriasis       Fluocinolone Acetonide Scalp 0.01 % Oil oil     118 mL    Apply 2 mLs topically daily To scalp    Scalp psoriasis       prenatal multivitamin plus iron 27-0.8 MG Tabs per tablet     100 tablet    Take 1 tablet by mouth daily    Encounter for supervision of normal first pregnancy in first trimester       sertraline 50 MG tablet    ZOLOFT    135 tablet    Take 1.5 tablets (75 mg) by mouth daily Patient will call when needs filled.    COURTNEY (generalized anxiety disorder), Major depressive disorder, recurrent episode, moderate (H)       * Notice:  This list has 2 medication(s) that are the same as other medications prescribed for you. Read the directions carefully, and ask your doctor or other care provider to review them with you.

## 2018-08-31 NOTE — TELEPHONE ENCOUNTER
Please ask the patient to come in for a nurse blood pressure check today in [place]. She should bring her things with her to stay, because if it is high again today, the plan would be for induction of labor. I reviewed all her labs, and they were all fine. Thanks.    Sandy Tran MD

## 2018-09-04 ENCOUNTER — HOSPITAL ENCOUNTER (INPATIENT)
Facility: CLINIC | Age: 33
LOS: 4 days | Discharge: HOME | End: 2018-09-09
Attending: OBSTETRICS & GYNECOLOGY | Admitting: OBSTETRICS & GYNECOLOGY
Payer: COMMERCIAL

## 2018-09-04 ENCOUNTER — PRENATAL OFFICE VISIT (OUTPATIENT)
Dept: OBGYN | Facility: CLINIC | Age: 33
End: 2018-09-04
Payer: COMMERCIAL

## 2018-09-04 VITALS — DIASTOLIC BLOOD PRESSURE: 86 MMHG | BODY MASS INDEX: 38.54 KG/M2 | WEIGHT: 261 LBS | SYSTOLIC BLOOD PRESSURE: 140 MMHG

## 2018-09-04 DIAGNOSIS — O13.3 PREGNANCY-INDUCED HYPERTENSION IN THIRD TRIMESTER: ICD-10-CM

## 2018-09-04 DIAGNOSIS — O09.529 SUPERVISION OF HIGH-RISK PREGNANCY OF ELDERLY MULTIGRAVIDA: Primary | ICD-10-CM

## 2018-09-04 LAB
ABO + RH BLD: NORMAL
ABO + RH BLD: NORMAL
ALBUMIN SERPL-MCNC: 2.6 G/DL (ref 3.4–5)
ALP SERPL-CCNC: 351 U/L (ref 40–150)
ALT SERPL W P-5'-P-CCNC: 18 U/L (ref 0–50)
ANION GAP SERPL CALCULATED.3IONS-SCNC: 8 MMOL/L (ref 3–14)
AST SERPL W P-5'-P-CCNC: 14 U/L (ref 0–45)
BILIRUB SERPL-MCNC: 0.1 MG/DL (ref 0.2–1.3)
BLD GP AB SCN SERPL QL: NORMAL
BLOOD BANK CMNT PATIENT-IMP: NORMAL
BUN SERPL-MCNC: 13 MG/DL (ref 7–30)
CALCIUM SERPL-MCNC: 8.6 MG/DL (ref 8.5–10.1)
CHLORIDE SERPL-SCNC: 106 MMOL/L (ref 94–109)
CO2 SERPL-SCNC: 22 MMOL/L (ref 20–32)
CREAT SERPL-MCNC: 0.61 MG/DL (ref 0.52–1.04)
CREAT UR-MCNC: 95 MG/DL
GFR SERPL CREATININE-BSD FRML MDRD: >90 ML/MIN/1.7M2
GLUCOSE SERPL-MCNC: 98 MG/DL (ref 70–99)
HGB BLD-MCNC: 12.4 G/DL (ref 11.7–15.7)
POTASSIUM SERPL-SCNC: 3.9 MMOL/L (ref 3.4–5.3)
PROT SERPL-MCNC: 6 G/DL (ref 6.8–8.8)
PROT UR-MCNC: 0.12 G/L
PROT/CREAT 24H UR: 0.13 G/G CR (ref 0–0.2)
SODIUM SERPL-SCNC: 136 MMOL/L (ref 133–144)
SPECIMEN EXP DATE BLD: NORMAL

## 2018-09-04 PROCEDURE — 86780 TREPONEMA PALLIDUM: CPT | Performed by: OBSTETRICS & GYNECOLOGY

## 2018-09-04 PROCEDURE — 86900 BLOOD TYPING SEROLOGIC ABO: CPT | Performed by: OBSTETRICS & GYNECOLOGY

## 2018-09-04 PROCEDURE — 85018 HEMOGLOBIN: CPT | Performed by: OBSTETRICS & GYNECOLOGY

## 2018-09-04 PROCEDURE — 84156 ASSAY OF PROTEIN URINE: CPT | Performed by: OBSTETRICS & GYNECOLOGY

## 2018-09-04 PROCEDURE — 25000132 ZZH RX MED GY IP 250 OP 250 PS 637: Performed by: OBSTETRICS & GYNECOLOGY

## 2018-09-04 PROCEDURE — 99207 ZZC PRENATAL VISIT: CPT | Performed by: OBSTETRICS & GYNECOLOGY

## 2018-09-04 PROCEDURE — 80053 COMPREHEN METABOLIC PANEL: CPT | Performed by: OBSTETRICS & GYNECOLOGY

## 2018-09-04 PROCEDURE — 86850 RBC ANTIBODY SCREEN: CPT | Performed by: OBSTETRICS & GYNECOLOGY

## 2018-09-04 PROCEDURE — 86901 BLOOD TYPING SEROLOGIC RH(D): CPT | Performed by: OBSTETRICS & GYNECOLOGY

## 2018-09-04 RX ORDER — ZOLPIDEM TARTRATE 5 MG/1
5 TABLET ORAL
Status: DISCONTINUED | OUTPATIENT
Start: 2018-09-04 | End: 2018-09-06

## 2018-09-04 RX ORDER — MISOPROSTOL 100 UG/1
25 TABLET ORAL
Status: DISCONTINUED | OUTPATIENT
Start: 2018-09-04 | End: 2018-09-06

## 2018-09-04 RX ADMIN — Medication 25 MCG: at 21:58

## 2018-09-04 RX ADMIN — Medication 25 MCG: at 17:57

## 2018-09-04 RX ADMIN — Medication 25 MCG: at 15:54

## 2018-09-04 RX ADMIN — Medication 25 MCG: at 20:03

## 2018-09-04 NOTE — MR AVS SNAPSHOT
After Visit Summary   9/4/2018    Krystina Suazo    MRN: [id number]           Patient Information     Date Of Birth          1985        Visit Information        Provider Department      9/4/2018 2:00 PM Sandy Tran MD [facility]        Today's Diagnoses     Supervision of high-risk pregnancy of elderly multigravida    -  1    Pregnancy-induced hypertension in third trimester           Follow-ups after your visit        Your next 10 appointments already scheduled     Sep 13, 2018  3:00 PM CDT   [facility] Establ[facility] Pren[facility] with Sandy Tran MD   [facility] ([facility])    [address]  [address]  [facility] [address] [phone]              Who to contact     If you have questions or need follow up information about today's clinic visit or your schedule please contact [facility] directly at [phone].  Normal or non-critical lab and imaging results will be communicated to you by [employer], letter or phone within 4 business days after the clinic has received the results. If you do not hear from us within 7 days, please contact the clinic through [employer] or phone. If you have a critical or abnormal lab result, we will notify you by phone as soon as possible.  Submit refill requests through [employer] or call your pharmacy and they will forward the refill request to us. Please allow 3 business days for your refill to be completed.          Additional Information About Your Visit        MyChart Information     [employer] gives you secure access to your electronic health record. If you see a primary care provider, you can also send messages to your care team and make appointments. If you have questions, please call your primary care clinic.  If you do not have a primary care provider, please call [phone] and they will assist you.        Care EveryWhere ID     This is your Care EveryWhere ID.  This could be used by other organizations to access your [facility] medical records  [id number]        Your Vitals Were     Last Period Breastfeeding? BMI (Body Mass Index)             12/20/2017 No 38.54 kg/m2          Blood Pressure from Last 3 Encounters:   09/04/18 140/77   09/04/18 140/86   08/31/18 138/76    Weight from Last 3 Encounters:   09/04/18 265 lb (120.2 kg)   09/04/18 261 lb (118.4 kg)   08/30/18 263 lb (119.3 kg)              Today, you had the following     No orders found for display       Primary Care Provider Office Phone # Fax #    Valeria Daly [phone] [address] [place] [address]        Equal Access to Services     [facility] : Hadii sanjiv sood Somadelyn, waaxda luqadaha, qaybta kaalmada adeegyada, ruth guillaume . So [facility] [phone].    ATENCIÓN: Si habla español, tiene a wesley disposición servicios gratuitos de asistencia lingüística. Llame al [phone].    We comply with applicable federal civil rights laws and [place] laws. We do not discriminate on the basis of race, color, national origin, age, disability, sex, sexual orientation, or gender identity.            Thank you!     Thank you for choosing [facility]  for your care. Our goal is always to provide you with excellent care. Hearing back from our patients is one way we can continue to improve our services. Please take a few minutes to complete the written survey that you may receive in the mail after your visit with us. Thank you!             Your Updated Medication List - Protect others around you: Learn how to safely use, store and throw away your medicines at www.disposemymeds.org.          This list is accurate as of 9/4/18  2:37 PM.  Always use your most recent med list.                   Brand Name Dispense Instructions for use Diagnosis    albuterol 108 (90 Base) MCG/ACT inhaler    PROAIR HFA/PROVENTIL HFA/VENTOLIN HFA    1  Inhaler    Inhale 2 puffs into the lungs every 4 hours as needed for shortness of breath / dyspnea or wheezing    Mild intermittent asthma without complication       * clobetasol 0.05 % external solution    TEMOVATE    50 mL    Apply topically 2 times daily Apply to affected areas on scalp bid as needed    Scalp psoriasis       * clobetasol propionate 0.05 % Foam     50 g    Apply sparingly to affected area twice daily as needed.  Do not apply to face.    Scalp psoriasis       Fluocinolone Acetonide Scalp 0.01 % Oil oil     118 mL    Apply 2 mLs topically daily To scalp    Scalp psoriasis       prenatal multivitamin plus iron 27-0.8 MG Tabs per tablet     100 tablet    Take 1 tablet by mouth daily    Encounter for supervision of normal first pregnancy in first trimester       sertraline 50 MG tablet    ZOLOFT    135 tablet    Take 1.5 tablets (75 mg) by mouth daily Patient will call when needs filled.    COURTNEY (generalized anxiety disorder), Major depressive disorder, recurrent episode, moderate (H)       * Notice:  This list has 2 medication(s) that are the same as other medications prescribed for you. Read the directions carefully, and ask your doctor or other care provider to review them with you.

## 2018-09-04 NOTE — PROGRESS NOTES
PROBLEM LIST  LABS: Opos/RI/normal noninvasive prenatal testing Group B Strep positive.    1. AMA (33 at EDC): normal level II but marginal cord insertion. Follow up ultrasound at 28 weeks within normal limits. Further US only as needed clinically per [facility].    Blood pressure elevated today again. No new symptoms. Discussed that she now meets criteria for gestational hypertension. Recommend induction of labor. Risks, benefits, and alternatives discussed. Plan cervical ripening, repeat labs. On call MD notified.    Sandy Tran MD

## 2018-09-04 NOTE — IP AVS SNAPSHOT
MRN:[id number]                      After Visit Summary   9/4/2018    Krystina Suazo    MRN: [id number]           Thank you!     Thank you for choosing [facility] for your care. Our goal is always to provide you with excellent care. Hearing back from our patients is one way we can continue to improve our services. Please take a few minutes to complete the written survey that you may receive in the mail after you visit. If you would like to speak to someone directly about your visit please contact Patient Relations at [phone]. Thank you!          Patient Information     Date Of Birth          1985        About your hospital stay     You were admitted on:  September 4, 2018 You last received care in the:  [facility] [place] [facility] Postpartum    You were discharged on:  September 9, 2018        Reason for your hospital stay       Maternity care                  Who to Call     For medical emergencies, please call 911.  For non-urgent questions about your medical care, please call your primary care provider or clinic, [phone]  For questions related to your surgery, please call your surgery clinic        Attending Provider     Provider Specialty    Alex Reilly MD OB/Gyn    [facility]       Primary Care Provider Office Phone # Fax #    Valeria Daly [phone]      After Care Instructions     Activity       Review discharge instructions            Diet       Resume previous diet            Discharge Instructions - Postpartum visit       Schedule postpartum visit with your provider and return to clinic in 2 weeks for incsion check and 6 weeks for PP exam.                  Your next 10 appointments already scheduled     Sep 13, 2018  3:00 PM CDT   MyChart OB-GYN Established Prenatal with Sandy Tran MD   [facility] ([facility]    [facility] [address]  [address]  [facility] [address]    [phone]              Further instructions from your care team       Postop  Birth Instructions    Activity       Do not lift more than 10 pounds for 6 weeks after surgery.  Ask family and friends for help when you need it.    No driving until you have stopped taking your pain medications (usually two weeks after surgery).    No heavy exercise or activity for 6 weeks.  Don't do anything that will put a strain on your surgery site.    Don't strain when using the toilet.  Your care team may prescribe a stool softener if you have problems with your bowel movements.     To care for your incision:       Keep the incision clean and dry.    Do not soak your incision in water. No swimming or hot tubs until it has fully healed. You may soak in the bathtub if the water level is below your incision.    Do not use peroxide, gel, cream, lotion, or ointment on your incision.    Adjust your clothes to avoid pressure on your surgery site (check the elastic in your underwear for example).     You may see a small amount of clear or pink drainage and this is normal.  Check with your health care provider:       If the drainage increases or has an odor.    If the incision reddens, you have swelling, or develop a rash.    If you have increased pain and the medicine we prescribed doesn't help.    If you have a fever above 100.4 F (38 C) with or without chills when placing thermometer under your tongue.   The area around your incision (surgery wound), will feel numb.  This is normal. The numbness should go away in less than a year.     Keep your hands clean:  Always wash your hands before touching your incision (surgery wound). This helps reduce your risk of infection. If your hands aren't dirty, you may use an alcohol hand-rub to clean your hands. Keep your nails clean and short.    Call your healthcare provider if you have any of these symptoms:       You soak a sanitary pad with blood within 1 hour, or you see blood clots  "larger than a golf ball.    Bleeding that lasts more than 6 weeks.    Vaginal discharge that smells bad.    Severe pain, cramping or tenderness in your lower belly area.    A need to urinate more frequently (use the toilet more often), more urgently (use the toilet very quickly), or it burns when you urinate.    Nausea and vomiting.    Redness, swelling or pain around a vein in your leg.    Problems breastfeeding or a red or painful area on your breast.    Chest pain and cough or are gasping for air.    Problems with coping with sadness, anxiety or depression. If you have concerns about hurting yourself or the baby, call your provider immediately.      You have questions or concerns after you return home.                  Pending Results     No orders found from 9/2/2018 to 9/5/2018.            Statement of Approval     Ordered          09/09/18 0911  I have reviewed and agree with all the recommendations and orders detailed in this document.  EFFECTIVE NOW     Approved and electronically signed by:  Aníbal Pearson MD             Admission Information     Date & Time Provider Department Dept. Phone    9/4/2018 Jay Yanez MD [facility] Postpartum [phone]      Your Vitals Were     Blood Pressure Pulse Temperature Respirations Height Weight    132/60 99 98.2  F (36.8  C) (Oral) 18 1.753 m (5' 9\") 120.2 kg (265 lb)    Last Period Pulse Oximetry BMI (Body Mass Index)             12/20/2017 96% 39.13 kg/m2         MyChart Information     [employer] gives you secure access to your electronic health record. If you see a primary care provider, you can also send messages to your care team and make appointments. If you have questions, please call your primary care clinic.  If you do not have a primary care provider, please call [phone] and they will assist you.        Care EveryWhere ID     This is your Care EveryWhere ID. This could be used by other organizations to access your [facility]" records  [id number]        Equal Access to Services     [facility] : Hadii sanjiv marion cherylneela Velasquez, waarnaudda luqadaha, qaybta sylviaemyyobani fermin, ruth marquez. So [facility] [phone].    ATENCIÓN: Si habla español, tiene a wesley disposición servicios gratuitos de asistencia lingüística. Llame al [phone].    We comply with applicable federal civil rights laws and [place] laws. We do not discriminate on the basis of race, color, national origin, age, disability, sex, sexual orientation, or gender identity.               Review of your medicines      CONTINUE these medicines which have NOT CHANGED        Dose / Directions    albuterol 108 (90 Base) MCG/ACT inhaler   Commonly known as:  PROAIR HFA/PROVENTIL HFA/VENTOLIN HFA   Used for:  Mild intermittent asthma without complication        Dose:  2 puff   Inhale 2 puffs into the lungs every 4 hours as needed for shortness of breath / dyspnea or wheezing   Quantity:  1 Inhaler   Refills:  11       * clobetasol 0.05 % external solution   Commonly known as:  TEMOVATE   Used for:  Scalp psoriasis        Apply topically 2 times daily Apply to affected areas on scalp bid as needed   Quantity:  50 mL   Refills:  3       * clobetasol propionate 0.05 % Foam   Used for:  Scalp psoriasis        Apply sparingly to affected area twice daily as needed.  Do not apply to face.   Quantity:  50 g   Refills:  3       Fluocinolone Acetonide Scalp 0.01 % Oil oil   Used for:  Scalp psoriasis        Dose:  2 mL   Apply 2 mLs topically daily To scalp   Quantity:  118 mL   Refills:  3       prenatal multivitamin plus iron 27-0.8 MG Tabs per tablet   Used for:  Encounter for supervision of normal first pregnancy in first trimester        Dose:  1 tablet   Take 1 tablet by mouth daily   Quantity:  100 tablet   Refills:  3       sertraline 50 MG tablet   Commonly known as:  ZOLOFT   Used for:  COURTNEY (generalized anxiety disorder), Major depressive disorder, recurrent  episode, moderate (H)        Dose:  75 mg   Take 1.5 tablets (75 mg) by mouth daily Patient will call when needs filled.   Quantity:  135 tablet   Refills:  1       * Notice:  This list has 2 medication(s) that are the same as other medications prescribed for you. Read the directions carefully, and ask your doctor or other care provider to review them with you.             Protect others around you: Learn how to safely use, store and throw away your medicines at www.disposemymeds.org.             Medication List: This is a list of all your medications and when to take them. Check marks below indicate your daily home schedule. Keep this list as a reference.      Medications           Morning Afternoon Evening Bedtime As Needed    albuterol 108 (90 Base) MCG/ACT inhaler   Commonly known as:  PROAIR HFA/PROVENTIL HFA/VENTOLIN HFA   Inhale 2 puffs into the lungs every 4 hours as needed for shortness of breath / dyspnea or wheezing                                * clobetasol 0.05 % external solution   Commonly known as:  TEMOVATE   Apply topically 2 times daily Apply to affected areas on scalp bid as needed                                * clobetasol propionate 0.05 % Foam   Apply sparingly to affected area twice daily as needed.  Do not apply to face.                                Fluocinolone Acetonide Scalp 0.01 % Oil oil   Apply 2 mLs topically daily To scalp                                prenatal multivitamin plus iron 27-0.8 MG Tabs per tablet   Take 1 tablet by mouth daily   Last time this was given:  1 tablet on 9/9/2018  9:01 AM                                sertraline 50 MG tablet   Commonly known as:  ZOLOFT   Take 1.5 tablets (75 mg) by mouth daily Patient will call when needs filled.   Last time this was given:  75 mg on 9/9/2018  9:01 AM                                * Notice:  This list has 2 medication(s) that are the same as other medications prescribed for you. Read the directions carefully, and ask  your doctor or other care provider to review them with you.

## 2018-09-04 NOTE — PLAN OF CARE
Data: [facility]: 2018  2:37 PM.  Reason for admission is induction of labor for hypertension. Patient reports mild headache but has not taken anything for it.  Patient is a .  Prenatal record reviewed. Pregnancy has been complicated by gestational hypertension.  Gestational Age 38w2d. VSS. Fetal movement present. Patient denies uterine contractions, leaking of vaginal fluid/rupture of membranes, vaginal bleeding, abdominal pain, pelvic pressure, nausea, vomiting, visual disturbances, epigastric or URQ pain, significant edema. Support person is present.   Action: Verbal consent for EFM. Triage assessment completed. Bill of rights reviewed.  Response: Patient verbalized agreement with plan. Will contact Dr Alex Reilly Md with update and further orders.

## 2018-09-04 NOTE — IP AVS SNAPSHOT
[facility]    [address]    [facility] [address]    Phone:  [phone]    Fax:  [phone]                                       After Visit Summary   9/4/2018    Krystina Suazo    MRN: [id number]           After Visit Summary Signature Page     I have received my discharge instructions, and my questions have been answered. I have discussed any challenges I see with this plan with the nurse or doctor.    ..........................................................................................................................................  Patient/Patient Representative Signature      ..........................................................................................................................................  Patient Representative Print Name and Relationship to Patient    ..................................................               ................................................  Date                                            Time    ..........................................................................................................................................  Reviewed by Signature/Title    ...................................................              ..............................................  Date                                                            Time          22EPIC Rev 08/18

## 2018-09-04 NOTE — NURSING NOTE
"Chief Complaint   Patient presents with     Prenatal Care       Initial /86  Wt 261 lb (118.4 kg)  LMP 2017  Breastfeeding? No  BMI 38.54 kg/m2 Estimated body mass index is 38.54 kg/(m^2) as calculated from the following:    Height as of 18: 5' 9\" (1.753 m).    Weight as of this encounter: 261 lb (118.4 kg).  BP completed using cuff size: large        38w2d  + FM daily  + discharge x 2 days  - bleeding or leaking of fluid  + mild headache  + edema in hands/feet  Marquita Ramirez LPN                 "

## 2018-09-05 LAB
ALT SERPL W P-5'-P-CCNC: 16 U/L (ref 0–50)
AST SERPL W P-5'-P-CCNC: 10 U/L (ref 0–45)
CREAT SERPL-MCNC: 0.66 MG/DL (ref 0.52–1.04)
ERYTHROCYTE [DISTWIDTH] IN BLOOD BY AUTOMATED COUNT: 12.7 % (ref 10–15)
GFR SERPL CREATININE-BSD FRML MDRD: >90 ML/MIN/1.7M2
HCT VFR BLD AUTO: 37.9 % (ref 35–47)
HGB BLD-MCNC: 13.2 G/DL (ref 11.7–15.7)
MCH RBC QN AUTO: 34 PG (ref 26.5–33)
MCHC RBC AUTO-ENTMCNC: 34.8 G/DL (ref 31.5–36.5)
MCV RBC AUTO: 98 FL (ref 78–100)
PLATELET # BLD AUTO: 195 10E9/L (ref 150–450)
RBC # BLD AUTO: 3.88 10E12/L (ref 3.8–5.2)
T PALLIDUM AB SER QL: NONREACTIVE
WBC # BLD AUTO: 10.2 10E9/L (ref 4–11)

## 2018-09-05 PROCEDURE — 25000128 H RX IP 250 OP 636: Performed by: OBSTETRICS & GYNECOLOGY

## 2018-09-05 PROCEDURE — 12000031 ZZH R&B OB CRITICAL

## 2018-09-05 PROCEDURE — 85027 COMPLETE CBC AUTOMATED: CPT | Performed by: OBSTETRICS & GYNECOLOGY

## 2018-09-05 PROCEDURE — 36415 COLL VENOUS BLD VENIPUNCTURE: CPT | Performed by: OBSTETRICS & GYNECOLOGY

## 2018-09-05 PROCEDURE — 82565 ASSAY OF CREATININE: CPT | Performed by: OBSTETRICS & GYNECOLOGY

## 2018-09-05 PROCEDURE — 25000132 ZZH RX MED GY IP 250 OP 250 PS 637: Performed by: OBSTETRICS & GYNECOLOGY

## 2018-09-05 PROCEDURE — 84450 TRANSFERASE (AST) (SGOT): CPT | Performed by: OBSTETRICS & GYNECOLOGY

## 2018-09-05 PROCEDURE — 84460 ALANINE AMINO (ALT) (SGPT): CPT | Performed by: OBSTETRICS & GYNECOLOGY

## 2018-09-05 PROCEDURE — 25000125 ZZHC RX 250: Performed by: OBSTETRICS & GYNECOLOGY

## 2018-09-05 RX ORDER — IBUPROFEN 800 MG/1
800 TABLET, FILM COATED ORAL
Status: DISCONTINUED | OUTPATIENT
Start: 2018-09-05 | End: 2018-09-06

## 2018-09-05 RX ORDER — LORAZEPAM 2 MG/ML
2 INJECTION INTRAMUSCULAR
Status: DISCONTINUED | OUTPATIENT
Start: 2018-09-05 | End: 2018-09-06

## 2018-09-05 RX ORDER — NALOXONE HYDROCHLORIDE 0.4 MG/ML
.1-.4 INJECTION, SOLUTION INTRAMUSCULAR; INTRAVENOUS; SUBCUTANEOUS
Status: DISCONTINUED | OUTPATIENT
Start: 2018-09-05 | End: 2018-09-06

## 2018-09-05 RX ORDER — PENICILLIN G POTASSIUM 5000000 [IU]/1
5 INJECTION, POWDER, FOR SOLUTION INTRAMUSCULAR; INTRAVENOUS ONCE
Status: COMPLETED | OUTPATIENT
Start: 2018-09-05 | End: 2018-09-05

## 2018-09-05 RX ORDER — SODIUM CHLORIDE, SODIUM LACTATE, POTASSIUM CHLORIDE, CALCIUM CHLORIDE 600; 310; 30; 20 MG/100ML; MG/100ML; MG/100ML; MG/100ML
10-125 INJECTION, SOLUTION INTRAVENOUS CONTINUOUS
Status: DISCONTINUED | OUTPATIENT
Start: 2018-09-05 | End: 2018-09-06

## 2018-09-05 RX ORDER — CALCIUM GLUCONATE 94 MG/ML
1 INJECTION, SOLUTION INTRAVENOUS
Status: DISCONTINUED | OUTPATIENT
Start: 2018-09-05 | End: 2018-09-06

## 2018-09-05 RX ORDER — OXYTOCIN/0.9 % SODIUM CHLORIDE 30/500 ML
100-340 PLASTIC BAG, INJECTION (ML) INTRAVENOUS CONTINUOUS PRN
Status: DISCONTINUED | OUTPATIENT
Start: 2018-09-05 | End: 2018-09-06

## 2018-09-05 RX ORDER — OXYTOCIN 10 [USP'U]/ML
10 INJECTION, SOLUTION INTRAMUSCULAR; INTRAVENOUS
Status: DISCONTINUED | OUTPATIENT
Start: 2018-09-05 | End: 2018-09-06

## 2018-09-05 RX ORDER — MAGNESIUM SULFATE HEPTAHYDRATE 40 MG/ML
4 INJECTION, SOLUTION INTRAVENOUS
Status: DISCONTINUED | OUTPATIENT
Start: 2018-09-05 | End: 2018-09-06

## 2018-09-05 RX ORDER — METHYLERGONOVINE MALEATE 0.2 MG/ML
200 INJECTION INTRAVENOUS
Status: DISCONTINUED | OUTPATIENT
Start: 2018-09-05 | End: 2018-09-06

## 2018-09-05 RX ORDER — NIFEDIPINE 10 MG/1
10 CAPSULE ORAL
Status: DISCONTINUED | OUTPATIENT
Start: 2018-09-05 | End: 2018-09-06

## 2018-09-05 RX ORDER — OXYTOCIN/0.9 % SODIUM CHLORIDE 30/500 ML
1-24 PLASTIC BAG, INJECTION (ML) INTRAVENOUS CONTINUOUS
Status: DISCONTINUED | OUTPATIENT
Start: 2018-09-05 | End: 2018-09-06

## 2018-09-05 RX ORDER — OXYCODONE AND ACETAMINOPHEN 5; 325 MG/1; MG/1
1 TABLET ORAL
Status: DISCONTINUED | OUTPATIENT
Start: 2018-09-05 | End: 2018-09-06

## 2018-09-05 RX ORDER — FENTANYL CITRATE 50 UG/ML
50-100 INJECTION, SOLUTION INTRAMUSCULAR; INTRAVENOUS
Status: DISCONTINUED | OUTPATIENT
Start: 2018-09-05 | End: 2018-09-06

## 2018-09-05 RX ORDER — MAGNESIUM SULFATE HEPTAHYDRATE 40 MG/ML
4 INJECTION, SOLUTION INTRAVENOUS ONCE
Status: COMPLETED | OUTPATIENT
Start: 2018-09-05 | End: 2018-09-05

## 2018-09-05 RX ORDER — MAGNESIUM SULFATE HEPTAHYDRATE 500 MG/ML
4 INJECTION, SOLUTION INTRAMUSCULAR; INTRAVENOUS
Status: DISCONTINUED | OUTPATIENT
Start: 2018-09-05 | End: 2018-09-06

## 2018-09-05 RX ORDER — MAGNESIUM SULFATE IN WATER 40 MG/ML
1 INJECTION, SOLUTION INTRAVENOUS CONTINUOUS
Status: DISCONTINUED | OUTPATIENT
Start: 2018-09-05 | End: 2018-09-06

## 2018-09-05 RX ORDER — ONDANSETRON 2 MG/ML
4 INJECTION INTRAMUSCULAR; INTRAVENOUS EVERY 6 HOURS PRN
Status: DISCONTINUED | OUTPATIENT
Start: 2018-09-05 | End: 2018-09-06

## 2018-09-05 RX ORDER — ACETAMINOPHEN 325 MG/1
650 TABLET ORAL EVERY 4 HOURS PRN
Status: DISCONTINUED | OUTPATIENT
Start: 2018-09-05 | End: 2018-09-06

## 2018-09-05 RX ORDER — SODIUM CHLORIDE, SODIUM LACTATE, POTASSIUM CHLORIDE, CALCIUM CHLORIDE 600; 310; 30; 20 MG/100ML; MG/100ML; MG/100ML; MG/100ML
INJECTION, SOLUTION INTRAVENOUS CONTINUOUS
Status: DISCONTINUED | OUTPATIENT
Start: 2018-09-05 | End: 2018-09-06

## 2018-09-05 RX ORDER — LIDOCAINE 40 MG/G
CREAM TOPICAL
Status: DISCONTINUED | OUTPATIENT
Start: 2018-09-05 | End: 2018-09-06

## 2018-09-05 RX ORDER — CARBOPROST TROMETHAMINE 250 UG/ML
250 INJECTION, SOLUTION INTRAMUSCULAR
Status: DISCONTINUED | OUTPATIENT
Start: 2018-09-05 | End: 2018-09-06

## 2018-09-05 RX ADMIN — SODIUM CHLORIDE, POTASSIUM CHLORIDE, SODIUM LACTATE AND CALCIUM CHLORIDE: 600; 310; 30; 20 INJECTION, SOLUTION INTRAVENOUS at 23:21

## 2018-09-05 RX ADMIN — Medication 25 MCG: at 06:14

## 2018-09-05 RX ADMIN — SODIUM CHLORIDE 2.5 MILLION UNITS: 9 INJECTION, SOLUTION INTRAVENOUS at 14:28

## 2018-09-05 RX ADMIN — SERTRALINE HYDROCHLORIDE 75 MG: 50 TABLET ORAL at 12:05

## 2018-09-05 RX ADMIN — MAGNESIUM SULFATE HEPTAHYDRATE 4 G: 40 INJECTION, SOLUTION INTRAVENOUS at 11:59

## 2018-09-05 RX ADMIN — Medication 25 MCG: at 12:27

## 2018-09-05 RX ADMIN — Medication 25 MCG: at 08:23

## 2018-09-05 RX ADMIN — SODIUM CHLORIDE, POTASSIUM CHLORIDE, SODIUM LACTATE AND CALCIUM CHLORIDE: 600; 310; 30; 20 INJECTION, SOLUTION INTRAVENOUS at 10:15

## 2018-09-05 RX ADMIN — SODIUM CHLORIDE 2.5 MILLION UNITS: 9 INJECTION, SOLUTION INTRAVENOUS at 23:22

## 2018-09-05 RX ADMIN — Medication 25 MCG: at 02:03

## 2018-09-05 RX ADMIN — SODIUM CHLORIDE 2.5 MILLION UNITS: 9 INJECTION, SOLUTION INTRAVENOUS at 19:05

## 2018-09-05 RX ADMIN — Medication 25 MCG: at 10:19

## 2018-09-05 RX ADMIN — OXYTOCIN-SODIUM CHLORIDE 0.9% IV SOLN 30 UNIT/500ML 2 MILLI-UNITS/MIN: 30-0.9/5 SOLUTION at 15:11

## 2018-09-05 RX ADMIN — Medication 25 MCG: at 04:14

## 2018-09-05 RX ADMIN — Medication 25 MCG: at 00:00

## 2018-09-05 RX ADMIN — MAGNESIUM SULFATE HEPTAHYDRATE 1 G/HR: 40 INJECTION, SOLUTION INTRAVENOUS at 12:43

## 2018-09-05 RX ADMIN — PENICILLIN G POTASSIUM 5 MILLION UNITS: 5000000 POWDER, FOR SOLUTION INTRAMUSCULAR; INTRAPLEURAL; INTRATHECAL; INTRAVENOUS at 10:15

## 2018-09-05 NOTE — PROVIDER NOTIFICATION
09/05/18 0931   Provider Notification   Provider Name/Title Dr. Yanez   Method of Notification At Bedside   Request Evaluate in Person   Notification Reason Status Update   Reflexes +3, occasional periods of no accels this AM and occasional periods of reactivity. Patient rating pain at 4 now. SROM at 0700, clear fluid. Dr. Yanez at bedside, update given. Will begin Penicillin, repeat PIH labs and reasses reflexes in 1 hour.

## 2018-09-05 NOTE — H&P
"  2018    Krystina Suazo  [id number]            OB Admit History & Physical      Ms. Suazo  is here for cervical ripening and induction, secondary to PIH.  Has received Cytotec orally overnight; no HA, blurred vision or epigastric       -pain.    Patient's last menstrual period was 2017.   Her Estimated Date of Delivery: Sep 16, 2018  , making her 38w3d  wks.      Estimated body mass index is 39.13 kg/(m^2) as calculated from the following:    Height as of this encounter: 1.753 m (5' 9\").    Weight as of this encounter: 120.2 kg (265 lb).  Her prenatal course has been complicated by PIH.        Estimated fetal weight= 7 1/2 lb       She is a 33 year old   Her OB history:   Obstetric History       T0      L0     SAB0   TAB0   Ectopic0   Multiple0   Live Births0       # Outcome Date GA Lbr Daryn/2nd Weight Sex Delivery Anes PTL Lv   1 Current                        Past Medical History:   Diagnosis Date     Asthma      Depression           Past Surgical History:   Procedure Laterality Date     CARPAL TUNNEL RELEASE RT/LT Bilateral 2013     SINUS SURGERY  2014     TONSILLECTOMY           No current outpatient prescriptions on file.       Allergies: Review of patient's allergies indicates no known allergies.      REVIEW OF SYSTEMS:  NEUROLOGIC:  Negative  EYES:  Negative  ENT:  Negative  GI:  Negative  BREAST:  Negative  :  Negative  GYN:  Negative  CV:  Negative  PULMONARY:  Negative  MUSCULOSKELETAL:  Negative  PSYCH:  Negative        Social History     Social History     Marital status:      Spouse name: Yakov     Number of children: N/A     Years of education: 16     Occupational History           Social History Main Topics     Smoking status: Never Smoker     Smokeless tobacco: Never Used     Alcohol use No     Drug use: No     Sexual activity: Yes     Partners: Male     Other Topics Concern     Not on file     Social History Narrative    "   Family History   Problem Relation Age of Onset     Esophageal Cancer Mother 57     Diabetes Mother      Hypertension Mother      Asthma Father      Glaucoma No family hx of      Macular Degeneration No family hx of              Vitals:     With contractions every  3-7 min    Alert Awake in NAD  HEENT grossly normal  Neck: no lymphadenopathy or thryoidomegaly  Lungs clear  Back no spinal or CVAT  Heart RR  ABD gravid, term on exam with vertex palpable  Pelvic:  clear fluid noted, no blood noted  EXT:  mod edema or calf tenderness  Neuro:  Reflexes 3 plus    Assessment:  IUP at 38w3d  PIH     -normal PIH labs     -group B positive     -signs of CNS hyper-reflexia  SROM at 0700h    Plan:  Continue cervical ripening     -begin treatment of Group B      -begin MgSO4    [unfilled]      Jay Yanez MD  Dept of OB/GYN  September 5, 2018

## 2018-09-05 NOTE — PLAN OF CARE
Problem: Labor (Cervical Ripen, Induct, Augment) (Adult,Obstetrics,Pediatric)  Goal: Signs and Symptoms of Listed Potential Problems Will be Absent, Minimized or Managed (Labor)  Signs and symptoms of listed potential problems will be absent, minimized or managed by discharge/transition of care (reference Labor (Cervical Ripen, Induct, Augment) (Adult,Obstetrics,Pediatric) CPG).   Outcome: No Change  Stable pregnant patient meeting all expected goals.  Oral cytotec throughout the night.

## 2018-09-05 NOTE — PLAN OF CARE
Assessment completed and informed pt to call if rupture of membranes, vaginal bleeding, contractions increasing in strength or frequency, or any other concerns.

## 2018-09-05 NOTE — PROVIDER NOTIFICATION
09/04/18 2030   Provider Notification   Provider Name/Title Dr. Reilly   Method of Notification Phone   RN requesting clarification on how often to check BPs. Orders received to check BPs every 2 hours.

## 2018-09-05 NOTE — PLAN OF CARE
Reflexes +2 on left leg and +3 on right leg. Dr. Yanez paged, update given, labs reported. Order received to begin Magnesium 4 GM load and then 1 GM per hour and to check cervix at 1415 prior to Cytotec dose and report to Dr. Yanez.

## 2018-09-05 NOTE — PROVIDER NOTIFICATION
09/05/18 1623   Provider Notification   Provider Name/Title Dr Yanez   Method of Notification In Department   Request Evaluate - Remote   Notification Reason Uterine Activity   Discussed with Dr Yanez Pit Augmentaion started at 1400. Contractions unclear on TOCO. Ok to place IUPC.     Placed IUPC with no resistance. PT tolerated well.

## 2018-09-05 NOTE — PROVIDER NOTIFICATION
09/05/18 1823   Provider Notification   Provider Name/Title Dr Yanez   Method of Notification In Department   Request Evaluate - Remote   Notification Reason Status Update;Pain   Dr Yanez updated that pt using NO for pain. States she is coping. FHT varies between moderate and minimal variability with occasional VD. Accels present. Plan for SVE at 1900 and update [place].

## 2018-09-05 NOTE — PROVIDER NOTIFICATION
09/05/18 1442   Provider Notification   Provider Name/Title Dr. Yanez   Method of Notification Phone   Request Evaluate - Remote   Notification Reason SVE;Status Update   SVE cervix 2/80/-3. Update to Dr. Yanez. Order received to begin Pitocin.

## 2018-09-05 NOTE — PROVIDER NOTIFICATION
09/05/18 1640   Provider Notification   Provider Name/Title Dr Yanez   Method of Notification In Department   Request Evaluate - Remote   Notification Reason Status Update;SVE   Updated Dr Yanez that pt SVE 3.5/80/-2. IUPC placed without resistance.Pt tolerated well. First . Pit increased from 2 to 3ml/hr. Pt Temp 99.0 oral. Will cont to monitor and update Dr cheng.

## 2018-09-06 ENCOUNTER — ANESTHESIA (OUTPATIENT)
Dept: SURGERY | Facility: CLINIC | Age: 33
End: 2018-09-06
Payer: COMMERCIAL

## 2018-09-06 ENCOUNTER — ANESTHESIA EVENT (OUTPATIENT)
Dept: OBGYN | Facility: CLINIC | Age: 33
End: 2018-09-06
Payer: COMMERCIAL

## 2018-09-06 ENCOUNTER — ANESTHESIA EVENT (OUTPATIENT)
Dept: SURGERY | Facility: CLINIC | Age: 33
End: 2018-09-06
Payer: COMMERCIAL

## 2018-09-06 ENCOUNTER — ANESTHESIA (OUTPATIENT)
Dept: OBGYN | Facility: CLINIC | Age: 33
End: 2018-09-06
Payer: COMMERCIAL

## 2018-09-06 PROBLEM — Z98.891 S/P C-SECTION: Status: ACTIVE | Noted: 2018-09-06

## 2018-09-06 LAB
ALT SERPL W P-5'-P-CCNC: 14 U/L (ref 0–50)
AST SERPL W P-5'-P-CCNC: 14 U/L (ref 0–45)
CREAT SERPL-MCNC: 0.62 MG/DL (ref 0.52–1.04)
ERYTHROCYTE [DISTWIDTH] IN BLOOD BY AUTOMATED COUNT: 12.5 % (ref 10–15)
GFR SERPL CREATININE-BSD FRML MDRD: >90 ML/MIN/1.7M2
HCT VFR BLD AUTO: 33.5 % (ref 35–47)
HGB BLD-MCNC: 11.6 G/DL (ref 11.7–15.7)
MAGNESIUM SERPL-MCNC: 4.2 MG/DL (ref 1.6–2.3)
MCH RBC QN AUTO: 33.6 PG (ref 26.5–33)
MCHC RBC AUTO-ENTMCNC: 34.6 G/DL (ref 31.5–36.5)
MCV RBC AUTO: 97 FL (ref 78–100)
PLATELET # BLD AUTO: 185 10E9/L (ref 150–450)
RBC # BLD AUTO: 3.45 10E12/L (ref 3.8–5.2)
URATE SERPL-MCNC: 3.7 MG/DL (ref 2.6–6)
WBC # BLD AUTO: 18.4 10E9/L (ref 4–11)

## 2018-09-06 PROCEDURE — 37000011 ZZH ANESTHESIA WARD SERVICE

## 2018-09-06 PROCEDURE — 37000008 ZZH ANESTHESIA TECHNICAL FEE, 1ST 30 MIN: Performed by: OBSTETRICS & GYNECOLOGY

## 2018-09-06 PROCEDURE — 37000009 ZZH ANESTHESIA TECHNICAL FEE, EACH ADDTL 15 MIN: Performed by: OBSTETRICS & GYNECOLOGY

## 2018-09-06 PROCEDURE — 58140 MYOMECTOMY ABDOM METHOD: CPT | Mod: 51 | Performed by: OBSTETRICS & GYNECOLOGY

## 2018-09-06 PROCEDURE — 88305 TISSUE EXAM BY PATHOLOGIST: CPT | Mod: 26 | Performed by: OBSTETRICS & GYNECOLOGY

## 2018-09-06 PROCEDURE — 88305 TISSUE EXAM BY PATHOLOGIST: CPT | Performed by: OBSTETRICS & GYNECOLOGY

## 2018-09-06 PROCEDURE — 25000132 ZZH RX MED GY IP 250 OP 250 PS 637: Performed by: OBSTETRICS & GYNECOLOGY

## 2018-09-06 PROCEDURE — 27110038 ZZH RX 271: Performed by: ANESTHESIOLOGY

## 2018-09-06 PROCEDURE — 84460 ALANINE AMINO (ALT) (SGPT): CPT | Performed by: OBSTETRICS & GYNECOLOGY

## 2018-09-06 PROCEDURE — 25000128 H RX IP 250 OP 636

## 2018-09-06 PROCEDURE — 00HU33Z INSERTION OF INFUSION DEVICE INTO SPINAL CANAL, PERCUTANEOUS APPROACH: ICD-10-PCS | Performed by: ANESTHESIOLOGY

## 2018-09-06 PROCEDURE — 83735 ASSAY OF MAGNESIUM: CPT | Performed by: OBSTETRICS & GYNECOLOGY

## 2018-09-06 PROCEDURE — 25800025 ZZH RX 258: Performed by: OBSTETRICS & GYNECOLOGY

## 2018-09-06 PROCEDURE — 25000128 H RX IP 250 OP 636: Performed by: OBSTETRICS & GYNECOLOGY

## 2018-09-06 PROCEDURE — 85027 COMPLETE CBC AUTOMATED: CPT | Performed by: OBSTETRICS & GYNECOLOGY

## 2018-09-06 PROCEDURE — 59510 CESAREAN DELIVERY: CPT | Performed by: OBSTETRICS & GYNECOLOGY

## 2018-09-06 PROCEDURE — 3E0P7VZ INTRODUCTION OF HORMONE INTO FEMALE REPRODUCTIVE, VIA NATURAL OR ARTIFICIAL OPENING: ICD-10-PCS | Performed by: OBSTETRICS & GYNECOLOGY

## 2018-09-06 PROCEDURE — 12000029 ZZH R&B OB INTERMEDIATE

## 2018-09-06 PROCEDURE — 36000056 ZZH SURGERY LEVEL 3 1ST 30 MIN: Performed by: OBSTETRICS & GYNECOLOGY

## 2018-09-06 PROCEDURE — 25000125 ZZHC RX 250: Performed by: ANESTHESIOLOGY

## 2018-09-06 PROCEDURE — 84450 TRANSFERASE (AST) (SGOT): CPT | Performed by: OBSTETRICS & GYNECOLOGY

## 2018-09-06 PROCEDURE — 82565 ASSAY OF CREATININE: CPT | Performed by: OBSTETRICS & GYNECOLOGY

## 2018-09-06 PROCEDURE — 36415 COLL VENOUS BLD VENIPUNCTURE: CPT | Performed by: OBSTETRICS & GYNECOLOGY

## 2018-09-06 PROCEDURE — 71000012 ZZH RECOVERY PHASE 1 LEVEL 1 FIRST HR: Performed by: OBSTETRICS & GYNECOLOGY

## 2018-09-06 PROCEDURE — 3E0R3NZ INTRODUCTION OF ANALGESICS, HYPNOTICS, SEDATIVES INTO SPINAL CANAL, PERCUTANEOUS APPROACH: ICD-10-PCS | Performed by: ANESTHESIOLOGY

## 2018-09-06 PROCEDURE — 27210794 ZZH OR GENERAL SUPPLY STERILE: Performed by: OBSTETRICS & GYNECOLOGY

## 2018-09-06 PROCEDURE — 36000058 ZZH SURGERY LEVEL 3 EA 15 ADDTL MIN: Performed by: OBSTETRICS & GYNECOLOGY

## 2018-09-06 PROCEDURE — 40000671 ZZH STATISTIC ANESTHESIA CASE

## 2018-09-06 PROCEDURE — 25000128 H RX IP 250 OP 636: Performed by: ANESTHESIOLOGY

## 2018-09-06 PROCEDURE — 84550 ASSAY OF BLOOD/URIC ACID: CPT | Performed by: OBSTETRICS & GYNECOLOGY

## 2018-09-06 PROCEDURE — 25000125 ZZHC RX 250: Performed by: OBSTETRICS & GYNECOLOGY

## 2018-09-06 PROCEDURE — 0UB90ZZ EXCISION OF UTERUS, OPEN APPROACH: ICD-10-PCS | Performed by: OBSTETRICS & GYNECOLOGY

## 2018-09-06 RX ORDER — SIMETHICONE 80 MG
80 TABLET,CHEWABLE ORAL 4 TIMES DAILY PRN
Status: DISCONTINUED | OUTPATIENT
Start: 2018-09-06 | End: 2018-09-09 | Stop reason: HOSPADM

## 2018-09-06 RX ORDER — MAGNESIUM SULFATE HEPTAHYDRATE 40 MG/ML
4 INJECTION, SOLUTION INTRAVENOUS
Status: DISCONTINUED | OUTPATIENT
Start: 2018-09-06 | End: 2018-09-09 | Stop reason: HOSPADM

## 2018-09-06 RX ORDER — CEFAZOLIN SODIUM 1 G/3ML
INJECTION, POWDER, FOR SOLUTION INTRAMUSCULAR; INTRAVENOUS PRN
Status: DISCONTINUED | OUTPATIENT
Start: 2018-09-06 | End: 2018-09-06

## 2018-09-06 RX ORDER — AMOXICILLIN 250 MG
1 CAPSULE ORAL 2 TIMES DAILY PRN
Status: DISCONTINUED | OUTPATIENT
Start: 2018-09-06 | End: 2018-09-09 | Stop reason: HOSPADM

## 2018-09-06 RX ORDER — CEFAZOLIN SODIUM 1 G/3ML
1 INJECTION, POWDER, FOR SOLUTION INTRAMUSCULAR; INTRAVENOUS SEE ADMIN INSTRUCTIONS
Status: DISCONTINUED | OUTPATIENT
Start: 2018-09-06 | End: 2018-09-06 | Stop reason: HOSPADM

## 2018-09-06 RX ORDER — MAGNESIUM SULFATE HEPTAHYDRATE 500 MG/ML
4 INJECTION, SOLUTION INTRAMUSCULAR; INTRAVENOUS
Status: DISCONTINUED | OUTPATIENT
Start: 2018-09-06 | End: 2018-09-09 | Stop reason: HOSPADM

## 2018-09-06 RX ORDER — CALCIUM GLUCONATE 94 MG/ML
1 INJECTION, SOLUTION INTRAVENOUS
Status: DISCONTINUED | OUTPATIENT
Start: 2018-09-06 | End: 2018-09-09 | Stop reason: HOSPADM

## 2018-09-06 RX ORDER — ONDANSETRON 2 MG/ML
4 INJECTION INTRAMUSCULAR; INTRAVENOUS EVERY 30 MIN PRN
Status: DISCONTINUED | OUTPATIENT
Start: 2018-09-06 | End: 2018-09-06 | Stop reason: HOSPADM

## 2018-09-06 RX ORDER — PROCHLORPERAZINE 25 MG
25 SUPPOSITORY, RECTAL RECTAL EVERY 12 HOURS PRN
Status: DISCONTINUED | OUTPATIENT
Start: 2018-09-06 | End: 2018-09-09 | Stop reason: HOSPADM

## 2018-09-06 RX ORDER — DEXTROSE, SODIUM CHLORIDE, SODIUM LACTATE, POTASSIUM CHLORIDE, AND CALCIUM CHLORIDE 5; .6; .31; .03; .02 G/100ML; G/100ML; G/100ML; G/100ML; G/100ML
INJECTION, SOLUTION INTRAVENOUS CONTINUOUS
Status: DISCONTINUED | OUTPATIENT
Start: 2018-09-06 | End: 2018-09-09 | Stop reason: HOSPADM

## 2018-09-06 RX ORDER — MAGNESIUM SULFATE HEPTAHYDRATE 40 MG/ML
4 INJECTION, SOLUTION INTRAVENOUS ONCE
Status: DISCONTINUED | OUTPATIENT
Start: 2018-09-06 | End: 2018-09-06

## 2018-09-06 RX ORDER — LIDOCAINE 40 MG/G
CREAM TOPICAL
Status: DISCONTINUED | OUTPATIENT
Start: 2018-09-06 | End: 2018-09-09 | Stop reason: HOSPADM

## 2018-09-06 RX ORDER — LORAZEPAM 2 MG/ML
2 INJECTION INTRAMUSCULAR
Status: DISCONTINUED | OUTPATIENT
Start: 2018-09-06 | End: 2018-09-09 | Stop reason: HOSPADM

## 2018-09-06 RX ORDER — MAGNESIUM HYDROXIDE 1200 MG/15ML
LIQUID ORAL PRN
Status: DISCONTINUED | OUTPATIENT
Start: 2018-09-06 | End: 2018-09-06

## 2018-09-06 RX ORDER — MAGNESIUM SULFATE IN WATER 40 MG/ML
1 INJECTION, SOLUTION INTRAVENOUS CONTINUOUS
Status: DISCONTINUED | OUTPATIENT
Start: 2018-09-06 | End: 2018-09-06

## 2018-09-06 RX ORDER — ACETAMINOPHEN 325 MG/1
650 TABLET ORAL EVERY 4 HOURS PRN
Status: DISCONTINUED | OUTPATIENT
Start: 2018-09-09 | End: 2018-09-09 | Stop reason: HOSPADM

## 2018-09-06 RX ORDER — MORPHINE SULFATE 1 MG/ML
INJECTION, SOLUTION EPIDURAL; INTRATHECAL; INTRAVENOUS PRN
Status: DISCONTINUED | OUTPATIENT
Start: 2018-09-06 | End: 2018-09-06

## 2018-09-06 RX ORDER — CEFAZOLIN SODIUM 2 G/100ML
2 INJECTION, SOLUTION INTRAVENOUS EVERY 8 HOURS
Status: DISCONTINUED | OUTPATIENT
Start: 2018-09-06 | End: 2018-09-06

## 2018-09-06 RX ORDER — METOCLOPRAMIDE HYDROCHLORIDE 5 MG/ML
10 INJECTION INTRAMUSCULAR; INTRAVENOUS EVERY 6 HOURS PRN
Status: DISCONTINUED | OUTPATIENT
Start: 2018-09-06 | End: 2018-09-09 | Stop reason: HOSPADM

## 2018-09-06 RX ORDER — HYDROCORTISONE 2.5 %
CREAM (GRAM) TOPICAL 3 TIMES DAILY PRN
Status: DISCONTINUED | OUTPATIENT
Start: 2018-09-06 | End: 2018-09-09 | Stop reason: HOSPADM

## 2018-09-06 RX ORDER — BISACODYL 10 MG
10 SUPPOSITORY, RECTAL RECTAL DAILY PRN
Status: DISCONTINUED | OUTPATIENT
Start: 2018-09-08 | End: 2018-09-09 | Stop reason: HOSPADM

## 2018-09-06 RX ORDER — SODIUM CHLORIDE, SODIUM LACTATE, POTASSIUM CHLORIDE, CALCIUM CHLORIDE 600; 310; 30; 20 MG/100ML; MG/100ML; MG/100ML; MG/100ML
10-125 INJECTION, SOLUTION INTRAVENOUS CONTINUOUS
Status: DISCONTINUED | OUTPATIENT
Start: 2018-09-06 | End: 2018-09-09 | Stop reason: HOSPADM

## 2018-09-06 RX ORDER — NALOXONE HYDROCHLORIDE 0.4 MG/ML
.1-.4 INJECTION, SOLUTION INTRAMUSCULAR; INTRAVENOUS; SUBCUTANEOUS
Status: DISCONTINUED | OUTPATIENT
Start: 2018-09-06 | End: 2018-09-06

## 2018-09-06 RX ORDER — PRENATAL VIT/IRON FUM/FOLIC AC 27MG-0.8MG
1 TABLET ORAL DAILY
Status: DISCONTINUED | OUTPATIENT
Start: 2018-09-06 | End: 2018-09-09 | Stop reason: HOSPADM

## 2018-09-06 RX ORDER — SODIUM CHLORIDE, SODIUM LACTATE, POTASSIUM CHLORIDE, CALCIUM CHLORIDE 600; 310; 30; 20 MG/100ML; MG/100ML; MG/100ML; MG/100ML
INJECTION, SOLUTION INTRAVENOUS CONTINUOUS
Status: DISCONTINUED | OUTPATIENT
Start: 2018-09-06 | End: 2018-09-06 | Stop reason: HOSPADM

## 2018-09-06 RX ORDER — LANOLIN 100 %
OINTMENT (GRAM) TOPICAL
Status: DISCONTINUED | OUTPATIENT
Start: 2018-09-06 | End: 2018-09-09 | Stop reason: HOSPADM

## 2018-09-06 RX ORDER — AMOXICILLIN 250 MG
2 CAPSULE ORAL 2 TIMES DAILY PRN
Status: DISCONTINUED | OUTPATIENT
Start: 2018-09-06 | End: 2018-09-09 | Stop reason: HOSPADM

## 2018-09-06 RX ORDER — NALOXONE HYDROCHLORIDE 0.4 MG/ML
.1-.4 INJECTION, SOLUTION INTRAMUSCULAR; INTRAVENOUS; SUBCUTANEOUS
Status: DISCONTINUED | OUTPATIENT
Start: 2018-09-06 | End: 2018-09-09 | Stop reason: HOSPADM

## 2018-09-06 RX ORDER — BUPIVACAINE HCL/0.9 % NACL/PF 0.125 %
15 PLASTIC BAG, INJECTION (ML) EPIDURAL CONTINUOUS
Status: DISCONTINUED | OUTPATIENT
Start: 2018-09-06 | End: 2018-09-06

## 2018-09-06 RX ORDER — FENTANYL CITRATE 50 UG/ML
25-50 INJECTION, SOLUTION INTRAMUSCULAR; INTRAVENOUS
Status: DISCONTINUED | OUTPATIENT
Start: 2018-09-06 | End: 2018-09-06 | Stop reason: HOSPADM

## 2018-09-06 RX ORDER — OXYTOCIN/0.9 % SODIUM CHLORIDE 30/500 ML
100 PLASTIC BAG, INJECTION (ML) INTRAVENOUS CONTINUOUS
Status: DISCONTINUED | OUTPATIENT
Start: 2018-09-06 | End: 2018-09-09 | Stop reason: HOSPADM

## 2018-09-06 RX ORDER — ACETAMINOPHEN 325 MG/1
975 TABLET ORAL EVERY 8 HOURS
Status: COMPLETED | OUTPATIENT
Start: 2018-09-06 | End: 2018-09-08

## 2018-09-06 RX ORDER — CEFAZOLIN SODIUM 1 G/50ML
3 SOLUTION INTRAVENOUS
Status: DISCONTINUED | OUTPATIENT
Start: 2018-09-06 | End: 2018-09-06 | Stop reason: HOSPADM

## 2018-09-06 RX ORDER — KETOROLAC TROMETHAMINE 30 MG/ML
30 INJECTION, SOLUTION INTRAMUSCULAR; INTRAVENOUS EVERY 6 HOURS
Status: COMPLETED | OUTPATIENT
Start: 2018-09-06 | End: 2018-09-06

## 2018-09-06 RX ORDER — OXYTOCIN 10 [USP'U]/ML
10 INJECTION, SOLUTION INTRAMUSCULAR; INTRAVENOUS
Status: DISCONTINUED | OUTPATIENT
Start: 2018-09-06 | End: 2018-09-09 | Stop reason: HOSPADM

## 2018-09-06 RX ORDER — CITRIC ACID/SODIUM CITRATE 334-500MG
30 SOLUTION, ORAL ORAL
Status: COMPLETED | OUTPATIENT
Start: 2018-09-06 | End: 2018-09-06

## 2018-09-06 RX ORDER — ALBUTEROL SULFATE 90 UG/1
2 AEROSOL, METERED RESPIRATORY (INHALATION) EVERY 4 HOURS PRN
Status: DISCONTINUED | OUTPATIENT
Start: 2018-09-06 | End: 2018-09-09 | Stop reason: HOSPADM

## 2018-09-06 RX ORDER — HYDROMORPHONE HYDROCHLORIDE 1 MG/ML
.3-.5 INJECTION, SOLUTION INTRAMUSCULAR; INTRAVENOUS; SUBCUTANEOUS EVERY 5 MIN PRN
Status: DISCONTINUED | OUTPATIENT
Start: 2018-09-06 | End: 2018-09-06 | Stop reason: HOSPADM

## 2018-09-06 RX ORDER — OXYTOCIN/0.9 % SODIUM CHLORIDE 30/500 ML
PLASTIC BAG, INJECTION (ML) INTRAVENOUS PRN
Status: DISCONTINUED | OUTPATIENT
Start: 2018-09-06 | End: 2018-09-06

## 2018-09-06 RX ORDER — ONDANSETRON 2 MG/ML
INJECTION INTRAMUSCULAR; INTRAVENOUS PRN
Status: DISCONTINUED | OUTPATIENT
Start: 2018-09-06 | End: 2018-09-06

## 2018-09-06 RX ORDER — NIFEDIPINE 10 MG/1
10 CAPSULE ORAL
Status: DISCONTINUED | OUTPATIENT
Start: 2018-09-06 | End: 2018-09-09 | Stop reason: HOSPADM

## 2018-09-06 RX ORDER — EPHEDRINE SULFATE 50 MG/ML
5 INJECTION, SOLUTION INTRAMUSCULAR; INTRAVENOUS; SUBCUTANEOUS
Status: DISCONTINUED | OUTPATIENT
Start: 2018-09-06 | End: 2018-09-06

## 2018-09-06 RX ORDER — ONDANSETRON 2 MG/ML
4 INJECTION INTRAMUSCULAR; INTRAVENOUS EVERY 6 HOURS PRN
Status: DISCONTINUED | OUTPATIENT
Start: 2018-09-06 | End: 2018-09-09 | Stop reason: HOSPADM

## 2018-09-06 RX ORDER — IBUPROFEN 600 MG/1
600 TABLET, FILM COATED ORAL EVERY 6 HOURS PRN
Status: DISCONTINUED | OUTPATIENT
Start: 2018-09-07 | End: 2018-09-09 | Stop reason: HOSPADM

## 2018-09-06 RX ORDER — OXYCODONE HYDROCHLORIDE 5 MG/1
5-10 TABLET ORAL
Status: DISCONTINUED | OUTPATIENT
Start: 2018-09-06 | End: 2018-09-09 | Stop reason: HOSPADM

## 2018-09-06 RX ORDER — BUPIVACAINE HYDROCHLORIDE 5 MG/ML
INJECTION, SOLUTION EPIDURAL; INTRACAUDAL PRN
Status: DISCONTINUED | OUTPATIENT
Start: 2018-09-06 | End: 2018-09-06

## 2018-09-06 RX ORDER — TERBUTALINE SULFATE 1 MG/ML
INJECTION, SOLUTION SUBCUTANEOUS
Status: COMPLETED
Start: 2018-09-06 | End: 2018-09-06

## 2018-09-06 RX ORDER — ONDANSETRON 4 MG/1
4 TABLET, ORALLY DISINTEGRATING ORAL EVERY 30 MIN PRN
Status: DISCONTINUED | OUTPATIENT
Start: 2018-09-06 | End: 2018-09-06 | Stop reason: HOSPADM

## 2018-09-06 RX ORDER — OXYTOCIN/0.9 % SODIUM CHLORIDE 30/500 ML
340 PLASTIC BAG, INJECTION (ML) INTRAVENOUS CONTINUOUS PRN
Status: DISCONTINUED | OUTPATIENT
Start: 2018-09-06 | End: 2018-09-09 | Stop reason: HOSPADM

## 2018-09-06 RX ORDER — LABETALOL HYDROCHLORIDE 5 MG/ML
10 INJECTION, SOLUTION INTRAVENOUS
Status: DISCONTINUED | OUTPATIENT
Start: 2018-09-06 | End: 2018-09-06 | Stop reason: HOSPADM

## 2018-09-06 RX ADMIN — ACETAMINOPHEN 975 MG: 325 TABLET, FILM COATED ORAL at 05:06

## 2018-09-06 RX ADMIN — ONDANSETRON 4 MG: 2 INJECTION INTRAMUSCULAR; INTRAVENOUS at 02:31

## 2018-09-06 RX ADMIN — SENNOSIDES AND DOCUSATE SODIUM 1 TABLET: 8.6; 5 TABLET ORAL at 21:25

## 2018-09-06 RX ADMIN — Medication 15 ML/HR: at 01:44

## 2018-09-06 RX ADMIN — MORPHINE SULFATE 3 MG: 1 INJECTION, SOLUTION EPIDURAL; INTRATHECAL; INTRAVENOUS at 02:53

## 2018-09-06 RX ADMIN — CEFAZOLIN SODIUM 2 G: 2 INJECTION, SOLUTION INTRAVENOUS at 10:47

## 2018-09-06 RX ADMIN — BUPIVACAINE HYDROCHLORIDE 6 ML: 5 INJECTION, SOLUTION EPIDURAL; INTRACAUDAL; PERINEURAL at 01:29

## 2018-09-06 RX ADMIN — OXYTOCIN-SODIUM CHLORIDE 0.9% IV SOLN 30 UNIT/500ML 500 ML: 30-0.9/5 SOLUTION at 02:50

## 2018-09-06 RX ADMIN — ACETAMINOPHEN 975 MG: 325 TABLET, FILM COATED ORAL at 21:24

## 2018-09-06 RX ADMIN — PHENYLEPHRINE HYDROCHLORIDE 200 MCG: 10 INJECTION, SOLUTION INTRAMUSCULAR; INTRAVENOUS; SUBCUTANEOUS at 03:15

## 2018-09-06 RX ADMIN — KETOROLAC TROMETHAMINE 30 MG: 30 INJECTION, SOLUTION INTRAMUSCULAR at 11:39

## 2018-09-06 RX ADMIN — KETOROLAC TROMETHAMINE 30 MG: 30 INJECTION, SOLUTION INTRAMUSCULAR at 05:06

## 2018-09-06 RX ADMIN — KETOROLAC TROMETHAMINE 30 MG: 30 INJECTION, SOLUTION INTRAMUSCULAR at 18:39

## 2018-09-06 RX ADMIN — PHENYLEPHRINE HYDROCHLORIDE 100 MCG: 10 INJECTION, SOLUTION INTRAMUSCULAR; INTRAVENOUS; SUBCUTANEOUS at 02:59

## 2018-09-06 RX ADMIN — PHENYLEPHRINE HYDROCHLORIDE 100 MCG: 10 INJECTION, SOLUTION INTRAMUSCULAR; INTRAVENOUS; SUBCUTANEOUS at 02:37

## 2018-09-06 RX ADMIN — OXYTOCIN-SODIUM CHLORIDE 0.9% IV SOLN 30 UNIT/500ML 90 ML/HR: 30-0.9/5 SOLUTION at 05:49

## 2018-09-06 RX ADMIN — SODIUM CITRATE AND CITRIC ACID MONOHYDRATE 30 ML: 500; 334 SOLUTION ORAL at 02:07

## 2018-09-06 RX ADMIN — SODIUM CHLORIDE, POTASSIUM CHLORIDE, SODIUM LACTATE AND CALCIUM CHLORIDE: 600; 310; 30; 20 INJECTION, SOLUTION INTRAVENOUS at 02:23

## 2018-09-06 RX ADMIN — ACETAMINOPHEN 975 MG: 325 TABLET, FILM COATED ORAL at 13:29

## 2018-09-06 RX ADMIN — SERTRALINE HYDROCHLORIDE 75 MG: 50 TABLET ORAL at 08:54

## 2018-09-06 RX ADMIN — SODIUM CHLORIDE, POTASSIUM CHLORIDE, SODIUM LACTATE AND CALCIUM CHLORIDE 10 ML/HR: 600; 310; 30; 20 INJECTION, SOLUTION INTRAVENOUS at 04:14

## 2018-09-06 RX ADMIN — CEFAZOLIN 2 G: 1 INJECTION, POWDER, FOR SOLUTION INTRAMUSCULAR; INTRAVENOUS at 02:25

## 2018-09-06 RX ADMIN — SODIUM CHLORIDE, POTASSIUM CHLORIDE, SODIUM LACTATE AND CALCIUM CHLORIDE: 600; 310; 30; 20 INJECTION, SOLUTION INTRAVENOUS at 03:06

## 2018-09-06 RX ADMIN — TERBUTALINE SULFATE 0.25 MG: 1 INJECTION SUBCUTANEOUS at 01:39

## 2018-09-06 RX ADMIN — KETOROLAC TROMETHAMINE 30 MG: 30 INJECTION, SOLUTION INTRAMUSCULAR at 23:43

## 2018-09-06 RX ADMIN — MAGNESIUM SULFATE IN WATER 1 G/HR: 40 INJECTION, SOLUTION INTRAVENOUS at 05:47

## 2018-09-06 ASSESSMENT — ACTIVITIES OF DAILY LIVING (ADL)
SWALLOWING: 0-->SWALLOWS FOODS/LIQUIDS WITHOUT DIFFICULTY
AMBULATION: 0-->INDEPENDENT
FALL_HISTORY_WITHIN_LAST_SIX_MONTHS: NO
DRESS: 0-->INDEPENDENT
BATHING: 0-->INDEPENDENT
RETIRED_EATING: 0-->INDEPENDENT
RETIRED_COMMUNICATION: 0-->UNDERSTANDS/COMMUNICATES WITHOUT DIFFICULTY
COGNITION: 0 - NO COGNITION ISSUES REPORTED
TOILETING: 0-->INDEPENDENT
TRANSFERRING: 0-->INDEPENDENT

## 2018-09-06 NOTE — PROVIDER NOTIFICATION
09/06/18 0026   Provider Notification   Provider Name/Title Dr. Yanez   Method of Notification Phone   Request Evaluate - Remote   Notification Reason Membrane Status;Labor Status;Status Update;SVE   Dr. Yanez updated IUPC fell out at 2330, pt laboring natural with nitrous and frequently ambulating making contraction and baby difficult to monitor. Verbal order received for FSE and replacing IUPC if needed at this point. SVE 8/95/-2.

## 2018-09-06 NOTE — OP NOTE
[facility]  Obstetrics Brief Operative Note    Pre-operative diagnosis: IUP at 38 weekws  PIH  Fetal intolerance of labor   Post-operative diagnosis: Same  Multiple leiomyoma   Procedure: Primary low transverse  section   Surgeon: Jay Yanez MD   Assistant(s): None   Anesthesia: Epidural anesthesia   Estimated blood loss: 600ml   Total IV fluids: (See anesthesia record)   Blood transfusion: No transfusion was given during surgery   Total urine output: (See anesthesia record)   Drains: None   Specimens: leiomyoma   Findings: Live   Male   Complications: None   Condition: Infant stable, transferred to general care nursery  Mother stable, transfered to floor   Comments: See dictated operative report for full details

## 2018-09-06 NOTE — ANESTHESIA CARE TRANSFER NOTE
Patient: Krystina Suazo    Procedure(s):   section - Wound Class: II-Clean Contaminated    Diagnosis:  section  Diagnosis Additional Information: No value filed.    Anesthesia Type:   No value filed.     Note:  Airway :Room Air  Patient transferred to:Labor and Delivery  Handoff Report: Identifed the Patient, Identified the Reponsible Provider, Reviewed the pertinent medical history, Discussed the surgical course, Reviewed Intra-OP anesthesia mangement and issues during anesthesia, Set expectations for post-procedure period and Allowed opportunity for questions and acknowledgement of understanding      Vitals: (Last set prior to Anesthesia Care Transfer)    CRNA VITALS  2018 0255 - 2018 0337      2018             Pulse: 89    SpO2: 97 %                Electronically Signed By: JAMIN Mendoza CRNA  2018  3:37 AM

## 2018-09-06 NOTE — PROVIDER NOTIFICATION
09/06/18 0119   Provider Notification   Provider Name/Title JEROMY East   Method of Notification At Bedside   Request Evaluate in Person   Notification Reason Pain   JEROMY East at the bedside for labor epidural. Patient in sitting position. Consent signed. FOB at bedside.

## 2018-09-06 NOTE — PROVIDER NOTIFICATION
09/06/18 0402   Provider Notification   Provider Name/Title    Method of Notification At Bedside   Request Evaluate in Person    at the bedside to discuss the surgery procedure. Verbal order to continue magnesium sulfate 1g/mL for 24 hours, order HELLP labs and magnesium level in the AM. Plan to place orders.

## 2018-09-06 NOTE — OP NOTE
Procedure Date: 2018      DATE OF SERVICE: 2018      PREOPERATIVE DIAGNOSES:   1.  Intrauterine pregnancy at 38-4/7 weeks.   2.  Pregnancy-induced hypertension with cervical ripening and induction secondary to above.      3.  Fetal intolerance of labor.      POSTOPERATIVE DIAGNOSES:   1.  Intrauterine pregnancy at 38 and 4/7 weeks.   2.  Pregnancy induced hypertension with cervical ripening and induction secondary to above.   3.  Fetal intolerance of labor.   4.  Multiple uterine leiomyoma.      PROCEDURE:  Primary low transverse  section with removal of a pedunculated uterine fibroid.      SURGEON:  Jay Yanez MD      ANESTHESIA:  Epidural.      COMPLICATIONS:  None apparent.      ESTIMATED BLOOD LOSS:  Was 600 mL.      FINDINGS:  Living male weighing 6 pounds 12 ounces, Apgars of 8 and 9.      NARRATIVE SUMMARY:  The patient is a 33-year-old,  1, para 0 patient who is brought to Labor and Delivery at 38-3/7 weeks for cervical ripening and induction of labor secondary to pregnancy-induced hypertension.  After approximately 12 doses of Cytotec, she had spontaneous rupture of membranes revealing clear fluid and with Pitocin augmentation following a normal Borden curve to 6 cm with the increase in cervical dilatation and progress in traversing the pelvic cavity, she was noted to have an episode of prolonged fetal bradycardia which did respond to usual measures of position change, oxygen, cessation of Pitocin and terbutaline.  She at this junction was noted to be making inadequate progress in the active phase of labor and this coupled with the prolonged deceleration, recommendation made for a primary  section.  Risks, benefits and alternatives were reviewed and discussed.  They include but are not limited to infection, bleeding, damage to bowel, bladder or any other intraabdominal viscera, as well as all risks attended to anesthesia and blood transfusion. With consent, she was  taken to the operating suite, where under adequate epidural anesthesia, the abdomen was prepped and draped in a sterile fashion.  Rene catheter previously had been placed. With a vaginal prep completed as well.  A Pfannenstiel skin incision was made.  Entry into the peritoneal cavity without incident.  Bladder flap was reflected caudad and a transverse uterine incision was made by scoring the uterus with a knife and entering bluntly with a Mitzi clamp. Again clear fluid was encountered.  The incision was finger fractured transversely and the baby's head was delivered through the incision.  Two tight loops of umbilical cord were noted with the baby in occiput transverse position.  The baby's head was delivered.  The nasal oropharynx was suctioned and the cord is reduced and after a fifty second delay, the cord was clamped, cut and handed to the NICU staff in attendance.  A living male was delivered weighing 6 pounds 12 ounces, Apgars of 8 and 9.  Placenta was delivered manually.  Uterine cavity wiped free of debris.  The uterus was then closed with 2 layers of #1 chromic suture, first layer imbricated by the second.  Retrouterine space, pericolic gutters and subvesical space were irrigated copiously.  When hemostasis had been secured, the bladder flap was reapproximated with 2-0 Monocryl.  Inspection of the uterus, fallopian tubes and ovaries revealed an approximately 12 cm left lateral fundal uterine fibroid that appears to be within the body of the uterus. There is a 6 cm pedunculated fibroid on the anterior right fundus of the uterus with a slender stalk. In view of this, 2-0 Monocryl was used to tie off the fibroid at the pedunculated stalk.  This was resected and sent for histopathologic evaluation with hemostasis controlled by 2-0 Monocryl.  The parietoperitoneum was then closed with 2-0 Monocryl.  Subfascial space was irrigated copiously.  When hemostasis had been secured, the fascial edges were  reapproximated with 0 Vicryl in running fashion.  Subcutaneous space was irrigated copiously.  When hemostasis had been secured, it was closed with 2-0 plain sutures.  Skin edge reapproximated with skin clips.  There were no intraoperative complications.  Sponge and needle counts were correct.  Estimated blood loss is 600 mL.  Mother and  went to recovery and  nursery respectively.         DAVID CORDOVA MD             D: 2018   T: 2018   MT: DIANNA      Name:     ARIEL MARTIN   MRN:      [id number]        Account:        [id number]   :      1985           Procedure Date: 2018      Document: [id number]

## 2018-09-06 NOTE — ANESTHESIA PREPROCEDURE EVALUATION
Anesthesia Evaluation     . Pt has had prior anesthetic. Type: Regional and General    No history of anesthetic complications          ROS/MED HX    ENT/Pulmonary:     (+)Intermittent asthma , . .    Neurologic:  - neg neurologic ROS     Cardiovascular:     (+) hypertension----. : . . . :. .       METS/Exercise Tolerance:     Hematologic: Comments: Lab Test        09/05/18 09/04/18 08/30/18 06/14/18                       1016          1530          1628          1546          WBC          10.2          --          8.1          9.6           HGB          13.2         12.4         12.7         10.9*         MCV          98            --          99           99            PLT          195           --          205          193            Lab Test        09/05/18 09/04/18 08/30/18                       1016          1530          1628          NA            --          136          136           POTASSIUM     --          3.9          4.2           CHLORIDE      --          106          107           CO2           --          22           22            BUN           --          13           13            CR           0.66         0.61         0.64          ANIONGAP      --          8            7             LORNA           --          8.6          8.5           GLC           --          98           98                    Musculoskeletal:  - neg musculoskeletal ROS       GI/Hepatic:  - neg GI/hepatic ROS       Renal/Genitourinary:  - ROS Renal section negative       Endo:  - neg endo ROS       Psychiatric:  - neg psychiatric ROS       Infectious Disease:  - neg infectious disease ROS       Malignancy:         Other:    (+) Possibly pregnant                    Physical Exam  Normal systems: cardiovascular, pulmonary and dental    Airway   Mallampati: II  TM distance: >3 FB  Neck ROM: full    Dental     Cardiovascular       Pulmonary                     Anesthesia Plan      History & Physical  Review  History and physical reviewed and following examination; no interval change.    ASA Status:  2 emergent.        Plan for Epidural   PONV prophylaxis:  Ondansetron (or other 5HT-3) and Dexamethasone or Solumedrol       Postoperative Care  Postoperative pain management:  IV analgesics and Oral pain medications.      Consents  Anesthetic plan, risks, benefits and alternatives discussed with:  Patient and Spouse.  Use of blood products discussed: Yes.   Use of blood products discussed with Patient.  .                          .

## 2018-09-06 NOTE — PROVIDER NOTIFICATION
09/05/18 1925   Provider Notification   Provider Name/Title Dr Yanez   Method of Notification Phone   Request Evaluate - Remote   Notification Reason SVE;Status Update   UPdated Dr Pfeiffer that SVE 5-5.5/95/-1. Temp 99.2 /69 . Pt continues to use NO for pain. FHT varies from Minimal to Moderate variability with occasional VD. Plan to recheck SVE between 2030 and 2100 and update .  in hospital for the night.

## 2018-09-06 NOTE — PLAN OF CARE
Problem: Patient Care Overview  Goal: Plan of Care/Patient Progress Review  Outcome: Improving  Patient vital signs stable. Denies s/sx of pre-eclampsia. +1 BLE edema noted. Fundus boggy, but firmed quickly with minimal massage. Small amount of rubra noted. Rene in place draining urine well. Breastfeeding well. Mg and Pitocin running. Reflexes brisk bilat with no clonus. FOB at bedside, supportive.

## 2018-09-06 NOTE — ANESTHESIA PREPROCEDURE EVALUATION
PAC NOTE:       ANESTHESIA PRE EVALUATION:  Anesthesia Evaluation       history and physical reviewed .      No history of anesthetic complications          ROS/MED HX    ENT/Pulmonary:     (+)asthma , . .    Neurologic:  - neg neurologic ROS     Cardiovascular:  - neg cardiovascular ROS       METS/Exercise Tolerance:     Hematologic:         Musculoskeletal:         GI/Hepatic:  - neg GI/hepatic ROS       Renal/Genitourinary:         Endo:         Psychiatric:         Infectious Disease:         Malignancy:         Other:                     Physical Exam  Normal systems: cardiovascular, pulmonary and dental    Airway   Mallampati: II  TM distance: > 3 FB  Neck ROM: full  Mouth opening: > 3 cm    Dental     Cardiovascular       Pulmonary         neg OB ROS            Anesthesia Plan      History & Physical Review      ASA Status:  .  OB Epidural Asa: 2            Postoperative Care      Consents  Anesthetic plan, risks, benefits and alternatives discussed with:  Patient..                            .

## 2018-09-06 NOTE — ANESTHESIA PROCEDURE NOTES
Peripheral nerve/Neuraxial procedure note : epidural catheter  Pre-Procedure  Performed by NEVA MONTEZ  Referred by TASHA  Location: OB    Procedure Times:9/6/2018 1:13 AM and 9/6/2018 1:30 AM  Pre-Anesthestic Checklist: patient identified, IV checked, risks and benefits discussed, informed consent, monitors and equipment checked, pre-op evaluation and at physician/surgeon's request    Timeout  Correct Patient: Yes   Correct Procedure: Yes   Correct Site: Yes   Correct Laterality: N/A   Correct Position: Yes   Site Marked: N/A   .   Procedure Documentation    .    Procedure:    Epidural catheter.  Insertion Site:L2-3  (midline approach) Injection technique: LORT saline   Local skin infiltrated with mL of 1% lidocaine.  CHIKIS at 6 cm     Patient Prep;mask, sterile gloves, povidone-iodine 7.5% surgical scrub, patient draped.  .  Needle: Touhy needle Needle Gauge: 17.    Needle Length (Inches) 3.5  # of attempts: 1 and # of redirects:  .   Catheter: 19 G . .  Catheter threaded easily  6 cm epidural space.  12 cm at skin.   .    Assessment/Narrative  Paresthesias: No.  .  .  Aspiration negative for heme or CSF  . Test dose of 3 mL lidocaine 1.5% w/ 1:200,000 epinephrine at 01:26.  Test dose negative for signs of intravascular, subdural or intrathecal injection.

## 2018-09-06 NOTE — PLAN OF CARE
Problem: Patient Care Overview  Goal: Plan of Care/Patient Progress Review  Outcome: Improving  BP readings unremarkable this shift, patient denies any PIH symptoms, no headache or vision problems, does have edema , DTR now normal , were brisk earlier, and no clonus Magnesium 1gm/hour infusing , may now be discontinued as per DR Fernandez. Patient afebrile and Ancef given, further IV antibiotics not needed as per MD and now discontinued. Tolerating oral fluids and now advanced to regular diet. BS active and passing gas pr. Tolerated ambulating to bathroom well with assist of 2.Rene patent  And draining and good output this shift, IV Pitocin now complete. Patient denies pain and has been given scheduled Toradol and Tylenol. Incision dressing intact. Stable at this time.    Next shift Rn updated and  will discontinue magnesium.

## 2018-09-06 NOTE — PROVIDER NOTIFICATION
09/05/18 2145   Provider Notification   Provider Name/Title Dr Yanez   Method of Notification Phone   Request Evaluate - Remote   Notification Reason SVE;Status Update;Pain   Updated Dr Yanez SVE 6-6.5/95 FHT no change. Pt continues with Nitrous for pain. Will cont to monitor and update prn.

## 2018-09-06 NOTE — PROVIDER NOTIFICATION
18 0153   Provider Notification   Provider Name/Title    Method of Notification At Bedside   Request Evaluate in Person    at the bedside to evaluate patient. FHT showed VD's and LD's. Contractions have spaced since terbutaline. Patient is requesting to proceed with  at this time. MD explained procedure and signed consent. Surgical team notified and began  prep. MD will place orders.

## 2018-09-06 NOTE — PROGRESS NOTES
[facility] Labor and Delivery Progress Note    Krystina Suazo MRN# [id number]   Age: 33 year old YOB: 1985           Subjective:   Called for prolonged fetal deceleration      -L lateral recumbency, O2, cessation of Pitocin, and terbutaline given      -fetal recovery; but persistent late decelerations            Objective:   Patient Vitals for the past 8 hrs:   BP Temp Temp src Resp   18 0153 143/65 - - -   18 0148 126/59 - - -   18 0142 105/53 97.9  F (36.6  C) Oral 20   18 0140 126/58 - - -   18 0135 131/59 - - -   18 0130 125/58 - - -   18 0122 141/68 - - -   18 2327 148/84 99.1  F (37.3  C) Oral 20   18 2230 - 99  F (37.2  C) Oral 20   18 2200 138/73 - - 20   18 2128 153/83 99.6  F (37.6  C) Oral 18   18 2030 - 99  F (37.2  C) Oral 18   18 2015 153/89 - - 20   18 1930 - 98.9  F (37.2  C) Oral 18 1910 140/69 - - -   18 1830 149/83 99.2  F (37.3  C) Oral 20        Cervical Exam:  / -2      Position: Mid    Membranes: Leaking     Fetal Heart Rate:    Monitor: Fetal Scalp Electrode    Variability: Moderate    Baseline Rate: 150 bpm    Fetal Heart Rate Tracing: see above          Assessment:   Krystina Suazo is a 33 year old  who is 38w4d here with          Plan:   Prepare for  section  -Risks, benefits, alternatives discussed; including but not limited to risk of infection, bleeding, damage to bowel/bladder and any other intra-abdominal viscera.  Risks also include risks of anesthesia and blood transfusion. Patient understands and agrees to planned procedure.    @sign@

## 2018-09-06 NOTE — PROVIDER NOTIFICATION
09/06/18 1446   Provider Notification   Provider Name/Title Dr Fernandez   Method of Notification Phone   Notification Reason Other   Comments   Comments No end time for magnesium   magnesium discontinued and ancef may be discontinued too,

## 2018-09-06 NOTE — PLAN OF CARE
Data: Krystina Suazo transferred to [facility] via wheelchair at 0605. Baby transferred via isolette.  Action: Receiving unit notified of transfer: Yes. Patient and family notified of room change. Report given to SHAQ Cornell at 0605. Belongings sent to receiving unit. Accompanied by Registered Nurse. Oriented patient to surroundings. Call light within reach. ID bands double-checked with receiving RN.  Response: Patient tolerated transfer and is stable.

## 2018-09-06 NOTE — PROGRESS NOTES
POD#0 s/p primary c/s for fetal intolerance of labor following IOL for GHTN.   Vitals:    09/06/18 0951 09/06/18 1052 09/06/18 1146 09/06/18 1425   BP: 145/66 138/68 129/62 127/63   Pulse:       Resp: 17 17 16    Temp: 98.2  F (36.8  C)  97.8  F (36.6  C)    TempSrc: Oral  Oral    SpO2: 97% 96% 96% 97%   Weight:       Height:          GHTN: nomotensive with rare mild range pressures. Does not meet criteria for preeclampsia with severe features as she is assymptmatic, normal HELLP labs, and no evidence of end organ damage. In this setting will discontinue magnesium sulfate early after 14 hours of administration. Will restart for symptomatic changes or worsening BPs in the severe range.     Discontinued antibiotics following 1 post-op dose as afebrile and no signs of infection.     Saima Fernandez MD

## 2018-09-06 NOTE — LACTATION NOTE
This note was copied from a baby's chart.  LC to see patient.  Her baby was cold this morning, but has warmed now.  STS contact used with t-shirt left on back and arms. Hand expression used to entice the latch.  Good rooting noted from infant and immediate latch.  Nutritive sucking with consistent swallows noted.  Plan for continued support prn.

## 2018-09-06 NOTE — PROVIDER NOTIFICATION
18 0135   Provider Notification   Provider Name/Title Dr. Yanez   Method of Notification At Bedside   Request Evaluate in Person   Notification Reason Fetal Baseline Change;Status Update;SVE    at the bedside to evaluate FHT. Recurrent deep VD's with slow return to baseline. Patient just obtained an epidural and now comfortable. SVE unchanged at 6 cm, swollen with caput present. VSS. Oxygen therapy and IV fluid bolus started, pitocin off. Verbal order to administer terbutaline at this time. Discussed  option to the patient if the terbutaline doesn't help. Patient and FOB agreeable with plan.

## 2018-09-06 NOTE — ANESTHESIA POSTPROCEDURE EVALUATION
Patient: Krystina Suazo    Procedure(s):   section - Wound Class: II-Clean Contaminated    Diagnosis: section  Diagnosis Additional Information: Pre-operative diagnosis: IUP at 38 weekws  PIH  Fetal intolerance of labor  Post-operative diagnosis: Same  Multiple leiomyoma  Procedure: Primary low transverse  section        Anesthesia Type:  Epidural    Note:  Anesthesia Post Evaluation    Patient location during evaluation: PACU  Patient participation: Able to fully participate in evaluation  Level of consciousness: awake and alert  Pain management: adequate  Airway patency: patent  Cardiovascular status: acceptable  Respiratory status: acceptable  Hydration status: acceptable  PONV: none     Anesthetic complications: None          Last vitals:  Vitals:    18 0336 18 0345 18 0352   BP: (!) 76/42 (!) 87/44 (!) 86/44   Pulse:      Resp: 22 18 20   Temp: 98.4  F (36.9  C)           Electronically Signed By: Allan East MD  2018  4:15 AM

## 2018-09-07 LAB
COPATH REPORT: NORMAL
HGB BLD-MCNC: 9.7 G/DL (ref 11.7–15.7)

## 2018-09-07 PROCEDURE — 12000029 ZZH R&B OB INTERMEDIATE

## 2018-09-07 PROCEDURE — 36415 COLL VENOUS BLD VENIPUNCTURE: CPT | Performed by: OBSTETRICS & GYNECOLOGY

## 2018-09-07 PROCEDURE — 25000132 ZZH RX MED GY IP 250 OP 250 PS 637: Performed by: OBSTETRICS & GYNECOLOGY

## 2018-09-07 PROCEDURE — 85018 HEMOGLOBIN: CPT | Performed by: OBSTETRICS & GYNECOLOGY

## 2018-09-07 RX ADMIN — IBUPROFEN 600 MG: 600 TABLET ORAL at 05:17

## 2018-09-07 RX ADMIN — PRENATAL VIT W/ FE FUMARATE-FA TAB 27-0.8 MG 1 TABLET: 27-0.8 TAB at 09:56

## 2018-09-07 RX ADMIN — ACETAMINOPHEN 975 MG: 325 TABLET, FILM COATED ORAL at 05:17

## 2018-09-07 RX ADMIN — IBUPROFEN 600 MG: 600 TABLET ORAL at 12:59

## 2018-09-07 RX ADMIN — IBUPROFEN 600 MG: 600 TABLET ORAL at 19:18

## 2018-09-07 RX ADMIN — ACETAMINOPHEN 975 MG: 325 TABLET, FILM COATED ORAL at 20:59

## 2018-09-07 RX ADMIN — SENNOSIDES AND DOCUSATE SODIUM 1 TABLET: 8.6; 5 TABLET ORAL at 20:59

## 2018-09-07 RX ADMIN — ACETAMINOPHEN 975 MG: 325 TABLET, FILM COATED ORAL at 12:58

## 2018-09-07 RX ADMIN — SERTRALINE HYDROCHLORIDE 75 MG: 50 TABLET ORAL at 09:57

## 2018-09-07 NOTE — PLAN OF CARE
Problem: Patient Care Overview  Goal: Plan of Care/Patient Progress Review  Outcome: Improving  Pt up independent in room and self cares. Using Ibuprofen and tylenol for pain and discomfort. Morgan intact, open to air.  at bedside and very supportive. No concerns at this time.

## 2018-09-07 NOTE — PROGRESS NOTES
[facility] Obstetrics Post-Op / Progress Note         Assessment and Plan:    Assessment:   Post-operative day #1  Low transverse primary  section  L&D complications: Persistent late decelerations  Gestational hypertension  Uterine fibroids      Doing well.  Clean wound without signs of infection.  No excessive bleeding  Pain well-controlled.  BPs normal range with no signs/symptoms pre eclampsia      Plan:   Ambulation encouraged  Monitor wound for signs of infection  Pain control measures as needed  Reportable signs and symptoms dicussed with the patient  Anticipate discharge in 1-2 days           Interval History:   Doing well.  Pain is well-controlled.  No fevers.  No history of wound drainage, warmth or significant erythema.  Good appetite.  Denies chest pain, shortness of breath, nausea or vomiting.  Ambulatory.  Breastfeeding well.          Significant Problems:      Patient Active Problem List   Diagnosis     Chronic rhinitis     COURTNEY (generalized anxiety disorder)     Major depressive disorder, recurrent episode, moderate (H)     Supervision of normal first pregnancy     Supervision of high-risk pregnancy of elderly multigravida     Uterine leiomyoma, unspecified location     Allergic rhinitis     Mild intermittent asthma without complication     Vasovagal syncope     Hypertension     Indication for care in labor or delivery     S/P              Review of Systems:    The patient denies any chest pain, shortness of breath, excessive pain, fever, chills, purulent drainage from the wound, nausea or vomiting.          Medications:   All medications related to the patient's surgery have been reviewed          Physical Exam:     All vitals stable  Patient Vitals for the past 12 hrs:   BP Temp Temp src Pulse Resp SpO2   18 2343 125/63 98.2  F (36.8  C) Oral 74 16 96 %   18 2052 118/55 98.5  F (36.9  C) Oral 74 18 -     Wound clean and dry with minimal or no drainage.  Surrounding  skin with minimal erythema.  Ext NT          Data:     Hemoglobin   Date Value Ref Range Status   09/07/2018 9.7 (L) 11.7 - 15.7 g/dL Final   09/06/2018 11.6 (L) 11.7 - 15.7 g/dL Final   09/05/2018 13.2 11.7 - 15.7 g/dL Final   09/04/2018 12.4 11.7 - 15.7 g/dL Final   08/30/2018 12.7 11.7 - 15.7 g/dL Final     -    Aníbal Pearson MD  9/7/2018 8:44 AM

## 2018-09-07 NOTE — PLAN OF CARE
Problem: Patient Care Overview  Goal: Plan of Care/Patient Progress Review  Outcome: Improving  Pt. VSS. Pain managed with tylenol and ibuprofen. Dressing has shadow serosanguinous drainage, without signs of infection. Voiding adequately. Ambulating well independently. Independent in personal and infant cares. Breastfeeding infant. Attentive to infant needs and bonding well with infant. FOB at bedside, supportive.

## 2018-09-07 NOTE — PLAN OF CARE
Problem: Patient Care Overview  Goal: Plan of Care/Patient Progress Review  Outcome: Improving  Patient is meeting expected goals for this shift. Her magnesium sulfate and warren were discontinued around 1600; she has been up once to the bathroom and voided a large amount. She denies any headache, blurred vision or epigastric pain. Edema noted. relexes normal and no clonus. Pain is managed with scheduled toradol and tylenol. Very attentive to baby, bonding evident. Monitor.

## 2018-09-07 NOTE — ANESTHESIA POSTPROCEDURE EVALUATION
Patient: Krystina Suazo    * No procedures listed *    Diagnosis:* No pre-op diagnosis entered *  Diagnosis Additional Information: Labor pain    Anesthesia Type:  Epidural    Note:  Anesthesia Post Evaluation    Patient location during evaluation: Bedside       Comments: Patient post labor epidural catheter, doing well.  She ultimately went to  section for delivery and the epidural catheter was used for the anesthetic.  The catheter was removed following the  section.  She reports good pain relief with epidural catheter.  She denies ongoing sensorimotor block, headache, fever, chills or other complaints.  All questions answered, understanding voiced.  She will have us contacted for any questions or problems.    I or my partner was immediately available for management of this patient during epidural analgesia infusion.         Last vitals:  Vitals:    18 1616 18 2052 18 2343   BP: 121/62 118/55 125/63   Pulse: 72 74 74   Resp: 18 18 16   Temp: 98.2  F (36.8  C) 98.5  F (36.9  C) 98.2  F (36.8  C)   SpO2:   96%         Electronically Signed By: Franklin Vazquez MD  2018  8:26 AM

## 2018-09-08 PROCEDURE — 25000132 ZZH RX MED GY IP 250 OP 250 PS 637: Performed by: OBSTETRICS & GYNECOLOGY

## 2018-09-08 PROCEDURE — 12000027 ZZH R&B OB

## 2018-09-08 PROCEDURE — 40000083 ZZH STATISTIC IP LACTATION SERVICES 1-15 MIN

## 2018-09-08 RX ADMIN — SENNOSIDES AND DOCUSATE SODIUM 1 TABLET: 8.6; 5 TABLET ORAL at 21:11

## 2018-09-08 RX ADMIN — ACETAMINOPHEN 975 MG: 325 TABLET, FILM COATED ORAL at 05:24

## 2018-09-08 RX ADMIN — IBUPROFEN 600 MG: 600 TABLET ORAL at 15:28

## 2018-09-08 RX ADMIN — ACETAMINOPHEN 975 MG: 325 TABLET, FILM COATED ORAL at 13:36

## 2018-09-08 RX ADMIN — IBUPROFEN 600 MG: 600 TABLET ORAL at 21:09

## 2018-09-08 RX ADMIN — ACETAMINOPHEN 975 MG: 325 TABLET, FILM COATED ORAL at 21:09

## 2018-09-08 RX ADMIN — PRENATAL VIT W/ FE FUMARATE-FA TAB 27-0.8 MG 1 TABLET: 27-0.8 TAB at 08:13

## 2018-09-08 RX ADMIN — IBUPROFEN 600 MG: 600 TABLET ORAL at 07:23

## 2018-09-08 RX ADMIN — SERTRALINE HYDROCHLORIDE 75 MG: 50 TABLET ORAL at 08:13

## 2018-09-08 RX ADMIN — IBUPROFEN 600 MG: 600 TABLET ORAL at 00:20

## 2018-09-08 NOTE — PLAN OF CARE
Problem: Patient Care Overview  Goal: Plan of Care/Patient Progress Review  Outcome: Improving  Pt up ambulating independently in hallway. Voiding without difficulty. Incision CDI staples for closure. Reports adequate pain control with current pain plan. Family present and supportive. Meeting expected goals. Mother attentive to infants needs.Breastfeeding infant.

## 2018-09-08 NOTE — PROGRESS NOTES
[facility] Obstetrics Post-Op / Progress Note         Assessment and Plan:    Assessment:   Post-operative day #2  Low transverse primary  section  L&D complications:  section      Doing well.      Plan:   Anticipate discharge tomorrow           Interval History:   Doing well.  Pain is well-controlled.  No fevers.  No history of wound drainage, warmth or significant erythema.  Good appetite.  Denies chest pain, shortness of breath, nausea or vomiting.  Ambulatory.  Breastfeeding well.          Significant Problems:    None          Review of Systems:    The patient denies any chest pain, shortness of breath, excessive pain, fever, chills, purulent drainage from the wound, nausea or vomiting.          Medications:   All medications related to the patient's surgery have been reviewed          Physical Exam:     All vitals stable  Patient Vitals for the past 12 hrs:   BP Temp Temp src Pulse Resp   18 0800 135/67 98.5  F (36.9  C) Oral 99 18   18 0524 138/78 - - 98 -   18 2351 138/64 98.8  F (37.1  C) Oral 80 16     Wound clean and dry with minimal or no drainage.  Surrounding skin with minimal erythema.          Data:     All laboratory data related to this surgery reviewed  Hemoglobin   Date Value Ref Range Status   2018 9.7 (L) 11.7 - 15.7 g/dL Final   2018 11.6 (L) 11.7 - 15.7 g/dL Final   2018 13.2 11.7 - 15.7 g/dL Final   2018 12.4 11.7 - 15.7 g/dL Final   2018 12.7 11.7 - 15.7 g/dL Final     No imaging studies have been ordered    Alex Reilly MD, MD

## 2018-09-08 NOTE — PLAN OF CARE
Problem: Patient Care Overview  Goal: Plan of Care/Patient Progress Review  Outcome: Improving  Reports some increased pain with movement, managing with ibuprofen and tylenol, pt knows oxycodone is available. ind with self and baby cares. FOB is supportive and helpful at the bedside. Bonding well with baby.

## 2018-09-08 NOTE — PLAN OF CARE
Problem: Patient Care Overview  Goal: Plan of Care/Patient Progress Review  Outcome: No Change  Patient up and around voiding without complications   Taking ibuprofen for pain with relief -passing gas   Working on breast feeding baby

## 2018-09-09 VITALS
DIASTOLIC BLOOD PRESSURE: 60 MMHG | WEIGHT: 265 LBS | OXYGEN SATURATION: 96 % | BODY MASS INDEX: 39.25 KG/M2 | HEIGHT: 69 IN | RESPIRATION RATE: 18 BRPM | SYSTOLIC BLOOD PRESSURE: 132 MMHG | HEART RATE: 99 BPM | TEMPERATURE: 98.2 F

## 2018-09-09 PROCEDURE — 25000132 ZZH RX MED GY IP 250 OP 250 PS 637: Performed by: OBSTETRICS & GYNECOLOGY

## 2018-09-09 RX ADMIN — ACETAMINOPHEN 650 MG: 325 TABLET, FILM COATED ORAL at 00:48

## 2018-09-09 RX ADMIN — ACETAMINOPHEN 650 MG: 325 TABLET, FILM COATED ORAL at 06:24

## 2018-09-09 RX ADMIN — PRENATAL VIT W/ FE FUMARATE-FA TAB 27-0.8 MG 1 TABLET: 27-0.8 TAB at 09:01

## 2018-09-09 RX ADMIN — IBUPROFEN 600 MG: 600 TABLET ORAL at 09:22

## 2018-09-09 RX ADMIN — SERTRALINE HYDROCHLORIDE 75 MG: 50 TABLET ORAL at 09:01

## 2018-09-09 RX ADMIN — IBUPROFEN 600 MG: 600 TABLET ORAL at 04:07

## 2018-09-09 NOTE — PLAN OF CARE
Problem: Patient Care Overview  Goal: Plan of Care/Patient Progress Review  Outcome: Adequate for Discharge Date Met: 09/09/18  Staples removed steri-strips applied per MD order   Incision intact with scant serous drain  Discharge orders received -plan for discharge today

## 2018-09-09 NOTE — PLAN OF CARE
Problem: Patient Care Overview  Goal: Discharge Needs Assessment  Outcome: Adequate for Discharge Date Met: 09/09/18  Discharge instructions reviewed education complete   Patient discharged home per MD order  Walked out at 1350 with  and baby

## 2018-09-09 NOTE — H&P
[facility] Obstetrics Post-Op / Progress Note         Assessment and Plan:    Assessment:   Post-operative day #3  Low transverse primary  section  L&D complications: Fetal intolerance of labor      Doing well.  Clean wound without signs of infection.  No immediate surgical complications identified.  No excessive bleeding  Pain well-controlled.      Plan:   Ambulation encouraged  Monitor wound for signs of infection  Pain control measures as needed  Reportable signs and symptoms dicussed with the patient  Discharge later today           Interval History:   Doing well.  Pain is well-controlled.  No fevers.  No history of wound drainage, warmth or significant erythema.  Good appetite.  Denies chest pain, shortness of breath, nausea or vomiting.  Ambulatory.  Breastfeeding well.          Significant Problems:      Patient Active Problem List   Diagnosis     Chronic rhinitis     COURTNEY (generalized anxiety disorder)     Major depressive disorder, recurrent episode, moderate (H)     Supervision of normal first pregnancy     Supervision of high-risk pregnancy of elderly multigravida     Uterine leiomyoma, unspecified location     Allergic rhinitis     Mild intermittent asthma without complication     Vasovagal syncope     Hypertension     Indication for care in labor or delivery     S/P              Review of Systems:    The patient denies any chest pain, shortness of breath, excessive pain, fever, chills, purulent drainage from the wound, nausea or vomiting.          Medications:   All medications related to the patient's surgery have been reviewed          Physical Exam:     All vitals stable  Patient Vitals for the past 12 hrs:   BP Temp Temp src Heart Rate Resp   18 0048 139/88 97.3  F (36.3  C) Oral 74 16     Wound clean and dry with minimal or no drainage.  Surrounding skin with minimal erythema.  Ext NT 2+ edema bilat          Data:     Hemoglobin   Date Value Ref Range Status    09/07/2018 9.7 (L) 11.7 - 15.7 g/dL Final   09/06/2018 11.6 (L) 11.7 - 15.7 g/dL Final   09/05/2018 13.2 11.7 - 15.7 g/dL Final   09/04/2018 12.4 11.7 - 15.7 g/dL Final   08/30/2018 12.7 11.7 - 15.7 g/dL Final     -    Aníbal Pearson MD  9/9/2018 9:04 AM

## 2018-09-09 NOTE — PLAN OF CARE
Problem: Patient Care Overview  Goal: Plan of Care/Patient Progress Review  Pt is up ad calvin, and reports adequate pain control. Family is present and supportive. Pt is attentive to infants needs. Meeting expected goals. Breastfeeding and started to pump after feeding.

## 2018-09-09 NOTE — PLAN OF CARE
Problem: Patient Care Overview  Goal: Plan of Care/Patient Progress Review  Outcome: Improving  ind with self and baby cares. Breastfeeding, pumping, and finger feeding EBM, encouraged to call for help as needed. Managing pain with ibuprofen and tylenol, knows oxycodone is available for breakthrew pain. Bonding well with baby.

## 2018-09-09 NOTE — LACTATION NOTE
Lactation in to see patient. Observed a feed. Changed position to have baby looking up at breast, patient states latch feels better. Swallows heard. Baby at times sleepy, encouraged patient to take off baby's clothes next time and nurse skin to skin. All questions answered, encouraged hand expressing while nursing. Knows to call for assistance prn.

## 2018-09-10 NOTE — DISCHARGE SUMMARY
[facility] Discharge Summary    Krystina Suazo MRN# [id number]   Age: 33 year old YOB: 1985     Date of Admission:  2018  Date of Discharge::  2018  1:59 PM  Admitting Physician:  Jay Yanez MD  Discharge Physician:  Jay Yanez MD     Home clinic: [facility]          Admission Diagnoses:   Indication for care in labor or delivery   section  S/P           Discharge Diagnosis:    section          Procedures:   Procedure(s): Primary low transverse  section       No other procedures performed during this admission           Medications Prior to Admission:     No prescriptions prior to admission.             Discharge Medications:     Discharge Medication List as of 2018 11:18 AM      CONTINUE these medications which have NOT CHANGED    Details   albuterol (PROAIR HFA/PROVENTIL HFA/VENTOLIN HFA) 108 (90 Base) MCG/ACT inhaler Inhale 2 puffs into the lungs every 4 hours as needed for shortness of breath / dyspnea or wheezing, Disp-1 Inhaler, R-11, E-Prescribe      clobetasol (TEMOVATE) 0.05 % external solution Apply topically 2 times daily Apply to affected areas on scalp bid as neededDisp-50 mL, [id number]      clobetasol propionate 0.05 % FOAM Apply sparingly to affected area twice daily as needed.  Do not apply to face.Disp-50 g, [id number]      Fluocinolone Acetonide Scalp 0.01 % OIL oil Apply 2 mLs topically daily To scalpDisp-118 mL, [id number]      Prenatal Vit-Fe Fumarate-FA (PRENATAL MULTIVITAMIN PLUS IRON) 27-0.8 MG TABS per tablet Take 1 tablet by mouth daily, Disp-100 tablet, R-3, No Print Out      sertraline (ZOLOFT) 50 MG tablet Take 1.5 tablets (75 mg) by mouth daily Patient will call when needs filled., Disp-135 tablet, R-1, E-Prescribe                   Consultations:   No consultations were requested during this admission          Brief History of Labor or Admission:   See note            Hospital Course:   The patient's hospital course was unremarkable.  She recovered as anticipated and experienced no post-operative complications.  On discharge, her pain was well controlled. Vaginal bleeding is similar to peak menstrual flow.  Voiding without difficulty.  Ambulating well and tolerating a normal diet.  No fever or significant wound drainage.  Breastfeeding well.  Infant is stable.  No bowel movement yet.  She was discharged on post-partum day #3.    Post-partum hemoglobin:   Hemoglobin   Date Value Ref Range Status   09/07/2018 9.7 (L) 11.7 - 15.7 g/dL Final             Discharge Instructions and Follow-Up:   Discharge diet: Regular   Discharge activity: Lifting restricted to baby and car seat for 6 weeks  No driving or operating machinery while on narcotic analgesics  Pelvic rest: abstain from intercourse and do not use tampons for 6 week(s)   Discharge follow-up: Follow up with primary care provider in 2 weeks   Wound care: Drink plenty of fluids           Discharge Disposition:   Discharged to home      Attestation:  I have reviewed today's vital signs, notes, medications, labs and imaging.    Jay Yanez MD

## 2018-09-12 NOTE — NURSING NOTE
Spoke with triage to discuss if ok for patient to go home since BP was WNL. She discussed with MD who was at another clinic and was told it was ok for patient to leave.   Elle Sexton, CMA

## 2018-09-21 ENCOUNTER — OFFICE VISIT (OUTPATIENT)
Dept: OBGYN | Facility: CLINIC | Age: 33
End: 2018-09-21
Payer: COMMERCIAL

## 2018-09-21 VITALS — BODY MASS INDEX: 35.84 KG/M2 | SYSTOLIC BLOOD PRESSURE: 112 MMHG | WEIGHT: 242.7 LBS | DIASTOLIC BLOOD PRESSURE: 70 MMHG

## 2018-09-21 DIAGNOSIS — Z98.890 POSTOPERATIVE STATE: Primary | ICD-10-CM

## 2018-09-21 PROBLEM — D25.9 UTERINE LEIOMYOMA, UNSPECIFIED LOCATION: Status: RESOLVED | Noted: 2018-02-23 | Resolved: 2018-09-21

## 2018-09-21 PROBLEM — O09.529 SUPERVISION OF HIGH-RISK PREGNANCY OF ELDERLY MULTIGRAVIDA: Status: RESOLVED | Noted: 2018-02-23 | Resolved: 2018-09-21

## 2018-09-21 PROBLEM — Z34.00 SUPERVISION OF NORMAL FIRST PREGNANCY: Status: RESOLVED | Noted: 2018-02-13 | Resolved: 2018-09-21

## 2018-09-21 PROCEDURE — 99024 POSTOP FOLLOW-UP VISIT: CPT | Performed by: OBSTETRICS & GYNECOLOGY

## 2018-09-21 NOTE — PROGRESS NOTES
Here for postop check      -no gi or gu complaints      O: incision intact    A: satisfactory postop progress  P: return in 4 weeks PP check

## 2018-09-21 NOTE — MR AVS SNAPSHOT
After Visit Summary   9/21/2018    Krystina Suazo    MRN: [id number]           Patient Information     Date Of Birth          1985        Visit Information        Provider Department      9/21/2018 2:15 PM Jay Yanez MD [facility]        Today's Diagnoses     Postoperative state    -  1       Follow-ups after your visit        Your next 10 appointments already scheduled     Oct 16, 2018  2:15 PM CDT   Post Partum with Sandy Tran MD   [facility]    [address]  [address]  [facility] [id number] [phone]              Who to contact     If you have questions or need follow up information about today's clinic visit or your schedule please contact [facility] directly at [phone].  Normal or non-critical lab and imaging results will be communicated to you by MyChart, letter or phone within 4 business days after the clinic has received the results. If you do not hear from us within 7 days, please contact the clinic through MyChart or phone. If you have a critical or abnormal lab result, we will notify you by phone as soon as possible.  Submit refill requests through [employer] or call your pharmacy and they will forward the refill request to us. Please allow 3 business days for your refill to be completed.          Additional Information About Your Visit        MyChart Information     [employer] gives you secure access to your electronic health record. If you see a primary care provider, you can also send messages to your care team and make appointments. If you have questions, please call your primary care clinic.  If you do not have a primary care provider, please call [phone] and they will assist you.        Care EveryWhere ID     This is your Care EveryWhere ID. This could be used by other organizations to access your [facility] medical records  [id number]        Your Vitals  Were     Last Period BMI (Body Mass Index)                12/20/2017 35.84 kg/m2           Blood Pressure from Last 3 Encounters:   09/21/18 112/70   09/09/18 132/60   09/04/18 140/86    Weight from Last 3 Encounters:   09/21/18 242 lb 11.2 oz (110.1 kg)   09/04/18 265 lb (120.2 kg)   09/04/18 261 lb (118.4 kg)              Today, you had the following     No orders found for display       Primary Care Provider Office Phone # Fax #    Valeria Daly [phone] [address] [place] [address]        Equal Access to Services     [facility]: Hadii sanjiv Velasquez, watom whitlock, esau kaalmada zander, ruth guillaume . So [facility] [phone].    ATENCIÓN: Si habla español, tiene a wesley disposición servicios gratuitos de asistencia lingüística. Llame al [phone].    We comply with applicable federal civil rights laws and [place] laws. We do not discriminate on the basis of race, color, national origin, age, disability, sex, sexual orientation, or gender identity.            Thank you!     Thank you for choosing [facility]  for your care. Our goal is always to provide you with excellent care. Hearing back from our patients is one way we can continue to improve our services. Please take a few minutes to complete the written survey that you may receive in the mail after your visit with us. Thank you!             Your Updated Medication List - Protect others around you: Learn how to safely use, store and throw away your medicines at www.disposemymeds.org.          This list is accurate as of 9/21/18  2:33 PM.  Always use your most recent med list.                   Brand Name Dispense Instructions for use Diagnosis    albuterol 108 (90 Base) MCG/ACT inhaler    PROAIR HFA/PROVENTIL HFA/VENTOLIN HFA    1 Inhaler    Inhale 2 puffs into the lungs every 4 hours as needed for shortness of breath / dyspnea or wheezing    Mild  intermittent asthma without complication       * clobetasol 0.05 % external solution    TEMOVATE    50 mL    Apply topically 2 times daily Apply to affected areas on scalp bid as needed    Scalp psoriasis       * clobetasol propionate 0.05 % Foam     50 g    Apply sparingly to affected area twice daily as needed.  Do not apply to face.    Scalp psoriasis       Fluocinolone Acetonide Scalp 0.01 % Oil oil     118 mL    Apply 2 mLs topically daily To scalp    Scalp psoriasis       prenatal multivitamin plus iron 27-0.8 MG Tabs per tablet     100 tablet    Take 1 tablet by mouth daily    Encounter for supervision of normal first pregnancy in first trimester       sertraline 50 MG tablet    ZOLOFT    135 tablet    Take 1.5 tablets (75 mg) by mouth daily Patient will call when needs filled.    COURTNEY (generalized anxiety disorder), Major depressive disorder, recurrent episode, moderate (H)       * Notice:  This list has 2 medication(s) that are the same as other medications prescribed for you. Read the directions carefully, and ask your doctor or other care provider to review them with you.

## 2018-09-21 NOTE — NURSING NOTE
"Chief Complaint   Patient presents with     Post-op Visit   Haylee Quintanilla MA      Initial /70 (BP Location: Right arm, Patient Position: Chair, Cuff Size: Adult Large)  Wt 242 lb 11.2 oz (110.1 kg)  LMP 2017  BMI 35.84 kg/m2 Estimated body mass index is 35.84 kg/(m^2) as calculated from the following:    Height as of 18: 5' 9\" (1.753 m).    Weight as of this encounter: 242 lb 11.2 oz (110.1 kg).  BP completed using cuff size: large        The following HM Due: NONE      The following patient reported/Care Every where data was sent to:  P ABSTRACT QUALITY INITIATIVES [09654]                      "

## 2018-10-10 ENCOUNTER — NURSE TRIAGE (OUTPATIENT)
Dept: NURSING | Facility: CLINIC | Age: 33
End: 2018-10-10

## 2018-10-10 NOTE — TELEPHONE ENCOUNTER
Patient calling questioning if she can get the flu shot with cold symptoms? Reports mild nasal congestion. Afebrile.   Patient will keep scheduled appointment for immunization.  Reviewed per CDC web site:  It is usually okay to get flu vaccine when you have a mild illness.    Caller verbalized understanding. Denies further questions.    Drea Kennedy RN  [facility]

## 2018-10-16 ENCOUNTER — PRENATAL OFFICE VISIT (OUTPATIENT)
Dept: OBGYN | Facility: CLINIC | Age: 33
End: 2018-10-16
Payer: COMMERCIAL

## 2018-10-16 VITALS
SYSTOLIC BLOOD PRESSURE: 110 MMHG | DIASTOLIC BLOOD PRESSURE: 62 MMHG | HEIGHT: 69 IN | WEIGHT: 250 LBS | BODY MASS INDEX: 37.03 KG/M2

## 2018-10-16 DIAGNOSIS — Z23 NEED FOR PROPHYLACTIC VACCINATION AND INOCULATION AGAINST INFLUENZA: ICD-10-CM

## 2018-10-16 PROCEDURE — 99207 ZZC POST PARTUM EXAM: CPT | Performed by: OBSTETRICS & GYNECOLOGY

## 2018-10-16 PROCEDURE — 90471 IMMUNIZATION ADMIN: CPT | Performed by: OBSTETRICS & GYNECOLOGY

## 2018-10-16 PROCEDURE — 90686 IIV4 VACC NO PRSV 0.5 ML IM: CPT | Performed by: OBSTETRICS & GYNECOLOGY

## 2018-10-16 NOTE — PROGRESS NOTES
"  Krystina is here for a 6-week postpartum checkup.    She had a  of a viable boy, weight 6 pounds 12 oz., with none complications. Date of delivery was 18. Since delivery, she has been breast feeding.  She has no signs of infection, bleeding or other complications.  She is not pregnant.  We discussed contraceptions and she has chosen condoms.      Post partum tubal: No  History of Gestational Diabetes? No  Type of Delivery:    Feeding Method:  Breast  If initiated breast feeding and stopped, how long did you breast feed?:  Not applicable    REVIEW OF SYSTEMS:  ROS otherwise negative.    Contraception Plan: condoms    Medical History Reviewed yes -   Family History Reviewed yes -   Problem List Updated no    EXAM:  /62  Ht 5' 9\" (1.753 m)  Wt 250 lb (113.4 kg)  Breastfeeding? Yes  BMI 36.92 kg/m2  HEENT: grossly normal.  ABDOMEN: soft, non tender, good bowel sounds, without masses rebound, guarding or tenderness.  EXTREMITIES:  warm to touch, good pulses, no ankle edema or calf tenderness.  NEUROLOGIC: grossly normal.  Incision clean, dry, and intact.    ASSESSMENT:   6-week postpartum exam after c/s for obstetrical complications.    PLAN:    Contraception methods discussed  Exercise encouraged  Flu shot recommended; she chose to get this.  Follow up in 1 year.  One-hour glucose tolerance test needed? No  Condoms for contraception.    Sandy Tran MD      "

## 2018-10-16 NOTE — PROGRESS NOTES

## 2018-10-16 NOTE — MR AVS SNAPSHOT
"              After Visit Summary   10/16/2018    Krystina Suazo    MRN: [id number]           Patient Information     Date Of Birth          1985        Visit Information        Provider Department      10/16/2018 2:15 PM Sandy Tran MD [facility]        Today's Diagnoses     Routine postpartum follow-up    -  1       Follow-ups after your visit        Who to contact     If you have questions or need follow up information about today's clinic visit or your schedule please contact [facility] directly at [phone].  Normal or non-critical lab and imaging results will be communicated to you by Digital Health Dialoghart, letter or phone within 4 business days after the clinic has received the results. If you do not hear from us within 7 days, please contact the clinic through AirSense Wirelesst or phone. If you have a critical or abnormal lab result, we will notify you by phone as soon as possible.  Submit refill requests through iNeed or call your pharmacy and they will forward the refill request to us. Please allow 3 business days for your refill to be completed.          Additional Information About Your Visit        MyChart Information     iNeed gives you secure access to your electronic health record. If you see a primary care provider, you can also send messages to your care team and make appointments. If you have questions, please call your primary care clinic.  If you do not have a primary care provider, please call [phone] and they will assist you.        Care EveryWhere ID     This is your Care EveryWhere ID. This could be used by other organizations to access your [place] medical records  [id number]        Your Vitals Were     Height Breastfeeding? BMI (Body Mass Index)             5' 9\" (1.753 m) Yes 36.92 kg/m2          Blood Pressure from Last 3 Encounters:   10/16/18 110/62   09/21/18 112/70   09/09/18 132/60    Weight from Last 3 Encounters:   10/16/18 250 lb (113.4 " kg)   09/21/18 242 lb 11.2 oz (110.1 kg)   09/04/18 265 lb (120.2 kg)              Today, you had the following     No orders found for display       Primary Care Provider Office Phone # Fax #    Valeria Daly [phone] [address] [place] [address]        Equal Access to Services     [facility]: Hadii aad ku hadasho Soomaali, waaxda luqadaha, qaybta kaalmada adeegyada, waxay idiin hayaan adeeg kharash la'aan ah. So [facility] [phone].    ATENCIÓN: Si habla español, tiene a wesley disposición servicios gratuitos de asistencia lingüística. [facility] [phone].    We comply with applicable federal civil rights laws and [place] laws. We do not discriminate on the basis of race, color, national origin, age, disability, sex, sexual orientation, or gender identity.            Thank you!     Thank you for choosing [facility]  for your care. Our goal is always to provide you with excellent care. Hearing back from our patients is one way we can continue to improve our services. Please take a few minutes to complete the written survey that you may receive in the mail after your visit with us. Thank you!             Your Updated Medication List - Protect others around you: Learn how to safely use, store and throw away your medicines at www.disposemymeds.org.          This list is accurate as of 10/16/18  2:54 PM.  Always use your most recent med list.                   Brand Name Dispense Instructions for use Diagnosis    albuterol 108 (90 Base) MCG/ACT inhaler    PROAIR HFA/PROVENTIL HFA/VENTOLIN HFA    1 Inhaler    Inhale 2 puffs into the lungs every 4 hours as needed for shortness of breath / dyspnea or wheezing    Mild intermittent asthma without complication       * clobetasol 0.05 % external solution    TEMOVATE    50 mL    Apply topically 2 times daily Apply to affected areas on scalp bid as needed    Scalp psoriasis       * clobetasol propionate 0.05 % Foam      50 g    Apply sparingly to affected area twice daily as needed.  Do not apply to face.    Scalp psoriasis       Fluocinolone Acetonide Scalp 0.01 % Oil oil     118 mL    Apply 2 mLs topically daily To scalp    Scalp psoriasis       prenatal multivitamin plus iron 27-0.8 MG Tabs per tablet     100 tablet    Take 1 tablet by mouth daily    Encounter for supervision of normal first pregnancy in first trimester       sertraline 50 MG tablet    ZOLOFT    135 tablet    Take 1.5 tablets (75 mg) by mouth daily Patient will call when needs filled.    COURTNEY (generalized anxiety disorder), Major depressive disorder, recurrent episode, moderate (H)       * Notice:  This list has 2 medication(s) that are the same as other medications prescribed for you. Read the directions carefully, and ask your doctor or other care provider to review them with you.

## 2018-10-17 ASSESSMENT — PATIENT HEALTH QUESTIONNAIRE - PHQ9: SUM OF ALL RESPONSES TO PHQ QUESTIONS 1-9: 2

## 2018-10-26 ENCOUNTER — OFFICE VISIT (OUTPATIENT)
Dept: PEDIATRICS | Facility: CLINIC | Age: 33
End: 2018-10-26
Payer: COMMERCIAL

## 2018-10-26 VITALS
TEMPERATURE: 98.4 F | BODY MASS INDEX: 36.73 KG/M2 | SYSTOLIC BLOOD PRESSURE: 112 MMHG | HEART RATE: 63 BPM | WEIGHT: 248 LBS | HEIGHT: 69 IN | DIASTOLIC BLOOD PRESSURE: 68 MMHG | OXYGEN SATURATION: 97 %

## 2018-10-26 DIAGNOSIS — G56.03 BILATERAL CARPAL TUNNEL SYNDROME: ICD-10-CM

## 2018-10-26 DIAGNOSIS — M65.4 DE QUERVAIN'S DISEASE (TENOSYNOVITIS): Primary | ICD-10-CM

## 2018-10-26 PROCEDURE — 99213 OFFICE O/P EST LOW 20 MIN: CPT | Performed by: PHYSICIAN ASSISTANT

## 2018-10-26 NOTE — MR AVS SNAPSHOT
After Visit Summary   10/26/2018    Krystina Suazo    MRN: [id number]           Patient Information     Date Of Birth          1985        Visit Information        Provider Department      10/26/2018 2:30 PM David Dean [facility]        Today's Diagnoses     De Quervain's disease (tenosynovitis)    -  1    Bilateral carpal tunnel syndrome          Care Instructions    UTD info given regarding dequervains          Follow-ups after your visit        Follow-up notes from your care team     Return in about 3 weeks (around 11/16/2018) for if symptoms worsen or fail to improve.      Who to contact     If you have questions or need follow up information about today's clinic visit or your schedule please contact [facility] directly at [phone].  Normal or non-critical lab and imaging results will be communicated to you by MyChart, letter or phone within 4 business days after the clinic has received the results. If you do not hear from us within 7 days, please contact the clinic through MyChart or phone. If you have a critical or abnormal lab result, we will notify you by phone as soon as possible.  Submit refill requests through Triangulate or call your pharmacy and they will forward the refill request to us. Please allow 3 business days for your refill to be completed.          Additional Information About Your Visit        MyChart Information     Triangulate gives you secure access to your electronic health record. If you see a primary care provider, you can also send messages to your care team and make appointments. If you have questions, please call your primary care clinic.  If you do not have a primary care provider, please call [phone] and they will assist you.        Care EveryWhere ID     This is your Care EveryWhere ID. This could be used by other organizations to access your [place] medical records  [id number]        Your Vitals Were      "Pulse Temperature Height Pulse Oximetry BMI (Body Mass Index)       63 98.4  F (36.9  C) (Tympanic) 5' 9\" (1.753 m) 97% 36.62 kg/m2        Blood Pressure from Last 3 Encounters:   10/26/18 112/68   10/16/18 110/62   09/21/18 112/70    Weight from Last 3 Encounters:   10/26/18 248 lb (112.5 kg)   10/16/18 250 lb (113.4 kg)   09/21/18 242 lb 11.2 oz (110.1 kg)              Today, you had the following     No orders found for display       Primary Care Provider Office Phone # Fax #    Valeria Daly [phone]       [facility] [place] [address]        Equal Access to Services     JOSE ANGEL KURTZ : Hillary sood Somadelyn, waaxda luqadaha, qaybta kaalmada adeegyada, ruth guillaume . So [facility] [phone].    ATENCIÓN: Si habla español, tiene a wesley disposición servicios gratuitos de asistencia lingüística. Llame al [phone].    We comply with applicable federal civil rights laws and [place] laws. We do not discriminate on the basis of race, color, national origin, age, disability, sex, sexual orientation, or gender identity.            Thank you!     Thank you for choosing [facility] [place]  for your care. Our goal is always to provide you with excellent care. Hearing back from our patients is one way we can continue to improve our services. Please take a few minutes to complete the written survey that you may receive in the mail after your visit with us. Thank you!             Your Updated Medication List - Protect others around you: Learn how to safely use, store and throw away your medicines at www.disposemymeds.org.          This list is accurate as of 10/26/18  4:57 PM.  Always use your most recent med list.                   Brand Name Dispense Instructions for use Diagnosis    albuterol 108 (90 Base) MCG/ACT inhaler    PROAIR HFA/PROVENTIL HFA/VENTOLIN HFA    1 Inhaler    Inhale 2 puffs into the lungs every 4 hours as needed for shortness " of breath / dyspnea or wheezing    Mild intermittent asthma without complication       * clobetasol 0.05 % external solution    TEMOVATE    50 mL    Apply topically 2 times daily Apply to affected areas on scalp bid as needed    Scalp psoriasis       * clobetasol propionate 0.05 % Foam     50 g    Apply sparingly to affected area twice daily as needed.  Do not apply to face.    Scalp psoriasis       Fluocinolone Acetonide Scalp 0.01 % Oil oil     118 mL    Apply 2 mLs topically daily To scalp    Scalp psoriasis       prenatal multivitamin plus iron 27-0.8 MG Tabs per tablet     100 tablet    Take 1 tablet by mouth daily    Encounter for supervision of normal first pregnancy in first trimester       sertraline 50 MG tablet    ZOLOFT    135 tablet    Take 1.5 tablets (75 mg) by mouth daily Patient will call when needs filled.    COURTNEY (generalized anxiety disorder), Major depressive disorder, recurrent episode, moderate (H)       * Notice:  This list has 2 medication(s) that are the same as other medications prescribed for you. Read the directions carefully, and ask your doctor or other care provider to review them with you.

## 2018-10-26 NOTE — PROGRESS NOTES
"  SUBJECTIVE:   Krystina Suazo is a 33 year old female who presents to clinic today for the following health issues:    Joint Pain    Onset: x 2 weeks    Description:   Location: bilateral wrists L>R  Character: Sharp, burning and lingering    Intensity: moderate, severe    Progression of Symptoms: worse    Accompanying Signs & Symptoms:  Other symptoms: tingling and swelling    History:   Previous similar pain: YES- carpel tunnel   Continues to wear night braces  S/p surgery for CTS release    Precipitating factors:   Trauma or overuse: YES- overuse  Patient has 7 week old    Alleviating factors:  Improved by: rest/inactivity, heat, cold  Therapies Tried and outcome: Ibuprofen    ROS:  ROS otherwise negative    OBJECTIVE:                                                    /68 (BP Location: Right arm, Cuff Size: Adult Large)  Pulse 63  Temp 98.4  F (36.9  C) (Tympanic)  Ht 5' 9\" (1.753 m)  Wt 248 lb (112.5 kg)  SpO2 97%  BMI 36.62 kg/m2  Body mass index is 36.62 kg/(m^2).   GENERAL: healthy, alert, no distress  Wrist Exam: WRIST: bilateral  No edema or erythema. No obvious deformities. Surgical scars noted over the volar wrists.  Palpation: Tender: along the snuffbox and proximally bilaterally  Range of Motion: full  Strength: no deficits  Special tests: positive Tinel's at carpal tunnel.bilaterally-mild  , positive Finkelstein's bilaterally R>L    Diagnostic test results:  No results found for this or any previous visit (from the past 24 hour(s)).     ASSESSMENT/PLAN:                                                    (M65.4) De Quervain's disease (tenosynovitis)  (primary encounter diagnosis)  Comment: RICE and wear braces bilaterally. Call in 3 weeks if symptoms persist.   Plan:     (G56.03) Bilateral carpal tunnel syndrome  Comment: patient knowledgeable about these symptoms and can avoid aggravating activities. She will wear braces at night.  Plan:     David Dean, " CHEL  [place] TONY ROPER

## 2018-10-31 ENCOUNTER — OFFICE VISIT (OUTPATIENT)
Dept: FAMILY MEDICINE | Facility: CLINIC | Age: 33
End: 2018-10-31
Payer: COMMERCIAL

## 2018-10-31 VITALS
HEART RATE: 74 BPM | RESPIRATION RATE: 14 BRPM | DIASTOLIC BLOOD PRESSURE: 70 MMHG | SYSTOLIC BLOOD PRESSURE: 122 MMHG | BODY MASS INDEX: 36.77 KG/M2 | TEMPERATURE: 98.3 F | WEIGHT: 249 LBS | OXYGEN SATURATION: 98 %

## 2018-10-31 DIAGNOSIS — N61.0 ACUTE MASTITIS: Primary | ICD-10-CM

## 2018-10-31 PROCEDURE — 99213 OFFICE O/P EST LOW 20 MIN: CPT | Performed by: NURSE PRACTITIONER

## 2018-10-31 RX ORDER — CEPHALEXIN 500 MG/1
500 CAPSULE ORAL 4 TIMES DAILY
Qty: 40 CAPSULE | Refills: 0 | Status: SHIPPED | OUTPATIENT
Start: 2018-10-31 | End: 2018-11-19

## 2018-10-31 NOTE — PATIENT INSTRUCTIONS
Mastitis  Mastitis occurs when breast tissue becomes swollen and inflamed. This is almost always due to infection. Mastitis most often affects breastfeeding women during the first 6 weeks after childbirth. For this reason, it s also known as lactation mastitis. Infection may happen after a duct becomes clogged, causing milk to back up in the breast. Mastitis may also occur if bacteria enter the breast through small cracks in the nipple. (Less often, mastitis occurs in women who aren t breastfeeding. If you have mastitis that is not due to breastfeeding, your healthcare provider will give you more information as needed. Treatment may include some of the same home care measures listed below.)  Mastitis may cause flu-like symptoms such as fever, aches, and fatigue. The affected breast may feel painful, warm, tender, firm, or swollen. The skin over the breast may be red (often in a wedge-shaped pattern). You may feel a burning a sensation when breastfeeding.  In most cases, mastitis can be treated with antibiotics. This should clear the infection. If treatment is delayed, a pocket of pus (abscess) can form in the breast tissue. A procedure may then be needed to drain the pus. In severe cases of infection, other treatments may be needed.  Home care  Breastfeeding    It s very important to keep the milk flowing from the infected breast. Continue breastfeeding from both breasts as usual. This will not hurt the baby. If this is too painful, use a breast pump to remove milk from the infected side. This can be fed to your baby or discarded. Note: If you don't continue to breastfeed or pump your breast, bacteria can grow in the milk that is left in your breast. This can make your infection worse.    Tell your healthcare provider if you have problems with breastfeeding. He or she may suggest changes to your technique, if needed. You may also be referred to a lactation nurse or consultant for support with  breastfeeding.  General care    Take any medicines you re prescribed as directed. If you re taking antibiotics, be sure to complete all of the medicine even if you start to feel better. Over-the-counter pain medicines may also be recommended. Don t use breast creams or other products or medicines without talking to your healthcare provider first. Note: If you re concerned about taking medicines while breastfeeding, talk to your healthcare provider.    Rest as often as needed. Also be sure to drink plenty of fluids.    To help relieve pain and swelling, heat or ice may be used. Apply as often as directed by your provider.    Heat: Place a warm compress on the breast. Use a towel soaked in hot water, a heating pad, or a hot water bottle.    Cold: Place a cold compress on the breast. Use an ice pack or bag of ice wrapped in a thin towel. Never place a cold source directly on the skin.  Follow-up care  Follow up with your healthcare provider as advised.  When to seek medical advice  Call your healthcare provider right away if any of these occur:    Fever of 100.4 F (38 C) or higher, or as directed by your provider    Shaking chills    Symptoms worsen or do not improve within 48 to 72 hours of starting treatment    New symptoms develop  Date Last Reviewed: 7/30/2015 2000-2017 [employer]. [address] [place] [address], [place] [address]. All rights reserved. This information is not intended as a substitute for professional medical care. Always follow your healthcare professional's instructions.

## 2018-10-31 NOTE — MR AVS SNAPSHOT
After Visit Summary   10/31/2018    Krystina Suazo    MRN: [id number]           Patient Information     Date Of Birth          1985        Visit Information        Provider Department      10/31/2018 2:30 PM Haase, Susan Rachele, APRN [facility]        Today's Diagnoses     Acute mastitis    -  1      Care Instructions      Mastitis  Mastitis occurs when breast tissue becomes swollen and inflamed. This is almost always due to infection. Mastitis most often affects breastfeeding women during the first 6 weeks after childbirth. For this reason, it s also known as lactation mastitis. Infection may happen after a duct becomes clogged, causing milk to back up in the breast. Mastitis may also occur if bacteria enter the breast through small cracks in the nipple. (Less often, mastitis occurs in women who aren t breastfeeding. If you have mastitis that is not due to breastfeeding, your healthcare provider will give you more information as needed. Treatment may include some of the same home care measures listed below.)  Mastitis may cause flu-like symptoms such as fever, aches, and fatigue. The affected breast may feel painful, warm, tender, firm, or swollen. The skin over the breast may be red (often in a wedge-shaped pattern). You may feel a burning a sensation when breastfeeding.  In most cases, mastitis can be treated with antibiotics. This should clear the infection. If treatment is delayed, a pocket of pus (abscess) can form in the breast tissue. A procedure may then be needed to drain the pus. In severe cases of infection, other treatments may be needed.  Home care  Breastfeeding    It s very important to keep the milk flowing from the infected breast. Continue breastfeeding from both breasts as usual. This will not hurt the baby. If this is too painful, use a breast pump to remove milk from the infected side. This can be fed to your baby or discarded. Note: If you  don't continue to breastfeed or pump your breast, bacteria can grow in the milk that is left in your breast. This can make your infection worse.    Tell your healthcare provider if you have problems with breastfeeding. He or she may suggest changes to your technique, if needed. You may also be referred to a lactation nurse or consultant for support with breastfeeding.  General care    Take any medicines you re prescribed as directed. If you re taking antibiotics, be sure to complete all of the medicine even if you start to feel better. Over-the-counter pain medicines may also be recommended. Don t use breast creams or other products or medicines without talking to your healthcare provider first. Note: If you re concerned about taking medicines while breastfeeding, talk to your healthcare provider.    Rest as often as needed. Also be sure to drink plenty of fluids.    To help relieve pain and swelling, heat or ice may be used. Apply as often as directed by your provider.    Heat: Place a warm compress on the breast. Use a towel soaked in hot water, a heating pad, or a hot water bottle.    Cold: Place a cold compress on the breast. Use an ice pack or bag of ice wrapped in a thin towel. Never place a cold source directly on the skin.  Follow-up care  Follow up with your healthcare provider as advised.  When to seek medical advice  Call your healthcare provider right away if any of these occur:    Fever of 100.4 F (38 C) or higher, or as directed by your provider    Shaking chills    Symptoms worsen or do not improve within 48 to 72 hours of starting treatment    New symptoms develop  Date Last Reviewed: 7/30/2015 2000-2017 [employer]. [address] [place] [address], [place] [address]. All rights reserved. This information is not intended as a substitute for professional medical care. Always follow your healthcare professional's instructions.                Follow-ups after your visit        Who to contact      If you have questions or need follow up information about today's clinic visit or your schedule please contact [facility] directly at [phone].  Normal or non-critical lab and imaging results will be communicated to you by Azunahart, letter or phone within 4 business days after the clinic has received the results. If you do not hear from us within 7 days, please contact the clinic through Azunahart or phone. If you have a critical or abnormal lab result, we will notify you by phone as soon as possible.  Submit refill requests through Bkam or call your pharmacy and they will forward the refill request to us. Please allow 3 business days for your refill to be completed.          Additional Information About Your Visit        AzunaharSales Layer Information     Bkam gives you secure access to your electronic health record. If you see a primary care provider, you can also send messages to your care team and make appointments. If you have questions, please call your primary care clinic.  If you do not have a primary care provider, please call [phone] and they will assist you.        Care EveryWhere ID     This is your Care EveryWhere ID. This could be used by other organizations to access your [place] medical records  [id number]        Your Vitals Were     Pulse Temperature Respirations Pulse Oximetry BMI (Body Mass Index)       74 98.3  F (36.8  C) (Oral) 14 98% 36.77 kg/m2        Blood Pressure from Last 3 Encounters:   10/31/18 122/70   10/26/18 112/68   10/16/18 110/62    Weight from Last 3 Encounters:   10/31/18 249 lb (112.9 kg)   10/26/18 248 lb (112.5 kg)   10/16/18 250 lb (113.4 kg)              Today, you had the following     No orders found for display         Today's Medication Changes          These changes are accurate as of 10/31/18  2:46 PM.  If you have any questions, ask your nurse or doctor.               Start taking these medicines.        Dose/Directions    cephALEXin 500 MG  capsule   Commonly known as:  KEFLEX   Used for:  Acute mastitis   Started by:  Haase, Susan Rachele, APRN CNP        Dose:  500 mg   Take 1 capsule (500 mg) by mouth 4 times daily   Quantity:  40 capsule   Refills:  0         These medicines have changed or have updated prescriptions.        Dose/Directions    clobetasol propionate 0.05 % Foam   This may have changed:  Another medication with the same name was removed. Continue taking this medication, and follow the directions you see here.   Used for:  Scalp psoriasis   Changed by:  Haase, Susan Rachele, APRN CNP        Apply sparingly to affected area twice daily as needed.  Do not apply to face.   Quantity:  50 g   Refills:  3         Stop taking these medicines if you haven't already. Please contact your care team if you have questions.     Fluocinolone Acetonide Scalp 0.01 % Oil oil   Stopped by:  Haase, Susan Rachele, APRN CNP                Where to get your medicines      These medications were sent to [facility] [address]  [address] [facility] [address]     Phone:  [phone]     cephALEXin 500 MG capsule                Primary Care Provider Office Phone # Fax #    Valeria Daly [phone] [address] [place] [address]        Equal Access to Services     JOSE ANGEL [facility] Center[facility]DA : Hadii sanjiv marion hadasho Soomaali, waaxda luqadaha, qaybta kaalmada adeegyada, waxay pavel marquez. So [facility] [phone].    ATENCIÓN: Si habla español, tiene a wesley disposición servicios gratuitos de asistencia lingüística. Llame al [phone].    We comply with applicable federal civil rights laws and [place] laws. We do not discriminate on the basis of race, color, national origin, age, disability, sex, sexual orientation, or gender identity.            Thank you!     Thank you for choosing [facility]  for your care. Our goal is always to provide you  with excellent care. Hearing back from our patients is one way we can continue to improve our services. Please take a few minutes to complete the written survey that you may receive in the mail after your visit with us. Thank you!             Your Updated Medication List - Protect others around you: Learn how to safely use, store and throw away your medicines at www.disposemymeds.org.          This list is accurate as of 10/31/18  2:46 PM.  Always use your most recent med list.                   Brand Name Dispense Instructions for use Diagnosis    albuterol 108 (90 Base) MCG/ACT inhaler    PROAIR HFA/PROVENTIL HFA/VENTOLIN HFA    1 Inhaler    Inhale 2 puffs into the lungs every 4 hours as needed for shortness of breath / dyspnea or wheezing    Mild intermittent asthma without complication       cephALEXin 500 MG capsule    KEFLEX    40 capsule    Take 1 capsule (500 mg) by mouth 4 times daily    Acute mastitis       clobetasol propionate 0.05 % Foam     50 g    Apply sparingly to affected area twice daily as needed.  Do not apply to face.    Scalp psoriasis       prenatal multivitamin plus iron 27-0.8 MG Tabs per tablet     100 tablet    Take 1 tablet by mouth daily    Encounter for supervision of normal first pregnancy in first trimester       sertraline 50 MG tablet    ZOLOFT    135 tablet    Take 1.5 tablets (75 mg) by mouth daily Patient will call when needs filled.    CUORTNEY (generalized anxiety disorder), Major depressive disorder, recurrent episode, moderate (H)

## 2018-10-31 NOTE — PROGRESS NOTES
SUBJECTIVE:   Krystina Suazo is a 33 year old female who presents to clinic today for the following health issues:    Breast pain      Duration: 2 weeks    Description (location/character/radiation): both breast, L started yesterday    Intensity:  severe    Accompanying signs and symptoms: redness and warmth    History (similar episodes/previous evaluation): None    Precipitating or alleviating factors: None    Therapies tried and outcome: heat, ibuprofen, increased fluids     Two weeks ago, right breast produced bump. Area is red and tender. Left breast presented shooting pain 10/30, pain shoots direct underneath the nipple. Patient is breastfeeding and pumping daily, denies nipple cracking. She tried applying heat, massage, taking ibuprofen without much relief.     Problem list and histories reviewed & adjusted, as indicated.  Additional history: as documented    Patient Active Problem List   Diagnosis     Chronic rhinitis     COURTNEY (generalized anxiety disorder)     Major depressive disorder, recurrent episode, moderate (H)     Allergic rhinitis     Mild intermittent asthma without complication     Vasovagal syncope     Hypertension     S/P      Past Surgical History:   Procedure Laterality Date     CARPAL TUNNEL RELEASE RT/LT Bilateral       SECTION N/A 2018    Procedure:  SECTION;   section;  Surgeon: Jay Yanez MD;  Location: [facility] OR     SINUS SURGERY       TONSILLECTOMY         Social History   Substance Use Topics     Smoking status: Never Smoker     Smokeless tobacco: Never Used     Alcohol use No     Family History   Problem Relation Age of Onset     Esophageal Cancer Mother 57     Diabetes Mother      Hypertension Mother      Asthma Father      Glaucoma No family hx of      Macular Degeneration No family hx of          Current Outpatient Prescriptions   Medication Sig Dispense Refill     albuterol (PROAIR HFA/PROVENTIL HFA/VENTOLIN HFA) 108  (90 Base) MCG/ACT inhaler Inhale 2 puffs into the lungs every 4 hours as needed for shortness of breath / dyspnea or wheezing 1 Inhaler 11     clobetasol propionate 0.05 % FOAM Apply sparingly to affected area twice daily as needed.  Do not apply to face. 50 g 3     Prenatal Vit-Fe Fumarate-FA (PRENATAL MULTIVITAMIN PLUS IRON) 27-0.8 MG TABS per tablet Take 1 tablet by mouth daily 100 tablet 3     sertraline (ZOLOFT) 50 MG tablet Take 1.5 tablets (75 mg) by mouth daily Patient will call when needs filled. 135 tablet 1     No Known Allergies    Reviewed and updated as needed this visit by clinical staff       Reviewed and updated as needed this visit by Provider         ROS:  Constitutional,  cardiovascular, pulmonary, breast, and psych systems are negative, except as otherwise noted.    This document serves as a record of the services and decisions personally performed and made by Susan Haase, CNP. It was created on her behalf by Bree Elise, a trained medical scribe. The creation of this document is based on the provider's statements to the medical scribe.  Bree Elise 2:39 PM October 31, 2018  OBJECTIVE:   /70 (BP Location: Right arm, Patient Position: Chair, Cuff Size: Adult Large)  Pulse 74  Temp 98.3  F (36.8  C) (Oral)  Resp 14  Wt 112.9 kg (249 lb)  SpO2 98%  BMI 36.77 kg/m2  Body mass index is 36.77 kg/(m^2).  GENERAL: healthy, alert and no distress  BREAST:  Right breast with redness at 3 o'clock. Left breast tenderness around areola    PSYCH: mentation appears normal, affect normal/bright    ASSESSMENT/PLAN:   Krystina was seen today for breast pain.    Diagnoses and all orders for this visit:    Acute mastitis: Continue frequent nursing, every 2-3 hours. Encouraged to apply massage and warm packs.   -     cephALEXin (KEFLEX) 500 MG capsule; Take 1 capsule (500 mg) by mouth 4 times daily  Follow up if symptoms get worse or do not improve.      The information in this document, created by the  medical scribe for me, accurately reflects the services I personally performed and the decisions made by me. I have reviewed and approved this document for accuracy prior to leaving the patient care area.  October 31, 2018 2:42 PM  Susan Haase, APRN [facility] [place]

## 2018-11-12 ENCOUNTER — MYC MEDICAL ADVICE (OUTPATIENT)
Dept: PEDIATRICS | Facility: CLINIC | Age: 33
End: 2018-11-12

## 2018-11-12 DIAGNOSIS — M54.2 NECK PAIN: ICD-10-CM

## 2018-11-12 DIAGNOSIS — G56.03 BILATERAL CARPAL TUNNEL SYNDROME: Primary | ICD-10-CM

## 2018-11-12 NOTE — TELEPHONE ENCOUNTER
Patient saw Jacinta on 10/26/18 for tenosynovitis & carpal tunnel. Advised on home care measures and to follow up if symptoms not improved in 3 weeks for Ortho Referral.     Patient reports continued symptoms.     Entered Referral. Updated patient.

## 2018-11-16 ENCOUNTER — TRANSFERRED RECORDS (OUTPATIENT)
Dept: HEALTH INFORMATION MANAGEMENT | Facility: CLINIC | Age: 33
End: 2018-11-16

## 2018-11-16 ENCOUNTER — TELEPHONE (OUTPATIENT)
Dept: PEDIATRICS | Facility: CLINIC | Age: 33
End: 2018-11-16

## 2018-11-16 NOTE — TELEPHONE ENCOUNTER
Routing to covering providers to review as PCP is out of office.     The Pt received a cortisone injection today at [facility] and she is wondering if it is currently safe to breast feed?    Kendra Haro RN -- [facility]

## 2018-11-16 NOTE — TELEPHONE ENCOUNTER
LM and sent pt Omise message - ok'd to breast feed.     Zita Gonsalez MA   November 16, 2018,  1:29 PM

## 2018-11-19 ENCOUNTER — OFFICE VISIT (OUTPATIENT)
Dept: ORTHOPEDICS | Facility: CLINIC | Age: 33
End: 2018-11-19
Payer: COMMERCIAL

## 2018-11-19 VITALS
HEIGHT: 69 IN | DIASTOLIC BLOOD PRESSURE: 82 MMHG | BODY MASS INDEX: 36.88 KG/M2 | WEIGHT: 249 LBS | SYSTOLIC BLOOD PRESSURE: 140 MMHG

## 2018-11-19 DIAGNOSIS — M48.02 FORAMINAL STENOSIS OF CERVICAL REGION: ICD-10-CM

## 2018-11-19 DIAGNOSIS — M50.20 CERVICAL HERNIATED DISC: ICD-10-CM

## 2018-11-19 DIAGNOSIS — M54.2 CERVICALGIA: Primary | ICD-10-CM

## 2018-11-19 PROCEDURE — 99203 OFFICE O/P NEW LOW 30 MIN: CPT | Performed by: FAMILY MEDICINE

## 2018-11-19 NOTE — LETTER
11/19/2018         RE: Krystina Suazo  [address]  [place] [address]        Dear Colleague,    Thank you for referring your patient, Krystina Suazo, to the [facility]. Please see a copy of my visit note below.    ASSESSMENT & PLAN    1. Cervicalgia    2. Foraminal stenosis of cervical region    3. Cervical herniated disc      Physical therapy: [facility] - [phone]  Reviewed previous MRI. No indication to repeat MRI today - no weakness or sensory changes  Would evaluate your office set up to make sure it is ergonomically correct  Given that you're breast feeding would not recommend any steroid at this time    Follow-up if not improving after 4 - 6 therapy visits.    -----    SUBJECTIVE  Krystina Suazo is a/an 33 year old Right handed female who is seen in consultation at the request of  Valeria Daly M.D. for evaluation of  neck pain. The patient is seen by themselves.    Onset: 6+ month(s) ago. Reports insidious onset without acute precipitating event. Does note having upper back pain with pregnancy and has since had the baby 2 months ago and is currently breast feeding  Location of Pain: midline neck pain with radiation into the scapular region   Rating of Pain at worst: 10/10  Rating of Pain Currently: 1/10  Worsened by: turning head to the left, being on her feet for long periods  Better with: laying down, rolling back on a balance ball  Treatments tried: ice, heat, ibuprofen and chiropractic care (2-3 visits)  Quality: sharp, stabbing  Associated symptoms: tingling in upper back and right shoulder  Orthopedic history: radial styloid tenosynovitis,  bilateral CTS   Relevant surgical history:  carpal tunnel release  Patient Social History: currently on maternity leave, 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    REVIEW OF SYSTEMS:  10 point ROS is negative other than symptoms noted above in HPI, Past Medical History or as stated below  Constitutional: NEGATIVE for fever, chills, change in weight  Skin: NEGATIVE for worrisome rashes, moles or lesions  GI/: NEGATIVE for bowel or bladder changes  Neuro: NEGATIVE for weakness, dizziness or paresthesias    OBJECTIVE:  /82  Ht 5' 9\" (1.753 m)  Wt 249 lb (112.9 kg)  BMI 36.77 kg/m2   General: healthy, alert and in no distress  HEENT: no scleral icterus or conjunctival erythema  Skin: no suspicious lesions or rash. No jaundice.  CV: regular rhythm by palpation  Resp: normal respiratory effort without conversational dyspnea   Psych: normal mood and affect  Gait: normal steady gait with appropriate coordination and balance  Neuro: normal light touch sensory exam of the bilateral upper extremities.    MSK:  CERVICAL SPINE  Inspection:    normal cervical lordosis present, rounded shoulders, forward head posture  Palpation:    Tender about the upper trapezius (bilateral) and medial border of scapula. Otherwise remainder of the landmarks and nontender.  Range of Motion:     Flexion full    Extension full but painful  Strength:    Deltoid (C5) 5/5, biceps (C6) 5/5, wrist extension (C6) 5/5, triceps (C7) 5/5, wrist flexion (C7) 5/5  Special Tests:    Positive: Spurling's (bilateral)-axial pain only    BILATERAL SHOULDER  Inspection:    no atrophy  Active Range of Motion:     Abduction 1800, FF 1800, , IR normal.      Negative: Villarreal'    Independent visualization of the below image:  MR Cervical Spine WO IV Cont2/18/2014  [facility]  Result Impression   IMPRESSION: Mufti- level degenerative changes as detailed above without evidence for nerve root impingement or demyelinating disease.     Result Narrative       MRI of the cervical spine without contrast.     HISTORY: Left-sided cervical radiculopathy.     TECHNIQUE: Routine protocol without gadolinium. "     FINDINGS: No prior comparison studies.     Mild reversal of the cervical lordosis.  Mild degenerative endplate signal changes from C4 through C7.  Bone marrow signal characteristics, vertebral body alignment, craniocervical junction, cervical spinal cord and upper thoracic spinal cord otherwise   appear unremarkable.     Axial images:     Occiput C1 and C1 there C2 level: Unremarkable.     C2-3 level: Unremarkable.     C3-4 level: Mild to moderate left-sided neural foraminal stenosis secondary to uncinate spurring.     C4-5 level: Minimal posterior disk bulge moderate right-sided mild left-sided neural foraminal stenosis secondary to uncinate spurring.     C5-6 level: Mild posterior disk bulge mild left-sided neural foraminal stenosis secondary to uncinate spurring.     C6-7 level: Mild posterior osteophytic ridge and disk bulge, AP thecal sac 9 mm.  Left paracentral posterior annular tear, neural foramina nonstenotic.     C7-T1 level: Unremarkable.      Patient's conditions were thoroughly discussed during today's visit with greater than 50% of the visit spent counseling the patient with total time spent face-to-face with the patient being 20 minutes.    Elida Villa DO [facility]        Again, thank you for allowing me to participate in the care of your patient.        Sincerely,        Elida Villa DO

## 2018-11-19 NOTE — MR AVS SNAPSHOT
After Visit Summary   11/19/2018    Krystina Suazo    MRN: [id number]           Patient Information     Date Of Birth          1985        Visit Information        Provider Department      11/19/2018 9:40 AM Elida Villa, DO [facility]        Today's Diagnoses     Cervicalgia    -  1    Foraminal stenosis of cervical region        Cervical herniated disc          Care Instructions    1. Cervicalgia    2. Foraminal stenosis of cervical region    3. Cervical herniated disc      Physical therapy: [facility] - [phone]  Reviewed previous MRI. No indication to repeat MRI today - you have no weakness or sensory changes  Would evaluate your office set up to make sure it is ergonomically correct  Given that you're breast feeding would not recommend any steroid at this time    Follow-up if not improving after 4 - 6 therapy visits.    Official Walk with a Doc Chapter  Join me in the lobby of the [facility], December 1st at 1 PM for a free health talk.  Get a free T-shirt, listen and walk at your own pace!            Follow-ups after your visit        Additional Services     [facility] PT, HAND, AND CHIROPRACTIC REFERRAL       Physical Therapy, Hand Therapy and Chiropractic Care are available through:  *[facility]  *Hand Therapy (Occupational Therapy or Physical Therapy)  *[facility]    Call one number to schedule at any of the above locations: [phone].    Physical therapy, Hand therapy and/or Chiropractic care has been recommended by your physician as an excellent treatment option to reduce pain and help people return to normal activities, including sports.  Therapy and/or chiropractic care services are a great complement or alternative to expensive and invasive surgery, injections, or long-term use of prescription medications. The primary goal is to identify the underlying problem and provide you  the tools to manage your condition on your own.     Please be aware that coverage of these services is subject to the terms and limitations of your health insurance plan.  Call member services at your health plan with any benefit or coverage questions.      Please bring the following to your appointment:  *Your personal calendar for scheduling future appointments  *Comfortable clothing    Indication/Reason for Referral: Low Back Pain - cervicalgia, no radic and no weakness on exam  Special Request: MDT PT please - Breann Kuhn ([place]), Jaylene Vaca ([place]), Tricia Staton ([place]), Mame García ([place]) or Shane Jeronimo ([place])    Additional Comments for the Therapist or Chiropractor: Formal physical therapy - specific exercises to include centralization with flexion/extension activities with mobilization/manipulation and core stabilization with use of modalities (ie ice, ultrasound, electrical stimulation, etc.) as needed with home exercise prescription.                  Future tests that were ordered for you today     Open Future Orders        Priority Expected Expires Ordered    [facility] PT, HAND, AND CHIROPRACTIC REFERRAL Routine  11/19/2019 11/19/2018            Who to contact     If you have questions or need follow up information about today's clinic visit or your schedule please contact [facility] directly at [phone].  Normal or non-critical lab and imaging results will be communicated to you by Cigitalhart, letter or phone within 4 business days after the clinic has received the results. If you do not hear from us within 7 days, please contact the clinic through Cigitalhart or phone. If you have a critical or abnormal lab result, we will notify you by phone as soon as possible.  Submit refill requests through [facility] or call your pharmacy and they will forward the refill request to us. Please allow 3 business days for your refill to be completed.          Additional  "Information About Your Visit        MyChart Information     [employer] gives you secure access to your electronic health record. If you see a primary care provider, you can also send messages to your care team and make appointments. If you have questions, please call your primary care clinic.  If you do not have a primary care provider, please call [phone] and they will assist you.        Care EveryWhere ID     This is your Care EveryWhere ID. This could be used by other organizations to access your [facility] medical records  [id number]        Your Vitals Were     Height BMI (Body Mass Index)                5' 9\" (1.753 m) 36.77 kg/m2           Blood Pressure from Last 3 Encounters:   11/19/18 140/82   10/31/18 122/70   10/26/18 112/68    Weight from Last 3 Encounters:   11/19/18 249 lb (112.9 kg)   10/31/18 249 lb (112.9 kg)   10/26/18 248 lb (112.5 kg)                 Today's Medication Changes          These changes are accurate as of 11/19/18 12:54 PM.  If you have any questions, ask your nurse or doctor.               Stop taking these medicines if you haven't already. Please contact your care team if you have questions.     cephALEXin 500 MG capsule   Commonly known as:  KEFLEX   Stopped by:  Elida Villa,                     Primary Care Provider Office Phone # Fax #    Valeria E MD Addy [phone] [address] [place] [address]        Equal Access to Services     [facility] AH: Hadii sanjiv lunao Somadelyn, waaxda luqadaha, qaybta kaalmada zander, ruth sanchez adeheri marquez. So [facility] [phone].    ATENCIÓN: Si habla español, tiene a wesley disposición servicios gratuitos de asistencia lingüística. Llame al [phone].    We comply with applicable federal civil rights laws and [place] laws. We do not discriminate on the basis of race, color, national origin, age, disability, sex, sexual orientation, or gender identity.            Thank " you!     Thank you for choosing [facility]  for your care. Our goal is always to provide you with excellent care. Hearing back from our patients is one way we can continue to improve our services. Please take a few minutes to complete the written survey that you may receive in the mail after your visit with us. Thank you!             Your Updated Medication List - Protect others around you: Learn how to safely use, store and throw away your medicines at www.disposemymeds.org.          This list is accurate as of 11/19/18 12:54 PM.  Always use your most recent med list.                   Brand Name Dispense Instructions for use Diagnosis    albuterol 108 (90 Base) MCG/ACT inhaler    PROAIR HFA/PROVENTIL HFA/VENTOLIN HFA    1 Inhaler    Inhale 2 puffs into the lungs every 4 hours as needed for shortness of breath / dyspnea or wheezing    Mild intermittent asthma without complication       clobetasol propionate 0.05 % foam    OLUX    50 g    Apply sparingly to affected area twice daily as needed.  Do not apply to face.    Scalp psoriasis       prenatal multivitamin plus iron 27-0.8 MG Tabs per tablet     100 tablet    Take 1 tablet by mouth daily    Encounter for supervision of normal first pregnancy in first trimester       sertraline 50 MG tablet    ZOLOFT    135 tablet    Take 1.5 tablets (75 mg) by mouth daily Patient will call when needs filled.    COURTNEY (generalized anxiety disorder), Major depressive disorder, recurrent episode, moderate (H)

## 2018-11-19 NOTE — PATIENT INSTRUCTIONS
1. Cervicalgia    2. Foraminal stenosis of cervical region    3. Cervical herniated disc      Physical therapy: [facility] - [phone]  Reviewed previous MRI. No indication to repeat MRI today - you have no weakness or sensory changes  Would evaluate your office set up to make sure it is ergonomically correct  Given that you're breast feeding would not recommend any steroid at this time    Follow-up if not improving after 4 - 6 therapy visits.    Official Walk with a Doc Chapter  Join me in the lobby of the [facility], December 1st at 1 PM for a free health talk.  Get a free T-shirt, listen and walk at your own pace!

## 2018-11-19 NOTE — PROGRESS NOTES
ASSESSMENT & PLAN    1. Cervicalgia    2. Foraminal stenosis of cervical region    3. Cervical herniated disc      Physical therapy: [facility] - [phone]  Reviewed previous MRI. No indication to repeat MRI today - no weakness or sensory changes  Would evaluate your office set up to make sure it is ergonomically correct  Given that you're breast feeding would not recommend any steroid at this time    Follow-up if not improving after 4 - 6 therapy visits.    -----    SUBJECTIVE  Krystina Suazo is a/an 33 year old Right handed female who is seen in consultation at the request of  Valeria Daly M.D. for evaluation of  neck pain. The patient is seen by themselves.    Onset: 6+ month(s) ago. Reports insidious onset without acute precipitating event. Does note having upper back pain with pregnancy and has since had the baby 2 months ago and is currently breast feeding  Location of Pain: midline neck pain with radiation into the scapular region   Rating of Pain at worst: 10/10  Rating of Pain Currently: 1/10  Worsened by: turning head to the left, being on her feet for long periods  Better with: laying down, rolling back on a balance ball  Treatments tried: ice, heat, ibuprofen and chiropractic care (2-3 visits)  Quality: sharp, stabbing  Associated symptoms: tingling in upper back and right shoulder  Orthopedic history: radial styloid tenosynovitis,  bilateral CTS   Relevant surgical history:  carpal tunnel release  Patient Social History: currently on maternity leave, 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    REVIEW OF SYSTEMS:  10 point ROS is negative other than symptoms noted above in HPI, Past Medical History or as stated below  Constitutional: NEGATIVE for fever, chills, change in weight  Skin: NEGATIVE for worrisome rashes, moles or lesions  GI/: NEGATIVE for  "bowel or bladder changes  Neuro: NEGATIVE for weakness, dizziness or paresthesias    OBJECTIVE:  /82  Ht 5' 9\" (1.753 m)  Wt 249 lb (112.9 kg)  BMI 36.77 kg/m2   General: healthy, alert and in no distress  HEENT: no scleral icterus or conjunctival erythema  Skin: no suspicious lesions or rash. No jaundice.  CV: regular rhythm by palpation  Resp: normal respiratory effort without conversational dyspnea   Psych: normal mood and affect  Gait: normal steady gait with appropriate coordination and balance  Neuro: normal light touch sensory exam of the bilateral upper extremities.    MSK:  CERVICAL SPINE  Inspection:    normal cervical lordosis present, rounded shoulders, forward head posture  Palpation:    Tender about the upper trapezius (bilateral) and medial border of scapula. Otherwise remainder of the landmarks and nontender.  Range of Motion:     Flexion full    Extension full but painful  Strength:    Deltoid (C5) 5/5, biceps (C6) 5/5, wrist extension (C6) 5/5, triceps (C7) 5/5, wrist flexion (C7) 5/5  Special Tests:    Positive: Spurling's (bilateral)-axial pain only    BILATERAL SHOULDER  Inspection:    no atrophy  Active Range of Motion:     Abduction 1800, FF 1800, , IR normal.      Negative: Villarreal'    Independent visualization of the below image:  MR Cervical Spine WO IV Cont2/18/2014  [facility]  Result Impression   IMPRESSION: Mufti- level degenerative changes as detailed above without evidence for nerve root impingement or demyelinating disease.     Result Narrative       MRI of the cervical spine without contrast.     HISTORY: Left-sided cervical radiculopathy.     TECHNIQUE: Routine protocol without gadolinium.     FINDINGS: No prior comparison studies.     Mild reversal of the cervical lordosis.  Mild degenerative endplate signal changes from C4 through C7.  Bone marrow signal characteristics, vertebral body alignment, craniocervical junction, cervical spinal cord and upper thoracic " spinal cord otherwise   appear unremarkable.     Axial images:     Occiput C1 and C1 there C2 level: Unremarkable.     C2-3 level: Unremarkable.     C3-4 level: Mild to moderate left-sided neural foraminal stenosis secondary to uncinate spurring.     C4-5 level: Minimal posterior disk bulge moderate right-sided mild left-sided neural foraminal stenosis secondary to uncinate spurring.     C5-6 level: Mild posterior disk bulge mild left-sided neural foraminal stenosis secondary to uncinate spurring.     C6-7 level: Mild posterior osteophytic ridge and disk bulge, AP thecal sac 9 mm.  Left paracentral posterior annular tear, neural foramina nonstenotic.     C7-T1 level: Unremarkable.      Patient's conditions were thoroughly discussed during today's visit with greater than 50% of the visit spent counseling the patient with total time spent face-to-face with the patient being 20 minutes.    Elida Villa DO [facility]

## 2018-11-27 ENCOUNTER — THERAPY VISIT (OUTPATIENT)
Dept: PHYSICAL THERAPY | Facility: CLINIC | Age: 33
End: 2018-11-27
Attending: FAMILY MEDICINE
Payer: COMMERCIAL

## 2018-11-27 DIAGNOSIS — M48.02 FORAMINAL STENOSIS OF CERVICAL REGION: ICD-10-CM

## 2018-11-27 DIAGNOSIS — M54.2 CERVICALGIA: ICD-10-CM

## 2018-11-27 DIAGNOSIS — M50.20 CERVICAL HERNIATED DISC: ICD-10-CM

## 2018-11-27 DIAGNOSIS — M54.50 BILATERAL LOW BACK PAIN WITHOUT SCIATICA: Primary | ICD-10-CM

## 2018-11-27 PROCEDURE — 97110 THERAPEUTIC EXERCISES: CPT | Mod: GP | Performed by: PHYSICAL THERAPIST

## 2018-11-27 PROCEDURE — 97162 PT EVAL MOD COMPLEX 30 MIN: CPT | Mod: GP | Performed by: PHYSICAL THERAPIST

## 2018-11-27 NOTE — PROGRESS NOTES
[facility] Initial Evaluation  Subjective:  Patient is a 33 year old female presenting with rehab cervical spine hpi and rehab back hpi. The history is provided by the patient. No  was used.   Krystina Suazo is a 33 year old female with a cervical spine condition.  Condition occurred with:  Insidious onset.    This is a new condition  Pt had onset of neck pain in June 2018 during pregnancy. Symptoms worsened in October 2018 after her son was born with feeding and bending forward. She complains of left greater than right neck sharp pain on left, tingling on right and headaches. She has increased pain with driving and rotation of her neck. She feels better with rest and lying down. She also has tried rolling on balance ball which has been helpful at times. .        Pain is described as sharp and is intermittent and reported as 2/10.     Symptoms are exacerbated by certain positions, rotating head, driving and looking up or down and relieved by rest.  Since onset symptoms are unchanged.  Special tests:  X-ray (Joint space narrrowing C3-4).      General health as reported by patient is good.  Pertinent medical history includes:  Depression, migraines/headaches and numbness/tingling.      Current medications:  Anti-inflammatory.  Current occupation is Works at [employer].  Patient is currently not working due to present treatment problem.  Primary job tasks include:  Prolonged sitting.    Barriers include:  None as reported by the patient.          Krystina Suazo is a 33 year old female with a lumbar condition.    Condition occurred: for unknown reasons.  This is a recurrent condition  Pt reports having a fall as a teenager and has had intermittent low back pain. Low back pain was also increased in June 2018 during pregnancy. She did PT during pregnancy which was helpful. Chief complaint is intermittent sharp  pain with certain transitional movements and with  prolonged standing if she is wearing unsupportive shoes. .    Patient reports pain:  Lower lumbar spine and upper thoracic spine.  Radiates to:  Gluteals right.  Pain is described as sharp and is intermittent and reported as 1/10.   Pain is the same all the time.  Symptoms are exacerbated by standing and lifting   Since onset symptoms are unchanged.    Previous treatment includes physical therapy.  There was moderate improvement following previous treatment.  General health as reported by patient is good.    Medical allergies: no.  Other surgeries include:  Orthopedic surgery (carpal tunnel surgery. sinus, tonsils, Csection 2018).                                        Objective:  System         Lumbar/SI Evaluation  ROM:    AROM Lumbar:   Flexion:          To ankles  Ext:                    40%    Side Bend:        Left:     Right:   Rotation:           Left:     Right:   Side Glide:        Left:  75%    Right:  50% pain        Strength: weakness abdominals, bilateral glut max 4-/5, glut med 3/5  Lumbar Myotomes:  normal                Lumbar Dermtomes:  normal                Neural Tension/Mobility:  Lumbar:  Not assessed        Lumbar Palpation:    Tenderness present at Left:    Erector Spinae; Piriformis and Vertebral  Tenderness present at Right: Erector Spinae and Vertebral             Cervical/Thoracic Evaluation    AROM:  AROM Cervical:    Flexion:          Wnl  Extension:       75% pain  Rotation:         Left: 75%     Right: 90%  Side Bend:      Left:     Right:     Strength: weakness scap stabilizers. 3/5    Cervical Myotomes:        C4 (shrug):  Left: 5      C5 (Deltoid):  Left: 5      C6 (Biceps):  Left: 5      C7 (Triceps):  Left: 4      C8 (Thumb Ext): Left: 5      T1 (Intrinsics): Left: 5          Cervical Dermatomes:  normal                    Cervical Palpation:    Tenderness present at Left:    Scalenes  Tenderness present at Right:    Scalenes; Erector Spinae and Suboccipitals                                                   [place] Cervical Evaluation    Posture:  Sitting: fair  Standing: fair  Protruding Head: no  Wry Neck: no  Correction of Posture: better    Movement Loss:  Protrusion (PRO): nil  Flexion (Flex): nil  Retraction (RET): pain and mod  Extension (EXT): nil      Rotation Right (ROT R): min  Rotation Left (ROT L): pain and min  Test Movements:      RET:   Repeat RET: During: produces  After: better and centralizing  Mechanical Response: IncROM                          Conclusion: derangement  Principle of Treatment:      Extension: repeated cervical retraction every 1-2 hours      [place] Lumbar Evaluation        Test Movements:        EIL:   Repeat EIL: After: better  Mechanical Response: IncROM        Conclusion: derangement  Principle of Treatment:      Extension: REIL every 1-2 hours                                           ROS    Assessment/Plan:    Patient is a 33 year old female with cervical and lumbar complaints.    Patient has the following significant findings with corresponding treatment plan.                Diagnosis 1:  Neck pain  Pain -  hot/cold therapy, manual therapy, self management, education and home program  Decreased ROM/flexibility - manual therapy, therapeutic exercise and home program  Decreased strength - therapeutic exercise, therapeutic activities and home program  Decreased function - therapeutic activities and home program  Impaired posture - neuro re-education and home program  Diagnosis 2:  Low back pain   Pain -  self management, education and home program  Decreased ROM/flexibility - manual therapy, therapeutic exercise and home program  Decreased strength - therapeutic exercise, therapeutic activities and home program    Therapy Evaluation Codes:   1) History comprised of:   Personal factors that impact the plan of care:      Time since onset of symptoms.    Comorbidity factors that impact the plan of care are:      Asthma, Depression and  Numbness/tingling.     Medications impacting care: Anti-depressant.  2) Examination of Body Systems comprised of:   Body structures and functions that impact the plan of care:      Cervical spine and Lumbar spine.   Activity limitations that impact the plan of care are:      Bathing, Bending, Cooking, Driving, Dressing, Lifting, Standing and Sleeping.  3) Clinical presentation characteristics are:   Evolving/Changing.  4) Decision-Making    Moderate complexity using standardized patient assessment instrument and/or measureable assessment of functional outcome.  Cumulative Therapy Evaluation is: Moderate complexity.    Previous and current functional limitations:  (See Goal Flow Sheet for this information)    Short term and Long term goals: (See Goal Flow Sheet for this information)     Communication ability:  Patient appears to be able to clearly communicate and understand verbal and written communication and follow directions correctly.  Treatment Explanation - The following has been discussed with the patient:   RX ordered/plan of care  Anticipated outcomes  Possible risks and side effects  This patient would benefit from PT intervention to resume normal activities.   Rehab potential is excellent.    Frequency:  1 X week, once daily  Duration:  for 6 weeks, tapering to 2x monthly for 6 weeks    Discharge Plan:  Achieve all LTG.  Independent in home treatment program.  Reach maximal therapeutic benefit.    Please refer to the daily flowsheet for treatment today, total treatment time and time spent performing 1:1 timed codes.

## 2018-12-04 ENCOUNTER — THERAPY VISIT (OUTPATIENT)
Dept: PHYSICAL THERAPY | Facility: CLINIC | Age: 33
End: 2018-12-04
Payer: COMMERCIAL

## 2018-12-04 DIAGNOSIS — M54.2 CERVICALGIA: ICD-10-CM

## 2018-12-04 DIAGNOSIS — M54.50 BILATERAL LOW BACK PAIN WITHOUT SCIATICA, UNSPECIFIED CHRONICITY: ICD-10-CM

## 2018-12-04 PROCEDURE — 97112 NEUROMUSCULAR REEDUCATION: CPT | Mod: GP | Performed by: PHYSICAL THERAPIST

## 2018-12-04 PROCEDURE — 97110 THERAPEUTIC EXERCISES: CPT | Mod: GP | Performed by: PHYSICAL THERAPIST

## 2018-12-10 ENCOUNTER — THERAPY VISIT (OUTPATIENT)
Dept: PHYSICAL THERAPY | Facility: CLINIC | Age: 33
End: 2018-12-10
Payer: COMMERCIAL

## 2018-12-10 DIAGNOSIS — M54.2 CERVICALGIA: ICD-10-CM

## 2018-12-10 DIAGNOSIS — M54.50 BILATERAL LOW BACK PAIN WITHOUT SCIATICA, UNSPECIFIED CHRONICITY: ICD-10-CM

## 2018-12-10 PROCEDURE — 97110 THERAPEUTIC EXERCISES: CPT | Mod: GP | Performed by: PHYSICAL THERAPIST

## 2018-12-10 PROCEDURE — 97140 MANUAL THERAPY 1/> REGIONS: CPT | Mod: GP | Performed by: PHYSICAL THERAPIST

## 2018-12-10 PROCEDURE — 97112 NEUROMUSCULAR REEDUCATION: CPT | Mod: GP | Performed by: PHYSICAL THERAPIST

## 2018-12-17 ENCOUNTER — THERAPY VISIT (OUTPATIENT)
Dept: PHYSICAL THERAPY | Facility: CLINIC | Age: 33
End: 2018-12-17
Payer: COMMERCIAL

## 2018-12-17 DIAGNOSIS — M54.2 CERVICALGIA: ICD-10-CM

## 2018-12-17 DIAGNOSIS — M54.50 BILATERAL LOW BACK PAIN WITHOUT SCIATICA, UNSPECIFIED CHRONICITY: ICD-10-CM

## 2018-12-17 PROCEDURE — 97112 NEUROMUSCULAR REEDUCATION: CPT | Mod: GP | Performed by: PHYSICAL THERAPIST

## 2018-12-17 PROCEDURE — 97140 MANUAL THERAPY 1/> REGIONS: CPT | Mod: GP | Performed by: PHYSICAL THERAPIST

## 2018-12-17 PROCEDURE — 97530 THERAPEUTIC ACTIVITIES: CPT | Mod: GP | Performed by: PHYSICAL THERAPIST

## 2018-12-31 ENCOUNTER — THERAPY VISIT (OUTPATIENT)
Dept: PHYSICAL THERAPY | Facility: CLINIC | Age: 33
End: 2018-12-31
Payer: COMMERCIAL

## 2018-12-31 DIAGNOSIS — M54.50 BILATERAL LOW BACK PAIN WITHOUT SCIATICA, UNSPECIFIED CHRONICITY: ICD-10-CM

## 2018-12-31 DIAGNOSIS — M54.2 CERVICALGIA: ICD-10-CM

## 2018-12-31 PROCEDURE — 97140 MANUAL THERAPY 1/> REGIONS: CPT | Mod: GP | Performed by: PHYSICAL THERAPIST

## 2018-12-31 PROCEDURE — 97110 THERAPEUTIC EXERCISES: CPT | Mod: GP | Performed by: PHYSICAL THERAPIST

## 2018-12-31 PROCEDURE — 97112 NEUROMUSCULAR REEDUCATION: CPT | Mod: GP | Performed by: PHYSICAL THERAPIST

## 2019-01-08 ENCOUNTER — THERAPY VISIT (OUTPATIENT)
Dept: PHYSICAL THERAPY | Facility: CLINIC | Age: 34
End: 2019-01-08
Payer: COMMERCIAL

## 2019-01-08 DIAGNOSIS — M54.50 BILATERAL LOW BACK PAIN WITHOUT SCIATICA, UNSPECIFIED CHRONICITY: ICD-10-CM

## 2019-01-08 DIAGNOSIS — M54.2 CERVICALGIA: ICD-10-CM

## 2019-01-08 PROCEDURE — 97110 THERAPEUTIC EXERCISES: CPT | Mod: GP | Performed by: PHYSICAL THERAPIST

## 2019-01-08 PROCEDURE — 97112 NEUROMUSCULAR REEDUCATION: CPT | Mod: GP | Performed by: PHYSICAL THERAPIST

## 2019-01-08 PROCEDURE — 97140 MANUAL THERAPY 1/> REGIONS: CPT | Mod: GP | Performed by: PHYSICAL THERAPIST

## 2019-01-11 ENCOUNTER — OFFICE VISIT (OUTPATIENT)
Dept: PEDIATRICS | Facility: CLINIC | Age: 34
End: 2019-01-11
Payer: COMMERCIAL

## 2019-01-11 VITALS
TEMPERATURE: 98.1 F | WEIGHT: 248 LBS | OXYGEN SATURATION: 96 % | DIASTOLIC BLOOD PRESSURE: 66 MMHG | BODY MASS INDEX: 36.62 KG/M2 | SYSTOLIC BLOOD PRESSURE: 126 MMHG | RESPIRATION RATE: 19 BRPM | HEART RATE: 69 BPM

## 2019-01-11 DIAGNOSIS — J45.20 MILD INTERMITTENT ASTHMA WITHOUT COMPLICATION: ICD-10-CM

## 2019-01-11 DIAGNOSIS — J01.90 ACUTE SINUSITIS WITH SYMPTOMS > 10 DAYS: Primary | ICD-10-CM

## 2019-01-11 PROCEDURE — 99214 OFFICE O/P EST MOD 30 MIN: CPT | Performed by: PHYSICIAN ASSISTANT

## 2019-01-11 ASSESSMENT — ENCOUNTER SYMPTOMS
SINUS PAIN: 1
CHILLS: 0
NAUSEA: 0
FEVER: 0
HEADACHES: 1
SHORTNESS OF BREATH: 0
FOCAL WEAKNESS: 0
COUGH: 1
ABDOMINAL PAIN: 0
VOMITING: 0
DIARRHEA: 0

## 2019-01-11 NOTE — PROGRESS NOTES
HPI    SUBJECTIVE:   Krystina Suazo is a 33 year old female who presents to clinic today for the following health issues:    Acute Illness   Acute illness concerns: coughing, sinus pressure/pain  Onset: since before , getting worse about 2 weeks    Fever: no    Chills/Sweats: no    Headache (location?): YES    Sinus Pressure:YES    Conjunctivitis:  no    Ear Pain: YES: both    Rhinorrhea: YES    Congestion: YES    Sore Throat: YES     Cough: YES    Wheeze: no    Decreased Appetite: no    Nausea: YES    Vomiting: no    Diarrhea:  no    Dysuria/Freq.: no    Fatigue/Achiness: YES    Sick/Strep Exposure: YES- son goes to      Therapies Tried and outcome: ibuprofen, benadryl (is breastfeeding so has been staying away from a lot of medications)    Hx asthma- no SOB or wheezing.        Chart Review:  PHQ-9 SCORE 2018 2018 10/16/2018   PHQ-9 Total Score MyChart - - -   PHQ-9 Total Score 2 2 2     COURTNEY-7 SCORE 2018   Total Score 1 1 1       Patient Active Problem List   Diagnosis     Chronic rhinitis     COURTNEY (generalized anxiety disorder)     Major depressive disorder, recurrent episode, moderate (H)     Allergic rhinitis     Mild intermittent asthma without complication     Vasovagal syncope     Hypertension     S/P      Cervicalgia     Bilateral low back pain without sciatica     Past Surgical History:   Procedure Laterality Date     CARPAL TUNNEL RELEASE RT/LT Bilateral       SECTION N/A 2018    Procedure:  SECTION;   section;  Surgeon: Jay Yanez MD;  Location: [facility] OR     SINUS SURGERY       TONSILLECTOMY       Family History   Problem Relation Age of Onset     Esophageal Cancer Mother 57     Diabetes Mother      Hypertension Mother      Asthma Father      Glaucoma No family hx of      Macular Degeneration No family hx of       Social History     Tobacco Use     Smoking status: Never Smoker     Smokeless  tobacco: Never Used   Substance Use Topics     Alcohol use: No        Problem list, Medication list, Allergies, Medical/Social/Surg hx reviewed in [facility], updated as appropriate.      Review of Systems   Constitutional: Positive for malaise/fatigue. Negative for chills and fever.   HENT: Positive for congestion, ear pain and sinus pain.    Respiratory: Positive for cough. Negative for shortness of breath.    Cardiovascular: Negative for chest pain.   Gastrointestinal: Negative for abdominal pain, diarrhea, nausea and vomiting.   Skin: Negative for rash.   Neurological: Positive for headaches. Negative for focal weakness.   All other systems reviewed and are negative.        Physical Exam   Constitutional: She is oriented to person, place, and time.   HENT:   Head: Normocephalic and atraumatic.   Right Ear: Tympanic membrane and external ear normal.   Left Ear: Tympanic membrane and external ear normal.   Nose: Mucosal edema present. Right sinus exhibits maxillary sinus tenderness. Left sinus exhibits maxillary sinus tenderness.   Mouth/Throat: Uvula is midline, oropharynx is clear and moist and mucous membranes are normal.   Cardiovascular: Normal rate, regular rhythm and normal heart sounds.   Pulmonary/Chest: Effort normal and breath sounds normal.   Musculoskeletal: Normal range of motion.   Neurological: She is alert and oriented to person, place, and time.   Skin: Skin is warm and dry.   Nursing note and vitals reviewed.    Vital Signs  /66 (BP Location: Right arm, Patient Position: Sitting, Cuff Size: Adult Large)   Pulse 69   Temp 98.1  F (36.7  C) (Tympanic)   Resp 19   Wt 112.5 kg (248 lb)   SpO2 96%   BMI 36.62 kg/m     Body mass index is 36.62 kg/m .    Diagnostic Test Results:  none     ASSESSMENT/PLAN:                                                        ICD-10-CM    1. Acute sinusitis with symptoms > 10 days J01.90 amoxicillin-clavulanate (AUGMENTIN) 875-125 MG tablet   2. Mild intermittent  asthma without complication J45.20    Rx Augmentin due to prolonged & worsening symptoms. Avoid decongestants as may decrease milk supply. Increased fluids, humidifier.  No asthma exacerbation.    I have discussed any lab or imaging results, the patient's diagnosis, and my plan of treatment with the patient and/or family. Patient is aware to come back in if with worsening symptoms or if no relief despite treatment plan.  Patient voiced understanding and had no further questions.       Follow Up: Return in about 1 week (around 1/18/2019) for Follow up w/ PCP if not better.    KIRK Jones, PA-C  [facility]

## 2019-01-11 NOTE — PROGRESS NOTES
"  SUBJECTIVE:   Krystina Suazo is a 33 year old female who presents to clinic today for the following health issues:    Acute Illness   Acute illness concerns: coughing, sinus pressure/pain  Onset: since before Christmas, getting worse about 2 weeks    Fever: no    Chills/Sweats: no    Headache (location?): YES    Sinus Pressure:YES    Conjunctivitis:  no    Ear Pain: YES: both    Rhinorrhea: YES    Congestion: YES    Sore Throat: YES     Cough: YES    Wheeze: no    Decreased Appetite: no    Nausea: YES    Vomiting: no    Diarrhea:  no    Dysuria/Freq.: no    Fatigue/Achiness: YES    Sick/Strep Exposure: YES- son goes to      Therapies Tried and outcome: ibuprofen, benadryl (is breastfeeding so has been staying away from a lot of medications)      {additional problems for provider to add:300519}    Problem list and histories reviewed & adjusted, as indicated.  Additional history: {NONE - AS DOCUMENTED:169883::\"as documented\"}    {HIST REVIEW/ LINKS 2:253952}    Reviewed and updated as needed this visit by clinical staff       Reviewed and updated as needed this visit by Provider         {PROVIDER CHARTING PREFERENCE:887294}  "

## 2019-01-15 ENCOUNTER — THERAPY VISIT (OUTPATIENT)
Dept: PHYSICAL THERAPY | Facility: CLINIC | Age: 34
End: 2019-01-15
Payer: COMMERCIAL

## 2019-01-15 DIAGNOSIS — M54.2 CERVICALGIA: ICD-10-CM

## 2019-01-15 DIAGNOSIS — M54.50 BILATERAL LOW BACK PAIN WITHOUT SCIATICA, UNSPECIFIED CHRONICITY: ICD-10-CM

## 2019-01-15 PROCEDURE — 97140 MANUAL THERAPY 1/> REGIONS: CPT | Mod: GP | Performed by: PHYSICAL THERAPIST

## 2019-01-15 PROCEDURE — 97112 NEUROMUSCULAR REEDUCATION: CPT | Mod: GP | Performed by: PHYSICAL THERAPIST

## 2019-01-15 PROCEDURE — 97110 THERAPEUTIC EXERCISES: CPT | Mod: GP | Performed by: PHYSICAL THERAPIST

## 2019-01-22 ENCOUNTER — THERAPY VISIT (OUTPATIENT)
Dept: PHYSICAL THERAPY | Facility: CLINIC | Age: 34
End: 2019-01-22
Payer: COMMERCIAL

## 2019-01-22 DIAGNOSIS — M54.2 CERVICALGIA: ICD-10-CM

## 2019-01-22 DIAGNOSIS — M54.50 BILATERAL LOW BACK PAIN WITHOUT SCIATICA, UNSPECIFIED CHRONICITY: ICD-10-CM

## 2019-01-22 PROCEDURE — 97110 THERAPEUTIC EXERCISES: CPT | Mod: GP | Performed by: PHYSICAL THERAPIST

## 2019-01-22 PROCEDURE — 97112 NEUROMUSCULAR REEDUCATION: CPT | Mod: GP | Performed by: PHYSICAL THERAPIST

## 2019-01-22 PROCEDURE — 97140 MANUAL THERAPY 1/> REGIONS: CPT | Mod: GP | Performed by: PHYSICAL THERAPIST

## 2019-01-22 NOTE — PROGRESS NOTES
Subjective:  HPI                    Objective:  System    Physical Exam    General     ROS    Assessment/Plan:    PROGRESS  REPORT    Progress reporting period is from 11-27-18 to 1-22-19.       SUBJECTIVE    Subjective: Pt reports intermittent low back pain with bending over the crib. Rates pain 3-4/10 but this doesnt last. She had ergonomics dept make some adjustments to work station. She has some increased neck pain with prolonged standing over the sink while cooking and cleaning    Current pain level is 0/10 Current Pain level: 0/10.     Previous pain level was  5/10  .   Changes in function:  Yes (See Goal flowsheet attached for changes in current functional level)  Adverse reaction to treatment or activity: None    OBJECTIVE  Changes noted in objective findings:  Yes, improved ROM, strength and function.  Objective: Cervical AROM wnl, Lumbar AROM pain with BB end range, MMT scap stabilizers 4-/5, bilateral glut med 4-/5, bilateral glut max 5-/5.     ASSESSMENT/PLAN  Updated problem list and treatment plan: Diagnosis 1:  Cervical pain   Pain -  hot/cold therapy, manual therapy, self management, education and home program  Decreased strength - therapeutic exercise, therapeutic activities and home program  Decreased function - therapeutic activities and home program  Diagnosis 2:  Low back pain   Pain -  self management and education  Decreased strength - therapeutic exercise, therapeutic activities and home program  STG/LTGs have been met or progress has been made towards goals:  Yes (See Goal flow sheet completed today.)  Assessment of Progress: The patient's condition is improving.  The patient's condition has potential to improve.  Self Management Plans:  Patient has been instructed in a home treatment program.  I have re-evaluated this patient and find that the nature, scope, duration and intensity of the therapy is appropriate for the medical condition of the patient.  Krystina continues to require the  following intervention to meet STG and LTG's:  PT    Recommendations:  This patient would benefit from continued therapy.     Frequency:  2 X a month, once daily  Duration:  for 6 weeks        Please refer to the daily flowsheet for treatment today, total treatment time and time spent performing 1:1 timed codes.

## 2019-02-05 ENCOUNTER — OFFICE VISIT (OUTPATIENT)
Dept: PEDIATRICS | Facility: CLINIC | Age: 34
End: 2019-02-05
Payer: COMMERCIAL

## 2019-02-05 ENCOUNTER — THERAPY VISIT (OUTPATIENT)
Dept: PHYSICAL THERAPY | Facility: CLINIC | Age: 34
End: 2019-02-05
Payer: COMMERCIAL

## 2019-02-05 VITALS
OXYGEN SATURATION: 97 % | HEIGHT: 69 IN | BODY MASS INDEX: 37.1 KG/M2 | DIASTOLIC BLOOD PRESSURE: 68 MMHG | TEMPERATURE: 99.2 F | HEART RATE: 80 BPM | WEIGHT: 250.5 LBS | SYSTOLIC BLOOD PRESSURE: 110 MMHG

## 2019-02-05 DIAGNOSIS — M54.50 BILATERAL LOW BACK PAIN WITHOUT SCIATICA, UNSPECIFIED CHRONICITY: ICD-10-CM

## 2019-02-05 DIAGNOSIS — F41.1 GAD (GENERALIZED ANXIETY DISORDER): ICD-10-CM

## 2019-02-05 DIAGNOSIS — B07.8 COMMON WART: Primary | ICD-10-CM

## 2019-02-05 DIAGNOSIS — M54.2 CERVICALGIA: ICD-10-CM

## 2019-02-05 DIAGNOSIS — F33.1 MAJOR DEPRESSIVE DISORDER, RECURRENT EPISODE, MODERATE (H): ICD-10-CM

## 2019-02-05 PROCEDURE — 97110 THERAPEUTIC EXERCISES: CPT | Mod: GP | Performed by: PHYSICAL THERAPIST

## 2019-02-05 PROCEDURE — 17110 DESTRUCTION B9 LES UP TO 14: CPT | Performed by: INTERNAL MEDICINE

## 2019-02-05 PROCEDURE — 99213 OFFICE O/P EST LOW 20 MIN: CPT | Mod: 25 | Performed by: INTERNAL MEDICINE

## 2019-02-05 PROCEDURE — 97112 NEUROMUSCULAR REEDUCATION: CPT | Mod: GP | Performed by: PHYSICAL THERAPIST

## 2019-02-05 ASSESSMENT — ANXIETY QUESTIONNAIRES
6. BECOMING EASILY ANNOYED OR IRRITABLE: NOT AT ALL
3. WORRYING TOO MUCH ABOUT DIFFERENT THINGS: NOT AT ALL
2. NOT BEING ABLE TO STOP OR CONTROL WORRYING: NOT AT ALL
1. FEELING NERVOUS, ANXIOUS, OR ON EDGE: NOT AT ALL
GAD7 TOTAL SCORE: 0
5. BEING SO RESTLESS THAT IT IS HARD TO SIT STILL: NOT AT ALL
IF YOU CHECKED OFF ANY PROBLEMS ON THIS QUESTIONNAIRE, HOW DIFFICULT HAVE THESE PROBLEMS MADE IT FOR YOU TO DO YOUR WORK, TAKE CARE OF THINGS AT HOME, OR GET ALONG WITH OTHER PEOPLE: NOT DIFFICULT AT ALL
7. FEELING AFRAID AS IF SOMETHING AWFUL MIGHT HAPPEN: NOT AT ALL

## 2019-02-05 ASSESSMENT — PATIENT HEALTH QUESTIONNAIRE - PHQ9
SUM OF ALL RESPONSES TO PHQ QUESTIONS 1-9: 1
5. POOR APPETITE OR OVEREATING: NOT AT ALL

## 2019-02-05 ASSESSMENT — MIFFLIN-ST. JEOR: SCORE: 1895.64

## 2019-02-05 NOTE — PROGRESS NOTES
"  SUBJECTIVE:   Krystina Suazo is a 33 year old female who presents to clinic today for the following health issues:      Depression and Anxiety Follow-Up    Status since last visit: No change    Other associated symptoms:None    Complicating factors:     Significant life event: No     Current substance abuse: None    PHQ 2/13/2018 8/27/2018 10/16/2018   PHQ-9 Total Score 2 2 2   Q9: Suicide Ideation Not at all Not at all Not at all     COURTNEY-7 SCORE 2/13/2018 2/13/2018 8/27/2018   Total Score 1 1 1       PHQ-9  English  PHQ-9   Any Language  COURTNEY-7  Suicide Assessment Five-step Evaluation and Treatment (SAFE-T)    Amount of exercise or physical activity: 3-4 days/week for an average of 15 minutes    Problems taking medications regularly: No    Medication side effects: none    Diet: regular (no restrictions)        Skin tag on back of R leg and in groin on R. Has been there for a few months.      Problem list and histories reviewed & adjusted, as indicated.  Additional history: as documented      Reviewed and updated as needed this visit by clinical staff  Tobacco  Allergies  Meds  Med Hx  Surg Hx  Fam Hx  Soc Hx      Reviewed and updated as needed this visit by Provider         ROS:  Constitutional, gi and gu systems are negative, except as otherwise noted.    OBJECTIVE:     /68 (Cuff Size: Adult Large)   Pulse 80   Temp 99.2  F (37.3  C) (Oral)   Ht 1.753 m (5' 9\")   Wt 113.6 kg (250 lb 8 oz)   SpO2 97%   BMI 36.99 kg/m    Body mass index is 36.99 kg/m .  GENERAL: healthy, alert and no distress  SKIN: R posterior mid-thigh with 4mm scaly verrucous papule. R pubis region with 1mm filamentous papule  PSYCH: mentation appears normal, affect normal/bright    Diagnostic Test Results:  none      Procedure note:   Skin cleansed with alcohol prep pad. 2 lesions are frozen with LN2 x3. Patient tolerated procedure well.     ASSESSMENT/PLAN:     1. COURTNEY (generalized anxiety disorder)  Doing very " well.  - sertraline (ZOLOFT) 50 MG tablet; Take 1.5 tablets (75 mg) by mouth daily Patient will call when needs filled.  Dispense: 135 tablet; Refill: 2    2. Major depressive disorder, recurrent episode, moderate (H)    - sertraline (ZOLOFT) 50 MG tablet; Take 1.5 tablets (75 mg) by mouth daily Patient will call when needs filled.  Dispense: 135 tablet; Refill: 2    3. Common wart  Discussed options. She would like these frozen today. She will let me know if lesions persist after freezing.  - DESTRUCT BENIGN LESION, UP TO 14      Valeria Daly MD  [facility]

## 2019-02-06 ASSESSMENT — ANXIETY QUESTIONNAIRES: GAD7 TOTAL SCORE: 0

## 2019-02-06 ASSESSMENT — ASTHMA QUESTIONNAIRES: ACT_TOTALSCORE: 25

## 2019-03-13 ENCOUNTER — OFFICE VISIT (OUTPATIENT)
Dept: PEDIATRICS | Facility: CLINIC | Age: 34
End: 2019-03-13
Payer: COMMERCIAL

## 2019-03-13 VITALS
OXYGEN SATURATION: 97 % | SYSTOLIC BLOOD PRESSURE: 122 MMHG | BODY MASS INDEX: 36.53 KG/M2 | HEIGHT: 69 IN | DIASTOLIC BLOOD PRESSURE: 72 MMHG | WEIGHT: 246.6 LBS | TEMPERATURE: 98.8 F | RESPIRATION RATE: 16 BRPM | HEART RATE: 72 BPM

## 2019-03-13 DIAGNOSIS — F41.1 GAD (GENERALIZED ANXIETY DISORDER): ICD-10-CM

## 2019-03-13 DIAGNOSIS — F33.1 MAJOR DEPRESSIVE DISORDER, RECURRENT EPISODE, MODERATE (H): ICD-10-CM

## 2019-03-13 DIAGNOSIS — D64.9 ANEMIA, UNSPECIFIED TYPE: ICD-10-CM

## 2019-03-13 LAB
ERYTHROCYTE [DISTWIDTH] IN BLOOD BY AUTOMATED COUNT: 11.6 % (ref 10–15)
HCT VFR BLD AUTO: 40 % (ref 35–47)
HGB BLD-MCNC: 13.8 G/DL (ref 11.7–15.7)
MCH RBC QN AUTO: 32.9 PG (ref 26.5–33)
MCHC RBC AUTO-ENTMCNC: 34.5 G/DL (ref 31.5–36.5)
MCV RBC AUTO: 95 FL (ref 78–100)
PLATELET # BLD AUTO: 259 10E9/L (ref 150–450)
RBC # BLD AUTO: 4.2 10E12/L (ref 3.8–5.2)
WBC # BLD AUTO: 6.3 10E9/L (ref 4–11)

## 2019-03-13 PROCEDURE — 84443 ASSAY THYROID STIM HORMONE: CPT | Performed by: PEDIATRICS

## 2019-03-13 PROCEDURE — 85027 COMPLETE CBC AUTOMATED: CPT | Performed by: PEDIATRICS

## 2019-03-13 PROCEDURE — 36415 COLL VENOUS BLD VENIPUNCTURE: CPT | Performed by: PEDIATRICS

## 2019-03-13 PROCEDURE — 99214 OFFICE O/P EST MOD 30 MIN: CPT | Performed by: PEDIATRICS

## 2019-03-13 RX ORDER — SERTRALINE HYDROCHLORIDE 100 MG/1
100 TABLET, FILM COATED ORAL DAILY
Qty: 90 TABLET | Refills: 3 | Status: SHIPPED | OUTPATIENT
Start: 2019-03-13 | End: 2019-04-05

## 2019-03-13 ASSESSMENT — MIFFLIN-ST. JEOR: SCORE: 1877.95

## 2019-03-13 NOTE — PROGRESS NOTES
SUBJECTIVE:   Krystina Suazo is a 33 year old female who presents to clinic today for the following health issues:      Depression and Anxiety Follow-Up    Status since last visit: Worsened, postpartum have been feeling symptoms for about 6 months     Other associated symptoms:None    Complicating factors:     Significant life event: Yes-  Child birth      Current substance abuse: None    PHQ 8/27/2018 10/16/2018 2/5/2019   PHQ-9 Total Score 2 2 1   Q9: Suicide Ideation Not at all Not at all Not at all     COURTNEY-7 SCORE 2/13/2018 8/27/2018 2/5/2019   Total Score 1 1 0     PHQ-9  English  PHQ-9   Any Language  COURTNEY-7  Suicide Assessment Five-step Evaluation and Treatment (SAFE-T)      Amount of exercise or physical activity: Tried     Problems taking medications regularly: No    Medication side effects: none    Diet: regular (no restrictions)    Pt states after Post-partum she has been feeling depressed, its been about 6 month since child birth .     This is a 33-year-old woman currently 6 months postpartum with a history of generalized anxiety disorder and major depression who presents with worsening depression over the last couple of weeks.  She denies any clear triggers just feels like she is more stressed and things are spiraling.  She is crying at work and feels like she has a lack of hernando for all things besides her baby.  She went back to work 3 months after her baby was born and now has been there for 3 months full-time.  She is finding it difficult to balance.  She is breast-feeding and pumping at work.  She reports that she has certainly been more tired lately.  Her son is going through a sleep regression and is up every 2-3 hours.  Her  works 3 days a week in the evenings.  Multiple times a week they have limited contact awake together.  Her son does attend  2 times a week and the rest the time he is with her .  They do not have any other children.  She put feels like her breast  milk supply is stable though has a dip further into her freezer stash than she is wanted.  Her  is aware of her depression worsening and he is very supportive.  She feels safe at home.    The patient has a long-standing history of depression and anxiety dating back years.  It occurred when her mother was diagnosed with cancer and then when she  her  3 years ago it got much worse.  She was initially on citalopram for many years and then stopped when she was attempting to get pregnant.  Her symptoms got worse and her primary care doctor Addy placed her on sertraline.  She has been on this for a while and feels like this has been doing well at controlling her symptoms.  She is currently on 75 mg daily and this dose has not changed in quite some time.  In addition she also is seeing a psychologist every 1-2 weeks and plans on seeing this person on Friday.  Her depression and anxiety have always been worse around her menstrual cycle and this just returned last month.  She is due for her.  Later this week and feels like this could be the explanation for the acute worsening.  She currently is not on any oral contraceptive indications and is using condoms for birth control.  She has never been on OCPs for the purpose of regulating her cycle and depression in the past.    The patient denies any suicidal ideations.  She has no thoughts of self-harm or homicidal thoughts.  She denies any psychosi or hallucinations.      [place]  Depression Scale score: 17   - scaled score: >10 possible depression (maximum score 30)        Problem list and histories reviewed & adjusted, as indicated.  Additional history: as documented    Patient Active Problem List   Diagnosis     Chronic rhinitis     COURTNEY (generalized anxiety disorder)     Major depressive disorder, recurrent episode, moderate (H)     Allergic rhinitis     Mild intermittent asthma without complication     Vasovagal syncope     Hypertension  "    S/P      Cervicalgia     Bilateral low back pain without sciatica     Past Surgical History:   Procedure Laterality Date     CARPAL TUNNEL RELEASE RT/LT Bilateral       SECTION N/A 2018    Procedure:  SECTION;   section;  Surgeon: Jay Yanez MD;  Location: [facility] OR     SINUS SURGERY       TONSILLECTOMY         Social History     Tobacco Use     Smoking status: Never Smoker     Smokeless tobacco: Never Used   Substance Use Topics     Alcohol use: No     Family History   Problem Relation Age of Onset     Esophageal Cancer Mother 57     Diabetes Mother      Hypertension Mother      Asthma Father      Glaucoma No family hx of      Macular Degeneration No family hx of          Current Outpatient Medications   Medication Sig Dispense Refill     clobetasol propionate 0.05 % FOAM Apply sparingly to affected area twice daily as needed.  Do not apply to face. 50 g 3     Prenatal Vit-Fe Fumarate-FA (PRENATAL MULTIVITAMIN PLUS IRON) 27-0.8 MG TABS per tablet Take 1 tablet by mouth daily 100 tablet 3     sertraline (ZOLOFT) 100 MG tablet Take 1 tablet (100 mg) by mouth daily Patient will call when needs filled. 90 tablet 3     albuterol (PROAIR HFA/PROVENTIL HFA/VENTOLIN HFA) 108 (90 Base) MCG/ACT inhaler Inhale 2 puffs into the lungs every 4 hours as needed for shortness of breath / dyspnea or wheezing (Patient not taking: Reported on 2019) 1 Inhaler 11     No Known Allergies    ROS:  A 10 point ROS is negative other than the symptoms noted above in the HPI.    OBJECTIVE:     /72 (BP Location: Right arm, Patient Position: Sitting, Cuff Size: Adult Regular)   Pulse 72   Temp 98.8  F (37.1  C) (Oral)   Resp 16   Ht 1.753 m (5' 9\")   Wt 111.9 kg (246 lb 9.6 oz)   SpO2 97%   BMI 36.42 kg/m    Body mass index is 36.42 kg/m .    Exam:  General: the patient is pleasant, resting comfortably, no acute distress.   HEENT: Normocephalic, atraumatic. No scleral " icterus or conjunctivitis.   Lungs: Breathing comfortably on room air.  Extremities: No deformities  Skin: no appreciable skin lesions/rashes on exposed skin.   Neuro: Alert and oriented x4, grossly normal neurologic exam.       Diagnostic Test Results:  [facility]    ASSESSMENT/PLAN:     1. Post partum depression  2. COURTNEY (generalized anxiety disorder)  3. Major depressive disorder, recurrent episode, moderate (H)  This is a 33-year-old woman with long-standing depression and anxiety now presenting with worsening depression and 6 months postpartum.  Her [place] depression screen is a score of 17 which is consistent with diagnosis of postpartum depression.  There does not appear to be any specific triggers.  She has a supportive  but limited support outside of this.  She is following with the psychologist and gets every 1-2 weeks therapy.  I think it is reasonable to increase her sertraline dose and see her back in clinic next week assessing for response.  Further I will check a thyroid level to assess for medical causes for her symptoms.  I am reassured by her lack of suicidal ideations and support system with her .  She is able to contract for safety.  - TSH with free T4 reflex  - sertraline (ZOLOFT) 100 MG tablet; Take 1 tablet (100 mg) by mouth daily Patient will call when needs filled.  Dispense: 90 tablet; Refill: 3  -Discussed the possibility of postpartum depression therapy specific programs, I will look into this and get back to her next week  -She will continue to follow with her psychologist next appointment in 2 days  Crisis line numbers given-    3. Anemia, unspecified type  The patient's hemoglobin was 9.7 following her delivery, there has been no follow-up levels.  She is not currently on iron tablets but is getting some in her prenatal vitamins.  We will recheck her hemoglobin today as if she is still anemic this could be playing into some of her fatigue.  -  CBC with platelets    Follow up: in one week with me, in 4-6 week with PCP      Jacki Jackson MD  Internal Medicine Pediatrics  [facility]

## 2019-03-13 NOTE — PATIENT INSTRUCTIONS
Thank you for coming in to clinic today. It was a pleasure to meet you. I am currently building my patient panel and would be happy to see you back in clinic. I currently see patients on Wednesday afternoons and Thursday mornings.     Jacki Jackson MD  Internal Medicine-Pediatrics   For appointments: [phone]      Doctor's Instructions:   Let's see each other next Thursday 3/21 at 9am for check in.    Increase your sertraline to 100mg (2 full tablets daily).     Keep your appointment with psychology on Friday, discuss possible postpartum specific programs with her. I will look into this and get back to you next week with options as well.       Schedule with Dr. Daly in 4-6 weeks for check in.        Crisis Hotlines    If you or someone you know is experiencing a mental health crisis, visit Urgent Care for Adult Mental Health at [address] in [place] or call the crisis response provider in your community, available 24 hours a day, 7 days a week.       [facility] Mental Health Crisis Lines:  [facility]  [phone]         [facility]  [phone]         [facility]  [phone]         [facility]  [phone]         [facility] Adults  [phone]       [facility] Children  [phone]         [facility] Adults  [phone]       [facility] Children  [phone]      For children under the age of 18:  [facility] Children's Crisis Response Services can help you, your child or a child you care about get through a mental health crisis. Children's mobile crisis response teams can provide support by telephone and can also meet with you at school, home, or in the community. Visit www.[email or web address].

## 2019-03-14 LAB — TSH SERPL DL<=0.005 MIU/L-ACNC: 1.7 MU/L (ref 0.4–4)

## 2019-03-21 ENCOUNTER — OFFICE VISIT (OUTPATIENT)
Dept: PEDIATRICS | Facility: CLINIC | Age: 34
End: 2019-03-21
Payer: COMMERCIAL

## 2019-03-21 VITALS
SYSTOLIC BLOOD PRESSURE: 109 MMHG | BODY MASS INDEX: 36.71 KG/M2 | DIASTOLIC BLOOD PRESSURE: 69 MMHG | HEART RATE: 81 BPM | TEMPERATURE: 97.9 F | WEIGHT: 248.6 LBS | OXYGEN SATURATION: 97 %

## 2019-03-21 DIAGNOSIS — G44.209 TENSION HEADACHE: ICD-10-CM

## 2019-03-21 PROCEDURE — 99213 OFFICE O/P EST LOW 20 MIN: CPT | Performed by: PEDIATRICS

## 2019-03-21 NOTE — PROGRESS NOTES
SUBJECTIVE:                                                    Krystina Suazo is a 33 year old female who presents to clinic today for the following health issues:    Depression and Anxiety Follow-Up    Status since last visit: up and down, roller coaster    Other associated symptoms: HA's, tension in face/jaw/hx of clenching    Complicating factors:     Significant life event: No     Current substance abuse: None    PHQ 2018 10/16/2018 2019   PHQ-9 Total Score 2 2 1   Q9: Suicide Ideation Not at all Not at all Not at all     COURTNEY-7 SCORE 2018   Total Score 1 1 0       [facility]  DEPRESSION SCALE:    - Today 3/21/2019: score 16   - Last week 3/13/2019: score 17  (FYI: scaled score >10 consistent with possible depression, max score 30)    PHQ-9  English  PHQ-9   Any Language  COURTNEY-7  Suicide Assessment Five-step Evaluation and Treatment (SAFE-T)    Amount of exercise or physical activity: None    Problems taking medications regularly: No    Medication side effects: see above, possibly HA's and tension    Diet: regular (no restrictions      This is a 34 yo woman currently 6 months postpartum presenting with post partum depression follow up. She has a long standing history of depression and anxiety which worsened over the last couple of months since returning to work.  Since her last visit the patient did increase her dose of sertraline from 75 to 100 mg daily.  She denies any known side effects from this increased dose.  She denies any suicidal ideations.  She does note some jaw pain but notes she is someone that clenches her teeth with stress.  She reports the roller coaster of emotions has continued but overall is slightly better.  She has seen her therapist twice and will continue to see her weekly.  She was given information from her therapist regarding specific postpartum information.    Of note the patient is complaining of daily headaches for the last week or s  she describes the headaches as bitemporal and tension related.  They do radiate into her jaw and neck.  She has them every morning and they worsen with stress.  They are not worse with laying down.  She has tried some Motrin and Benadryl with some relief.  She does have a history of migraine headaches but feels these are different.  They are not associated with nausea, vomiting, photophobia, or vision changes.  She has no nausea, tingling, or weakness.         Problem list and histories reviewed & adjusted, as indicated.  Additional history: as documented    Patient Active Problem List   Diagnosis     Chronic rhinitis     COURTNEY (generalized anxiety disorder)     Major depressive disorder, recurrent episode, moderate (H)     Allergic rhinitis     Mild intermittent asthma without complication     Vasovagal syncope     Hypertension     S/P      Cervicalgia     Bilateral low back pain without sciatica     Past Surgical History:   Procedure Laterality Date     CARPAL TUNNEL RELEASE RT/LT Bilateral       SECTION N/A 2018    Procedure:  SECTION;   section;  Surgeon: Jay Yanez MD;  Location: [facility] OR     SINUS SURGERY       TONSILLECTOMY         Social History     Tobacco Use     Smoking status: Never Smoker     Smokeless tobacco: Never Used   Substance Use Topics     Alcohol use: No     Family History   Problem Relation Age of Onset     Esophageal Cancer Mother 57     Diabetes Mother      Hypertension Mother      Asthma Father      Glaucoma No family hx of      Macular Degeneration No family hx of          Current Outpatient Medications   Medication Sig Dispense Refill     albuterol (PROAIR HFA/PROVENTIL HFA/VENTOLIN HFA) 108 (90 Base) MCG/ACT inhaler Inhale 2 puffs into the lungs every 4 hours as needed for shortness of breath / dyspnea or wheezing 1 Inhaler 11     clobetasol propionate 0.05 % FOAM Apply sparingly to affected area twice daily as needed.  Do not apply  to face. 50 g 3     Prenatal Vit-Fe Fumarate-FA (PRENATAL MULTIVITAMIN PLUS IRON) 27-0.8 MG TABS per tablet Take 1 tablet by mouth daily 100 tablet 3     sertraline (ZOLOFT) 100 MG tablet Take 1 tablet (100 mg) by mouth daily Patient will call when needs filled. 90 tablet 3     No Known Allergies    ROS:  A 7 point ROS is negative other than the symptoms noted above in the HPI.    OBJECTIVE:     /69   Pulse 81   Temp 97.9  F (36.6  C) (Tympanic)   Wt 112.8 kg (248 lb 9.6 oz)   SpO2 97%   BMI 36.71 kg/m    Body mass index is 36.71 kg/m .    Exam:  General: the patient is pleasant, resting comfortably, no acute distress.   HEENT: Normocephalic, atraumatic. No scleral icterus or conjunctivitis.   Lungs: Breathing comfortably on room air.  Extremities: No deformities  Skin: no appreciable skin lesions/rashes on exposed skin.   Neuro: Alert and oriented x4, grossly normal neurologic exam.       Diagnostic Test Results:  none    ASSESSMENT/PLAN:     1. Postpartum depression  This is a 33-year-old woman With a history of depression and anxiety at baseline now 6 months postpartum with worsening symptoms and [place] depression scale consistent with diagnosis of postpartum depression.  We increased her Zoloft 1 week ago and her symptoms have been stable, no suicidal ideation.  She has no thoughts of self-harm or to her baby nor psychosis.  The patient is well plugged in with therapy and has increased frequency.  We discussed whether to increase the dose of her Zoloft today.  Since she feels like her symptoms have not gotten any worse she would like to continue on this current dose and see how things play out.  -We will connect through my chart in the next 1 to 2 weeks regarding her symptoms and consider increasing Zoloft to 125 mg at that time  -She has an appointment set up with Dr. Daly her primary care doctor in 3 weeks  -She will continue therapy weekly with her therapist  -She has crisis hotline  numbers should she need them  -We discussed safety marcelo and if she was feeling where she would contact her , neighbor, or friend    2. Tension HA  Patient's description of headache is consistent with tension.  She has no red flag signs or symptoms.  I think it is reasonable to monitor at this time.  -Reviewed ibuprofen use for headache management including dosing  -Discussed massage  -Discussed worrisome signs and symptoms to return for    Follow up: Follow-up on my chart in 1 to 2 weeks, will see PCP in 3 weeks as scheduled sooner as needed.      Jacki Jackson MD  Internal Medicine Pediatrics  [facility]

## 2019-03-21 NOTE — PATIENT INSTRUCTIONS
Thank you for coming in to clinic today. It was a pleasure to meet you. I am currently building my patient panel and would be happy to see you back in clinic. I currently see patients on Wednesday afternoons and Thursday mornings.     Jacki Jackson MD  Internal Medicine-Pediatrics   For appointments: [phone]      Doctor's Instructions:   Please contact me in the next 1-2 weeks by mychart if you'd like to increase your dose of sertraline.     Think about getting a massage for your headaches.     It would be okay to take ibuprofen 600mg up to three times a day as needed for your headaches.      - Increase your water consumption. You certainly need more than 64 ounces a day, closer to 80 ounces.    Consider keeping a headache journal.     - Attempt to find triggers.

## 2019-03-26 ENCOUNTER — THERAPY VISIT (OUTPATIENT)
Dept: PHYSICAL THERAPY | Facility: CLINIC | Age: 34
End: 2019-03-26
Payer: COMMERCIAL

## 2019-03-26 DIAGNOSIS — M54.50 BILATERAL LOW BACK PAIN WITHOUT SCIATICA, UNSPECIFIED CHRONICITY: ICD-10-CM

## 2019-03-26 DIAGNOSIS — M54.2 CERVICALGIA: ICD-10-CM

## 2019-03-26 PROCEDURE — 97112 NEUROMUSCULAR REEDUCATION: CPT | Mod: GP | Performed by: PHYSICAL THERAPIST

## 2019-03-26 PROCEDURE — 97140 MANUAL THERAPY 1/> REGIONS: CPT | Mod: GP | Performed by: PHYSICAL THERAPIST

## 2019-03-26 PROCEDURE — 97110 THERAPEUTIC EXERCISES: CPT | Mod: GP | Performed by: PHYSICAL THERAPIST

## 2019-03-26 NOTE — PROGRESS NOTES
Subjective:  HPI                      Objective:  System    Physical Exam    General     ROS    Assessment/Plan:    PROGRESS  REPORT    Progress reporting period is from 11-27-18 to 3-26-19.       SUBJECTIVE    Subjective: Pt reports dealing with postpartum depression and has had increase in headaches with tension into neck, teeth and jaw. Low back is doing well but she has been doing less of the exercises. She notes increased tension/tightness with stress including work. She feels better with stretches. She has been taking Ibuprofen to help with symptoms. Generally tension in neck increases after about half the day.   Headaches are daily. Low back is generally feeling better with bending over crib but still 2/10   Current pain level is 3/10  .     Previous pain level was  5/10  .   Changes in function:  Yes (See Goal flowsheet attached for changes in current functional level)  Adverse reaction to treatment or activity: None    OBJECTIVE  Changes noted in objective findings:  Yes, improved mobility but recent exacerbation of muscle tension  Objective: Cervical AROM FB, BB  wnl, right rotation 75%, left rotation 75%, Muscle guarding right greater than left suboccipitals, B scalenes, B levator scapulae, B Upper trap.      ASSESSMENT/PLAN  Updated problem list and treatment plan: Diagnosis 1:  Cervical Pain with headaches  Pain -  hot/cold therapy, manual therapy, self management, education and home program  Decreased ROM/flexibility - manual therapy, therapeutic exercise and home program  Decreased strength - therapeutic exercise, therapeutic activities and home program  Decreased function - therapeutic activities and home program  STG/LTGs have been met or progress has been made towards goals:  Yes (See Goal flow sheet completed today.)  Assessment of Progress: The patient's condition has potential to improve.  Self Management Plans:  Patient has been instructed in a home treatment program.  I have re-evaluated this  patient and find that the nature, scope, duration and intensity of the therapy is appropriate for the medical condition of the patient.  Krystina continues to require the following intervention to meet STG and LTG's:  PT    Recommendations:  This patient would benefit from continued therapy.     Frequency:  1 X week, once daily  Duration:  for 4 weeks tapering to 2 X a month over 4 weeks        Please refer to the daily flowsheet for treatment today, total treatment time and time spent performing 1:1 timed codes.

## 2019-04-02 ENCOUNTER — THERAPY VISIT (OUTPATIENT)
Dept: PHYSICAL THERAPY | Facility: CLINIC | Age: 34
End: 2019-04-02
Payer: COMMERCIAL

## 2019-04-02 DIAGNOSIS — M54.50 BILATERAL LOW BACK PAIN WITHOUT SCIATICA, UNSPECIFIED CHRONICITY: ICD-10-CM

## 2019-04-02 DIAGNOSIS — M54.2 CERVICALGIA: ICD-10-CM

## 2019-04-02 PROCEDURE — 97140 MANUAL THERAPY 1/> REGIONS: CPT | Mod: GP | Performed by: PHYSICAL THERAPIST

## 2019-04-02 PROCEDURE — 97110 THERAPEUTIC EXERCISES: CPT | Mod: GP | Performed by: PHYSICAL THERAPIST

## 2019-04-09 ENCOUNTER — THERAPY VISIT (OUTPATIENT)
Dept: PHYSICAL THERAPY | Facility: CLINIC | Age: 34
End: 2019-04-09
Payer: COMMERCIAL

## 2019-04-09 DIAGNOSIS — M54.50 BILATERAL LOW BACK PAIN WITHOUT SCIATICA, UNSPECIFIED CHRONICITY: ICD-10-CM

## 2019-04-09 DIAGNOSIS — M54.2 CERVICALGIA: ICD-10-CM

## 2019-04-09 PROCEDURE — 97140 MANUAL THERAPY 1/> REGIONS: CPT | Mod: GP | Performed by: PHYSICAL THERAPIST

## 2019-04-09 PROCEDURE — 97112 NEUROMUSCULAR REEDUCATION: CPT | Mod: GP | Performed by: PHYSICAL THERAPIST

## 2019-04-09 PROCEDURE — 97110 THERAPEUTIC EXERCISES: CPT | Mod: GP | Performed by: PHYSICAL THERAPIST

## 2019-04-10 ENCOUNTER — OFFICE VISIT (OUTPATIENT)
Dept: PEDIATRICS | Facility: CLINIC | Age: 34
End: 2019-04-10
Payer: COMMERCIAL

## 2019-04-10 VITALS
TEMPERATURE: 98.4 F | BODY MASS INDEX: 36.58 KG/M2 | DIASTOLIC BLOOD PRESSURE: 60 MMHG | HEIGHT: 69 IN | OXYGEN SATURATION: 98 % | HEART RATE: 66 BPM | WEIGHT: 247 LBS | SYSTOLIC BLOOD PRESSURE: 114 MMHG

## 2019-04-10 DIAGNOSIS — Z12.4 SCREENING FOR CERVICAL CANCER: ICD-10-CM

## 2019-04-10 DIAGNOSIS — Z13.6 CARDIOVASCULAR SCREENING; LDL GOAL LESS THAN 160: ICD-10-CM

## 2019-04-10 DIAGNOSIS — Z01.419 ENCOUNTER FOR GYNECOLOGICAL EXAMINATION WITHOUT ABNORMAL FINDING: Primary | ICD-10-CM

## 2019-04-10 DIAGNOSIS — E66.01 MORBID OBESITY (H): ICD-10-CM

## 2019-04-10 PROCEDURE — 87624 HPV HI-RISK TYP POOLED RSLT: CPT | Performed by: INTERNAL MEDICINE

## 2019-04-10 PROCEDURE — 99395 PREV VISIT EST AGE 18-39: CPT | Performed by: INTERNAL MEDICINE

## 2019-04-10 PROCEDURE — G0145 SCR C/V CYTO,THINLAYER,RESCR: HCPCS | Performed by: INTERNAL MEDICINE

## 2019-04-10 ASSESSMENT — ENCOUNTER SYMPTOMS
CHILLS: 0
ABDOMINAL PAIN: 0
ARTHRALGIAS: 0
JOINT SWELLING: 0
DIZZINESS: 0
FREQUENCY: 0
SORE THROAT: 0
FEVER: 0
HEMATURIA: 0
MYALGIAS: 0
NAUSEA: 0
NERVOUS/ANXIOUS: 0
CONSTIPATION: 0
COUGH: 0
EYE PAIN: 0
PARESTHESIAS: 0
HEARTBURN: 0
DIARRHEA: 0
BREAST MASS: 0
DYSURIA: 0
HEADACHES: 1
WEAKNESS: 0
SHORTNESS OF BREATH: 0
PALPITATIONS: 0
HEMATOCHEZIA: 0

## 2019-04-10 ASSESSMENT — MIFFLIN-ST. JEOR: SCORE: 1879.76

## 2019-04-10 NOTE — PROGRESS NOTES
SUBJECTIVE:   CC: Krystina Suazo is an 33 year old woman who presents for preventive health visit.     Healthy Habits:     Getting at least 3 servings of Calcium per day:  Yes    Bi-annual eye exam:  Yes    Dental care twice a year:  Yes    Sleep apnea or symptoms of sleep apnea:  None    Diet:  Regular (no restrictions)    Frequency of exercise:  2-3 days/week    Duration of exercise:  Less than 15 minutes    Taking medications regularly:  Yes    Medication side effects:  None    PHQ-2 Total Score: 2    Additional concerns today:  Yes      Pt. Has a small brown spot for about 2-3 years now on L thigh after got sunburn. Just wanted you to look at it.      Today's PHQ-2 Score:   PHQ-2 ( 1999 [employer]) 4/10/2019   Q1: Little interest or pleasure in doing things 1   Q2: Feeling down, depressed or hopeless 1   PHQ-2 Score 2   Q1: Little interest or pleasure in doing things Several days   Q2: Feeling down, depressed or hopeless Several days   PHQ-2 Score 2       Abuse: Current or Past(Physical, Sexual or Emotional)- No  Do you feel safe in your environment? Yes    Social History     Tobacco Use     Smoking status: Never Smoker     Smokeless tobacco: Never Used   Substance Use Topics     Alcohol use: No         Alcohol Use 4/10/2019   Prescreen: >3 drinks/day or >7 drinks/week? Not Applicable       Reviewed orders with patient.  Reviewed health maintenance and updated orders accordingly - Yes  BP Readings from Last 3 Encounters:   04/10/19 114/60   03/21/19 109/69   03/13/19 122/72    Wt Readings from Last 3 Encounters:   04/10/19 112 kg (247 lb)   03/21/19 112.8 kg (248 lb 9.6 oz)   03/13/19 111.9 kg (246 lb 9.6 oz)                  Patient Active Problem List   Diagnosis     Chronic rhinitis     COURTNEY (generalized anxiety disorder)     Major depressive disorder, recurrent episode, moderate (H)     Allergic rhinitis     Mild intermittent asthma without complication     Vasovagal syncope     Hypertension     S/P       Cervicalgia     Bilateral low back pain without sciatica     Obesity (BMI 35.0-39.9) with comorbidity (H)     Past Surgical History:   Procedure Laterality Date     CARPAL TUNNEL RELEASE RT/LT Bilateral       SECTION N/A 2018    Procedure:  SECTION;   section;  Surgeon: Jay Yanez MD;  Location: [facility] OR     SINUS SURGERY       TONSILLECTOMY         Social History     Tobacco Use     Smoking status: Never Smoker     Smokeless tobacco: Never Used   Substance Use Topics     Alcohol use: No     Family History   Problem Relation Age of Onset     Esophageal Cancer Mother 57     Diabetes Mother      Hypertension Mother      Asthma Father      Glaucoma No family hx of      Macular Degeneration No family hx of      Breast Cancer No family hx of      Colon Cancer No family hx of            Mammogram not appropriate for this patient based on age.    Pertinent mammograms are reviewed under the imaging tab.  History of abnormal Pap smear: NO - age 30- 65 PAP every 3 years recommended  PAP / HPV Latest Ref Rng & Units 4/10/2019   PAP - NIL   HPV 16 DNA NEG:Negative Negative   HPV 18 DNA NEG:Negative Negative   OTHER HR HPV NEG:Negative Negative     Reviewed and updated as needed this visit by clinical staff         Reviewed and updated as needed this visit by Provider  Tobacco  Allergies  Meds  Problems  Med Hx  Surg Hx  Fam Hx            Review of Systems   Constitutional: Negative for chills and fever.   HENT: Negative for congestion, ear pain, hearing loss and sore throat.    Eyes: Negative for pain and visual disturbance.   Respiratory: Negative for cough and shortness of breath.    Cardiovascular: Negative for chest pain, palpitations and peripheral edema.   Gastrointestinal: Negative for abdominal pain, constipation, diarrhea, heartburn, hematochezia and nausea.   Breasts:  Negative for tenderness, breast mass and discharge.   Genitourinary: Negative for  "dysuria, frequency, genital sores, hematuria, pelvic pain, urgency, vaginal bleeding and vaginal discharge.   Musculoskeletal: Negative for arthralgias, joint swelling and myalgias.   Skin: Negative for rash.   Neurological: Positive for headaches. Negative for dizziness, weakness and paresthesias.   Psychiatric/Behavioral: Negative for mood changes. The patient is not nervous/anxious.      Headaches intermittent and stable.    OBJECTIVE:   /60 (BP Location: Right arm, Patient Position: Chair, Cuff Size: Adult Large)   Pulse 66   Temp 98.4  F (36.9  C) (Oral)   Ht 1.753 m (5' 9\")   Wt 112 kg (247 lb)   LMP 03/22/2019 (Exact Date)   SpO2 98%   Breastfeeding? Yes   BMI 36.48 kg/m    Physical Exam  GENERAL: healthy, alert and no distress  EYES: Eyes grossly normal to inspection, PERRL and conjunctivae and sclerae normal  HENT: ear canals and TM's normal, nose and mouth without ulcers or lesions  NECK: no adenopathy, no asymmetry, masses, or scars and thyroid normal to palpation  RESP: lungs clear to auscultation - no rales, rhonchi or wheezes  BREAST: normal without masses, tenderness or nipple discharge and no palpable axillary masses or adenopathy  CV: regular rate and rhythm, normal S1 S2, no S3 or S4, no murmur, click or rub, no peripheral edema and peripheral pulses strong  ABDOMEN: soft, nontender, no hepatosplenomegaly, no masses and bowel sounds normal   (female): normal female external genitalia, normal urethral meatus, vaginal mucosa pink, moist, well rugated, and normal cervix/adnexa/uterus without masses or discharge  MS: no gross musculoskeletal defects noted, no edema  SKIN: no suspicious lesions or rashes  NEURO: Normal strength and tone, mentation intact and speech normal  PSYCH: mentation appears normal, affect normal/bright    Diagnostic Test Results:  Results for orders placed or performed in visit on 04/10/19   Pap imaged thin layer screen with HPV - recommended age 30 - 65 years " (select HPV order below)   Result Value Ref Range    PAP NIL     Copath Report         Patient Name: ARIEL MARTIN  MR#: [id number]  Specimen #: [id number]  Collected: 4/10/2019  Received: 4/11/2019  Reported: 4/12/2019 15:53  Ordering Phy(s): DAJA CANTRELL    For improved result formatting, select 'View Enhanced Report Format' under   Linked Documents section.    SPECIMEN/STAIN PROCESS:  Pap imaged thin layer prep screening (Surepath, FocalPoint with guided   screening)       Pap-Cyto x 1, HPV ordered x 1    SOURCE: Cervical, endocervical  ----------------------------------------------------------------   Pap imaged thin layer prep screening (Surepath, FocalPoint with guided   screening)  SPECIMEN ADEQUACY:  Satisfactory for evaluation.  -Transformation zone component present.    CYTOLOGIC INTERPRETATION:    Negative for intraepithelial lesion or malignancy    Electronically signed out by:  LALITHA Bella (ASCP)    Processed and screened at [facility]   [facility]    CLINICAL HISTORY:  LMP: 03/22/2019     Papanicolaou Test Limitations:  Cervical cytology is a screening test with   limited sensitivity; regular  screening is critical for cancer prevention; Pap tests are primarily   effective for the diagnosis/prevention of  squamous cell carcinoma, not adenocarcinomas or other cancers.    TESTING LAB LOCATION:  [facility] [address] [place]  [address] [phone]    COLLECTION SITE:  Client:  [facility]  Location: [facility] ([facility])     HPV High Risk Types DNA Cervical   Result Value Ref Range    HPV Source SurePath     HPV 16 DNA Negative NEG^Negative    HPV 18 DNA Negative NEG^Negative    Other HR HPV Negative NEG^Negative    Final Diagnosis This patient's sample is negative for HPV DNA.     Specimen Description Cervical Cells        ASSESSMENT/PLAN:   1. Encounter for gynecological examination without abnormal  "finding      2. Morbid obesity (H)  Discussed healthy diet and exercise.    3. CARDIOVASCULAR SCREENING; LDL GOAL LESS THAN 160    - Lipid panel reflex to direct LDL Fasting; Future  - Glucose; Future  - **A1C FUTURE anytime; Future    4. Screening for cervical cancer    - Pap imaged thin layer screen with HPV - recommended age 30 - 65 years (select HPV order below)  - HPV High Risk Types DNA Cervical    COUNSELING:  Reviewed preventive health counseling, as reflected in patient instructions    BP Readings from Last 1 Encounters:   04/10/19 114/60     Estimated body mass index is 36.48 kg/m  as calculated from the following:    Height as of this encounter: 1.753 m (5' 9\").    Weight as of this encounter: 112 kg (247 lb).      Weight management plan: Discussed healthy diet and exercise guidelines     reports that she has never smoked. She has never used smokeless tobacco.      Counseling Resources:  ATP IV Guidelines  Pooled Cohorts Equation Calculator  Breast Cancer Risk Calculator  FRAX Risk Assessment  ICSI Preventive Guidelines  Dietary Guidelines for Americans, 2010  USDA's MyPlate  ASA Prophylaxis  Lung CA Screening    Valeria Daly MD  [facility]  "

## 2019-04-10 NOTE — LETTER
My Depression Action Plan  Name: Krystina Suazo   Date of Birth 1985  Date: 4/10/2019    My doctor: Valeria Daly   My clinic: [address]  [address]  [facility] [address] [phone]          GREEN    ZONE   Good Control    What it looks like:     Things are going generally well. You have normal up s and down s. You may even feel depressed from time to time, but bad moods usually last less than a day.   What you need to do:  1. Continue to care for yourself (see self care plan)  2. Check your depression survival kit and update it as needed  3. Follow your physician s recommendations including any medication.  4. Do not stop taking medication unless you consult with your physician first.           YELLOW         ZONE Getting Worse    What it looks like:     Depression is starting to interfere with your life.     It may be hard to get out of bed; you may be starting to isolate yourself from others.    Symptoms of depression are starting to last most all day and this has happened for several days.     You may have suicidal thoughts but they are not constant.   What you need to do:     1. Call your care team, your response to treatment will improve if you keep your care team informed of your progress. Yellow periods are signs an adjustment may need to be made.     2. Continue your self-care, even if you have to fake it!    3. Talk to someone in your support network    4. Open up your depression survival kit           RED    ZONE Medical Alert - Get Help    What it looks like:     Depression is seriously interfering with your life.     You may experience these or other symptoms: You can t get out of bed most days, can t work or engage in other necessary activities, you have trouble taking care of basic hygiene, or basic responsibilities, thoughts of suicide or death that will not go away, self-injurious behavior.     What you need to  do:  1. Call your care team and request a same-day appointment. If they are not available (weekends or after hours) call your local crisis line, emergency room or 911.            Depression Self Care Plan / Survival Kit    Self-Care for Depression  Here s the deal. Your body and mind are really not as separate as most people think.  What you do and think affects how you feel and how you feel influences what you do and think. This means if you do things that people who feel good do, it will help you feel better.  Sometimes this is all it takes.  There is also a place for medication and therapy depending on how severe your depression is, so be sure to consult with your medical provider and/ or Behavioral Health Consultant if your symptoms are worsening or not improving.     In order to better manage my stress, I will:    Exercise  Get some form of exercise, every day. This will help reduce pain and release endorphins, the  feel good  chemicals in your brain. This is almost as good as taking antidepressants!  This is not the same as joining a gym and then never going! (they count on that by the way ) It can be as simple as just going for a walk or doing some gardening, anything that will get you moving.      Hygiene   Maintain good hygiene (Get out of bed in the morning, Make your bed, Brush your teeth, Take a shower, and Get dressed like you were going to work, even if you are unemployed).  If your clothes don't fit try to get ones that do.    Diet  I will strive to eat foods that are good for me, drink plenty of water, and avoid excessive sugar, caffeine, alcohol, and other mood-altering substances.  Some foods that are helpful in depression are: complex carbohydrates, B vitamins, flaxseed, fish or fish oil, fresh fruits and vegetables.    Psychotherapy  I agree to participate in Individual Therapy (if recommended).    Medication  If prescribed medications, I agree to take them.  Missing doses can result in serious  side effects.  I understand that drinking alcohol, or other illicit drug use, may cause potential side effects.  I will not stop my medication abruptly without first discussing it with my provider.    Staying Connected With Others  I will stay in touch with my friends, family members, and my primary care provider/team.    Use your imagination  Be creative.  We all have a creative side; it doesn t matter if it s oil painting, sand castles, or mud pies! This will also kick up the endorphins.    Witness Beauty  (AKA stop and smell the roses) Take a look outside, even in mid-winter. Notice colors, textures. Watch the squirrels and birds.     Service to others  Be of service to others.  There is always someone else in need.  By helping others we can  get out of ourselves  and remember the really important things.  This also provides opportunities for practicing all the other parts of the program.    Humor  Laugh and be silly!  Adjust your TV habits for less news and crime-drama and more comedy.    Control your stress  Try breathing deep, massage therapy, biofeedback, and meditation. Find time to relax each day.     My support system    Clinic Contact:  Phone number:    Contact 1:  Phone number:    Contact 2:  Phone number:    Faith/:  Phone number:    Therapist:  Phone number:    Local crisis center:    Phone number:    Other community support:  Phone number:

## 2019-04-12 LAB
COPATH REPORT: NORMAL
PAP: NORMAL

## 2019-04-16 ENCOUNTER — THERAPY VISIT (OUTPATIENT)
Dept: PHYSICAL THERAPY | Facility: CLINIC | Age: 34
End: 2019-04-16
Payer: COMMERCIAL

## 2019-04-16 DIAGNOSIS — M54.50 BILATERAL LOW BACK PAIN WITHOUT SCIATICA, UNSPECIFIED CHRONICITY: ICD-10-CM

## 2019-04-16 DIAGNOSIS — M54.2 CERVICALGIA: ICD-10-CM

## 2019-04-16 LAB
FINAL DIAGNOSIS: NORMAL
HPV HR 12 DNA CVX QL NAA+PROBE: NEGATIVE
HPV16 DNA SPEC QL NAA+PROBE: NEGATIVE
HPV18 DNA SPEC QL NAA+PROBE: NEGATIVE
SPECIMEN DESCRIPTION: NORMAL
SPECIMEN SOURCE CVX/VAG CYTO: NORMAL

## 2019-04-16 PROCEDURE — 97110 THERAPEUTIC EXERCISES: CPT | Mod: GP | Performed by: PHYSICAL THERAPIST

## 2019-04-16 PROCEDURE — 97112 NEUROMUSCULAR REEDUCATION: CPT | Mod: GP | Performed by: PHYSICAL THERAPIST

## 2019-04-16 PROCEDURE — 97140 MANUAL THERAPY 1/> REGIONS: CPT | Mod: GP | Performed by: PHYSICAL THERAPIST

## 2019-04-25 DIAGNOSIS — Z13.6 CARDIOVASCULAR SCREENING; LDL GOAL LESS THAN 160: ICD-10-CM

## 2019-04-25 LAB
CHOLEST SERPL-MCNC: 198 MG/DL
GLUCOSE SERPL-MCNC: 98 MG/DL (ref 70–99)
HBA1C MFR BLD: 5.3 % (ref 0–5.6)
HDLC SERPL-MCNC: 52 MG/DL
LDLC SERPL CALC-MCNC: 124 MG/DL
NONHDLC SERPL-MCNC: 146 MG/DL
TRIGL SERPL-MCNC: 111 MG/DL

## 2019-04-25 PROCEDURE — 82947 ASSAY GLUCOSE BLOOD QUANT: CPT | Performed by: INTERNAL MEDICINE

## 2019-04-25 PROCEDURE — 36415 COLL VENOUS BLD VENIPUNCTURE: CPT | Performed by: INTERNAL MEDICINE

## 2019-04-25 PROCEDURE — 83036 HEMOGLOBIN GLYCOSYLATED A1C: CPT | Performed by: INTERNAL MEDICINE

## 2019-04-25 PROCEDURE — 80061 LIPID PANEL: CPT | Performed by: INTERNAL MEDICINE

## 2019-05-02 ENCOUNTER — OFFICE VISIT (OUTPATIENT)
Dept: PEDIATRICS | Facility: CLINIC | Age: 34
End: 2019-05-02
Payer: COMMERCIAL

## 2019-05-02 ENCOUNTER — THERAPY VISIT (OUTPATIENT)
Dept: PHYSICAL THERAPY | Facility: CLINIC | Age: 34
End: 2019-05-02
Payer: COMMERCIAL

## 2019-05-02 VITALS
TEMPERATURE: 97.4 F | DIASTOLIC BLOOD PRESSURE: 73 MMHG | BODY MASS INDEX: 36.22 KG/M2 | SYSTOLIC BLOOD PRESSURE: 117 MMHG | WEIGHT: 245.3 LBS | HEART RATE: 71 BPM | OXYGEN SATURATION: 98 %

## 2019-05-02 DIAGNOSIS — M54.50 BILATERAL LOW BACK PAIN WITHOUT SCIATICA, UNSPECIFIED CHRONICITY: ICD-10-CM

## 2019-05-02 DIAGNOSIS — M21.621 BUNIONETTE OF RIGHT FOOT: Primary | ICD-10-CM

## 2019-05-02 DIAGNOSIS — M54.2 CERVICALGIA: ICD-10-CM

## 2019-05-02 PROCEDURE — 99213 OFFICE O/P EST LOW 20 MIN: CPT | Performed by: PEDIATRICS

## 2019-05-02 PROCEDURE — 97140 MANUAL THERAPY 1/> REGIONS: CPT | Mod: GP | Performed by: PHYSICAL THERAPIST

## 2019-05-02 PROCEDURE — 97110 THERAPEUTIC EXERCISES: CPT | Mod: GP | Performed by: PHYSICAL THERAPIST

## 2019-05-02 NOTE — PROGRESS NOTES
Subjective:  HPI  Oswestry Score: 0 %                 Objective:  System    Physical Exam    General     ROS    Assessment/Plan:    DISCHARGE REPORT    Progress reporting period is from 11-27-18 to 5-2-19.       SUBJECTIVE    Subjective: Pt reports waking with headache on weekend. She then had a headache for a couple of days straight. She does feel that the headaches have been better overall. She has noticed gradual improvement with exercises. Low back is feeling better and she is able to bend over the crib without increased pain.     Current pain level is 2/10 neck, 0/10 low back .     Previous pain level was  2/10  .   Changes in function:  Yes (See Goal flowsheet attached for changes in current functional level)  Adverse reaction to treatment or activity: slept in awkward position    OBJECTIVE  Changes noted in objective findings:  Yes, improved cervical and lumbar ROM, improved scapular and core hip and abdominal strength  Objective: Cervical AROM wnl following repeated cervical retraction. Slight restriction with right rotation prior to retraction. Lumbar AROM wnl, MMT right glut med 4+/5, left glut med 4+/5, bilateral mid and lower trap 4+/5     ASSESSMENT/PLAN  Updated problem list and treatment plan: Diagnosis 1:  Cervical pain  Pain -  hot/cold therapy, manual therapy, self management, education and home program  Decreased ROM/flexibility - manual therapy, therapeutic exercise and home program  Decreased strength - therapeutic exercise, therapeutic activities and home program  Decreased function - therapeutic activities and home program  Diagnosis 2:  Low back pain   Pain -  hot/cold therapy and self management  Decreased strength - therapeutic exercise, therapeutic activities and home program  STG/LTGs have been met or progress has been made towards goals:  Yes (See Goal flow sheet completed today.)  Assessment of Progress: Patient is meeting short term goals and is progressing towards long term  goals.  Self Management Plans:  Patient is independent in a home treatment program.  I have re-evaluated this patient and find that the nature, scope, duration and intensity of the therapy is appropriate for the medical condition of the patient.  Krystina continues to require the following intervention to meet STG and LTG's:  PT intervention is no longer required to meet STG/LTG.    Recommendations:  This patient is ready to be discharged from therapy and continue their home treatment program.    Please refer to the daily flowsheet for treatment today, total treatment time and time spent performing 1:1 timed codes.

## 2019-05-02 NOTE — PATIENT INSTRUCTIONS
Thank you for coming in to clinic today. It was a pleasure to meet you. I am currently building my patient panel and would be happy to see you back in clinic. I currently see patients on Wednesday afternoons and Thursday mornings.     Jacki Jackson MD  Internal Medicine-Pediatrics   For appointments: [phone]      Doctor's Instructions:   You can schedule an appointment with podiatry for further evaluation.         Tailor's Bunion (Bunionette)  What Is a Tailor s Bunion?  Tailor s bunion, also called a bunionette, is a prominence of the fifth metatarsal bone at the base of the little toe. The metatarsals are the five long bones of the foot. The prominence that characterizes a tailor s bunion occurs at the metatarsal  head,  located at the far end of the bone where it meets the toe. Tailor s bunions are not as common as bunions, which occur on the inside of the foot, but they are similar in symptoms and causes.    Why is it called a tailor s bunion?   The deformity received its name centuries ago, when tailors sat cross-legged all day with the outside edge of their feet rubbing on the ground. This constant rubbing led to a painful bump at the base of the little toe.    Causes  Often a tailor s bunion is caused by an inherited faulty mechanical structure of the foot. In these cases, changes occur in the foot s bony framework, resulting in the development of an enlargement. The fifth metatarsal bone starts to protrude outward, while the little toe moves inward. This shift creates a bump on the outside of the foot that becomes irritated whenever a shoe presses against it.    Sometimes a tailor s bunion is actually a bony spur (an outgrowth of bone) on the side of the fifth metatarsal head.    Regardless of the cause, the symptoms of a tailor s bunion are usually aggravated by wearing shoes that are too narrow in the toe, producing constant rubbing and pressure.    Symptoms  The symptoms of tailor s bunions include  redness, swelling, and pain at the site of the enlargement. These symptoms occur when wearing shoes that rub against the enlargement, irritating the soft tissues underneath the skin and producing inflammation.    Diagnosis  Tailor s bunion is easily diagnosed because the protrusion is visually apparent. X-rays may be ordered to help the foot and ankle surgeon determine the cause and extent of the deformity.    Non-Surgical Treatment  Treatment for tailor s bunion typically begins with non-surgical therapies. Your foot and ankle surgeon may select one or more of the following:      Shoe modifications. Choose shoes that have a wide toe box, and avoid those with pointed toes or high heels.    Padding. Bunionette pads placed over the area may help reduce pain.    Oral medications. Nonsteroidal anti-inflammatory drugs (NSAIDs), such as ibuprofen, may help relieve the pain and inflammation.    Icing. An ice pack may be applied to reduce pain and inflammation. Wrap the pack in a thin towel rather than placing ice directly on your skin.    Injection therapy. Injections of corticosteroid may be used to treat the inflamed tissue around the joint.    Orthotic devices. In some cases, custom orthotic devices may be provided by the foot and ankle surgeon.    When Is Surgery Needed?  Surgery is often considered when pain continues despite the above approaches. In selecting the procedure or combination of procedures for your case, the foot and ankle surgeon will take into consideration the extent of your deformity based on the x-ray findings, your age, your activity level, and other factors. The length of the recovery period will vary, depending on the procedure or procedures performed.

## 2019-05-02 NOTE — PROGRESS NOTES
SUBJECTIVE:                                                    Krystina Suazo is a 33 year old female who presents to clinic today for the following health issues:      Musculoskeletal problem/pain      Duration: few months but more chronic    Description  Location: right foot    Intensity:  Mild-moderate    Accompanying signs and symptoms: stabbing and dull feeling    History  Previous similar problem: YES- chronic  Previous evaluation:  none    Precipitating or alleviating factors:  Trauma or overuse: no   Aggravating factors include: none    Therapies tried and outcome: Ibuprofen-helps a little bit    32 yo woman 9 months postpartum presenting with right foot pain over the lateral fifth metatarsal. She has always had a bunionette there, but it has worsened during and after pregnancy. No trauma or injury. She previously had pain just at the end of the day, but now she is having pain in the morning as well. She is having difficulty findings shoes that fit. No drainage, ulcers, or fevers. No other joints are involved.       Problem list and histories reviewed & adjusted, as indicated.  Additional history: as documented    Patient Active Problem List   Diagnosis     Chronic rhinitis     COURTNEY (generalized anxiety disorder)     Major depressive disorder, recurrent episode, moderate (H)     Allergic rhinitis     Mild intermittent asthma without complication     Vasovagal syncope     Hypertension     S/P      Cervicalgia     Bilateral low back pain without sciatica     Obesity (BMI 35.0-39.9) with comorbidity (H)     Past Surgical History:   Procedure Laterality Date     CARPAL TUNNEL RELEASE RT/LT Bilateral       SECTION N/A 2018    Procedure:  SECTION;   section;  Surgeon: Jay Yanez MD;  Location: [facility] OR     SINUS SURGERY       TONSILLECTOMY         Social History     Tobacco Use     Smoking status: Never Smoker     Smokeless tobacco: Never Used    Substance Use Topics     Alcohol use: No     Family History   Problem Relation Age of Onset     Esophageal Cancer Mother 57     Diabetes Mother      Hypertension Mother      Asthma Father      Glaucoma No family hx of      Macular Degeneration No family hx of      Breast Cancer No family hx of      Colon Cancer No family hx of          Current Outpatient Medications   Medication Sig Dispense Refill     albuterol (PROAIR HFA/PROVENTIL HFA/VENTOLIN HFA) 108 (90 Base) MCG/ACT inhaler Inhale 2 puffs into the lungs every 4 hours as needed for shortness of breath / dyspnea or wheezing 1 Inhaler 11     clobetasol propionate 0.05 % FOAM Apply sparingly to affected area twice daily as needed.  Do not apply to face. 50 g 3     Prenatal Vit-Fe Fumarate-FA (PRENATAL MULTIVITAMIN PLUS IRON) 27-0.8 MG TABS per tablet Take 1 tablet by mouth daily 100 tablet 3     sertraline (ZOLOFT) 100 MG tablet Take 1 tablet (100 mg) by mouth daily Take with 25 mg tab. Total = 125 mg daily. 90 tablet 3     sertraline (ZOLOFT) 25 MG tablet Take 1 tablet (25 mg) by mouth daily Take along with the 100 mg tabs. Total = 125 mg daily 30 tablet 0     No Known Allergies    ROS:  A 7 point ROS is negative other than the symptoms noted above in the HPI.    OBJECTIVE:     /73   Pulse 71   Temp 97.4  F (36.3  C) (Tympanic)   Wt 111.3 kg (245 lb 4.8 oz)   SpO2 98%   BMI 36.22 kg/m    Body mass index is 36.22 kg/m .    Exam:  General: the patient is pleasant, resting comfortably, no acute distress.   HEENT: Normocephalic, atraumatic. No scleral icterus or conjunctivitis.   Lungs: Breathing comfortably on room air.  Extremities: Bunionette bilaterally, right >> left. Right bunionette with minimal tenderness to palpation, slight overlying erythema which is blanchable. No fluctuance. Distal CMS intact.   Skin: no appreciable skin lesions/rashes on exposed skin.   Neuro: Alert and oriented x4, grossly normal neurologic exam.     Diagnostic Test  Results:  none    ASSESSMENT/PLAN:     1. Bunionette of right foot  This is a 33 yoF with h/o bunionette, now worse in the postpartum period. No trauma or injury so did not obtain xrays. No point tenderness to suggest a stress fracture. No warm or overlying joint erythema to suggest a septic joint / gout. Elected for conservative management and plan to follow up with podiatry to discuss orthosis vs surgical intervention.     - PODIATRY/FOOT & ANKLE SURGERY REFERRAL    - Discussed conservative management: ice, ibuprofen, and bunion cushions    Follow up: with podiatry      Jacki Jackson MD  Internal Medicine Pediatrics  [facility]

## 2019-05-06 ENCOUNTER — OFFICE VISIT (OUTPATIENT)
Dept: PODIATRY | Facility: CLINIC | Age: 34
End: 2019-05-06
Payer: COMMERCIAL

## 2019-05-06 VITALS
WEIGHT: 245.3 LBS | SYSTOLIC BLOOD PRESSURE: 112 MMHG | DIASTOLIC BLOOD PRESSURE: 68 MMHG | HEIGHT: 69 IN | BODY MASS INDEX: 36.33 KG/M2

## 2019-05-06 DIAGNOSIS — M77.51 BURSITIS OF RIGHT FOOT: ICD-10-CM

## 2019-05-06 DIAGNOSIS — M21.621 TAILOR'S BUNIONETTE, RIGHT: Primary | ICD-10-CM

## 2019-05-06 PROCEDURE — 99203 OFFICE O/P NEW LOW 30 MIN: CPT | Performed by: PODIATRIST

## 2019-05-06 ASSESSMENT — MIFFLIN-ST. JEOR: SCORE: 1872.05

## 2019-05-06 NOTE — PROGRESS NOTES
"Foot & Ankle Surgery  May 6, 2019    CC: \"bunionette\"    I was asked to see Krystina Suazo regarding the chief complaint by:  Dr. RICHMOND Jackson    HPI:  Pt is a 33 year old female who presents with above complaint.  Right lower extremity issue for \"months; years\".  Looking for \"management of pain & keep for getting worse\".  Describes deep ache, \"stabbing at times\", 6/10 \"most days, milder this week\".  Worse with \"activity, on my feet\".  Has tried ibuprofen, \"up off my feet\", which does help.  Mostly dull but sometimes stabbing pain.  Went up 1/2 shoe size with her current pregnancy, hasn't come down in size yet.      ROS:   Pos for CC.  The patient denies current nausea, vomiting, chills, fevers, belly pain, calf pain, chest pain or SOB.  Complete remainder of ROS is otherwise neg.    VITALS:    Vitals:    19 1152   BP: 112/68   Weight: 111.3 kg (245 lb 4.8 oz)   Height: 1.753 m (5' 9\")       PMH:    Past Medical History:   Diagnosis Date     Asthma      Depression        SXHX:    Past Surgical History:   Procedure Laterality Date     CARPAL TUNNEL RELEASE RT/LT Bilateral       SECTION N/A 2018    Procedure:  SECTION;   section;  Surgeon: Jay Yanez MD;  Location: [facility] OR     SINUS SURGERY       TONSILLECTOMY          MEDS:    Current Outpatient Medications   Medication     albuterol (PROAIR HFA/PROVENTIL HFA/VENTOLIN HFA) 108 (90 Base) MCG/ACT inhaler     clobetasol propionate 0.05 % FOAM     Prenatal Vit-Fe Fumarate-FA (PRENATAL MULTIVITAMIN PLUS IRON) 27-0.8 MG TABS per tablet     sertraline (ZOLOFT) 100 MG tablet     sertraline (ZOLOFT) 25 MG tablet     No current facility-administered medications for this visit.        ALL:   No Known Allergies    FMH:    Family History   Problem Relation Age of Onset     Esophageal Cancer Mother 57     Diabetes Mother      Hypertension Mother      Asthma Father      Glaucoma No family hx of      Macular Degeneration No " family hx of      Breast Cancer No family hx of      Colon Cancer No family hx of        SocHx:    Social History     Socioeconomic History     Marital status:      Spouse name: Yakov     Number of children: Not on file     Years of education: 16     Highest education level: Not on file   Occupational History     Occupation:    Social Needs     Financial resource strain: Not on file     Food insecurity:     Worry: Not on file     Inability: Not on file     Transportation needs:     Medical: Not on file     Non-medical: Not on file   Tobacco Use     Smoking status: Never Smoker     Smokeless tobacco: Never Used   Substance and Sexual Activity     Alcohol use: No     Drug use: No     Sexual activity: Yes     Partners: Male   Lifestyle     Physical activity:     Days per week: Not on file     Minutes per session: Not on file     Stress: Not on file   Relationships     Social connections:     Talks on phone: Not on file     Gets together: Not on file     Attends Yarsanism service: Not on file     Active member of club or organization: Not on file     Attends meetings of clubs or organizations: Not on file     Relationship status: Not on file     Intimate partner violence:     Fear of current or ex partner: Not on file     Emotionally abused: Not on file     Physically abused: Not on file     Forced sexual activity: Not on file   Other Topics Concern     Parent/sibling w/ CABG, MI or angioplasty before 65F 55M? Not Asked   Social History Narrative     Not on file           EXAMINATION:  Gen:   No apparent distress  Neuro:   A&Ox3, no deficits  Psych:    Answering questions appropriately for age and situation with normal affect  Head:    NCAT  Eye:    Visual scanning without deficit  Ear:    Response to auditory stimuli wnl  Lung:    Non-labored breathing on RA noted  Abd:    NTND per patient report  Lymph:    Neg for pitting/non-pitting edema BLE  Vasc:    Pulses palpable, CFT minimally  delayed  Neuro:    Light touch sensation intact to all sensory nerve distributions without paresthesias  Derm:    Neg for nodules, lesions or ulcerations  MSK:    right lower extremity - tailor's bunion, bony structure similar in size to left lower extremity, but she has a large prominent fluctuant mass plantar-lateral R 5th MPJ consistent with bursitis.    Calf:    Neg for redness, swelling or tenderness    Assessment:  33 year old female with tailor's bunion right lower extremity with adventitial bursitis       Plan:  Discussed etiologies, anatomy and options  1.  Tailor's bunion right lower extremity with adventitial bursitis   -comfortable accommodative shoe gear  -padding options discussed  -RICE/NSAID vs tylenol prn based on pain levels; discuss with ObGyn if NSAIDs are advised or not during breastfeeding.  -if no improvement, call for aspiration order at [facility] vs [facility] with Dr Villa/Yeo  -consider advanced imaging and removal      Follow up:  prn or sooner with acute issues      Patient's medical history was reviewed today    Body mass index is 36.22 kg/m .  Weight management plan: Patient was referred to their PCP to discuss a diet and exercise plan.        Yakov Vallejo DPM FACFAS FACFAOM  Podiatric Foot & Ankle Surgeon  [facility]  [phone]

## 2019-05-13 ENCOUNTER — VIRTUAL VISIT (OUTPATIENT)
Dept: PEDIATRICS | Facility: CLINIC | Age: 34
End: 2019-05-13
Payer: COMMERCIAL

## 2019-05-13 DIAGNOSIS — F41.1 GAD (GENERALIZED ANXIETY DISORDER): ICD-10-CM

## 2019-05-13 DIAGNOSIS — F33.1 MAJOR DEPRESSIVE DISORDER, RECURRENT EPISODE, MODERATE (H): ICD-10-CM

## 2019-05-13 PROCEDURE — 99441 ZZC PHYSICIAN TELEPHONE EVALUATION 5-10 MIN: CPT | Performed by: INTERNAL MEDICINE

## 2019-05-13 RX ORDER — SERTRALINE HYDROCHLORIDE 25 MG/1
25 TABLET, FILM COATED ORAL DAILY
Qty: 90 TABLET | Refills: 3 | Status: SHIPPED | OUTPATIENT
Start: 2019-05-13 | End: 2020-04-06

## 2019-05-13 ASSESSMENT — PATIENT HEALTH QUESTIONNAIRE - PHQ9
SUM OF ALL RESPONSES TO PHQ QUESTIONS 1-9: 0
5. POOR APPETITE OR OVEREATING: NOT AT ALL

## 2019-05-13 ASSESSMENT — ANXIETY QUESTIONNAIRES
6. BECOMING EASILY ANNOYED OR IRRITABLE: NOT AT ALL
7. FEELING AFRAID AS IF SOMETHING AWFUL MIGHT HAPPEN: NOT AT ALL
5. BEING SO RESTLESS THAT IT IS HARD TO SIT STILL: NOT AT ALL
1. FEELING NERVOUS, ANXIOUS, OR ON EDGE: NOT AT ALL
3. WORRYING TOO MUCH ABOUT DIFFERENT THINGS: NOT AT ALL
GAD7 TOTAL SCORE: 0
2. NOT BEING ABLE TO STOP OR CONTROL WORRYING: NOT AT ALL

## 2019-05-13 NOTE — PROGRESS NOTES
"Krystina Suazo is a 33 year old female who is being evaluated via a telephone visit.      The patient has been notified of following (by PAUL Pereira LPN       \"We have found that certain health care needs can be provided without the need for a physical exam.  This service lets us provide the care you need with a short phone conversation.  If a prescription is necessary we can send it directly to your pharmacy.  If lab work is needed we can place an order for that and you can then stop by our lab to have the test done at a later time.    This telephone visit will be conducted via 3 way call with the you (the patient) , the physician/provider, and a me all on the line at the same time.  This allows your physician/provider to have the phone conversation with you while I will be taking notes for your medical record.  We will have full access to your [place] medical record during this entire phone call.    Since this is like an office visit,  will bill your insurance company for this service.  Please check with your medical insurance if this type of telephone/virtual is covered . You may be responsible for the cost of this service if insurance coverage is denied.  The typical cost is $30 (10min), $59(11-20min) and $85 (21-30min)     If during the course of the call the physician/provider feels a telephone visit is not appropriate, you will not be charged for this service\"    Consent has been obtained for this service by care team member: yes.  See the scanned image in the medical record.    S: The history as provided by the patient to the provider during this 3 way call include     Pertinent parts of the the patient's medical history reviewed and confirmed by the provider included :      Total time of call between patient and provider was 6 minutes     Valeria Daly M.D.   (MD signature)  ===================================================    I have reviewed the note as documented above.  This " accurately captures the substance of my conversation with the patient,    Additional provider notes:doing much better with increased sertraline dose from 100 to 125. Had only a few days that were more leung, but were related to her cycle and was manageable for her.    Assessment/Plan:  Major depressive disorder, recurrent episode, moderate (H)  Improved on increased dose sertraline. Plan questionnaires in 6 months. She will let me know if symptoms increase prior to that time.

## 2019-05-13 NOTE — ASSESSMENT & PLAN NOTE
Improved on increased dose sertraline. Plan questionnaires in 6 months. She will let me know if symptoms increase prior to that time.

## 2019-05-14 ASSESSMENT — ANXIETY QUESTIONNAIRES: GAD7 TOTAL SCORE: 0

## 2019-06-18 ENCOUNTER — THERAPY VISIT (OUTPATIENT)
Dept: PHYSICAL THERAPY | Facility: CLINIC | Age: 34
End: 2019-06-18
Payer: COMMERCIAL

## 2019-06-18 DIAGNOSIS — M54.2 CERVICALGIA: Primary | ICD-10-CM

## 2019-06-18 PROCEDURE — 97164 PT RE-EVAL EST PLAN CARE: CPT | Mod: GP | Performed by: PHYSICAL THERAPIST

## 2019-06-18 PROCEDURE — 97110 THERAPEUTIC EXERCISES: CPT | Mod: GP | Performed by: PHYSICAL THERAPIST

## 2019-06-18 PROCEDURE — 97140 MANUAL THERAPY 1/> REGIONS: CPT | Mod: GP | Performed by: PHYSICAL THERAPIST

## 2019-06-18 NOTE — PROGRESS NOTES
Subjective:  HPI                    Objective:  System    Physical Exam    General     ROS    Assessment/Plan:    PROGRESS  REPORT    Progress reporting period is from 5-2-19 to 6-18-19.       SUBJECTIVE    Subjective: Pt reports flare up of symptoms over the past few weeks. Symptoms have been intermittement. She has experienced symptoms into left upper arm and into left forearm. She feels better overall with exercise program/. She has increased arm pain when she does bridge ex. and chin tucks increase arm symptoms. She has increased symptoms also after long work day. She sometimes notes increased pain with carrying her son who now weighs 17#. She has intermittent pain turning neck to end range to the left.     Current pain level is 6/10  .     Previous pain level was  0/10  .   Changes in function:  Goals reestablished  Adverse reaction to treatment or activity: treatment - chin tucks with Overpressure reproducing left arm symptoms    OBJECTIVE  Changes noted in objective findings:  The objective findings below are from DOS 6-18-19.  Objective: Cervical AROM FB 90% stretch, BB 75% pain left cervical and radiates to left shoulder, right rotation 90%, left rotation 75% pain left, MMT left biceps +/5, left deltoid 4+/5, muscle guarding and tenderness, left scalenes, left levator, Power and ROM improved with repeated cervical retraction.      ASSESSMENT/PLAN  Updated problem list and treatment plan: Diagnosis 1:  Cervical pain  Pain -  hot/cold therapy, manual therapy, self management, education and home program  Decreased ROM/flexibility - manual therapy, therapeutic exercise and home program  Decreased strength - therapeutic exercise, therapeutic activities and home program  Decreased function - therapeutic activities and home program  STG/LTGs have been met or progress has been made towards goals:  None  Assessment of Progress: The patient's condition has potential to improve.  The patient's condition has  exacerbated.  Self Management Plans:  Patient has been instructed in a home treatment program.  I have re-evaluated this patient and find that the nature, scope, duration and intensity of the therapy is appropriate for the medical condition of the patient.  Krystina continues to require the following intervention to meet STG and LTG's:  PT    Recommendations:  This patient would benefit from continued therapy.     Frequency:  1 X week, once daily  Duration:  for 3 weeks tapering to 2 X a month over 6 weeks        Please refer to the daily flowsheet for treatment today, total treatment time and time spent performing 1:1 timed codes.

## 2019-06-24 ENCOUNTER — THERAPY VISIT (OUTPATIENT)
Dept: PHYSICAL THERAPY | Facility: CLINIC | Age: 34
End: 2019-06-24
Payer: COMMERCIAL

## 2019-06-24 DIAGNOSIS — M54.2 CERVICALGIA: ICD-10-CM

## 2019-06-24 PROCEDURE — 97140 MANUAL THERAPY 1/> REGIONS: CPT | Mod: GP | Performed by: PHYSICAL THERAPIST

## 2019-06-24 PROCEDURE — 97110 THERAPEUTIC EXERCISES: CPT | Mod: GP | Performed by: PHYSICAL THERAPIST

## 2019-07-16 ENCOUNTER — THERAPY VISIT (OUTPATIENT)
Dept: PHYSICAL THERAPY | Facility: CLINIC | Age: 34
End: 2019-07-16
Payer: COMMERCIAL

## 2019-07-16 DIAGNOSIS — M54.2 CERVICALGIA: ICD-10-CM

## 2019-07-16 PROCEDURE — 97140 MANUAL THERAPY 1/> REGIONS: CPT | Mod: GP | Performed by: PHYSICAL THERAPIST

## 2019-07-16 PROCEDURE — 97110 THERAPEUTIC EXERCISES: CPT | Mod: GP | Performed by: PHYSICAL THERAPIST

## 2019-07-30 ENCOUNTER — THERAPY VISIT (OUTPATIENT)
Dept: PHYSICAL THERAPY | Facility: CLINIC | Age: 34
End: 2019-07-30
Payer: COMMERCIAL

## 2019-07-30 DIAGNOSIS — M54.2 CERVICALGIA: ICD-10-CM

## 2019-07-30 PROCEDURE — 97140 MANUAL THERAPY 1/> REGIONS: CPT | Mod: GP | Performed by: PHYSICAL THERAPIST

## 2019-07-30 PROCEDURE — 97110 THERAPEUTIC EXERCISES: CPT | Mod: GP | Performed by: PHYSICAL THERAPIST

## 2019-08-13 ENCOUNTER — THERAPY VISIT (OUTPATIENT)
Dept: PHYSICAL THERAPY | Facility: CLINIC | Age: 34
End: 2019-08-13
Payer: COMMERCIAL

## 2019-08-13 DIAGNOSIS — M54.2 CERVICALGIA: ICD-10-CM

## 2019-08-13 PROCEDURE — 97140 MANUAL THERAPY 1/> REGIONS: CPT | Mod: GP | Performed by: PHYSICAL THERAPIST

## 2019-08-13 PROCEDURE — 97110 THERAPEUTIC EXERCISES: CPT | Mod: GP | Performed by: PHYSICAL THERAPIST

## 2019-08-13 NOTE — PROGRESS NOTES
Subjective:  HPI                    Objective:  System    Physical Exam    General     ROS    Assessment/Plan:    DISCHARGE REPORT    Progress reporting period is from 6-18-19 to 8-13-19.       SUBJECTIVE    Subjective: Pt denies headaches. She is managing symptoms well and is now able to tolerate sitting for an hour at a time with abilty to control symptoms with exercises She is feeling stronger and no longer having tingling into her arm. Pt has been managing low back pain as well with exercises.      Current Pain level: 0/10.      Initial Pain level: 6/10.   Changes in function:  Yes (See Goal flowsheet attached for changes in current functional level)  Adverse reaction to treatment or activity: None    OBJECTIVE  Changes noted in objective findings:  Yes, improved ROM strength and function.  Objective: Cervical AROM wnl, negative neural tension median nerve, MMT bilateral mid trap 5-/5, lower trap 4+/5 Mild tenderness and guarding left levator scapular. Non tender suboccipitals     ASSESSMENT/PLAN  Updated problem list and treatment plan: Diagnosis 1:  Neck pain  Pain -  hot/cold therapy, manual therapy, self management, education and home program  Decreased ROM/flexibility - manual therapy, therapeutic exercise and home program  Decreased strength - therapeutic exercise, therapeutic activities and home program  Decreased function - therapeutic activities and home program  STG/LTGs have been met or progress has been made towards goals:  Yes (See Goal flow sheet completed today.)  Assessment of Progress: The patient has met all of their long term goals.  Self Management Plans:  Patient is independent in a home treatment program.  I have re-evaluated this patient and find that the nature, scope, duration and intensity of the therapy is appropriate for the medical condition of the patient.  Krystina continues to require the following intervention to meet STG and LTG's:  PT intervention is no longer required to meet  STG/LTG.    Recommendations:  This patient is ready to be discharged from therapy and continue their home treatment program.    Please refer to the daily flowsheet for treatment today, total treatment time and time spent performing 1:1 timed codes.

## 2019-09-22 ENCOUNTER — OFFICE VISIT (OUTPATIENT)
Dept: URGENT CARE | Facility: URGENT CARE | Age: 34
End: 2019-09-22
Payer: COMMERCIAL

## 2019-09-22 VITALS
DIASTOLIC BLOOD PRESSURE: 66 MMHG | TEMPERATURE: 98.5 F | SYSTOLIC BLOOD PRESSURE: 134 MMHG | BODY MASS INDEX: 34.91 KG/M2 | OXYGEN SATURATION: 96 % | WEIGHT: 236.4 LBS | HEART RATE: 82 BPM

## 2019-09-22 DIAGNOSIS — J01.90 ACUTE SINUSITIS WITH SYMPTOMS > 10 DAYS: Primary | ICD-10-CM

## 2019-09-22 DIAGNOSIS — J45.20 MILD INTERMITTENT ASTHMA WITHOUT COMPLICATION: ICD-10-CM

## 2019-09-22 PROCEDURE — 99214 OFFICE O/P EST MOD 30 MIN: CPT | Performed by: PHYSICIAN ASSISTANT

## 2019-09-22 RX ORDER — ALBUTEROL SULFATE 90 UG/1
1-2 AEROSOL, METERED RESPIRATORY (INHALATION) EVERY 4 HOURS PRN
Qty: 8.5 G | Refills: 0 | Status: SHIPPED | OUTPATIENT
Start: 2019-09-22 | End: 2019-09-26

## 2019-09-22 NOTE — PATIENT INSTRUCTIONS
1.  Plenty of fluids, rest, warm compresses on face  2.  Mucinex twice daily for at least 4 days  3.  Nieves Pot 1x in the morning 1x at night (SALINE MIST SPRAY IS AN ACCEPTABLE, THOUGH NOT AS EFFECTIVE REPLACEMENT)  4.  Benadryl (diphenhydramine) at bedtime   5.  Either Claritin (Loratadine), Allegra (Fexofenadine), or Zyrtec (Cetirizine) in the day  6.  Flonase (Fluticasone) 2x each nostril twice a day for two weeks, then once each nostril once a day  7. Antibiotic twice daily for 7 Days  8. Probiotic (ie Culturelle) 1-2 hours after each antibiotic dose     Please let us know if symptoms persist, or worsen.      Patient Education     Self-Care for Sinusitis     Drinking plenty of water can help sinuses drain.   Sinusitis can often be managed with self-care. Self-care can keep sinuses moist and make you feel more comfortable. Remember to follow your doctor's instructions closely. This can make a big difference in getting your sinus problem under control.  Drink fluids  Drinking extra fluids helps thin your mucus. This lets it drain from your sinuses more easily. Have a glass of water every hour or two. A humidifier helps in much the same way. Fluids can also offset the drying effects of certain medicines. If you use a humidifier, follow the product maker's instructions on how to use it. Clean it on a regular schedule.  Use saltwater rinses  Rinses help keep your sinuses and nose moist. Mix a teaspoon of salt in 8 ounces of fresh, warm water. Use a bulb syringe to gently squirt the water into your nose a few times a day. You can also buy ready-made saline nasal sprays.  Apply hot or cold packs  Applying heat to the area surrounding your sinuses may make you feel more comfortable. Use a hot water bottle or a hand towel dipped in hot water. Some people also find ice packs effective for relieving pain.  Medicines  Your doctor may prescribe medications to help treat your sinusitis. If you have an infection,  antibiotics can help clear it up. If you are prescribed antibiotics, take all pills on schedule until they are gone, even if you feel better. Decongestants help relieve swelling. Use decongestant sprays for short periods only under the direction of your doctor. If you have allergies, your doctor may prescribe medications to help relieve them.   Date Last Reviewed: 10/1/2016    6932-5173 [employer]. [address] [place] [address], [place] [address]. All rights reserved. This information is not intended as a substitute for professional medical care. Always follow your healthcare professional's instructions.

## 2019-09-22 NOTE — PROGRESS NOTES
SUBJECTIVE:  Krystina Suazo is a 34 year old female here with concerns about sinus infection.  She states onset of symptoms were 2 week(s) ago.  She has had maxillary, frontal pressure. Course of illness is worsening significantly for the last 2 days. Severity moderate  Current and Associated symptoms: nasal congestion, rhinorrhea, sore throat, facial pain/pressure, tooth pain, headache and post-nasal drainage  Predisposing factors include seasonal allergies and child in . Recent treatment has included: Antihistamine, Decongestants, Steroid nasal spray and rinses    Past Medical History:   Diagnosis Date     Asthma      Depression      Social History     Tobacco Use     Smoking status: Never Smoker     Smokeless tobacco: Never Used   Substance Use Topics     Alcohol use: No       ROS:  10 point ROS negative except as listed above      OBJECTIVE:  /66   Pulse 82   Temp 98.5  F (36.9  C)   Wt 107.2 kg (236 lb 6.4 oz)   SpO2 96%   BMI 34.91 kg/m    Exam:  GENERAL APPEARANCE: healthy, alert and no distress  EYES: conjunctiva clear  HENT: ear canals and TM's normal.  Nose and mouth without ulcers, erythema or lesions  NECK: supple, nontender, no lymphadenopathy  RESP: lungs clear to auscultation - no rales, rhonchi or wheezes  CV: regular rates and rhythm, normal S1 S2, no murmur noted  ABDOMEN:  soft, nontender, no HSM or masses and bowel sounds normal  SKIN: no suspicious lesions or rashes        ASSESSMENT:  (J01.90) Acute sinusitis with symptoms > 10 days  (primary encounter diagnosis)  Plan: amoxicillin-clavulanate (AUGMENTIN) 875-125 MG         tablet      (J45.20) Mild intermittent asthma without complication  Plan: albuterol (PROAIR HFA/PROVENTIL HFA/VENTOLIN         HFA) 108 (90 Base) MCG/ACT inhaler      Follow up with PCP if symptoms worsen or fail to improve      Patient Instructions         1.  Plenty of fluids, rest, warm compresses on face  2.  Mucinex twice daily for at least 4  days  3.  Nieves Pot 1x in the morning 1x at night (SALINE MIST SPRAY IS AN ACCEPTABLE, THOUGH NOT AS EFFECTIVE REPLACEMENT)  4.  Benadryl (diphenhydramine) at bedtime   5.  Either Claritin (Loratadine), Allegra (Fexofenadine), or Zyrtec (Cetirizine) in the day  6.  Flonase (Fluticasone) 2x each nostril twice a day for two weeks, then once each nostril once a day  7. Antibiotic twice daily for 7 Days  8. Probiotic (ie Culturelle) 1-2 hours after each antibiotic dose     Please let us know if symptoms persist, or worsen.      Patient Education     Self-Care for Sinusitis     Drinking plenty of water can help sinuses drain.   Sinusitis can often be managed with self-care. Self-care can keep sinuses moist and make you feel more comfortable. Remember to follow your doctor's instructions closely. This can make a big difference in getting your sinus problem under control.  Drink fluids  Drinking extra fluids helps thin your mucus. This lets it drain from your sinuses more easily. Have a glass of water every hour or two. A humidifier helps in much the same way. Fluids can also offset the drying effects of certain medicines. If you use a humidifier, follow the product maker's instructions on how to use it. Clean it on a regular schedule.  Use saltwater rinses  Rinses help keep your sinuses and nose moist. Mix a teaspoon of salt in 8 ounces of fresh, warm water. Use a bulb syringe to gently squirt the water into your nose a few times a day. You can also buy ready-made saline nasal sprays.  Apply hot or cold packs  Applying heat to the area surrounding your sinuses may make you feel more comfortable. Use a hot water bottle or a hand towel dipped in hot water. Some people also find ice packs effective for relieving pain.  Medicines  Your doctor may prescribe medications to help treat your sinusitis. If you have an infection, antibiotics can help clear it up. If you are prescribed antibiotics, take all pills on schedule until  they are gone, even if you feel better. Decongestants help relieve swelling. Use decongestant sprays for short periods only under the direction of your doctor. If you have allergies, your doctor may prescribe medications to help relieve them.   Date Last Reviewed: 10/1/2016    1333-1403 [employer]. [address] [place] [address], [place] [address]. All rights reserved. This information is not intended as a substitute for professional medical care. Always follow your healthcare professional's instructions.

## 2019-09-26 ENCOUNTER — OFFICE VISIT (OUTPATIENT)
Dept: OBGYN | Facility: CLINIC | Age: 34
End: 2019-09-26
Payer: COMMERCIAL

## 2019-09-26 VITALS — WEIGHT: 235.3 LBS | SYSTOLIC BLOOD PRESSURE: 124 MMHG | BODY MASS INDEX: 34.75 KG/M2 | DIASTOLIC BLOOD PRESSURE: 68 MMHG

## 2019-09-26 DIAGNOSIS — Z98.891 HISTORY OF C-SECTION: ICD-10-CM

## 2019-09-26 DIAGNOSIS — Z31.9 PATIENT DESIRES PREGNANCY: ICD-10-CM

## 2019-09-26 DIAGNOSIS — Z87.59 HISTORY OF GESTATIONAL HYPERTENSION: Primary | ICD-10-CM

## 2019-09-26 PROCEDURE — 99214 OFFICE O/P EST MOD 30 MIN: CPT | Performed by: OBSTETRICS & GYNECOLOGY

## 2019-09-26 NOTE — PROGRESS NOTES
SUBJECTIVE:                                                     Krystina Suazo is a 34 year old female  who presents to clinic today for discussion of pregnancy planning.    Prior pregnancy complicated by gestational hypertension and  for fetal intolerance of labor, also AMA. She would like to discussed planning for a healthy pregnancy and has some questions. Is currently breastfeeding her son, who is 1. Wonders about continuing this, plans for pregnancy and also delivery.    Induced for gestational hypertension, no preeclampsia. Had primary  for fetal intolerance, low transverse  with 2 layer closure. She had 2 large fibroids, one of which was removed as it was pedunculated. She has regular periods now.  She has had no hypertension since delivery.        Problem list and histories reviewed & adjusted, as indicated.  Additional history: as documented.    Patient Active Problem List   Diagnosis     Chronic rhinitis     COURTNEY (generalized anxiety disorder)     Major depressive disorder, recurrent episode, moderate (H)     Allergic rhinitis     Mild intermittent asthma without complication     Vasovagal syncope     Hypertension     S/P      Obesity (BMI 35.0-39.9) with comorbidity (H)     Past Surgical History:   Procedure Laterality Date     CARPAL TUNNEL RELEASE RT/LT Bilateral       SECTION N/A 2018    Procedure:  SECTION;   section;  Surgeon: Jay Yanez MD;  Location: [facility]     SINUS SURGERY       TONSILLECTOMY        Social History     Tobacco Use     Smoking status: Never Smoker     Smokeless tobacco: Never Used   Substance Use Topics     Alcohol use: No      Problem (# of Occurrences) Relation (Name,Age of Onset)    Asthma (1) Father    Diabetes (1) Mother    Esophageal Cancer (1) Mother (57)    Hypertension (1) Mother       Negative family history of: Glaucoma, Macular Degeneration, Breast Cancer, Colon Cancer             albuterol (PROAIR HFA/PROVENTIL HFA/VENTOLIN HFA) 108 (90 Base) MCG/ACT inhaler, Inhale 2 puffs into the lungs every 4 hours as needed for shortness of breath / dyspnea or wheezing  amoxicillin-clavulanate (AUGMENTIN) 875-125 MG tablet, Take 1 tablet by mouth 2 times daily for 7 days  Prenatal Vit-Fe Fumarate-FA (PRENATAL MULTIVITAMIN PLUS IRON) 27-0.8 MG TABS per tablet, Take 1 tablet by mouth daily  sertraline (ZOLOFT) 100 MG tablet, Take 1 tablet (100 mg) by mouth daily Take with 25 mg tab. Total = 125 mg daily.  sertraline (ZOLOFT) 25 MG tablet, Take 1 tablet (25 mg) by mouth daily Take along with the 100 mg tabs. Total = 125 mg daily    No current facility-administered medications on file prior to visit.     Allergies   Allergen Reactions     Dust Mite Extract      Pollen and animal dander.       ROS:  As above.    OBJECTIVE:     Exam:  Constitutional:  Appearance: Well nourished, well developed alert, in no acute distress  Neurologic/Psychiatric:  Mental Status:  Oriented X3   No other examination was necessary for today's appointment.      In-Clinic Test Results:  No results found for this or any previous visit (from the past 24 hour(s)).    ASSESSMENT/PLAN:                                                        ICD-10-CM    1. History of gestational hypertension Z87.59    2. History of  Z98.891    3. Patient desires pregnancy Z31.9          Discussed risks, benefits, and alternatives to TOLAC versus RCS.  Discussed increased risks of pregnancy after age 35, including increased risk of aneuploidy, miscarriage, hypertensive disorders, diabetes,  labor, cardiac complications, and .   For history gestational hypertension, would plan baseline labs and also baby ASA daily from 12w-delivery.  All questions answered.    Sandy Tran MD  [facility]

## 2019-09-26 NOTE — NURSING NOTE
"Chief Complaint   Patient presents with     Consult     for family planning, patient is wanting to get pregnant again, and has some questions.        Initial /68 (BP Location: Right arm, Patient Position: Chair, Cuff Size: Adult Large)   Wt 106.7 kg (235 lb 4.8 oz)   LMP 09/15/2019 (Exact Date)   BMI 34.75 kg/m   Estimated body mass index is 34.75 kg/m  as calculated from the following:    Height as of 19: 1.753 m (5' 9\").    Weight as of this encounter: 106.7 kg (235 lb 4.8 oz).  BP completed using cuff size: large    Questioned patient about current smoking habits.  Pt. has never smoked.          The following HM Due: NONE      Elle Sexton CMA             "

## 2019-10-01 NOTE — PROGRESS NOTES
[facility] - PRIMARY CARE SKIN    CC : scalp psoriasis   SUBJECTIVE:                                                    Krystina Suazo is a 33 year old female who presents to clinic today for follow up of chronic scalp psoriasis.     Current treatment : was ;using clobetasol proprinate 0.05% shampoo once per day with good control. Ran out few months ago. No increased itching and scaling.  Response to treatment : Scaling and itchiness symptoms have improved.  Side effects noted : None    Previous shampoos :   OTC T/Sal 3% salicylic acid shampoo and Head & Shoulders shampoo.        Personal Medical History  Skin Cancer : NO  Eczema Psoriasis Rosacea Autoimmune   YES - eczema in childhood on elbows and knees and current involvement area on back of scalp ?YES NO NO     Family Medical History  Skin Cancer : NO  Eczema Psoriasis Rosacea Autoimmune   NO NO NO NO     Occupation : desk work (indoor).    Patient Active Problem List   Diagnosis     Chronic rhinitis     COURTNEY (generalized anxiety disorder)     Major depressive disorder, recurrent episode, moderate (H)     Allergic rhinitis     Mild intermittent asthma without complication     Vasovagal syncope     Hypertension     S/P      Obesity (BMI 35.0-39.9) with comorbidity (H)       Past Medical History:   Diagnosis Date     Asthma      Depression     Past Surgical History:   Procedure Laterality Date     CARPAL TUNNEL RELEASE RT/LT Bilateral       SECTION N/A 2018    Procedure:  SECTION;   section;  Surgeon: Jay Yanez MD;  Location: [facility] OR     SINUS SURGERY  2014     TONSILLECTOMY        Social History     Tobacco Use     Smoking status: Never Smoker     Smokeless tobacco: Never Used   Substance Use Topics     Alcohol use: No     Drug use: No    Family History     Problem (# of Occurrences) Relation (Name,Age of Onset)    Asthma (1) Father    Diabetes (1) Mother    Esophageal Cancer (1) Mother (57)     Hypertension (1) Mother       Negative family history of: Glaucoma, Macular Degeneration, Breast Cancer, Colon Cancer           Prescription Medications as of 10/1/2019       Rx Number Disp Refills Start End Last Dispensed Date Next Fill Date Owning Pharmacy    albuterol (PROAIR HFA/PROVENTIL HFA/VENTOLIN HFA) 108 (90 Base) MCG/ACT inhaler  1 Inhaler 11 8/27/2018    [facility] [address]     Sig: Inhale 2 puffs into the lungs every 4 hours as needed for shortness of breath / dyspnea or wheezing    Class: E-Prescribe    Route: Inhalation    amoxicillin-clavulanate (AUGMENTIN) 875-125 MG tablet (Ended)  14 tablet 0 9/22/2019 9/29/2019   [id number] [id number] [place] [address] AT [place] [address]    Sig: Take 1 tablet by mouth 2 times daily for 7 days    Class: E-Prescribe    Route: Oral    Prenatal Vit-Fe Fumarate-FA (PRENATAL MULTIVITAMIN PLUS IRON) 27-0.8 MG TABS per tablet  100 tablet 3 2/13/2018        Sig: Take 1 tablet by mouth daily    Class: No Print Out    Route: Oral    sertraline (ZOLOFT) 100 MG tablet  90 tablet 3 4/5/2019    [facility] [place] [address]     Sig: Take 1 tablet (100 mg) by mouth daily Take with 25 mg tab. Total = 125 mg daily.    Class: No Print Out    Notes to Pharmacy: Dose increase. See 4/3/19 encounter.    Route: Oral    sertraline (ZOLOFT) 25 MG tablet  90 tablet 3 5/13/2019    [facility] [place] [address]     Sig: Take 1 tablet (25 mg) by mouth daily Take along with the 100 mg tabs. Total = 125 mg daily    Class: E-Prescribe    Route: Oral            Allergies   Allergen Reactions     Dust Mite Extract      Pollen and animal dander.        CONSTITUTIONAL:NEGATIVE for fever, chills, change in weight  INTEGUMENTARY/SKIN: POSITIVE for pruritis, rash and scaling  ROS : 14 point review of systems was negative except the symptoms listed  above in the HPI.          OBJECTIVE:                                                    GENERAL: healthy, alert and no distress  SKIN: Briggs Skin Type - II.  Scalp, Face, Neck were examined. The dermatoscope was used to help evaluate pigmented lesions.  Skin Pertinent Findings:  Scalp : thick scales and erythematous plaque occipital scalp  Elbows : clear  Face : clear          ASSESSMENT:                                                      Encounter Diagnosis   Name Primary?     Scalp psoriasis Yes         PLAN:                                                    Patient Instructions   Clobetasol proprinate 0.05% foam - apply to affected areas on scalp two time per day for one week then once per week for 2 weeks. Then use two times per week, once per day  Recheck in 3 months     Cerave facial moisturizer         PROCEDURES:                                                    None.    TT : 20 minutes.  CT : 15 minutes. Discussion regarding etiology, spectrum of psoriasis, treatment options, aggravating factors.

## 2019-10-02 ENCOUNTER — OFFICE VISIT (OUTPATIENT)
Dept: FAMILY MEDICINE | Facility: CLINIC | Age: 34
End: 2019-10-02
Payer: COMMERCIAL

## 2019-10-02 VITALS — SYSTOLIC BLOOD PRESSURE: 112 MMHG | DIASTOLIC BLOOD PRESSURE: 62 MMHG

## 2019-10-02 DIAGNOSIS — L40.9 SCALP PSORIASIS: Primary | ICD-10-CM

## 2019-10-02 PROCEDURE — 99213 OFFICE O/P EST LOW 20 MIN: CPT | Performed by: FAMILY MEDICINE

## 2019-10-02 RX ORDER — CLOBETASOL PROPIONATE 0.5 MG/G
AEROSOL, FOAM TOPICAL
Qty: 100 G | Refills: 3 | Status: SHIPPED | OUTPATIENT
Start: 2019-10-02 | End: 2021-12-15

## 2019-10-02 NOTE — PATIENT INSTRUCTIONS
Clobetasol proprinate 0.05% foam - apply to affected areas on scalp two time per day for one week then once per week for 2 weeks. Then use two times per week, once per day  Recheck in 3 months     Cerave facial moisturizer

## 2019-10-02 NOTE — LETTER
10/2/2019         RE: Krystina Suazo  [address]  [place] [address]        Dear Colleague,    Thank you for referring your patient, Krystina Suazo, to the [facility]. Please see a copy of my visit note below.    [facility] - PRIMARY CARE SKIN    CC : scalp psoriasis   SUBJECTIVE:                                                    Krystina Suazo is a 33 year old female who presents to clinic today for follow up of chronic scalp psoriasis.     Current treatment : was ;using clobetasol proprinate 0.05% shampoo once per day with good control. Ran out few months ago. No increased itching and scaling.  Response to treatment : Scaling and itchiness symptoms have improved.  Side effects noted : None    Previous shampoos :   OTC T/Sal 3% salicylic acid shampoo and Head & Shoulders shampoo.        Personal Medical History  Skin Cancer : NO  Eczema Psoriasis Rosacea Autoimmune   YES - eczema in childhood on elbows and knees and current involvement area on back of scalp ?YES NO NO     Family Medical History  Skin Cancer : NO  Eczema Psoriasis Rosacea Autoimmune   NO NO NO NO     Occupation : desk work (indoor).    Patient Active Problem List   Diagnosis     Chronic rhinitis     COURTNEY (generalized anxiety disorder)     Major depressive disorder, recurrent episode, moderate (H)     Allergic rhinitis     Mild intermittent asthma without complication     Vasovagal syncope     Hypertension     S/P      Obesity (BMI 35.0-39.9) with comorbidity (H)       Past Medical History:   Diagnosis Date     Asthma      Depression     Past Surgical History:   Procedure Laterality Date     CARPAL TUNNEL RELEASE RT/LT Bilateral       SECTION N/A 2018    Procedure:  SECTION;   section;  Surgeon: Jay Yanez MD;  Location: [facility] OR     SINUS SURGERY       TONSILLECTOMY        Social History     Tobacco Use     Smoking status: Never Smoker      Smokeless tobacco: Never Used   Substance Use Topics     Alcohol use: No     Drug use: No    Family History     Problem (# of Occurrences) Relation (Name,Age of Onset)    Asthma (1) Father    Diabetes (1) Mother    Esophageal Cancer (1) Mother (57)    Hypertension (1) Mother       Negative family history of: Glaucoma, Macular Degeneration, Breast Cancer, Colon Cancer           Prescription Medications as of 10/1/2019       Rx Number Disp Refills Start End Last Dispensed Date Next Fill Date Owning Pharmacy    albuterol (PROAIR HFA/PROVENTIL HFA/VENTOLIN HFA) 108 (90 Base) MCG/ACT inhaler  1 Inhaler 11 8/27/2018    [facility] [address]     Sig: Inhale 2 puffs into the lungs every 4 hours as needed for shortness of breath / dyspnea or wheezing    Class: E-Prescribe    Route: Inhalation    amoxicillin-clavulanate (AUGMENTIN) 875-125 MG tablet (Ended)  14 tablet 0 9/22/2019 9/29/2019   [id number] [id number] [place] [address]    Sig: Take 1 tablet by mouth 2 times daily for 7 days    Class: E-Prescribe    Route: Oral    Prenatal Vit-Fe Fumarate-FA (PRENATAL MULTIVITAMIN PLUS IRON) 27-0.8 MG TABS per tablet  100 tablet 3 2/13/2018        Sig: Take 1 tablet by mouth daily    Class: No Print Out    Route: Oral    sertraline (ZOLOFT) 100 MG tablet  90 tablet 3 4/5/2019    [facility] [address]     Sig: Take 1 tablet (100 mg) by mouth daily Take with 25 mg tab. Total = 125 mg daily.    Class: No Print Out    Notes to Pharmacy: Dose increase. See 4/3/19 encounter.    Route: Oral    sertraline (ZOLOFT) 25 MG tablet  90 tablet 3 5/13/2019    [facility] [address] [place] [address]     Sig: Take 1 tablet (25 mg) by mouth daily Take along with the 100 mg tabs. Total = 125 mg daily    Class: E-Prescribe    Route: Oral            Allergies   Allergen Reactions      Dust Mite Extract      Pollen and animal dander.        CONSTITUTIONAL:NEGATIVE for fever, chills, change in weight  INTEGUMENTARY/SKIN: POSITIVE for pruritis, rash and scaling  ROS : 14 point review of systems was negative except the symptoms listed above in the HPI.          OBJECTIVE:                                                    GENERAL: healthy, alert and no distress  SKIN: Briggs Skin Type - II.  Scalp, Face, Neck were examined. The dermatoscope was used to help evaluate pigmented lesions.  Skin Pertinent Findings:  Scalp : thick scales and erythematous plaque occipital scalp  Elbows : clear  Face : clear          ASSESSMENT:                                                      Encounter Diagnosis   Name Primary?     Scalp psoriasis Yes         PLAN:                                                    Patient Instructions   Clobetasol proprinate 0.05% foam - apply to affected areas on scalp two time per day for one week then once per week for 2 weeks. Then use two times per week, once per day  Recheck in 3 months     Cerave facial moisturizer         PROCEDURES:                                                    None.    TT : 20 minutes.  CT : 15 minutes. Discussion regarding etiology, spectrum of psoriasis, treatment options, aggravating factors.          Again, thank you for allowing me to participate in the care of your patient.        Sincerely,        Michelle Bacon MD

## 2019-10-03 ENCOUNTER — OFFICE VISIT (OUTPATIENT)
Dept: OPTOMETRY | Facility: CLINIC | Age: 34
End: 2019-10-03
Payer: COMMERCIAL

## 2019-10-03 DIAGNOSIS — H52.13 MYOPIA OF BOTH EYES: Primary | ICD-10-CM

## 2019-10-03 PROCEDURE — 92014 COMPRE OPH EXAM EST PT 1/>: CPT | Performed by: OPTOMETRIST

## 2019-10-03 ASSESSMENT — CONF VISUAL FIELD
OS_NORMAL: 1
METHOD: COUNTING FINGERS
OD_NORMAL: 1

## 2019-10-03 ASSESSMENT — REFRACTION_MANIFEST
OD_CYLINDER: +0.25
OD_AXIS: 090
OS_SPHERE: -0.75
OS_SPHERE: -0.75
OD_AXIS: 005
OS_AXIS: 180
OD_SPHERE: -1.00
OD_CYLINDER: +0.50
OS_AXIS: 006
OS_CYLINDER: +0.25
OD_SPHERE: +1.00
OS_CYLINDER: +0.25
METHOD_AUTOREFRACTION: 1

## 2019-10-03 ASSESSMENT — VISUAL ACUITY
OD_CC: 20/20-1
OS_CC: 20/20
OD_SC: 20/30
OD_SC+: -1
CORRECTION_TYPE: GLASSES
OS_CC: 20/20
OD_CC: 20/20
OS_SC: 20/25
METHOD: SNELLEN - LINEAR

## 2019-10-03 ASSESSMENT — CUP TO DISC RATIO
OS_RATIO: 0.15
OD_RATIO: 0.1

## 2019-10-03 ASSESSMENT — EXTERNAL EXAM - LEFT EYE: OS_EXAM: NORMAL

## 2019-10-03 ASSESSMENT — SLIT LAMP EXAM - LIDS
COMMENTS: NORMAL
COMMENTS: NORMAL

## 2019-10-03 ASSESSMENT — REFRACTION_WEARINGRX
OD_AXIS: 101
OS_SPHERE: -0.75
OD_SPHERE: -1.00
SPECS_TYPE: SVL
OD_CYLINDER: +0.75

## 2019-10-03 ASSESSMENT — TONOMETRY
IOP_METHOD: APPLANATION
OD_IOP_MMHG: 18
OS_IOP_MMHG: 18

## 2019-10-03 ASSESSMENT — EXTERNAL EXAM - RIGHT EYE: OD_EXAM: NORMAL

## 2019-10-03 NOTE — PROGRESS NOTES
Chief Complaint   Patient presents with     Annual Eye Exam         Last Eye Exam:06/12/18  Dilated Previously: Yes - gets a little light headed, never fainted from dilation.    What are you currently using to see?  Glasses - mainly for driving at night and at work.       Distance Vision Acuity: Noticed gradual change in both eyes    Near Vision Acuity: Satisfied with vision while reading  with glasses and Satisfied with vision while reading and using computer with glasses    Eye Comfort: good  Do you use eye drops? 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       OBJECTIVE: See Ophthalmology exam    ASSESSMENT:    ICD-10-CM    1. Myopia of both eyes H52.13     difficulty seeing to ora with Paremyd   PLAN:       Cleo Duran OD

## 2019-10-03 NOTE — LETTER
10/3/2019         RE: Krystina Suazo  [address]  [place] [address]        Dear Colleague,    Thank you for referring your patient, [facility]azo[facility]. Please see a copy of my visit note below.    Chief Complaint   Patient presents with     Annual Eye Exam         Last Eye Exam:06/12/18  Dilated Previously: Yes - gets a little light headed, never fainted from dilation.    What are you currently using to see?  Glasses - mainly for driving at night and at work.       Distance Vision Acuity: Noticed gradual change in both eyes    Near Vision Acuity: Satisfied with vision while reading  with glasses and Satisfied with vision while reading and using computer with glasses    Eye Comfort: good  Do you use eye drops? 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       OBJECTIVE: See Ophthalmology exam    ASSESSMENT:    ICD-10-CM    1. Myopia of both eyes H52.13     difficulty seeing to ora with Paremyd   PLAN:       Cleo Duran OD     Again, thank you for allowing me to participate in the care of your patient.        Sincerely,        Cleo Duran, OD

## 2019-11-11 ENCOUNTER — OFFICE VISIT (OUTPATIENT)
Dept: URGENT CARE | Facility: URGENT CARE | Age: 34
End: 2019-11-11
Payer: COMMERCIAL

## 2019-11-11 VITALS
BODY MASS INDEX: 34.38 KG/M2 | HEIGHT: 69 IN | WEIGHT: 232.1 LBS | DIASTOLIC BLOOD PRESSURE: 66 MMHG | TEMPERATURE: 97.9 F | RESPIRATION RATE: 16 BRPM | SYSTOLIC BLOOD PRESSURE: 118 MMHG | OXYGEN SATURATION: 97 % | HEART RATE: 74 BPM

## 2019-11-11 DIAGNOSIS — J06.9 VIRAL URI WITH COUGH: Primary | ICD-10-CM

## 2019-11-11 PROCEDURE — 99213 OFFICE O/P EST LOW 20 MIN: CPT | Performed by: PHYSICIAN ASSISTANT

## 2019-11-11 ASSESSMENT — PAIN SCALES - GENERAL: PAINLEVEL: MILD PAIN (3)

## 2019-11-11 ASSESSMENT — MIFFLIN-ST. JEOR: SCORE: 1807.18

## 2019-11-11 NOTE — PROGRESS NOTES
"SUBJECTIVE:   Krystina Suazo is a 34 year old female presenting with a chief complaint of cough. Patient reports that her symptoms started a couple weeks ago with sore throat and runny nose, developed a cough. Now, she has dry cough.   Course of illness is improving.    Severity moderate  Current and Associated symptoms: cough  Treatment measures tried include Fluids and Rest.  Predisposing factors include None.  Denies fever/chills, HA, CP, pleuritic chest pain, hemoptysis, abd pain, N/V/D, rash, or any other symptoms.     Past Medical History:   Diagnosis Date     Asthma      Depression      Current Outpatient Medications   Medication Sig Dispense Refill     albuterol (PROAIR HFA/PROVENTIL HFA/VENTOLIN HFA) 108 (90 Base) MCG/ACT inhaler Inhale 2 puffs into the lungs every 4 hours as needed for shortness of breath / dyspnea or wheezing 1 Inhaler 11     clobetasol propionate (OLUX) 0.05 % external foam Apply to AA on scalp bid for 7 days then once per day for 2 weeks . Then when needed 100 g 3     Prenatal Vit-Fe Fumarate-FA (PRENATAL MULTIVITAMIN PLUS IRON) 27-0.8 MG TABS per tablet Take 1 tablet by mouth daily 100 tablet 3     sertraline (ZOLOFT) 100 MG tablet Take 1 tablet (100 mg) by mouth daily Take with 25 mg tab. Total = 125 mg daily. 90 tablet 3     sertraline (ZOLOFT) 25 MG tablet Take 1 tablet (25 mg) by mouth daily Take along with the 100 mg tabs. Total = 125 mg daily 90 tablet 3     Social History     Tobacco Use     Smoking status: Never Smoker     Smokeless tobacco: Never Used   Substance Use Topics     Alcohol use: No       ROS:  Review of systems negative except as stated above.    OBJECTIVE:  /66   Pulse 74   Temp 97.9  F (36.6  C) (Tympanic)   Resp 16   Ht 1.753 m (5' 9\")   Wt 105.3 kg (232 lb 1.6 oz)   SpO2 97%   BMI 34.28 kg/m    GENERAL APPEARANCE: healthy, alert and no distress  EYES: EOMI,  PERRL, conjunctiva clear  HENT: ear canals and TM's normal.  Nose and mouth " without ulcers, erythema or lesions  NECK: supple, nontender, no lymphadenopathy  RESP: lungs clear to auscultation - no rales, rhonchi or wheezes  CV: regular rates and rhythm, normal S1 S2, no murmur noted  ABDOMEN:  soft, nontender  NEURO: Normal strength and tone, sensory exam grossly normal,  normal speech and mentation  SKIN: no suspicious lesions or rashes    ASSESSMENT / PLAN:  1. Viral URI with cough  Lungs are CTAB, no sign of respiratory distress. Throat without PTA or RPA and TM clear B/L. No nuchal rigidity or abd tenderness. I do not think that symptoms are representative of pneumonia, AOM, ARBS or other bacterial etiology.   Encouraged supportive cares, fluids and rest. Dextromethorphan and guaifenesin ok to use  If development of fever, chills, chest pain, sob or worsening symptoms follow-up in clinic.     Diagnosis and treatment plan was reviewed with patient and/or family.   We went over any labs or imaging. Discussed worsening symptoms or little to no relief despite treatment plan to follow-up with PCP or return to clinic.  Patient verbalizes understanding. All questions were addressed and answered.   Kandi Terrell PA-C

## 2019-11-11 NOTE — PATIENT INSTRUCTIONS
Dextromethorphan -- DM is ok to take  Guaifenesin in OK   Humidified air and honey for cough suppression

## 2020-03-04 ENCOUNTER — MYC MEDICAL ADVICE (OUTPATIENT)
Dept: PEDIATRICS | Facility: CLINIC | Age: 35
End: 2020-03-04
Payer: COMMERCIAL

## 2020-03-04 ENCOUNTER — MYC MEDICAL ADVICE (OUTPATIENT)
Dept: PEDIATRICS | Facility: CLINIC | Age: 35
End: 2020-03-04

## 2020-03-04 DIAGNOSIS — J02.9 SORE THROAT: Primary | ICD-10-CM

## 2020-03-04 DIAGNOSIS — J02.0 STREPTOCOCCAL PHARYNGITIS: ICD-10-CM

## 2020-03-06 NOTE — TELEPHONE ENCOUNTER
This topic is being addressed in another encounter from 3/4/20.    Jason Camacho RN on 3/6/2020 at 7:34 AM

## 2020-03-09 ENCOUNTER — APPOINTMENT (OUTPATIENT)
Dept: LAB | Facility: CLINIC | Age: 35
End: 2020-03-09
Payer: COMMERCIAL

## 2020-03-09 ENCOUNTER — TELEPHONE (OUTPATIENT)
Dept: PEDIATRICS | Facility: CLINIC | Age: 35
End: 2020-03-09

## 2020-03-09 ENCOUNTER — RESULTS ONLY (OUTPATIENT)
Dept: PEDIATRICS | Facility: CLINIC | Age: 35
End: 2020-03-09

## 2020-03-09 DIAGNOSIS — J02.9 SORE THROAT: Primary | ICD-10-CM

## 2020-03-09 PROCEDURE — 40001204 ZZHCL STATISTIC STREP A RAPID: Performed by: INTERNAL MEDICINE

## 2020-03-09 PROCEDURE — 87651 STREP A DNA AMP PROBE: CPT | Performed by: INTERNAL MEDICINE

## 2020-03-09 NOTE — TELEPHONE ENCOUNTER
Called Mom regarding PCR result on sons throat culture being positive.  Mom says she has sore throat also, huddled with Dr Daly and she wants her to do throat culture in lab.  Appt made for lab tonight, notified lab.  Pharmacy added and Mom says TALA Pereira LPN

## 2020-03-10 LAB
DEPRECATED S PYO AG THROAT QL EIA: NEGATIVE
SPECIMEN SOURCE: ABNORMAL
SPECIMEN SOURCE: NORMAL
STREP GROUP A PCR: ABNORMAL

## 2020-03-10 RX ORDER — AMOXICILLIN 500 MG/1
500 CAPSULE ORAL 2 TIMES DAILY
Qty: 20 CAPSULE | Refills: 0 | Status: SHIPPED | OUTPATIENT
Start: 2020-03-10 | End: 2020-03-31

## 2020-03-10 NOTE — TELEPHONE ENCOUNTER
Please call her. Her strep PCR is positive. Needs antibiotics. Make sure she takes the whole 10 days.  Valeria Daly M.D.

## 2020-03-10 NOTE — TELEPHONE ENCOUNTER
Called and talked with patient and message given.  She verbalized understanding.  Franca Pereira LPN

## 2020-03-29 ENCOUNTER — VIRTUAL VISIT (OUTPATIENT)
Dept: FAMILY MEDICINE | Facility: OTHER | Age: 35
End: 2020-03-29
Payer: COMMERCIAL

## 2020-03-29 ENCOUNTER — MYC MEDICAL ADVICE (OUTPATIENT)
Dept: OBGYN | Facility: CLINIC | Age: 35
End: 2020-03-29

## 2020-03-29 PROCEDURE — 99421 OL DIG E/M SVC 5-10 MIN: CPT | Performed by: NURSE PRACTITIONER

## 2020-03-29 NOTE — PROGRESS NOTES
"Date: 2020 10:51:30  Clinician: Eve Infante  Clinician NPI: [id number]  Patient: Krystina Suazo  Patient : 1985  Patient Address: [place] [address]  Patient Phone: [phone]  Visit Protocol: URI  Patient Summary:  Krystina is a 34 year old ( : 1985 ) female who initiated a Visit for COVID-19 (Coronavirus) evaluation and screening. When asked the question \"Please sign me up to receive news, health information and promotions from [employer].\", Krystina responded \"No\".    Krystina states her symptoms started gradually 3-6 days ago.   Her symptoms consist of a headache, facial pain or pressure, chills, a cough, nasal congestion, and malaise. She is experiencing difficulty breathing due to nasal congestion but she is not short of breath.   Symptom details     Nasal secretions: The color of her mucus is yellow.    Cough: Krystina coughs a few times an hour and her cough is not more bothersome at night. Phlegm comes into her throat when she coughs. She believes her cough is caused by post-nasal drip. The color of the phlegm is yellow.     Facial pain or pressure: The facial pain or pressure feels worse when bending over or leaning forward.     Headache: She states the headache is mild (1-3 on a 10 point pain scale).      Krystina denies having ear pain, rhinitis, teeth pain, fever, myalgias, wheezing, sore throat, and enlarged lymph nodes. She also denies having recent facial or sinus surgery in the past 60 days and double sickening (worsening symptoms after initial improvement).   Precipitating events  She has not recently been exposed to someone with influenza. Krystina has been in close contact with the following high risk individuals: children under the age of 5.   Pertinent COVID-19 (Coronavirus) information  Krystina has not traveled internationally or to the areas where COVID-19 (Coronavirus) is widespread, including cruise ship travel in the last 14 days " before the start of her symptoms.   Krystina has not had a close contact with a laboratory-confirmed COVID-19 patient within 14 days of symptom onset. She also has not had a close contact with a suspected COVID-19 patient within 14 days of symptom onset.   Krystina is not a healthcare worker or a  and does not work in a healthcare facility. She lives with a healthcare worker.   Pertinent medical history  Krystina had 2 sinus infections within the past year.   Krystina has taken an antibiotic medication in the past month. Antibiotic details as reported by the patient (free text): Amoxicillin, tested positive for Strep, ten day treatment started March 10   Krystina does not get yeast infections when she takes antibiotics.   Krystina does not need a return to work/school note.   Weight: 230 lbs   Krystina does not smoke or use smokeless tobacco.   She is pregnant and denies breastfeeding.   Weight: 230 lbs    MEDICATIONS: Prenatal Vitamin oral, sertraline oral, ALLERGIES: NKDA  Clinician Response:  Dear Krystina,   Based on the information you have provided, you do have symptoms that are consistent with Coronavirus (COVID-19).  As you are pregnant, please reach out to your OB provider and let them know that you have possible COVID-19.  The coronavirus causes mild to severe respiratory illness with the most common symptoms including fever, cough and difficulty breathing. Unfortunately, many viruses cause similar symptoms and it can be difficult to distinguish between viruses, especially in mild cases, so we are presuming that anyone with cough or fever has coronavirus at this time.  Coronavirus/COVID-19 has reached the point of community spread in [place], meaning that we are finding the virus in people with no known exposure risk for marcelo the virus. Given the increasing commonness of coronavirus in the community we are no longer testing patients who are not critically ill.  If you are a  health care worker, you should refer to your employee health office for instructions about testing and returning to work.  For everyone else who has cough or fever, you should assume you are infected with coronavirus. Since you will not be tested but have symptoms that may be consistent with coronavirus, the CDC recommends you stay in self-isolation until these three things have happened:    You have had no fever for at least 72 hours (that is three full days of no fever without the use of medicine that reduces fevers)    AND   Other symptoms have improved (for example, when your cough or shortness of breath have improved)   AND   At least 7 days have passed since your symptoms first appeared.   How to Isolate:   Isolate yourself at home.  Do Not allow any visitors  Do Not go to work or school  Do Not go to Jewish,  centers, shopping, or other public places.  Do Not shake hands.  Avoid close contact with others (hugging, kissing).   Protect Others:   Cover Your Mouth and Nose with a mask, disposable tissue or wash cloth to avoid spreading germs to others.  Wash your hands and face frequently with soap and water.   We know it can be scary to hear that you might have COVID-19. Our team can help track your symptoms and make sure you are doing ok over the next two weeks using a program called [employer] to keep in touch. When you receive an email from [employer], please consider enrolling in our monitoring program. There is no cost to you for monitoring. Here is a URL where you can learn more: [email or web address]  Managing Symptoms:   At this time, we primarily recommend Tylenol (Acetaminophen) for fever or pain. If you have liver or kidney problems, contact your primary care provider for instructions on use of tylenol. Adults can take 650 mg (two 325 mg pills) by mouth every 4-6 hours as needed OR 1,000 mg (two 500 mg pills) every 8 hours as needed. MAXIMUM DAILY DOSE: 3,000mg. For children,  refer to dosing on bottle based on age or weight.   If you develop significant shortness of breath that prevents you from doing normal activities, please call 911 or proceed to the nearest emergency room and alert them immediately that you have been in self-isolation for possible coronavirus.  If you have a higher risk medical condition such as cancer, heart failure, end stage renal disease on dialysis or have a transplant, please reach out to your specialist's clinic to advise them of your [facility] visit should you not improve within the next two days.   For more information about COVID19 and options for caring for yourself at home, please visit the CDC website at https://www.cdc.gov/coronavirus/2019-ncov/about/steps-when-sick.htmlFor more options for care at [facility], please visit our website at [email or web address]    Diagnosis: Cough  Diagnosis ICD: R05

## 2020-03-30 NOTE — TELEPHONE ENCOUNTER
I spoke with the patient and reviewed the results of the on care interview that she had.  The patient states that she has not been febrile she is improving does not have shortness of breath or other concerns at this time suggestive of coronavirus.  I forward her the recommendation that she should be afebrile for at least 72 hours in the absence of any antipyretics her symptoms should be improving and there should be at least a 7-day gap from the onset of her symptoms.  Once these criteria are met she can go back to normal lifestyle.  The patient states that she has had her questions answered and will call should any other issues arise

## 2020-03-31 ENCOUNTER — PRENATAL OFFICE VISIT (OUTPATIENT)
Dept: NURSING | Facility: CLINIC | Age: 35
End: 2020-03-31
Payer: COMMERCIAL

## 2020-03-31 VITALS — BODY MASS INDEX: 34.28 KG/M2 | HEIGHT: 69 IN

## 2020-03-31 DIAGNOSIS — Z98.891 HX OF CESAREAN SECTION: ICD-10-CM

## 2020-03-31 DIAGNOSIS — O09.91 SUPERVISION OF HIGH RISK PREGNANCY IN FIRST TRIMESTER: Primary | ICD-10-CM

## 2020-03-31 PROCEDURE — 99207 ZZC NO CHARGE NURSE ONLY: CPT

## 2020-03-31 SDOH — ECONOMIC STABILITY: TRANSPORTATION INSECURITY: IN THE PAST 12 MONTHS, HAS LACK OF TRANSPORTATION KEPT YOU FROM MEDICAL APPOINTMENTS OR FROM GETTING MEDICATIONS?: NO

## 2020-03-31 SDOH — ECONOMIC STABILITY: FOOD INSECURITY: WITHIN THE PAST 12 MONTHS, THE FOOD YOU BOUGHT JUST DIDN'T LAST AND YOU DIDN'T HAVE MONEY TO GET MORE.: NEVER TRUE

## 2020-03-31 SDOH — ECONOMIC STABILITY: FOOD INSECURITY: WITHIN THE PAST 12 MONTHS, YOU WORRIED THAT YOUR FOOD WOULD RUN OUT BEFORE YOU GOT THE MONEY TO BUY MORE.: NEVER TRUE

## 2020-03-31 SDOH — ECONOMIC STABILITY: FOOD INSECURITY: HOW HARD IS IT FOR YOU TO PAY FOR THE VERY BASICS LIKE FOOD, HOUSING, MEDICAL CARE, AND HEATING?: NOT HARD AT ALL

## 2020-03-31 ASSESSMENT — ACTIVITIES OF DAILY LIVING (ADL): LACK_OF_TRANSPORTATION: NO

## 2020-03-31 NOTE — PROGRESS NOTES
"Chief Complaint   Patient presents with     Prenatal Care     New Prenatal Telephone Visit   8w6d  Estimated Date of Delivery: 2020      Initial Ht 1.753 m (5' 9\")   LMP 2020   BMI 34.28 kg/m   Estimated body mass index is 34.28 kg/m  as calculated from the following:    Height as of this encounter: 1.753 m (5' 9\").    Weight as of 19: 105.3 kg (232 lb 1.6 oz).  BP completed using cuff size: NA (Not Taken)    Prenatal book and folder (containing standard educational hand-outs and brochures) to be given to patient at ultrasound visit next week. Information in folder reviewed. Questions answered. Brochure given on optional screening available to assess chromosomal anomalies. Pt advised to call the clinic if she has any questions or concerns related to her pregnancy. Prenatal labs to be obtained 20. New prenatal visit scheduled on 20 with Dr Tran.        Lab Results   Component Value Date    PAP NIL 04/10/2019           Patient supplied answers from flow sheet for:  Prenatal OB Questionnaire.  Past Medical History  Diabetes?: No  Hypertension : (!) Yes(end of last pregnancy)  Heart disease, mitral valve prolapse or rheumatic fever?: No  An autoimmune disease such as lupus or rheumatoid arthritis?: No  Kidney disease or urinary tract infection?: No  Epilepsy, seizures or spells?: No  Migraine headaches?: No  A stroke or loss of function or sensation?: No  Any other neurological problems?: No  Have you ever been treated for depression?: (!) Yes  Are you having problems with crying spells or loss of self-esteem?: No  Have you ever required psychiatric care?: (!) Yes  Have you ever had hepatitis, liver disease or jaundice?: No  Have you been treated for blood clots in your veins, deep vein thromosis, inflammation in the veins, thrombosis, phlebitis, pulmonary embolism or varicosities?: No  Have you had excessive bleeding after surgery or dental work?: No  Do you bleed more than other " women after a cut or scratch?: No  Do you have a history of anemia?: No  Have you ever had thyroid problems or taken thyroid medication?: No   Do you have any endocrine problems?: No  Have you ever been in a major accident or suffered serious trauma?: No  Within the last year, has anyone hit, slapped, kicked or otherwise hurt you?: No  In the last year, has anyone forced you to have sex when you didn't want to?: No    Past Medical History 2   Have you ever received a blood transfusion?: No  Would you refuse a blood transfusion if a doctor judged it to be medically necessary?: No   If you answered Yes, would you rather die than receive a blood transfusion?: No  If you answered Yes, is this for Lutheran reasons?: No  Does anyone in your home smoke?: No  Do you use tobacco products?: No  Do you drink beer, wine or hard liquor?: No  Do you use any of the following: marijuana, speed, cocaine, heroin, hallucinogens or other drugs?: No   Is your blood type Rh negative?: No  Have you ever had abnormal antibodies in your blood?: No  Have you ever had asthma?: (!) Yes  Have you ever had tuberculosis?: No  Do you have any allergies to drugs or over-the-counter medications?: No  Allergies: Dust Mites, Aspartame, Ethanol, Venlafaxine, Hydrochloride, Sertraline: (!) Yes(seasonal, dust)  Have you had any breast problems?: No  Have you ever ?: (!) Yes  Have you had any gynecological surgical procedures such as cervical conization, a LEEP procedure, laser treatment, cryosurgery of the cervix or a dilation and curettage, etc?: No  Have you ever had any other surgical procedures?: (!) Yes(see surgical history)  Have you been hospitalized for a nonsurgical reason excluding normal delivery?: No  Have you ever had any anesthetic complications?: No  Have you ever had an abnormal pap smear?: No    Past Medical History (Continued)  Do you have a history of abnormalities of the uterus?: No  Did your mother take ROSLYN or any other  hormones when she was pregnant with you?: Unknown  Did it take you more than a year to become pregnant?: No  Have you ever been evaluated or treated for infertility?: No  Is there a history of medical problems in your family, which you feel may be important to this pregnancy?: No  Do you have any other problems we have not asked about which you feel may be important to this pregnancy?: No    Symptoms since last menstrual period  Do you have any of the following symptoms: abdominal pain, blood in stools or urine, chest pain, shortness of breath, coughing or vomiting up blood, your heart racing or skipping beats, nausea and vomiting, pain on urination or vaginal discharge or bleed: No  Current medications, including over-the-counter medications, you are using? (If not applicable answer none): See med list  Will the patient be 35 years old or older at the time of delivery?: (!) Yes    Has the patient, baby's father or anyone in either family had:  Thalassemia (Italian, Greek, Mediterranean or  background only) and an MCV result less than 80?: No  Neural tube defect such as meningomyelocele, spina bifida or anencephaly?: No  Congenital heart defect?: No  Down's Syndrome?: No  Mello-Sachs disease (Denominational, Cajun, Cape Verdean-Perry)?: No  Sickle cell disease or trait ()?: No  Hemophilia or other inherited problems of blood?: No  Muscular dystrophy?: No  Cystic fibrosis?: No  Y[place]hill's chorea?: No  Mental retardation/autism?: No  If yes, was the person tested for fragile X?: No  Any other inherited genetic or chromosomal disorder?: No  Maternal metabolic disorder (e.g Insulin-dependent diabetes, PKU)?: No  A child with birth defects not listed above?: No  Recurrent pregnancy loss or stillbirth?: No   Has the patient had any medications/street drugs/alcohol since her last menstrual period?: No  Does the patient or baby's father have any other genetic risks?: No    Infection History   Do you object to being tested  for Hepatitis B?: No  Do you object to being tested for HIV?: No   Do you feel that you are at high risk for coming in contact with the AIDS virus?: No  Have you ever been treated for tuberculosis?: No  Have you ever had a positive skin test for tuberculosis?: No  Do you live with someone who has tuberculosis?: No  Have you ever been exposed to tuberculosis?: No  Do you have genital herpes?: No  Does your partner have genital herpes?: No  Have you had a viral illness since your last period?: Unknown  Have you ever had gonorrhea, chlamydia, syphilis, venereal warts, trichomoniasis, pelvic inflammatory disease or any other sexually transmitted disease?: No  Do you know if you are a genital group B streptococcus carrier?: No  Have you had chicken pox/varicella?: (!) Yes   Have you been vaccinated against chicken Pox?: No  Have you had any other infectious diseases?: (!) Yes(Strep 3/2020)    Sania Horn RN

## 2020-04-01 ENCOUNTER — MYC REFILL (OUTPATIENT)
Dept: PEDIATRICS | Facility: CLINIC | Age: 35
End: 2020-04-01

## 2020-04-01 DIAGNOSIS — F33.1 MAJOR DEPRESSIVE DISORDER, RECURRENT EPISODE, MODERATE (H): ICD-10-CM

## 2020-04-01 DIAGNOSIS — F41.1 GAD (GENERALIZED ANXIETY DISORDER): ICD-10-CM

## 2020-04-01 RX ORDER — SERTRALINE HYDROCHLORIDE 100 MG/1
100 TABLET, FILM COATED ORAL DAILY
Qty: 90 TABLET | Refills: 0 | Status: SHIPPED | OUTPATIENT
Start: 2020-04-01 | End: 2020-04-06

## 2020-04-02 NOTE — TELEPHONE ENCOUNTER
The pt is aware and scheduled for her upcoming appointment.   Joanne Guadarrama on 4/2/2020 at 9:44 AM

## 2020-04-02 NOTE — TELEPHONE ENCOUNTER
LVM for patient to call us back to relay message per VG. If patient has any other questions please transfer patient to station C. Jana Caro CMA on 4/2/2020 at 7:32 AM

## 2020-04-05 ASSESSMENT — ANXIETY QUESTIONNAIRES
7. FEELING AFRAID AS IF SOMETHING AWFUL MIGHT HAPPEN: NOT AT ALL
GAD7 TOTAL SCORE: 0
5. BEING SO RESTLESS THAT IT IS HARD TO SIT STILL: NOT AT ALL
4. TROUBLE RELAXING: NOT AT ALL
6. BECOMING EASILY ANNOYED OR IRRITABLE: NOT AT ALL
7. FEELING AFRAID AS IF SOMETHING AWFUL MIGHT HAPPEN: NOT AT ALL
3. WORRYING TOO MUCH ABOUT DIFFERENT THINGS: NOT AT ALL
2. NOT BEING ABLE TO STOP OR CONTROL WORRYING: NOT AT ALL
1. FEELING NERVOUS, ANXIOUS, OR ON EDGE: NOT AT ALL
GAD7 TOTAL SCORE: 0
GAD7 TOTAL SCORE: 0

## 2020-04-05 ASSESSMENT — PATIENT HEALTH QUESTIONNAIRE - PHQ9
SUM OF ALL RESPONSES TO PHQ QUESTIONS 1-9: 2
10. IF YOU CHECKED OFF ANY PROBLEMS, HOW DIFFICULT HAVE THESE PROBLEMS MADE IT FOR YOU TO DO YOUR WORK, TAKE CARE OF THINGS AT HOME, OR GET ALONG WITH OTHER PEOPLE: NOT DIFFICULT AT ALL
SUM OF ALL RESPONSES TO PHQ QUESTIONS 1-9: 2

## 2020-04-06 ENCOUNTER — VIRTUAL VISIT (OUTPATIENT)
Dept: PEDIATRICS | Facility: CLINIC | Age: 35
End: 2020-04-06
Payer: COMMERCIAL

## 2020-04-06 DIAGNOSIS — F41.1 GAD (GENERALIZED ANXIETY DISORDER): ICD-10-CM

## 2020-04-06 DIAGNOSIS — F33.1 MAJOR DEPRESSIVE DISORDER, RECURRENT EPISODE, MODERATE (H): ICD-10-CM

## 2020-04-06 PROCEDURE — 99213 OFFICE O/P EST LOW 20 MIN: CPT | Mod: TEL | Performed by: INTERNAL MEDICINE

## 2020-04-06 RX ORDER — SERTRALINE HYDROCHLORIDE 100 MG/1
100 TABLET, FILM COATED ORAL DAILY
Qty: 90 TABLET | Refills: 3 | Status: SHIPPED | OUTPATIENT
Start: 2020-04-06 | End: 2020-11-02

## 2020-04-06 RX ORDER — SERTRALINE HYDROCHLORIDE 25 MG/1
25 TABLET, FILM COATED ORAL DAILY
Qty: 90 TABLET | Refills: 3 | Status: SHIPPED | OUTPATIENT
Start: 2020-04-06 | End: 2020-11-02

## 2020-04-06 ASSESSMENT — PATIENT HEALTH QUESTIONNAIRE - PHQ9: SUM OF ALL RESPONSES TO PHQ QUESTIONS 1-9: 2

## 2020-04-06 ASSESSMENT — ANXIETY QUESTIONNAIRES: GAD7 TOTAL SCORE: 0

## 2020-04-06 NOTE — PROGRESS NOTES
"Subjective     Krystina Suazo is a 34 year old female who is being evaluated via a billable telephone visit.      The patient has been notified of following:     \"This telephone visit will be conducted via a call between you and your physician/provider. We have found that certain health care needs can be provided without the need for a physical exam.  This service lets us provide the care you need with a short phone conversation.  If a prescription is necessary we can send it directly to your pharmacy.  If lab work is needed we can place an order for that and you can then stop by our lab to have the test done at a later time.    If during the course of the call the physician/provider feels a telephone visit is not appropriate, you will not be charged for this service.\"     Patient has given verbal consent for Telephone visit?  Yes    Krystina Suazo complains of   Chief Complaint   Patient presents with     Recheck Medication       ALLERGIES  Dust mite extract    Depression and Anxiety Follow-Up    How are you doing with your depression since your last visit? No change    How are you doing with your anxiety since your last visit?  No change    Are you having other symptoms that might be associated with depression or anxiety? No    Have you had a significant life event? OTHER: pregnancy, lots of nausea     Do you have any concerns with your use of alcohol or other drugs? No    Social History     Tobacco Use     Smoking status: Never Smoker     Smokeless tobacco: Never Used   Substance Use Topics     Alcohol use: No     Drug use: No     PHQ 2/5/2019 5/13/2019 4/5/2020   PHQ-9 Total Score 1 0 2   Q9: Thoughts of better off dead/self-harm past 2 weeks Not at all Not at all Not at all     COURTNEY-7 SCORE 2/5/2019 5/13/2019 4/5/2020   Total Score - - 0 (minimal anxiety)   Total Score 0 0 0     Last PHQ-9 4/5/2020   1.  Little interest or pleasure in doing things 0   2.  Feeling down, depressed, or hopeless 0 "   3.  Trouble falling or staying asleep, or sleeping too much 1   4.  Feeling tired or having little energy 1   5.  Poor appetite or overeating 0   6.  Feeling bad about yourself 0   7.  Trouble concentrating 0   8.  Moving slowly or restless 0   Q9: Thoughts of better off dead/self-harm past 2 weeks 0   PHQ-9 Total Score 2   Difficulty at work, home, or with people -     COURTNEY-7  4/5/2020   1. Feeling nervous, anxious, or on edge 0   2. Not being able to stop or control worrying 0   3. Worrying too much about different things 0   4. Trouble relaxing 0   5. Being so restless that it is hard to sit still 0   6. Becoming easily annoyed or irritable 0   7. Feeling afraid, as if something awful might happen 0   COURTNEY-7 Total Score 0   If you checked any problems, how difficult have they made it for you to do your work, take care of things at home, or get along with other people? -       Answers for HPI/ROS submitted by the patient on 4/5/2020   Chronic problems general questions HPI Form  How many servings of fruits and vegetables do you eat daily?: 2-3  On average, how many sweetened beverages do you drink each day (Examples: soda, juice, sweet tea, etc.  Do NOT count diet or artificially sweetened beverages)?: 1  How many minutes a day do you exercise enough to make your heart beat faster?: 10 to 19  How many days a week do you exercise enough to make your heart beat faster?: 3 or less  How many days per week do you miss taking your medication?: 0  Depression/Anxiety: Depression & Anxiety  Status since last visit:: good  Anxiety since last: : good  Other associated symptoms of depression:: No  Other associated symotome: : No  Significant life event: : job concerns, other  Anxious:: No  Current substance use:: No  If you checked off any problems, how difficult have these problems made it for you to do your work, take care of things at home, or get along with other people?: Not difficult at all  PHQ9 TOTAL SCORE: 2  COURTNEY 7  TOTAL SCORE: 0      Reviewed recent illness. Treated for strep in early March. Then she and her  were a little under the weather, he was tested for covid 19 and tested negative (he is an RN). Since then has had a little residual cough. Otherwise feels well. 19 month old son also healthy. Discussed current coronavirus pandemic and general precautions to take.    Reviewed and updated as needed this visit by Provider         Review of Systems   ROS COMP: Constitutional, HEENT, cardiovascular, pulmonary, gi and gu systems are negative, except as otherwise noted.       Objective   Reported vitals:  LMP 01/29/2020    healthy, alert and no distress  Psych: Alert and oriented times 3; coherent speech, normal   rate and volume, able to articulate logical thoughts, able   to abstract reason, no tangential thoughts, no hallucinations   or delusions  Her affect is normal     Diagnostic Test Results:  Labs reviewed in Epic        Assessment/Plan:  1. COURTNEY (generalized anxiety disorder)  Currently 10 weeks pregnant. Lost job due to coronavirus,  works as RN at [employer] in [place] so she worries about him. Though she feels she has excellent control right now, she does not want to try decreasing her dose. Will continue as we are. She will let me know if she has any increase in symptoms or if she wants to try to decrease her dose in 3-6 months. Otherwise will meet after the baby is born. She was on sertraline for previous pregnancy. Reviewed potential risks wth ssri and pregnancy and importance of doing lowest dose needed to control symptoms.   - sertraline (ZOLOFT) 100 MG tablet; Take 1 tablet (100 mg) by mouth daily Take with 25 mg tab. Total = 125 mg daily.  Dispense: 90 tablet; Refill: 3  - sertraline (ZOLOFT) 25 MG tablet; Take 1 tablet (25 mg) by mouth daily Take along with the 100 mg tabs. Total = 125 mg daily  Dispense: 90 tablet; Refill: 3    2. Major depressive disorder, recurrent episode, moderate  (H)    - sertraline (ZOLOFT) 100 MG tablet; Take 1 tablet (100 mg) by mouth daily Take with 25 mg tab. Total = 125 mg daily.  Dispense: 90 tablet; Refill: 3  - sertraline (ZOLOFT) 25 MG tablet; Take 1 tablet (25 mg) by mouth daily Take along with the 100 mg tabs. Total = 125 mg daily  Dispense: 90 tablet; Refill: 3    No follow-ups on file.    Reviewed coronavirus situation. Discussed protecting herself from infection during pregnancy, and implications for future.    Phone call duration:  17 minutes    Valeria Daly MD

## 2020-04-20 DIAGNOSIS — Z98.891 HX OF CESAREAN SECTION: ICD-10-CM

## 2020-04-20 DIAGNOSIS — O09.91 SUPERVISION OF HIGH RISK PREGNANCY IN FIRST TRIMESTER: ICD-10-CM

## 2020-04-20 PROBLEM — D25.9 UTERINE FIBROID IN PREGNANCY: Status: ACTIVE | Noted: 2020-04-20

## 2020-04-20 PROBLEM — O34.10 UTERINE FIBROID IN PREGNANCY: Status: ACTIVE | Noted: 2020-04-20

## 2020-04-20 LAB
ABO + RH BLD: NORMAL
ABO + RH BLD: NORMAL
BLD GP AB SCN SERPL QL: NORMAL
BLOOD BANK CMNT PATIENT-IMP: NORMAL
ERYTHROCYTE [DISTWIDTH] IN BLOOD BY AUTOMATED COUNT: 12.2 % (ref 10–15)
HCT VFR BLD AUTO: 37.3 % (ref 35–47)
HGB BLD-MCNC: 12.6 G/DL (ref 11.7–15.7)
MCH RBC QN AUTO: 31.3 PG (ref 26.5–33)
MCHC RBC AUTO-ENTMCNC: 33.8 G/DL (ref 31.5–36.5)
MCV RBC AUTO: 93 FL (ref 78–100)
PLATELET # BLD AUTO: 222 10E9/L (ref 150–450)
RBC # BLD AUTO: 4.03 10E12/L (ref 3.8–5.2)
SPECIMEN EXP DATE BLD: NORMAL
WBC # BLD AUTO: 6.1 10E9/L (ref 4–11)

## 2020-04-20 PROCEDURE — 86850 RBC ANTIBODY SCREEN: CPT | Performed by: OBSTETRICS & GYNECOLOGY

## 2020-04-20 PROCEDURE — 85027 COMPLETE CBC AUTOMATED: CPT | Performed by: OBSTETRICS & GYNECOLOGY

## 2020-04-20 PROCEDURE — 86901 BLOOD TYPING SEROLOGIC RH(D): CPT | Performed by: OBSTETRICS & GYNECOLOGY

## 2020-04-20 PROCEDURE — 87340 HEPATITIS B SURFACE AG IA: CPT | Performed by: OBSTETRICS & GYNECOLOGY

## 2020-04-20 PROCEDURE — 87086 URINE CULTURE/COLONY COUNT: CPT | Performed by: OBSTETRICS & GYNECOLOGY

## 2020-04-20 PROCEDURE — 87389 HIV-1 AG W/HIV-1&-2 AB AG IA: CPT | Performed by: OBSTETRICS & GYNECOLOGY

## 2020-04-20 PROCEDURE — 86780 TREPONEMA PALLIDUM: CPT | Performed by: OBSTETRICS & GYNECOLOGY

## 2020-04-20 PROCEDURE — 86900 BLOOD TYPING SEROLOGIC ABO: CPT | Performed by: OBSTETRICS & GYNECOLOGY

## 2020-04-20 PROCEDURE — 86762 RUBELLA ANTIBODY: CPT | Performed by: OBSTETRICS & GYNECOLOGY

## 2020-04-20 PROCEDURE — 36415 COLL VENOUS BLD VENIPUNCTURE: CPT | Performed by: OBSTETRICS & GYNECOLOGY

## 2020-04-21 LAB
BACTERIA SPEC CULT: NORMAL
HBV SURFACE AG SERPL QL IA: NONREACTIVE
HIV 1+2 AB+HIV1 P24 AG SERPL QL IA: NONREACTIVE
RUBV IGG SERPL IA-ACNC: 55 IU/ML
SPECIMEN SOURCE: NORMAL
T PALLIDUM AB SER QL: NONREACTIVE

## 2020-04-22 ENCOUNTER — TRANSCRIBE ORDERS (OUTPATIENT)
Dept: MATERNAL FETAL MEDICINE | Facility: CLINIC | Age: 35
End: 2020-04-22

## 2020-04-22 ENCOUNTER — PRENATAL OFFICE VISIT (OUTPATIENT)
Dept: OBGYN | Facility: CLINIC | Age: 35
End: 2020-04-22
Payer: COMMERCIAL

## 2020-04-22 VITALS — WEIGHT: 223.8 LBS | BODY MASS INDEX: 33.05 KG/M2

## 2020-04-22 DIAGNOSIS — O09.291 HISTORY OF PRE-ECLAMPSIA IN PRIOR PREGNANCY, CURRENTLY PREGNANT IN FIRST TRIMESTER: ICD-10-CM

## 2020-04-22 DIAGNOSIS — O09.529 SUPERVISION OF HIGH-RISK PREGNANCY OF ELDERLY MULTIGRAVIDA: Primary | ICD-10-CM

## 2020-04-22 DIAGNOSIS — O26.90 PREGNANCY RELATED CONDITION, ANTEPARTUM: Primary | ICD-10-CM

## 2020-04-22 DIAGNOSIS — Z98.891 HISTORY OF C-SECTION: ICD-10-CM

## 2020-04-22 DIAGNOSIS — O34.10 UTERINE FIBROID IN PREGNANCY: ICD-10-CM

## 2020-04-22 DIAGNOSIS — D25.9 UTERINE FIBROID IN PREGNANCY: ICD-10-CM

## 2020-04-22 LAB
CREAT UR-MCNC: 125 MG/DL
PROT UR-MCNC: 0.11 G/L
PROT/CREAT 24H UR: 0.09 G/G CR (ref 0–0.2)

## 2020-04-22 PROCEDURE — 99000 SPECIMEN HANDLING OFFICE-LAB: CPT | Performed by: OBSTETRICS & GYNECOLOGY

## 2020-04-22 PROCEDURE — 99207 ZZC FIRST OB VISIT: CPT | Performed by: OBSTETRICS & GYNECOLOGY

## 2020-04-22 PROCEDURE — 36415 COLL VENOUS BLD VENIPUNCTURE: CPT | Performed by: OBSTETRICS & GYNECOLOGY

## 2020-04-22 PROCEDURE — 40000791 ZZHCL STATISTIC VERIFI PRENATAL TRISOMY 21,18,13: Mod: 90 | Performed by: OBSTETRICS & GYNECOLOGY

## 2020-04-22 PROCEDURE — 84156 ASSAY OF PROTEIN URINE: CPT | Performed by: OBSTETRICS & GYNECOLOGY

## 2020-04-22 NOTE — PROGRESS NOTES
New OB Visit  Krystina Martin   2020   12w0d     Subjective: Krystina Martin 34 year old  at 12w0d dated by LMP, early ultrasound here today for initial OB visit. Patient reports No Problems. Denies cramping and vaginal spotting.     Gyn History:   Menses: LMP: Patient's last menstrual period was 2020. frequency: q month  Sexually transmitted disease history: none.    Occupation:   Exercise:   Diet:   H/o Chicken Pox or Varicella Vaccination: Yes    History of GDM: No   Hx PTL : No   History of HTN in pregnancy: Yes - pre eclampsia  Shoulder dystocia: No   Vacuum Extraction: No   PPH: No   3rd of 4th degree laceration: No   Other complications: No    Since her last LMP she denies use of alcohol, tobacco and street drugs.    OBhx: C-sec x 1 for fetal intolerance  - induction for PIH  OB History    Para Term  AB Living   2 1 1 0 0 1   SAB TAB Ectopic Multiple Live Births   0 0 0 0 1      # Outcome Date GA Lbr Daryn/2nd Weight Sex Delivery Anes PTL Lv   2 Current            1 Term 18 38w4d  3.07 kg (6 lb 12.3 oz) M CS-LTranv Nitrous, EPI N MARIA VICTORIA      Complications: Fetal Intolerance, Preeclampsia/Hypertension      Name: DANILO MARTIN      Apgar1: 8  Apgar5: 9       ROS: Ten point review of systems was reviewed and negative except the above.    HISTORY:  Past Medical History:   Diagnosis Date     Asthma      Depression      Past Surgical History:   Procedure Laterality Date     CARPAL TUNNEL RELEASE RT/LT Bilateral       SECTION N/A 2018    Procedure:  SECTION;   section;  Surgeon: Jay Yanez MD;  Location: [facility] OR     SINUS SURGERY       TONSILLECTOMY  2014     Family History   Problem Relation Age of Onset     Esophageal Cancer Mother 57     Diabetes Mother      Hypertension Mother      Asthma Father      Glaucoma No family hx of      Macular Degeneration No family hx of      Breast Cancer No  family hx of      Colon Cancer No family hx of      Social History     Socioeconomic History     Marital status:      Spouse name: Yakov     Number of children: 1     Years of education: 16     Highest education level: Bachelor's degree (e.g., BA, AB, BS)   Occupational History     Occupation:    Social Needs     Financial resource strain: Not hard at all     Food insecurity     Worry: Never true     Inability: Never true     Transportation needs     Medical: No     Non-medical: No   Tobacco Use     Smoking status: Never Smoker     Smokeless tobacco: Never Used   Substance and Sexual Activity     Alcohol use: No     Drug use: No     Sexual activity: Yes     Partners: Male   Lifestyle     Physical activity     Days per week: Not on file     Minutes per session: Not on file     Stress: Not on file   Relationships     Social connections     Talks on phone: Not on file     Gets together: Not on file     Attends Restorationist service: Not on file     Active member of club or organization: Not on file     Attends meetings of clubs or organizations: Not on file     Relationship status: Not on file     Intimate partner violence     Fear of current or ex partner: Not on file     Emotionally abused: Not on file     Physically abused: Not on file     Forced sexual activity: Not on file   Other Topics Concern     Parent/sibling w/ CABG, MI or angioplasty before 65F 55M? Not Asked   Social History Narrative     Not on file     Current Outpatient Medications   Medication Sig     albuterol (PROAIR HFA/PROVENTIL HFA/VENTOLIN HFA) 108 (90 Base) MCG/ACT inhaler Inhale 2 puffs into the lungs every 4 hours as needed for shortness of breath / dyspnea or wheezing     clobetasol propionate (OLUX) 0.05 % external foam Apply to AA on scalp bid for 7 days then once per day for 2 weeks . Then when needed (Patient not taking: Reported on 3/31/2020)     Prenatal Vit-Fe Fumarate-FA (PRENATAL MULTIVITAMIN PLUS IRON) 27-0.8 MG  TABS per tablet Take 1 tablet by mouth daily     sertraline (ZOLOFT) 100 MG tablet Take 1 tablet (100 mg) by mouth daily Take with 25 mg tab. Total = 125 mg daily.     sertraline (ZOLOFT) 25 MG tablet Take 1 tablet (25 mg) by mouth daily Take along with the 100 mg tabs. Total = 125 mg daily     No current facility-administered medications for this visit.      Allergies   Allergen Reactions     Dust Mite Extract      Pollen and animal dander.       Past medical, surgical, social and family history were reviewed and updated in [facility].      EXAM:  LMP 2020      Gen:  no acute distress, comfortable  HENT: No scleral injection or icterus  CV: Regular rate and rhythm, no murmur  Resp: CTAB, Normal work of breathing, no cough  GI: Abdomen soft, non-tender. No masses, organomegaly  Skin: No suspicious lesions or rashes  Psychiatric: mentation appears normal and affect bright     +FHT present 160s      Recent Labs   Lab Test 20  1053   ABO O   [facility] Pos   AS Neg     Rhogam not indicated     Recent Labs   Lab Test 20  1053 18  1500   HEPBANG Nonreactive  --    HIAGAB Nonreactive  --    GBS  --  Positive*   RUQIGG 55  --        Treponemal antibody neg    CBC RESULTS:   Recent Labs   Lab Test 20  1053   WBC 6.1   RBC 4.03   HGB 12.6   HCT 37.3   MCV 93   MCH 31.3   MCHC 33.8   RDW 12.2          ASSESSMENT:  34 year old  at 12w0d dated by LMP and early U/S here for NOB visit.    PLAN:    1)Prenatal labs reviewed. She has no questions.  2) EDUCATION : RECOMMENDED WEIGHT GAIN: 25-35 lbs given There is no height or weight on file to calculate BMI..   - Instructed on best evidence for: healthy diet and foods to avoid; exercise and activity during pregnancy; and maintenance of a generally healthy lifestyle. Reviewed early pregnancy education, provider coverage, labor and delivery, and prenatal visits.  Discussed the harms, benefits, side effects and alternative therapies for current prescribed  and OTC medications.  - recommend PNV  3) Discussed options for screening for chromosomal anomalies, including first screen, noninvasive prenatal testing, CVS/amniocentesis, quad screen, and ultrasound at 18-20 weeks. She is electing noninvasive prenatal testing and ultrasound at 18-20 weeks.  4) Check baseline urine prot/Cr and BMP given PIH Hx  5) Baby ASA daily  6) MFM and Genetic counseling referral for AMA    Follow up in 4 weeks. She is encouraged to call sooner with questions or concerns.    Aníbal Pearson MD   Obstetrics and Gynecology

## 2020-04-22 NOTE — NURSING NOTE
"Chief Complaint   Patient presents with     Prenatal Care   12w0d  Discuss genetic testing    initial Wt 101.5 kg (223 lb 12.8 oz)   LMP 01/29/2020   BMI 33.05 kg/m   Estimated body mass index is 33.05 kg/m  as calculated from the following:    Height as of 3/31/20: 1.753 m (5' 9\").    Weight as of this encounter: 101.5 kg (223 lb 12.8 oz).  BP completed using cuff size large.  Anjali Hamilton CMA    "

## 2020-04-27 ENCOUNTER — TELEPHONE (OUTPATIENT)
Dept: OBGYN | Facility: CLINIC | Age: 35
End: 2020-04-27

## 2020-04-27 LAB — LAB SCANNED RESULT: NORMAL

## 2020-04-27 NOTE — TELEPHONE ENCOUNTER
Results received from [facility] testing in [facility].  Testing done:  Innatal Prenatal Screen    Action:  LM for pt to cb to report NORMAL results.    Gender:**BOY**  Will ask pt if they wish to know the gender.    Please route to provider as FYI once pt is informed. (CS)    Miley MARROQUIN, RN

## 2020-05-14 ENCOUNTER — VIRTUAL VISIT (OUTPATIENT)
Dept: OBGYN | Facility: CLINIC | Age: 35
End: 2020-05-14
Payer: COMMERCIAL

## 2020-05-14 DIAGNOSIS — Z98.891 HISTORY OF C-SECTION: ICD-10-CM

## 2020-05-14 DIAGNOSIS — O09.529 SUPERVISION OF HIGH-RISK PREGNANCY OF ELDERLY MULTIGRAVIDA: Primary | ICD-10-CM

## 2020-05-14 PROCEDURE — 99207 ZZC PRENATAL VISIT: CPT | Performed by: OBSTETRICS & GYNECOLOGY

## 2020-05-14 NOTE — PROGRESS NOTES
PROBLEM LIST  LABS: Opos/RI BOY    1. Prior low transverse  for fetal intolerance.  2. History preeclampsia: baby ASA daily, baseline labs within normal limits.  3. AMA: level II US scheduled. Noninvasive prenatal testing within normal limits.    Doing well. MFM for US on , sees me on . All questions answered. Will start baby ASA now. Will get urine GC/C at next visit as well. Will discuss mode of delivery at next visit.    Sandy Tran MD

## 2020-05-14 NOTE — PROGRESS NOTES
"Krystina Suazo is a 34 year old female who is being evaluated via a billable telephone visit.      The patient has been notified of following:     \"This telephone visit will be conducted via a call between you and your physician/provider. We have found that certain health care needs can be provided without the need for a physical exam.  This service lets us provide the care you need with a short phone conversation.  If a prescription is necessary we can send it directly to your pharmacy.  If lab work is needed we can place an order for that and you can then stop by our lab to have the test done at a later time.    Telephone visits are billed at different rates depending on your insurance coverage. During this emergency period, for some insurers they may be billed the same as an in-person visit.  Please reach out to your insurance provider with any questions.    If during the course of the call the physician/provider feels a telephone visit is not appropriate, you will not be charged for this service.\"    Patient has given verbal consent for Telephone visit?  Yes    What phone number would you like to be contacted at? [phone]    Pt has U/S 6/19, has been having headaches, thought it was do to working on computer from home, tried Tylenol, does not touch it, no fevers, starts in back of head and moves to front.  Samira Moralez CMA      "

## 2020-06-12 ENCOUNTER — PRE VISIT (OUTPATIENT)
Dept: MATERNAL FETAL MEDICINE | Facility: CLINIC | Age: 35
End: 2020-06-12

## 2020-06-19 ENCOUNTER — PRENATAL OFFICE VISIT (OUTPATIENT)
Dept: OBGYN | Facility: CLINIC | Age: 35
End: 2020-06-19
Payer: COMMERCIAL

## 2020-06-19 ENCOUNTER — HOSPITAL ENCOUNTER (OUTPATIENT)
Dept: ULTRASOUND IMAGING | Facility: CLINIC | Age: 35
End: 2020-06-19
Attending: OBSTETRICS & GYNECOLOGY
Payer: COMMERCIAL

## 2020-06-19 ENCOUNTER — OFFICE VISIT (OUTPATIENT)
Dept: MATERNAL FETAL MEDICINE | Facility: CLINIC | Age: 35
End: 2020-06-19
Attending: OBSTETRICS & GYNECOLOGY
Payer: COMMERCIAL

## 2020-06-19 VITALS — DIASTOLIC BLOOD PRESSURE: 62 MMHG | SYSTOLIC BLOOD PRESSURE: 116 MMHG | WEIGHT: 231.9 LBS | BODY MASS INDEX: 34.25 KG/M2

## 2020-06-19 DIAGNOSIS — E66.01 MORBID OBESITY (H): ICD-10-CM

## 2020-06-19 DIAGNOSIS — O34.10 UTERINE FIBROID IN PREGNANCY: ICD-10-CM

## 2020-06-19 DIAGNOSIS — O09.529 SUPERVISION OF HIGH-RISK PREGNANCY OF ELDERLY MULTIGRAVIDA: Primary | ICD-10-CM

## 2020-06-19 DIAGNOSIS — O09.522 MULTIGRAVIDA OF ADVANCED MATERNAL AGE IN SECOND TRIMESTER: Primary | ICD-10-CM

## 2020-06-19 DIAGNOSIS — Z87.59 HISTORY OF GESTATIONAL HYPERTENSION: ICD-10-CM

## 2020-06-19 DIAGNOSIS — O26.90 PREGNANCY RELATED CONDITION, ANTEPARTUM: ICD-10-CM

## 2020-06-19 DIAGNOSIS — D25.9 UTERINE FIBROID IN PREGNANCY: ICD-10-CM

## 2020-06-19 PROCEDURE — 87491 CHLMYD TRACH DNA AMP PROBE: CPT | Performed by: OBSTETRICS & GYNECOLOGY

## 2020-06-19 PROCEDURE — 99207 ZZC PRENATAL VISIT: CPT | Performed by: OBSTETRICS & GYNECOLOGY

## 2020-06-19 PROCEDURE — 76811 OB US DETAILED SNGL FETUS: CPT

## 2020-06-19 PROCEDURE — 87591 N.GONORRHOEAE DNA AMP PROB: CPT | Performed by: OBSTETRICS & GYNECOLOGY

## 2020-06-19 RX ORDER — ASPIRIN 81 MG/1
81 TABLET ORAL DAILY
COMMUNITY
End: 2020-12-10

## 2020-06-19 NOTE — PROGRESS NOTES
Please see full imaging report from ViewPoint program under imaging tab.    Normal anatomic survey. Low risk NIPT. On low dose ASA.    Sofiya Hawk MD  Maternal Fetal Medicine

## 2020-06-19 NOTE — PROGRESS NOTES
35yo  at 20w2d here for scheduled visit.  MFM scan today normal.  Desires RCS which will be scheduled at 39 weeks.    Occ HAs o/w doing well.  RTC 4 weeks

## 2020-06-19 NOTE — NURSING NOTE
"Chief Complaint   Patient presents with     Prenatal Care   20w2d  C/o continued headaches    initial Wt 105.2 kg (231 lb 14.4 oz)   LMP 01/29/2020   BMI 34.25 kg/m   Estimated body mass index is 34.25 kg/m  as calculated from the following:    Height as of 3/31/20: 1.753 m (5' 9\").    Weight as of this encounter: 105.2 kg (231 lb 14.4 oz).  BP completed using cuff size large.  Anjali Hamilton CMA    "

## 2020-06-22 ENCOUNTER — PREP FOR PROCEDURE (OUTPATIENT)
Dept: OBGYN | Facility: CLINIC | Age: 35
End: 2020-06-22

## 2020-06-22 ENCOUNTER — TELEPHONE (OUTPATIENT)
Dept: OBGYN | Facility: CLINIC | Age: 35
End: 2020-06-22

## 2020-06-22 DIAGNOSIS — Z98.891 PREVIOUS CESAREAN SECTION: Primary | ICD-10-CM

## 2020-06-22 NOTE — TELEPHONE ENCOUNTER
Procedure name(s) - multi select  Repeat  section    Reason for procedure  Prior  section    Surgeon:  Lili    Is this a multi surgeon case?  No    Laterality  N/A    Request for additional equipment  Other (see comments)    Comment: None    Anesthesia  Spinal    Positioning (if different from preference card):  Supine    Is the patient known to have any of the following?  None of the above    Initiate Pre-op orders for above procedure:  Yes, as ordered in Epic    Comment: additional orders noted there also    Location of Case:  [facility]    Surgical Assistant  yes    Operating room  requested:  No    Urgency of Surgery:  Routine    Surgeon Procedure Time (incision to closure) in minutes (per procedure as applicable)  60    Note:  Surgical Case Time Needed (in minutes)    Surgery Date:  39-40 weeks    Comment: 10/27/20    Time of Surgery (Requested):  7:30 AM    Comment: preferred    Patient Class (for admit prior to surgery, specify number of days in comments):  Surgery admit    Comment: admit day of surgery    Length of Stay:  3 days    H&P To Be Completed By:  Surgeon    Where is the note?  In EpicProgress Note     needed?  No    Post-Op Appointment  6 weeks    Vendor Needed?  No

## 2020-06-28 PROBLEM — Z98.891 PREVIOUS CESAREAN SECTION: Status: ACTIVE | Noted: 2020-06-28

## 2020-07-21 ENCOUNTER — PRENATAL OFFICE VISIT (OUTPATIENT)
Dept: OBGYN | Facility: CLINIC | Age: 35
End: 2020-07-21
Payer: COMMERCIAL

## 2020-07-21 VITALS — SYSTOLIC BLOOD PRESSURE: 126 MMHG | WEIGHT: 235.4 LBS | BODY MASS INDEX: 34.76 KG/M2 | DIASTOLIC BLOOD PRESSURE: 62 MMHG

## 2020-07-21 DIAGNOSIS — D25.9 UTERINE FIBROID IN PREGNANCY: ICD-10-CM

## 2020-07-21 DIAGNOSIS — O09.529 SUPERVISION OF HIGH-RISK PREGNANCY OF ELDERLY MULTIGRAVIDA: Primary | ICD-10-CM

## 2020-07-21 DIAGNOSIS — O34.10 UTERINE FIBROID IN PREGNANCY: ICD-10-CM

## 2020-07-21 DIAGNOSIS — Z98.891 PREVIOUS CESAREAN SECTION: ICD-10-CM

## 2020-07-21 DIAGNOSIS — E66.01 MORBID OBESITY (H): ICD-10-CM

## 2020-07-21 PROCEDURE — 99207 ZZC PRENATAL VISIT: CPT | Performed by: OBSTETRICS & GYNECOLOGY

## 2020-07-21 NOTE — TELEPHONE ENCOUNTER
Type of surgery: repeat  section  Location of surgery: [facility]  Date and time of surgery: 10/27/20 @ 7:30 am  Surgeon: Dr. Pearson  Pre-Op Appt Date: @ prenatal appointment  Post-Op Appt Date: Patient advised to schedule.   Packet sent out: Yes  Pre-cert/Authorization completed:  No  Date: 20

## 2020-07-21 NOTE — NURSING NOTE
"Chief Complaint   Patient presents with     Prenatal Care   24w6d  No concerns  Baby active    initial /62   Wt 106.8 kg (235 lb 6.4 oz)   LMP 01/29/2020   BMI 34.76 kg/m   Estimated body mass index is 34.76 kg/m  as calculated from the following:    Height as of 3/31/20: 1.753 m (5' 9\").    Weight as of this encounter: 106.8 kg (235 lb 6.4 oz).  BP completed using cuff size regular.  Anjali Hamilton CMA    "

## 2020-07-21 NOTE — PROGRESS NOTES
35 yo  at 25w3d here for routine visit.    Hx of C/S  Hx of pre eclampsia on baby ASA daily  Fibroids  AMA with normal NIPT and L2 U/S    Feels well.  No concerns.  RTC 3 weeks with GCT/Hgb/Trep and TdaP.

## 2020-07-22 DIAGNOSIS — Z11.59 ENCOUNTER FOR SCREENING FOR OTHER VIRAL DISEASES: Primary | ICD-10-CM

## 2020-08-05 ENCOUNTER — MYC MEDICAL ADVICE (OUTPATIENT)
Dept: OBGYN | Facility: CLINIC | Age: 35
End: 2020-08-05

## 2020-08-05 DIAGNOSIS — R30.0 DYSURIA: Primary | ICD-10-CM

## 2020-08-06 ENCOUNTER — ALLIED HEALTH/NURSE VISIT (OUTPATIENT)
Dept: NURSING | Facility: CLINIC | Age: 35
End: 2020-08-06
Payer: COMMERCIAL

## 2020-08-06 VITALS — WEIGHT: 236.5 LBS | BODY MASS INDEX: 34.92 KG/M2

## 2020-08-06 DIAGNOSIS — D25.9 UTERINE FIBROID IN PREGNANCY: ICD-10-CM

## 2020-08-06 DIAGNOSIS — R30.0 DYSURIA: ICD-10-CM

## 2020-08-06 DIAGNOSIS — O34.10 UTERINE FIBROID IN PREGNANCY: ICD-10-CM

## 2020-08-06 LAB
ALBUMIN UR-MCNC: NEGATIVE MG/DL
AMORPH CRY #/AREA URNS HPF: ABNORMAL /HPF
APPEARANCE UR: CLEAR
BACTERIA #/AREA URNS HPF: ABNORMAL /HPF
BILIRUB UR QL STRIP: NEGATIVE
COLOR UR AUTO: YELLOW
GLUCOSE UR STRIP-MCNC: NEGATIVE MG/DL
HGB UR QL STRIP: NEGATIVE
KETONES UR STRIP-MCNC: NEGATIVE MG/DL
LEUKOCYTE ESTERASE UR QL STRIP: NEGATIVE
NITRATE UR QL: NEGATIVE
NON-SQ EPI CELLS #/AREA URNS LPF: ABNORMAL /LPF
PH UR STRIP: 8 PH (ref 5–7)
RBC #/AREA URNS AUTO: ABNORMAL /HPF
SOURCE: ABNORMAL
SP GR UR STRIP: 1.02 (ref 1–1.03)
UROBILINOGEN UR STRIP-ACNC: 0.2 EU/DL (ref 0.2–1)
WBC #/AREA URNS AUTO: ABNORMAL /HPF

## 2020-08-06 PROCEDURE — 87086 URINE CULTURE/COLONY COUNT: CPT | Performed by: OBSTETRICS & GYNECOLOGY

## 2020-08-06 PROCEDURE — 81001 URINALYSIS AUTO W/SCOPE: CPT | Performed by: OBSTETRICS & GYNECOLOGY

## 2020-08-06 PROCEDURE — 99207 ZZC NO CHARGE NURSE ONLY: CPT

## 2020-08-06 PROCEDURE — 87088 URINE BACTERIA CULTURE: CPT | Performed by: OBSTETRICS & GYNECOLOGY

## 2020-08-06 NOTE — NURSING NOTE
"Chief Complaint   Patient presents with     Allied Health Visit     Possible UTI  SX painful and odor  urination       Initial Wt 107.3 kg (236 lb 8 oz)   LMP 2020   Breastfeeding No   BMI 34.92 kg/m   Estimated body mass index is 34.92 kg/m  as calculated from the following:    Height as of 3/31/20: 1.753 m (5' 9\").    Weight as of this encounter: 107.3 kg (236 lb 8 oz).  BP completed using cuff size: NA (Not Taken)    Questioned patient about current smoking habits.  Pt. has never smoked.    27w5d      The following HM Due: NONE      Pt here for possible UTI. SX odor and painful urination       Ria May, KEV on 2020 at 9:08 AM       "

## 2020-08-07 ENCOUNTER — TELEPHONE (OUTPATIENT)
Dept: OBGYN | Facility: CLINIC | Age: 35
End: 2020-08-07

## 2020-08-07 DIAGNOSIS — R30.0 DYSURIA: Primary | ICD-10-CM

## 2020-08-07 LAB
BACTERIA SPEC CULT: ABNORMAL
SPECIMEN SOURCE: ABNORMAL

## 2020-08-07 RX ORDER — CEPHALEXIN 500 MG/1
500 CAPSULE ORAL 3 TIMES DAILY
Qty: 21 CAPSULE | Refills: 0 | Status: SHIPPED | OUTPATIENT
Start: 2020-08-07 | End: 2020-08-14

## 2020-08-07 NOTE — RESULT ENCOUNTER NOTE
Please inform the patient of the urine culture results showing mixed iban consistent with vaginal contaminant.  If the patient is asymptomatic no further therapy would be undertaken.  If she is having symptoms to suggest a UTI I can prescribe an antibiotic for her Charles Rubin MD FACOG

## 2020-08-07 NOTE — TELEPHONE ENCOUNTER
The patient had a urine culture that was positive for group B strep with greater than 100,000 colonies of mixed urogenital iban.  The patient has urinary tract infectious symptoms.  I will send a prescription to the pharmacy for Keflex 500 mg p.o. 3 times daily for 7 days  Thank you

## 2020-08-07 NOTE — RESULT ENCOUNTER NOTE
Please see the telephone encounter notes.  I sent a prescription for Keflex 500 mg p.o. 3 times daily for 7 days to her listed pharmacy Charles Rubin MD FACOG

## 2020-08-13 ENCOUNTER — PRENATAL OFFICE VISIT (OUTPATIENT)
Dept: OBGYN | Facility: CLINIC | Age: 35
End: 2020-08-13
Payer: COMMERCIAL

## 2020-08-13 VITALS — SYSTOLIC BLOOD PRESSURE: 134 MMHG | DIASTOLIC BLOOD PRESSURE: 60 MMHG | BODY MASS INDEX: 35 KG/M2 | WEIGHT: 237 LBS

## 2020-08-13 DIAGNOSIS — Z23 NEED FOR TDAP VACCINATION: ICD-10-CM

## 2020-08-13 DIAGNOSIS — D25.9 UTERINE FIBROID IN PREGNANCY: ICD-10-CM

## 2020-08-13 DIAGNOSIS — O34.10 UTERINE FIBROID IN PREGNANCY: ICD-10-CM

## 2020-08-13 DIAGNOSIS — O09.529 SUPERVISION OF HIGH-RISK PREGNANCY OF ELDERLY MULTIGRAVIDA: Primary | ICD-10-CM

## 2020-08-13 LAB
ERYTHROCYTE [DISTWIDTH] IN BLOOD BY AUTOMATED COUNT: 12.5 % (ref 10–15)
HCT VFR BLD AUTO: 35.4 % (ref 35–47)
HGB BLD-MCNC: 12 G/DL (ref 11.7–15.7)
MCH RBC QN AUTO: 33 PG (ref 26.5–33)
MCHC RBC AUTO-ENTMCNC: 33.9 G/DL (ref 31.5–36.5)
MCV RBC AUTO: 97 FL (ref 78–100)
PLATELET # BLD AUTO: 195 10E9/L (ref 150–450)
RBC # BLD AUTO: 3.64 10E12/L (ref 3.8–5.2)
WBC # BLD AUTO: 8.9 10E9/L (ref 4–11)

## 2020-08-13 PROCEDURE — 85027 COMPLETE CBC AUTOMATED: CPT | Performed by: OBSTETRICS & GYNECOLOGY

## 2020-08-13 PROCEDURE — 36415 COLL VENOUS BLD VENIPUNCTURE: CPT | Performed by: OBSTETRICS & GYNECOLOGY

## 2020-08-13 PROCEDURE — 86780 TREPONEMA PALLIDUM: CPT | Performed by: OBSTETRICS & GYNECOLOGY

## 2020-08-13 PROCEDURE — 90715 TDAP VACCINE 7 YRS/> IM: CPT | Performed by: OBSTETRICS & GYNECOLOGY

## 2020-08-13 PROCEDURE — 82950 GLUCOSE TEST: CPT | Performed by: OBSTETRICS & GYNECOLOGY

## 2020-08-13 PROCEDURE — 90471 IMMUNIZATION ADMIN: CPT | Performed by: OBSTETRICS & GYNECOLOGY

## 2020-08-13 PROCEDURE — 99207 ZZC PRENATAL VISIT: CPT | Performed by: OBSTETRICS & GYNECOLOGY

## 2020-08-13 NOTE — NURSING NOTE
"Chief Complaint   Patient presents with     Prenatal Care       Initial /60   Wt 107.5 kg (237 lb)   LMP 2020   Breastfeeding No   BMI 35.00 kg/m   Estimated body mass index is 35 kg/m  as calculated from the following:    Height as of 3/31/20: 1.753 m (5' 9\").    Weight as of this encounter: 107.5 kg (237 lb).  BP completed using cuff size: regular    Questioned patient about current smoking habits.  Pt. has never smoked.          28w5d  + FM daily  + mild headache  + heartburn  - bleeding or leaking of fluid  Marquita Ramirez LPN               "

## 2020-08-13 NOTE — PROGRESS NOTES
PROBLEM LIST  LABS: Opos/RI BOY     1. Prior low transverse  for fetal intolerance. Plans RCS at 39w.  2. History preeclampsia: baby ASA daily, baseline labs within normal limits.  3. AMA: level II US scheduled. Noninvasive prenatal testing within normal limits.     Labs today. Tdap done. Discussed RCS on 10/27.    Sandy Tran MD

## 2020-08-14 LAB — GLUCOSE 1H P 50 G GLC PO SERPL-MCNC: 119 MG/DL (ref 60–129)

## 2020-08-15 LAB — T PALLIDUM AB SER QL: NONREACTIVE

## 2020-09-04 ENCOUNTER — PRENATAL OFFICE VISIT (OUTPATIENT)
Dept: OBGYN | Facility: CLINIC | Age: 35
End: 2020-09-04
Payer: COMMERCIAL

## 2020-09-04 VITALS — BODY MASS INDEX: 35.15 KG/M2 | WEIGHT: 238 LBS | DIASTOLIC BLOOD PRESSURE: 70 MMHG | SYSTOLIC BLOOD PRESSURE: 130 MMHG

## 2020-09-04 DIAGNOSIS — O34.10 UTERINE FIBROID IN PREGNANCY: Primary | ICD-10-CM

## 2020-09-04 DIAGNOSIS — O09.529 SUPERVISION OF HIGH-RISK PREGNANCY OF ELDERLY MULTIGRAVIDA: ICD-10-CM

## 2020-09-04 DIAGNOSIS — Z98.891 PREVIOUS CESAREAN SECTION: ICD-10-CM

## 2020-09-04 DIAGNOSIS — D25.9 UTERINE FIBROID IN PREGNANCY: Primary | ICD-10-CM

## 2020-09-04 PROCEDURE — 99207 ZZC PRENATAL VISIT: CPT | Performed by: OBSTETRICS & GYNECOLOGY

## 2020-09-04 NOTE — PROGRESS NOTES
PROBLEM LIST  LABS: Opos/RI BOY Group B Strep positive      1. Prior low transverse  for fetal intolerance. Plans RCS at 39w on 10/27.  2. History preeclampsia: baby ASA daily, baseline labs within normal limits.  3. AMA: level II US scheduled. Noninvasive prenatal testing within normal limits.     Occasional headache, discussed conservative measures for treatment and what signs and symptoms would be concerning. Otherwise, doing very well, no issues. Follow up in 2 weeks.    Sandy Tran MD

## 2020-09-04 NOTE — NURSING NOTE
"Chief Complaint   Patient presents with     Prenatal Care       Initial /70   Wt 108 kg (238 lb)   LMP 2020   Breastfeeding No   BMI 35.15 kg/m   Estimated body mass index is 35.15 kg/m  as calculated from the following:    Height as of 3/31/20: 1.753 m (5' 9\").    Weight as of this encounter: 108 kg (238 lb).  BP completed using cuff size: regular    Questioned patient about current smoking habits.  Pt. has never smoked.        31w6d  + FM daily  - bleeding or leaking of fluid  - edema  + headache  + hb  Marquita Ramirez LPN               "

## 2020-09-18 ENCOUNTER — PRENATAL OFFICE VISIT (OUTPATIENT)
Dept: OBGYN | Facility: CLINIC | Age: 35
End: 2020-09-18
Payer: COMMERCIAL

## 2020-09-18 VITALS — SYSTOLIC BLOOD PRESSURE: 122 MMHG | BODY MASS INDEX: 35.74 KG/M2 | WEIGHT: 242 LBS | DIASTOLIC BLOOD PRESSURE: 76 MMHG

## 2020-09-18 DIAGNOSIS — O09.529 SUPERVISION OF HIGH-RISK PREGNANCY OF ELDERLY MULTIGRAVIDA: ICD-10-CM

## 2020-09-18 DIAGNOSIS — D25.9 UTERINE FIBROID IN PREGNANCY: ICD-10-CM

## 2020-09-18 DIAGNOSIS — O34.10 UTERINE FIBROID IN PREGNANCY: ICD-10-CM

## 2020-09-18 DIAGNOSIS — Z23 NEED FOR PROPHYLACTIC VACCINATION AND INOCULATION AGAINST INFLUENZA: Primary | ICD-10-CM

## 2020-09-18 PROCEDURE — 90471 IMMUNIZATION ADMIN: CPT | Performed by: OBSTETRICS & GYNECOLOGY

## 2020-09-18 PROCEDURE — 99207 ZZC PRENATAL VISIT: CPT | Performed by: OBSTETRICS & GYNECOLOGY

## 2020-09-18 PROCEDURE — 90686 IIV4 VACC NO PRSV 0.5 ML IM: CPT | Performed by: OBSTETRICS & GYNECOLOGY

## 2020-09-18 NOTE — NURSING NOTE
"Chief Complaint   Patient presents with     Prenatal Care       Initial /76   Wt 109.8 kg (242 lb)   LMP 2020   Breastfeeding No   BMI 35.74 kg/m   Estimated body mass index is 35.74 kg/m  as calculated from the following:    Height as of 3/31/20: 1.753 m (5' 9\").    Weight as of this encounter: 109.8 kg (242 lb).  BP completed using cuff size: regular    Questioned patient about current smoking habits.  Pt. has never smoked.        33w6d  + FM daily  - bleeding or leaking of fluid  - headache  + mild heartburn  - edema  Marquita Ramirez LPN               "

## 2020-09-18 NOTE — PROGRESS NOTES
PROBLEM LIST  LABS: Opos/RI BOY Group B Strep positive      1. Prior low transverse  for fetal intolerance. Plans RCS at 39w on 10/27.  2. History preeclampsia: baby ASA daily, baseline labs within normal limits.  3. AMA: level II US normal. Noninvasive prenatal testing within normal limits.     Follow up in 2 weeks, Group B Strep and preop at that time. Doing well overall. Flu shot today.  Sandy Tran MD

## 2020-10-02 ENCOUNTER — PRENATAL OFFICE VISIT (OUTPATIENT)
Dept: OBGYN | Facility: CLINIC | Age: 35
End: 2020-10-02
Payer: COMMERCIAL

## 2020-10-02 VITALS — SYSTOLIC BLOOD PRESSURE: 132 MMHG | BODY MASS INDEX: 36.48 KG/M2 | WEIGHT: 247 LBS | DIASTOLIC BLOOD PRESSURE: 70 MMHG

## 2020-10-02 DIAGNOSIS — O09.529 SUPERVISION OF HIGH-RISK PREGNANCY OF ELDERLY MULTIGRAVIDA: Primary | ICD-10-CM

## 2020-10-02 DIAGNOSIS — D25.9 UTERINE FIBROID IN PREGNANCY: ICD-10-CM

## 2020-10-02 DIAGNOSIS — O34.10 UTERINE FIBROID IN PREGNANCY: ICD-10-CM

## 2020-10-02 DIAGNOSIS — Z98.891 PREVIOUS CESAREAN SECTION: ICD-10-CM

## 2020-10-02 PROCEDURE — 87186 SC STD MICRODIL/AGAR DIL: CPT | Performed by: OBSTETRICS & GYNECOLOGY

## 2020-10-02 PROCEDURE — 99207 PR PRENATAL VISIT: CPT | Performed by: OBSTETRICS & GYNECOLOGY

## 2020-10-02 PROCEDURE — 87077 CULTURE AEROBIC IDENTIFY: CPT | Performed by: OBSTETRICS & GYNECOLOGY

## 2020-10-02 PROCEDURE — 87653 STREP B DNA AMP PROBE: CPT | Performed by: OBSTETRICS & GYNECOLOGY

## 2020-10-02 NOTE — PROGRESS NOTES
PROBLEM LIST  LABS: Opos/RI BOY Group B Strep positive      1. Prior low transverse  for fetal intolerance. Plans RCS at 39w on 10/27.  2. History preeclampsia: baby ASA daily, baseline labs within normal limits.  3. AMA: level II US normal. Noninvasive prenatal testing within normal limits.     Doing well. Group B Strep today. Follow up in 1 week.  Signs and symptoms of labor reviewed, including when to call or come in for evaluation.   Preop H&P next visit.    Sandy Tran MD

## 2020-10-02 NOTE — NURSING NOTE
"Chief Complaint   Patient presents with     Prenatal Care       Initial /70   Wt 112 kg (247 lb)   LMP 2020   Breastfeeding No   BMI 36.48 kg/m   Estimated body mass index is 36.48 kg/m  as calculated from the following:    Height as of 3/31/20: 1.753 m (5' 9\").    Weight as of this encounter: 112 kg (247 lb).  BP completed using cuff size: regular    Questioned patient about current smoking habits.  Pt. has never smoked.        35w6d  + FM daily  - contractions  - bleeding or leaking of fluid  - edema  + heartburn  Marquita Ramirez LPN             "

## 2020-10-03 LAB
GP B STREP DNA SPEC QL NAA+PROBE: POSITIVE
SPECIMEN SOURCE: ABNORMAL

## 2020-10-06 LAB
BACTERIA SPEC CULT: ABNORMAL
SPECIMEN SOURCE: ABNORMAL

## 2020-10-09 ENCOUNTER — PRENATAL OFFICE VISIT (OUTPATIENT)
Dept: OBGYN | Facility: CLINIC | Age: 35
End: 2020-10-09
Payer: COMMERCIAL

## 2020-10-09 VITALS — DIASTOLIC BLOOD PRESSURE: 78 MMHG | BODY MASS INDEX: 36.03 KG/M2 | WEIGHT: 244 LBS | SYSTOLIC BLOOD PRESSURE: 114 MMHG

## 2020-10-09 DIAGNOSIS — O09.529 SUPERVISION OF HIGH-RISK PREGNANCY OF ELDERLY MULTIGRAVIDA: Primary | ICD-10-CM

## 2020-10-09 DIAGNOSIS — O34.10 UTERINE FIBROID IN PREGNANCY: ICD-10-CM

## 2020-10-09 DIAGNOSIS — D25.9 UTERINE FIBROID IN PREGNANCY: ICD-10-CM

## 2020-10-09 DIAGNOSIS — Z98.891 PREVIOUS CESAREAN SECTION: ICD-10-CM

## 2020-10-09 PROCEDURE — 99207 PR PRENATAL VISIT: CPT | Performed by: OBSTETRICS & GYNECOLOGY

## 2020-10-09 NOTE — PROGRESS NOTES
History and Physical  Obstetrics and Gynecology     Date of Admission:  (Not on file)    Assessment & Plan   Krystina Suazo is a 35 year old female who is scheduled for Repeat  section.     ASSESSMENT:   IUP @ 36w6d today  RCS planned at 39 weeks.      PLAN:   Scheduled for  section.    The risks, benefits, complications, treatment options, non-operative alternatives were discussed with the patient. Risks include but are not limited to infection, bleeding possibly requiring blood transfusion, injury to surrounding organs such as uterus/ovaries/bladder/bowel/nerves/ blood vessels with possible need for further surgery/procedure, injury to baby, VTE/PE, and remote risk of maternal/fetal death. The patient concurred with the proposed plan, giving informed consent.     All questions were answered.     Sandy Tran MD    History of Present Illness   Krystina Suazo is a 35 year old female  36w6d planning for Repeat  section.      PRENATAL COURSE  Prenatal course was essentially uncomplicated      Recent Labs   Lab Test 20  1053   ABO O   RH Pos   AS Neg     Rhogam not indicated   Recent Labs   Lab Test 10/02/20  1530 20  1053   HEPBANG  --  Nonreactive   HIAGAB  --  Nonreactive   GBS Positive*  --    RUQIGG  --  55         Prior to Admission Medications   Cannot display prior to admission medications because the patient has not been admitted in this contact.     Allergies   Allergies   Allergen Reactions     Dust Mite Extract      Pollen and animal dander.         Immunization History   Immunization History   Administered Date(s) Administered     Influenza Vaccine IM > 6 months Valent IIV4 2018, 10/16/2018, 10/24/2019, 2020     TDAP Vaccine (Adacel) 2017, 2018, 2020       Past Medical History:   Diagnosis Date     Asthma      Depression        Past Surgical History:   Procedure Laterality Date     CARPAL TUNNEL RELEASE RT/LT  Bilateral 2013      SECTION N/A 2018    Procedure:  SECTION;   section;  Surgeon: Jay Yanez MD;  Location: [facility] OR     SINUS SURGERY       TONSILLECTOMY         Clinic vitals from today: /78   Wt 110.7 kg (244 lb)   LMP 2020   Breastfeeding No   BMI 36.03 kg/m      Abdomen: gravid, single vertex fetus, non-tender, EFW 7 lbs   Constitutional: healthy, alert, active and no distress   Extremities: NT, no edema  Neurologic: Awake, alert, oriented x3  Neuropsychiatric: General: normal, calm and normal eye contact  Heart: Regular rate and rhythm  Lungs: clear to ausculation bilaterally    Sandy Tran MD

## 2020-10-09 NOTE — NURSING NOTE
"Chief Complaint   Patient presents with     Prenatal Care       Initial /78   Wt 110.7 kg (244 lb)   LMP 2020   Breastfeeding No   BMI 36.03 kg/m   Estimated body mass index is 36.03 kg/m  as calculated from the following:    Height as of 3/31/20: 1.753 m (5' 9\").    Weight as of this encounter: 110.7 kg (244 lb).  BP completed using cuff size: regular    Questioned patient about current smoking habits.  Pt. has never smoked.        36w6d  + FM daily  - cramping or bleeding  + BH contractions  - edema  - powers  Marquita Ramirez LPN               "

## 2020-10-09 NOTE — PROGRESS NOTES
PROBLEM LIST  LABS: Opos/RI BOY Group B Strep positive      1. Prior low transverse  for fetal intolerance. Plans RCS at 39w on 10/27.  2. History preeclampsia: baby ASA daily, baseline labs within normal limits.  3. AMA: level II US normal. Noninvasive prenatal testing within normal limits.     Doing well.   Signs and symptoms of labor reviewed, including when to call or come in for evaluation.   H&P done today.    Sandy Tran MD

## 2020-10-12 ENCOUNTER — TELEPHONE (OUTPATIENT)
Dept: OBGYN | Facility: CLINIC | Age: 35
End: 2020-10-12

## 2020-10-15 ENCOUNTER — PRENATAL OFFICE VISIT (OUTPATIENT)
Dept: OBGYN | Facility: CLINIC | Age: 35
End: 2020-10-15
Payer: COMMERCIAL

## 2020-10-15 VITALS — BODY MASS INDEX: 36.61 KG/M2 | SYSTOLIC BLOOD PRESSURE: 136 MMHG | WEIGHT: 247.9 LBS | DIASTOLIC BLOOD PRESSURE: 74 MMHG

## 2020-10-15 DIAGNOSIS — D25.9 UTERINE FIBROID IN PREGNANCY: ICD-10-CM

## 2020-10-15 DIAGNOSIS — O09.523 MULTIGRAVIDA OF ADVANCED MATERNAL AGE IN THIRD TRIMESTER: ICD-10-CM

## 2020-10-15 DIAGNOSIS — O34.10 UTERINE FIBROID IN PREGNANCY: ICD-10-CM

## 2020-10-15 DIAGNOSIS — Z98.891 PREVIOUS CESAREAN SECTION: ICD-10-CM

## 2020-10-15 DIAGNOSIS — O09.529 SUPERVISION OF HIGH-RISK PREGNANCY OF ELDERLY MULTIGRAVIDA: Primary | ICD-10-CM

## 2020-10-15 DIAGNOSIS — E66.01 MORBID OBESITY (H): ICD-10-CM

## 2020-10-15 DIAGNOSIS — B37.0 THRUSH: ICD-10-CM

## 2020-10-15 PROCEDURE — 99207 PR PRENATAL VISIT: CPT | Performed by: OBSTETRICS & GYNECOLOGY

## 2020-10-15 RX ORDER — CLOTRIMAZOLE 10 MG/1
10 LOZENGE ORAL
Qty: 35 LOZENGE | Refills: 0 | Status: SHIPPED | OUTPATIENT
Start: 2020-10-15 | End: 2020-10-22

## 2020-10-15 NOTE — NURSING NOTE
"Chief Complaint   Patient presents with     Prenatal Care   37w5d  Baby active  C/o white on tounge and bad taste x 1 week    initial /74   Wt 112.4 kg (247 lb 14.4 oz)   LMP 01/29/2020   BMI 36.61 kg/m   Estimated body mass index is 36.61 kg/m  as calculated from the following:    Height as of 3/31/20: 1.753 m (5' 9\").    Weight as of this encounter: 112.4 kg (247 lb 14.4 oz).  BP completed using cuff size regular ladonna.  Anjali Hamilton CMA    "

## 2020-10-15 NOTE — PROGRESS NOTES
34yo  at 37w5d with a Hx of LTCS who is scheduled for a RCS on 10/27.  Baby active.  No ctx.  Complains of thick, white tongue with decreased taste.  Thrush discussed and script for clotrimazole troches sent to pharmacy.  RTC 1 week

## 2020-10-22 ENCOUNTER — PRENATAL OFFICE VISIT (OUTPATIENT)
Dept: OBGYN | Facility: CLINIC | Age: 35
End: 2020-10-22
Payer: COMMERCIAL

## 2020-10-22 VITALS — BODY MASS INDEX: 37.71 KG/M2 | WEIGHT: 248 LBS | SYSTOLIC BLOOD PRESSURE: 128 MMHG | DIASTOLIC BLOOD PRESSURE: 70 MMHG

## 2020-10-22 DIAGNOSIS — Z98.891 PREVIOUS CESAREAN SECTION: ICD-10-CM

## 2020-10-22 DIAGNOSIS — O09.529 SUPERVISION OF HIGH-RISK PREGNANCY OF ELDERLY MULTIGRAVIDA: Primary | ICD-10-CM

## 2020-10-22 DIAGNOSIS — O34.10 UTERINE FIBROID IN PREGNANCY: ICD-10-CM

## 2020-10-22 DIAGNOSIS — D25.9 UTERINE FIBROID IN PREGNANCY: ICD-10-CM

## 2020-10-22 PROCEDURE — 99207 PR PRENATAL VISIT: CPT | Performed by: OBSTETRICS & GYNECOLOGY

## 2020-10-22 ASSESSMENT — MIFFLIN-ST. JEOR: SCORE: 1840.28

## 2020-10-22 NOTE — NURSING NOTE
"Chief Complaint   Patient presents with     Prenatal Care       Initial /70   Wt 112.5 kg (248 lb)   LMP 2020   Breastfeeding No   BMI 37.71 kg/m   Estimated body mass index is 37.71 kg/m  as calculated from the following:    Height as of an earlier encounter on 10/22/20: 1.727 m (5' 8\").    Weight as of this encounter: 112.5 kg (248 lb).  BP completed using cuff size: regular    Questioned patient about current smoking habits.  Pt. has never smoked.          38w5d  + FM daily  - contractions  - concerns  Marquita Ramirez LPN             "

## 2020-10-22 NOTE — PROGRESS NOTES
PROBLEM LIST  LABS: Opos/RI BOY Group B Strep positive      1. Prior low transverse  for fetal intolerance. Plans RCS at 39w on 10/27.  2. History preeclampsia: baby ASA daily, baseline labs within normal limits.  3. AMA: level II US normal. Noninvasive prenatal testing within normal limits.     Doing well.   Clotrimazole troches for thrush last week--reports no symptoms today.   next week, no further follow up needed in clinic.    Sandy Tran MD

## 2020-10-23 DIAGNOSIS — Z11.59 ENCOUNTER FOR SCREENING FOR OTHER VIRAL DISEASES: ICD-10-CM

## 2020-10-23 PROCEDURE — U0003 INFECTIOUS AGENT DETECTION BY NUCLEIC ACID (DNA OR RNA); SEVERE ACUTE RESPIRATORY SYNDROME CORONAVIRUS 2 (SARS-COV-2) (CORONAVIRUS DISEASE [COVID-19]), AMPLIFIED PROBE TECHNIQUE, MAKING USE OF HIGH THROUGHPUT TECHNOLOGIES AS DESCRIBED BY CMS-2020-01-R: HCPCS | Performed by: OBSTETRICS & GYNECOLOGY

## 2020-10-24 LAB
SARS-COV-2 RNA SPEC QL NAA+PROBE: NOT DETECTED
SPECIMEN SOURCE: NORMAL

## 2020-10-26 ENCOUNTER — HOSPITAL ENCOUNTER (OUTPATIENT)
Dept: LAB | Facility: CLINIC | Age: 35
Discharge: HOME | End: 2020-10-26
Attending: OBSTETRICS & GYNECOLOGY | Admitting: OBSTETRICS & GYNECOLOGY
Payer: COMMERCIAL

## 2020-10-26 DIAGNOSIS — Z01.812 PRE-OPERATIVE LABORATORY EXAMINATION: ICD-10-CM

## 2020-10-26 LAB
ABO + RH BLD: NORMAL
ABO + RH BLD: NORMAL
BLD GP AB SCN SERPL QL: NORMAL
BLOOD BANK CMNT PATIENT-IMP: NORMAL
HGB BLD-MCNC: 12.5 G/DL (ref 11.7–15.7)
SPECIMEN EXP DATE BLD: NORMAL

## 2020-10-26 PROCEDURE — 86850 RBC ANTIBODY SCREEN: CPT | Performed by: OBSTETRICS & GYNECOLOGY

## 2020-10-26 PROCEDURE — 85018 HEMOGLOBIN: CPT | Performed by: OBSTETRICS & GYNECOLOGY

## 2020-10-26 PROCEDURE — 86901 BLOOD TYPING SEROLOGIC RH(D): CPT | Performed by: OBSTETRICS & GYNECOLOGY

## 2020-10-26 PROCEDURE — 36415 COLL VENOUS BLD VENIPUNCTURE: CPT | Performed by: OBSTETRICS & GYNECOLOGY

## 2020-10-26 PROCEDURE — 86900 BLOOD TYPING SEROLOGIC ABO: CPT | Performed by: OBSTETRICS & GYNECOLOGY

## 2020-10-26 PROCEDURE — 86780 TREPONEMA PALLIDUM: CPT | Performed by: OBSTETRICS & GYNECOLOGY

## 2020-10-26 NOTE — PHARMACY-ADMISSION MEDICATION HISTORY
Admission medication history interview status for this patient is complete. See [facility] admission navigator for allergy information, prior to admission medications and immunization status.     PTA meds completed by pre-admitting nurse Latanya Ocampo and reviewed by pharmacy       Prior to Admission medications    Medication Sig Last Dose Taking? Auth Provider   albuterol (PROAIR HFA/PROVENTIL HFA/VENTOLIN HFA) 108 (90 Base) MCG/ACT inhaler Inhale 2 puffs into the lungs every 4 hours as needed for shortness of breath / dyspnea or wheezing  Yes Valeria Daly MD   aspirin 81 MG EC tablet Take 81 mg by mouth daily  Yes Reported, Patient   Prenatal Vit-Fe Fumarate-FA (PRENATAL MULTIVITAMIN PLUS IRON) 27-0.8 MG TABS per tablet Take 1 tablet by mouth daily  Yes Otilia Jacobo MD   sertraline (ZOLOFT) 100 MG tablet Take 1 tablet (100 mg) by mouth daily Take with 25 mg tab. Total = 125 mg daily.  Yes Valeria Daly MD   sertraline (ZOLOFT) 25 MG tablet Take 1 tablet (25 mg) by mouth daily Take along with the 100 mg tabs. Total = 125 mg daily  Yes Valeria Daly MD   clobetasol propionate (OLUX) 0.05 % external foam Apply to AA on scalp bid for 7 days then once per day for 2 weeks . Then when needed  Patient not taking: Reported on 3/31/2020   Michelle Bacon MD

## 2020-10-27 ENCOUNTER — ANESTHESIA (OUTPATIENT)
Dept: OBGYN | Facility: CLINIC | Age: 35
End: 2020-10-27
Payer: COMMERCIAL

## 2020-10-27 ENCOUNTER — HOSPITAL ENCOUNTER (INPATIENT)
Facility: CLINIC | Age: 35
LOS: 2 days | Discharge: HOME | End: 2020-10-29
Attending: OBSTETRICS & GYNECOLOGY | Admitting: OBSTETRICS & GYNECOLOGY
Payer: COMMERCIAL

## 2020-10-27 ENCOUNTER — ANESTHESIA EVENT (OUTPATIENT)
Dept: OBGYN | Facility: CLINIC | Age: 35
End: 2020-10-27
Payer: COMMERCIAL

## 2020-10-27 DIAGNOSIS — Z98.891 PREVIOUS CESAREAN SECTION: ICD-10-CM

## 2020-10-27 DIAGNOSIS — Z98.891 S/P C-SECTION: Primary | ICD-10-CM

## 2020-10-27 LAB
CREAT SERPL-MCNC: 0.47 MG/DL (ref 0.52–1.04)
GFR SERPL CREATININE-BSD FRML MDRD: >90 ML/MIN/{1.73_M2}
T PALLIDUM AB SER QL: NONREACTIVE

## 2020-10-27 PROCEDURE — 761N000001 HC RECOVERY PHASE 1 LEVEL 1 FIRST HR: Performed by: OBSTETRICS & GYNECOLOGY

## 2020-10-27 PROCEDURE — 250N000009 HC RX 250: Performed by: OBSTETRICS & GYNECOLOGY

## 2020-10-27 PROCEDURE — 360N000021 HC SURGERY LEVEL 3 EA 15 ADDTL MIN: Performed by: OBSTETRICS & GYNECOLOGY

## 2020-10-27 PROCEDURE — 250N000009 HC RX 250: Performed by: NURSE ANESTHETIST, CERTIFIED REGISTERED

## 2020-10-27 PROCEDURE — 272N000001 HC OR GENERAL SUPPLY STERILE: Performed by: OBSTETRICS & GYNECOLOGY

## 2020-10-27 PROCEDURE — 250N000013 HC RX MED GY IP 250 OP 250 PS 637: Performed by: OBSTETRICS & GYNECOLOGY

## 2020-10-27 PROCEDURE — 36415 COLL VENOUS BLD VENIPUNCTURE: CPT | Performed by: OBSTETRICS & GYNECOLOGY

## 2020-10-27 PROCEDURE — 370N000002 HC ANESTHESIA TECHNICAL FEE, EACH ADDTL 15 MIN: Performed by: OBSTETRICS & GYNECOLOGY

## 2020-10-27 PROCEDURE — 370N000001 HC ANESTHESIA TECHNICAL FEE, 1ST 30 MIN: Performed by: OBSTETRICS & GYNECOLOGY

## 2020-10-27 PROCEDURE — 258N000003 HC RX IP 258 OP 636: Performed by: OBSTETRICS & GYNECOLOGY

## 2020-10-27 PROCEDURE — 120N000001 HC R&B MED SURG/OB

## 2020-10-27 PROCEDURE — 59510 CESAREAN DELIVERY: CPT | Performed by: OBSTETRICS & GYNECOLOGY

## 2020-10-27 PROCEDURE — 250N000011 HC RX IP 250 OP 636: Performed by: NURSE ANESTHETIST, CERTIFIED REGISTERED

## 2020-10-27 PROCEDURE — 82565 ASSAY OF CREATININE: CPT | Performed by: OBSTETRICS & GYNECOLOGY

## 2020-10-27 PROCEDURE — 250N000011 HC RX IP 250 OP 636: Performed by: ANESTHESIOLOGY

## 2020-10-27 PROCEDURE — 250N000011 HC RX IP 250 OP 636: Performed by: OBSTETRICS & GYNECOLOGY

## 2020-10-27 PROCEDURE — 360N000020 HC SURGERY LEVEL 3 1ST 30 MIN: Performed by: OBSTETRICS & GYNECOLOGY

## 2020-10-27 RX ORDER — OXYTOCIN/0.9 % SODIUM CHLORIDE 30/500 ML
PLASTIC BAG, INJECTION (ML) INTRAVENOUS PRN
Status: DISCONTINUED | OUTPATIENT
Start: 2020-10-27 | End: 2020-10-27

## 2020-10-27 RX ORDER — AMOXICILLIN 250 MG
1 CAPSULE ORAL 2 TIMES DAILY
Status: DISCONTINUED | OUTPATIENT
Start: 2020-10-27 | End: 2020-10-29 | Stop reason: HOSPADM

## 2020-10-27 RX ORDER — ACETAMINOPHEN 325 MG/1
975 TABLET ORAL ONCE
Status: DISCONTINUED | OUTPATIENT
Start: 2020-10-27 | End: 2020-10-27 | Stop reason: HOSPADM

## 2020-10-27 RX ORDER — DEXAMETHASONE SODIUM PHOSPHATE 4 MG/ML
INJECTION, SOLUTION INTRA-ARTICULAR; INTRALESIONAL; INTRAMUSCULAR; INTRAVENOUS; SOFT TISSUE PRN
Status: DISCONTINUED | OUTPATIENT
Start: 2020-10-27 | End: 2020-10-27

## 2020-10-27 RX ORDER — ONDANSETRON 2 MG/ML
4 INJECTION INTRAMUSCULAR; INTRAVENOUS EVERY 6 HOURS PRN
Status: DISCONTINUED | OUTPATIENT
Start: 2020-10-27 | End: 2020-10-29 | Stop reason: HOSPADM

## 2020-10-27 RX ORDER — OXYTOCIN 10 [USP'U]/ML
10 INJECTION, SOLUTION INTRAMUSCULAR; INTRAVENOUS
Status: DISCONTINUED | OUTPATIENT
Start: 2020-10-27 | End: 2020-10-29 | Stop reason: HOSPADM

## 2020-10-27 RX ORDER — ACETAMINOPHEN 325 MG/1
975 TABLET ORAL EVERY 6 HOURS
Status: DISCONTINUED | OUTPATIENT
Start: 2020-10-27 | End: 2020-10-29 | Stop reason: HOSPADM

## 2020-10-27 RX ORDER — TRANEXAMIC ACID 10 MG/ML
1 INJECTION, SOLUTION INTRAVENOUS EVERY 30 MIN PRN
Status: DISCONTINUED | OUTPATIENT
Start: 2020-10-27 | End: 2020-10-29 | Stop reason: HOSPADM

## 2020-10-27 RX ORDER — OXYCODONE HYDROCHLORIDE 5 MG/1
5 TABLET ORAL EVERY 4 HOURS PRN
Status: DISCONTINUED | OUTPATIENT
Start: 2020-10-27 | End: 2020-10-29 | Stop reason: HOSPADM

## 2020-10-27 RX ORDER — ONDANSETRON 2 MG/ML
4 INJECTION INTRAMUSCULAR; INTRAVENOUS EVERY 30 MIN PRN
Status: DISCONTINUED | OUTPATIENT
Start: 2020-10-27 | End: 2020-10-27 | Stop reason: HOSPADM

## 2020-10-27 RX ORDER — LIDOCAINE 40 MG/G
CREAM TOPICAL
Status: DISCONTINUED | OUTPATIENT
Start: 2020-10-27 | End: 2020-10-27

## 2020-10-27 RX ORDER — CEFAZOLIN SODIUM 1 G/3ML
1 INJECTION, POWDER, FOR SOLUTION INTRAMUSCULAR; INTRAVENOUS SEE ADMIN INSTRUCTIONS
Status: DISCONTINUED | OUTPATIENT
Start: 2020-10-27 | End: 2020-10-27

## 2020-10-27 RX ORDER — LIDOCAINE 40 MG/G
CREAM TOPICAL
Status: DISCONTINUED | OUTPATIENT
Start: 2020-10-27 | End: 2020-10-29 | Stop reason: HOSPADM

## 2020-10-27 RX ORDER — NALOXONE HYDROCHLORIDE 0.4 MG/ML
.1-.4 INJECTION, SOLUTION INTRAMUSCULAR; INTRAVENOUS; SUBCUTANEOUS
Status: CANCELLED | OUTPATIENT
Start: 2020-10-27 | End: 2020-10-28

## 2020-10-27 RX ORDER — MAGNESIUM HYDROXIDE 1200 MG/15ML
LIQUID ORAL PRN
Status: DISCONTINUED | OUTPATIENT
Start: 2020-10-27 | End: 2020-10-29 | Stop reason: HOSPADM

## 2020-10-27 RX ORDER — MODIFIED LANOLIN
OINTMENT (GRAM) TOPICAL
Status: DISCONTINUED | OUTPATIENT
Start: 2020-10-27 | End: 2020-10-29 | Stop reason: HOSPADM

## 2020-10-27 RX ORDER — BISACODYL 10 MG
10 SUPPOSITORY, RECTAL RECTAL DAILY PRN
Status: DISCONTINUED | OUTPATIENT
Start: 2020-10-29 | End: 2020-10-29 | Stop reason: HOSPADM

## 2020-10-27 RX ORDER — NALOXONE HYDROCHLORIDE 0.4 MG/ML
.1-.4 INJECTION, SOLUTION INTRAMUSCULAR; INTRAVENOUS; SUBCUTANEOUS
Status: DISCONTINUED | OUTPATIENT
Start: 2020-10-27 | End: 2020-10-29 | Stop reason: HOSPADM

## 2020-10-27 RX ORDER — KETOROLAC TROMETHAMINE 30 MG/ML
30 INJECTION, SOLUTION INTRAMUSCULAR; INTRAVENOUS EVERY 6 HOURS
Status: COMPLETED | OUTPATIENT
Start: 2020-10-27 | End: 2020-10-28

## 2020-10-27 RX ORDER — OXYTOCIN/0.9 % SODIUM CHLORIDE 30/500 ML
340 PLASTIC BAG, INJECTION (ML) INTRAVENOUS CONTINUOUS PRN
Status: DISCONTINUED | OUTPATIENT
Start: 2020-10-27 | End: 2020-10-29 | Stop reason: HOSPADM

## 2020-10-27 RX ORDER — OXYTOCIN/0.9 % SODIUM CHLORIDE 30/500 ML
100 PLASTIC BAG, INJECTION (ML) INTRAVENOUS CONTINUOUS
Status: DISCONTINUED | OUTPATIENT
Start: 2020-10-27 | End: 2020-10-29 | Stop reason: HOSPADM

## 2020-10-27 RX ORDER — MORPHINE SULFATE 1 MG/ML
INJECTION, SOLUTION EPIDURAL; INTRATHECAL; INTRAVENOUS PRN
Status: DISCONTINUED | OUTPATIENT
Start: 2020-10-27 | End: 2020-10-27

## 2020-10-27 RX ORDER — CEFAZOLIN SODIUM 2 G/100ML
2 INJECTION, SOLUTION INTRAVENOUS
Status: COMPLETED | OUTPATIENT
Start: 2020-10-27 | End: 2020-10-27

## 2020-10-27 RX ORDER — HYDROCORTISONE 2.5 %
CREAM (GRAM) TOPICAL 3 TIMES DAILY PRN
Status: DISCONTINUED | OUTPATIENT
Start: 2020-10-27 | End: 2020-10-29 | Stop reason: HOSPADM

## 2020-10-27 RX ORDER — SIMETHICONE 80 MG
80 TABLET,CHEWABLE ORAL 4 TIMES DAILY PRN
Status: DISCONTINUED | OUTPATIENT
Start: 2020-10-27 | End: 2020-10-29 | Stop reason: HOSPADM

## 2020-10-27 RX ORDER — HYDROMORPHONE HYDROCHLORIDE 1 MG/ML
.3-.5 INJECTION, SOLUTION INTRAMUSCULAR; INTRAVENOUS; SUBCUTANEOUS EVERY 5 MIN PRN
Status: DISCONTINUED | OUTPATIENT
Start: 2020-10-27 | End: 2020-10-27 | Stop reason: HOSPADM

## 2020-10-27 RX ORDER — FENTANYL CITRATE-0.9 % NACL/PF 10 MCG/ML
PLASTIC BAG, INJECTION (ML) INTRAVENOUS CONTINUOUS PRN
Status: DISCONTINUED | OUTPATIENT
Start: 2020-10-27 | End: 2020-10-27

## 2020-10-27 RX ORDER — SODIUM CHLORIDE, SODIUM LACTATE, POTASSIUM CHLORIDE, CALCIUM CHLORIDE 600; 310; 30; 20 MG/100ML; MG/100ML; MG/100ML; MG/100ML
INJECTION, SOLUTION INTRAVENOUS CONTINUOUS
Status: DISCONTINUED | OUTPATIENT
Start: 2020-10-27 | End: 2020-10-27 | Stop reason: HOSPADM

## 2020-10-27 RX ORDER — IBUPROFEN 800 MG/1
800 TABLET, FILM COATED ORAL EVERY 6 HOURS
Status: DISCONTINUED | OUTPATIENT
Start: 2020-10-28 | End: 2020-10-29 | Stop reason: HOSPADM

## 2020-10-27 RX ORDER — ONDANSETRON 2 MG/ML
INJECTION INTRAMUSCULAR; INTRAVENOUS PRN
Status: DISCONTINUED | OUTPATIENT
Start: 2020-10-27 | End: 2020-10-27

## 2020-10-27 RX ORDER — ALBUTEROL SULFATE 90 UG/1
2 AEROSOL, METERED RESPIRATORY (INHALATION) EVERY 4 HOURS PRN
Status: DISCONTINUED | OUTPATIENT
Start: 2020-10-27 | End: 2020-10-29 | Stop reason: HOSPADM

## 2020-10-27 RX ORDER — CITRIC ACID/SODIUM CITRATE 334-500MG
30 SOLUTION, ORAL ORAL
Status: COMPLETED | OUTPATIENT
Start: 2020-10-27 | End: 2020-10-27

## 2020-10-27 RX ORDER — FENTANYL CITRATE 50 UG/ML
25-50 INJECTION, SOLUTION INTRAMUSCULAR; INTRAVENOUS
Status: DISCONTINUED | OUTPATIENT
Start: 2020-10-27 | End: 2020-10-27 | Stop reason: HOSPADM

## 2020-10-27 RX ORDER — DEXTROSE, SODIUM CHLORIDE, SODIUM LACTATE, POTASSIUM CHLORIDE, AND CALCIUM CHLORIDE 5; .6; .31; .03; .02 G/100ML; G/100ML; G/100ML; G/100ML; G/100ML
INJECTION, SOLUTION INTRAVENOUS CONTINUOUS
Status: DISCONTINUED | OUTPATIENT
Start: 2020-10-27 | End: 2020-10-29 | Stop reason: HOSPADM

## 2020-10-27 RX ORDER — BUPIVACAINE HYDROCHLORIDE 7.5 MG/ML
INJECTION, SOLUTION INTRASPINAL PRN
Status: DISCONTINUED | OUTPATIENT
Start: 2020-10-27 | End: 2020-10-27

## 2020-10-27 RX ORDER — SERTRALINE HYDROCHLORIDE 25 MG/1
25 TABLET, FILM COATED ORAL DAILY
Status: DISCONTINUED | OUTPATIENT
Start: 2020-10-27 | End: 2020-10-29 | Stop reason: HOSPADM

## 2020-10-27 RX ORDER — SODIUM CHLORIDE, SODIUM LACTATE, POTASSIUM CHLORIDE, CALCIUM CHLORIDE 600; 310; 30; 20 MG/100ML; MG/100ML; MG/100ML; MG/100ML
INJECTION, SOLUTION INTRAVENOUS CONTINUOUS
Status: DISCONTINUED | OUTPATIENT
Start: 2020-10-27 | End: 2020-10-27

## 2020-10-27 RX ORDER — AMOXICILLIN 250 MG
2 CAPSULE ORAL 2 TIMES DAILY
Status: DISCONTINUED | OUTPATIENT
Start: 2020-10-27 | End: 2020-10-29 | Stop reason: HOSPADM

## 2020-10-27 RX ORDER — ONDANSETRON 4 MG/1
4 TABLET, ORALLY DISINTEGRATING ORAL EVERY 30 MIN PRN
Status: DISCONTINUED | OUTPATIENT
Start: 2020-10-27 | End: 2020-10-27 | Stop reason: HOSPADM

## 2020-10-27 RX ADMIN — SERTRALINE HYDROCHLORIDE 25 MG: 25 TABLET ORAL at 13:44

## 2020-10-27 RX ADMIN — Medication 0.25 MG: at 07:33

## 2020-10-27 RX ADMIN — BUPIVACAINE HYDROCHLORIDE IN DEXTROSE 15 MG: 7.5 INJECTION, SOLUTION SUBARACHNOID at 07:33

## 2020-10-27 RX ADMIN — DOCUSATE SODIUM 50 MG AND SENNOSIDES 8.6 MG 1 TABLET: 8.6; 5 TABLET, FILM COATED ORAL at 13:44

## 2020-10-27 RX ADMIN — SODIUM CHLORIDE, SODIUM LACTATE, POTASSIUM CHLORIDE, CALCIUM CHLORIDE AND DEXTROSE MONOHYDRATE: 5; 600; 310; 30; 20 INJECTION, SOLUTION INTRAVENOUS at 15:41

## 2020-10-27 RX ADMIN — ONDANSETRON 4 MG: 2 INJECTION INTRAMUSCULAR; INTRAVENOUS at 07:44

## 2020-10-27 RX ADMIN — Medication 50 MCG/MIN: at 07:34

## 2020-10-27 RX ADMIN — SODIUM CITRATE AND CITRIC ACID MONOHYDRATE 30 ML: 500; 334 SOLUTION ORAL at 07:15

## 2020-10-27 RX ADMIN — SODIUM CHLORIDE, POTASSIUM CHLORIDE, SODIUM LACTATE AND CALCIUM CHLORIDE: 600; 310; 30; 20 INJECTION, SOLUTION INTRAVENOUS at 05:58

## 2020-10-27 RX ADMIN — Medication 100 ML/HR: at 10:41

## 2020-10-27 RX ADMIN — DEXAMETHASONE SODIUM PHOSPHATE 4 MG: 4 INJECTION, SOLUTION INTRA-ARTICULAR; INTRALESIONAL; INTRAMUSCULAR; INTRAVENOUS; SOFT TISSUE at 07:43

## 2020-10-27 RX ADMIN — CEFAZOLIN SODIUM 2 G: 2 INJECTION, SOLUTION INTRAVENOUS at 07:16

## 2020-10-27 RX ADMIN — SODIUM CHLORIDE, POTASSIUM CHLORIDE, SODIUM LACTATE AND CALCIUM CHLORIDE: 600; 310; 30; 20 INJECTION, SOLUTION INTRAVENOUS at 08:26

## 2020-10-27 RX ADMIN — ACETAMINOPHEN 975 MG: 325 TABLET, FILM COATED ORAL at 16:04

## 2020-10-27 RX ADMIN — KETOROLAC TROMETHAMINE 30 MG: 30 INJECTION, SOLUTION INTRAMUSCULAR at 21:04

## 2020-10-27 RX ADMIN — DOCUSATE SODIUM 50 MG AND SENNOSIDES 8.6 MG 1 TABLET: 8.6; 5 TABLET, FILM COATED ORAL at 21:06

## 2020-10-27 RX ADMIN — KETOROLAC TROMETHAMINE 30 MG: 30 INJECTION, SOLUTION INTRAMUSCULAR at 14:50

## 2020-10-27 RX ADMIN — ACETAMINOPHEN 975 MG: 325 TABLET, FILM COATED ORAL at 22:36

## 2020-10-27 RX ADMIN — OXYTOCIN-SODIUM CHLORIDE 0.9% IV SOLN 30 UNIT/500ML 500 ML: 30-0.9/5 SOLUTION at 07:54

## 2020-10-27 RX ADMIN — SERTRALINE HYDROCHLORIDE 100 MG: 50 TABLET ORAL at 13:43

## 2020-10-27 RX ADMIN — ACETAMINOPHEN 975 MG: 325 TABLET, FILM COATED ORAL at 10:03

## 2020-10-27 SDOH — HEALTH STABILITY: MENTAL HEALTH: CURRENT SMOKER: 0

## 2020-10-27 ASSESSMENT — MIFFLIN-ST. JEOR: SCORE: 1869.76

## 2020-10-27 NOTE — ANESTHESIA PREPROCEDURE EVALUATION
Anesthesia Pre-Procedure Evaluation    Patient: Krystina Suazo   MRN: [id number] : 1985          Preoperative Diagnosis: Previous  section [Z98.891]    Procedure(s):  REPEAT  SECTION    Past Medical History:   Diagnosis Date     Asthma      Depression      Past Surgical History:   Procedure Laterality Date     CARPAL TUNNEL RELEASE RT/LT Bilateral       SECTION N/A 2018    Procedure:  SECTION;   section;  Surgeon: Jay Yanez MD;  Location: [facility] OR     SINUS SURGERY       TONSILLECTOMY       Anesthesia Evaluation     . Pt has had prior anesthetic. Type: Regional    No history of anesthetic complications          ROS/MED HX    ENT/Pulmonary:     (+)Intermittent asthma , . .    Neurologic:  - neg neurologic ROS     Cardiovascular:     (+) hypertension----. : . . . :. .       METS/Exercise Tolerance:     Hematologic:  - neg hematologic  ROS       Musculoskeletal:  - neg musculoskeletal ROS       GI/Hepatic:  - neg GI/hepatic ROS       Renal/Genitourinary:  - ROS Renal section negative       Endo:     (+) Obesity, .      Psychiatric:  - neg psychiatric ROS       Infectious Disease:  - neg infectious disease ROS       Malignancy:         Other:    (+) Possibly pregnant                         Physical Exam  Normal systems: cardiovascular, pulmonary and dental    Airway   Mallampati: II  TM distance: >3 FB  Neck ROM: full    Dental     Cardiovascular       Pulmonary             Lab Results   Component Value Date    WBC 8.9 2020    HGB 12.5 10/26/2020    HCT 35.4 2020     2020     2018    POTASSIUM 3.9 2018    CHLORIDE 106 2018    CO2 22 2018    BUN 13 2018    CR 0.62 2018    GLC 98 2019    LORNA 8.6 2018    MAG 4.2 (H) 2018    ALBUMIN 2.6 (L) 2018    PROTTOTAL 6.0 (L) 2018    ALT 14 2018    AST 14 2018    ALKPHOS 351 (H) 2018     "BILITOTAL 0.1 (L) 09/04/2018    TSH 1.70 03/13/2019       Preop Vitals  BP Readings from Last 3 Encounters:   10/27/20 124/60   10/22/20 128/70   10/15/20 136/74    Pulse Readings from Last 3 Encounters:   11/11/19 74   09/22/19 82   05/02/19 71      Resp Readings from Last 3 Encounters:   10/27/20 16   11/11/19 16   03/13/19 16    SpO2 Readings from Last 3 Encounters:   11/11/19 97%   09/22/19 96%   05/02/19 98%      Temp Readings from Last 1 Encounters:   10/27/20 98.5  F (36.9  C) (Oral)    Ht Readings from Last 1 Encounters:   10/27/20 1.753 m (5' 9\")      Wt Readings from Last 1 Encounters:   10/27/20 112 kg (247 lb)    Estimated body mass index is 36.48 kg/m  as calculated from the following:    Height as of this encounter: 1.753 m (5' 9\").    Weight as of this encounter: 112 kg (247 lb).       Anesthesia Plan      History & Physical Review      ASA Status:  2 .    NPO Status:  > 8 hours    Plan for Spinal   PONV prophylaxis:  Ondansetron (or other 5HT-3) and Dexamethasone or Solumedrol    The patient is not a current smoker      Postoperative Care  Postoperative pain management:  IV analgesics, Oral pain medications, Neuraxial analgesia and Multi-modal analgesia.      Consents  Anesthetic plan, risks, benefits and alternatives discussed with:  Patient.  Use of blood products discussed: Yes.   Consented to blood products.  .                 Allan East MD                    .  "

## 2020-10-27 NOTE — ANESTHESIA CARE TRANSFER NOTE
Patient: Krystina Suazo    Procedure(s):  REPEAT  SECTION    Diagnosis: Previous  section [Z98.891]  Diagnosis Additional Information: No value filed.    Anesthesia Type:   Spinal     Note:  Airway :Room Air  Patient transferred to:Labor and Delivery  Comments: VSS.  Spontaneously breathing room air.   Report given to RNHandoff Report: Identifed the Patient, Identified the Reponsible Provider, Reviewed the pertinent medical history, Discussed the surgical course, Reviewed Intra-OP anesthesia mangement and issues during anesthesia, Set expectations for post-procedure period and Allowed opportunity for questions and acknowledgement of understanding      Vitals: (Last set prior to Anesthesia Care Transfer)    CRNA VITALS  10/27/2020 0802 - 10/27/2020 0842      10/27/2020             NIBP:  108/56    NIBP Mean:  82                Electronically Signed By: JAMIN Salas CRNA  2020  8:42 AM

## 2020-10-27 NOTE — PROVIDER NOTIFICATION
10/27/20 1647   Provider Notification   Provider Name/Title Dr Tran   Method of Notification In Department   Request Evaluate-Remote   Notification Reason Status Update     MD updated that the pt has not had her warren removed per the ERAS protocol. MD ok with it being removed as soon as pt is ambulatory.

## 2020-10-27 NOTE — ANESTHESIA POSTPROCEDURE EVALUATION
Patient: Krystina Suazo    Procedure(s):  REPEAT  SECTION    Diagnosis:Previous  section [Z98.891]  Diagnosis Additional Information: No value filed.    Anesthesia Type:  Spinal    Note:  Anesthesia Post Evaluation    Patient location during evaluation: PACU  Patient participation: Able to fully participate in evaluation  Level of consciousness: awake and alert  Pain management: adequate  multimodal analgesia used between 6 hours prior to anesthesia start to PACU dischargeAirway patency: patent  Cardiovascular status: acceptable  Respiratory status: acceptable  two or more mitigation strategies used for obstructive sleep apneaHydration status: acceptable  PONV: none     Anesthetic complications: None          Last vitals:  Vitals:    10/27/20 0920 10/27/20 0935 10/27/20 0950   BP: 120/69 116/65 117/69   Pulse:  70 69   Resp:  16 16   Temp:  98.2  F (36.8  C)    SpO2: 96% 96% 96%         Electronically Signed By: Allan East MD  2020  10:01 AM

## 2020-10-27 NOTE — PLAN OF CARE
"Patients mobililty level scored using the bedside mobility assistance tool (BMAT). Patient is at a mobility level test number: 1.Mobility equipment used: hover mat. Required assist of 3 staff members. Further use of BMAT scoring required. Performed lucia cares before lunch time and required 3 staff members, pt was still \"numb\" on her legs, advanced diet to a regular,  able to tolerate, drinking fluids,  encouraged to continue drinking fluids, breast feeding without assistance well, stated her incisional pain under control, continue to monitor.  "

## 2020-10-27 NOTE — ANESTHESIA PROCEDURE NOTES
Procedure note :      Staff -   Anesthesiologist:  Allan East MD  Performed By: anesthesiologist  Referred By: Lili  Pre-Procedure  Performed by Allan East MD  Referred by Lili  Location: OR, OB      Pre-Anesthestic Checklist: patient identified, IV checked, risks and benefits discussed, informed consent, monitors and equipment checked and pre-op evaluation    Timeout  Correct Patient: Yes   Correct Procedure: Yes   Correct Site: Yes   Correct Laterality: N/A   Correct Position: Yes   Site Marked: N/A   .   Procedure Documentation    .    Procedure: .   Patient Position:sitting Insertion Site:L3-4 (midline approach)     Patient Prep/Sterile Barriers; mask, sterile gloves, povidone-iodine 7.5% surgical scrub, patient draped.  .  Needle:  Spinal Needle (gauge): 24  Spinal/LP Needle Length (inches): 3.5 # of attempts: 1 and # of redirects:  Introducer used .        Assessment/Narrative  Paresthesias: No.  .  .  clear CSF fluid removed .

## 2020-10-27 NOTE — OP NOTE
Procedure Date: 10/27/2020      PREOPERATIVE DIAGNOSES:     1.  Intrauterine pregnancy at 39 weeks and 3 days gestation.   2.  Prior  section, declines trial of labor after .   3.  Uterine fibroids.   4.  Advanced maternal age.      POSTOPERATIVE DIAGNOSES:   1.  Intrauterine pregnancy at 39 weeks and 3 days gestation.   2.  Prior  section, declines trial of labor after .   3.  Uterine fibroids.   4.  Advanced maternal age.   5.  Delivered.      PROCEDURE:  Repeat low segment transverse  section.      SURGEON:  Aníbal Pearson MD.      ANESTHESIA:  Spinal.      INDICATIONS:  Jennifer Capellan is a 35-year-old female,  2, para 1 at 39 weeks and 3 days gestation with a prior  section who planned a repeat  section after being apprised to the pros, cons, risks and benefits of a trial of labor after  versus repeat  section.  She has been known to have a 7 cm posterior fundal fibroid, which was not felt to be impacting the uterine cavity or the lower uterine segment.  She has been largely asymptomatic from it.  Risks, benefits and alternatives were discussed and consent obtained to proceed.      OPERATIVE FINDINGS:  An 8 pound 6 ounce male infant in a cephalic presentation with Apgars 9 at 1 minute, 9 at 5 minutes, clear abundant amniotic fluid.  A 12 x 14 cm left fundal lateral fibroid, normal adnexa.      OPERATIVE PROCEDURE:  After spinal anesthesia was administered, the patient was placed in the left lateral tilt position, Rene catheter placed and then prepped and draped in the usual sterile fashion.  Adequacy of the spinal anesthetic was confirmed, and a surgical timeout conducted.      A Pfannenstiel skin incision was made through her preexisting skin incision and carried down sharply through the subcutaneous tissues.  The fascia was identified and opened transversely.  The fascia was bluntly and sharply  from the underlying  rectus abdominis muscle superiorly and inferiorly to the pubic symphysis.  The rectus abdominis muscles were bluntly and sharply  in the midline, exposing the peritoneum, which was sharply entered.  The peritoneal incision extended superiorly to the extent of the dissection and inferiorly to the superior border of the bladder.      The vesicouterine peritoneum was tented and incised and the bladder mobilized off the lower uterine segment.  A transverse incision was made in the lower uterine segment and extended bilaterally in a transverse fashion with digital pressure.  The infant was delivered from a cephalic presentation without difficulty.  The cord doubly clamped, cut and the infant handed off to the  staff in attendance with findings as noted above.  Delayed cord clamping was performed.      Cord blood was collected for analysis.  The placenta was then extracted from the uterine cavity.  The uterus was exteriorized to allow full visualization of the fibroid.  The uterine cavity wiped free of membranes and clot.  The uterine incision closed with 2 layers of 0 Vicryl, the second imbricating the first.  Two additional figure-of-X sutures along the uterine incision line achieved complete hemostasis.      The uterus was then returned to the pelvic cavity, which was irrigated with sterile saline until clear.  The uterine incision inspected and hemostatic.      The muscle bellies were reapproximated in the midline using interrupted figure-of-X sutures of 0 Vicryl.  The fascia was then closed using a running suture of 0 Vicryl.  The subcutaneous tissues were irrigated and hemostasis achieved using electrocautery as necessary.  The subcutaneous tissues were reapproximated using a 3-0 plain gut suture and the skin edges were reapproximated using a running subcuticular Monocryl suture.  Steri-Strips and sterile bandages were placed.  The patient was taken to the recovery room in satisfactory condition.   There were no intraoperative complications.  Estimated blood loss was 527 mL.  Rene catheter was draining clear urine throughout the procedure.         ALEX GUILLEN MD             D: 10/27/2020   T: 10/27/2020   MT: [id number]      Name:     ARIEL MARTIN   MRN:      [id number]        Account:        [id number]   :      1985           Procedure Date: 10/27/2020      Document: [id number]

## 2020-10-27 NOTE — PROGRESS NOTES
Brief Op Note  Pre-op Dx: 39 week pregnancy                     Prior C/S, declines TOLAC                     Uterine fibroids                     AMA  Post-op Dx: Same, delivered  Name of Procedure: Repeat LST  Section  Surgeon:Lili  Assistant:    Anesthesia:  Spinal  Operative Findings:  1. 8 #, 6 oz male infant with Apgars9 at one minute and 9 at five minutes  2. 12 x 14 cm left fundal/lateral fiboid uterus and normal adnexa  Drains: Rene  QBL: 527cc  Dr. Dandre Pearson

## 2020-10-27 NOTE — PROGRESS NOTES
Data: Krystina Suazo transferred to 443 via cart at 0910. Baby transferred via parent's arms.  Action: Receiving unit notified of transfer: Yes. Patient and family notified of room change. Report given to Arline Carr at 0910. Belongings sent to receiving unit. Accompanied by Registered Nurse. Oriented patient to surroundings. Call light within reach. ID bands double-checked with receiving RN.  Response: Patient tolerated transfer and is stable.

## 2020-10-28 LAB — HGB BLD-MCNC: 10.6 G/DL (ref 11.7–15.7)

## 2020-10-28 PROCEDURE — 250N000011 HC RX IP 250 OP 636: Performed by: OBSTETRICS & GYNECOLOGY

## 2020-10-28 PROCEDURE — 36415 COLL VENOUS BLD VENIPUNCTURE: CPT | Performed by: OBSTETRICS & GYNECOLOGY

## 2020-10-28 PROCEDURE — 120N000001 HC R&B MED SURG/OB

## 2020-10-28 PROCEDURE — 250N000013 HC RX MED GY IP 250 OP 250 PS 637: Performed by: OBSTETRICS & GYNECOLOGY

## 2020-10-28 PROCEDURE — 85018 HEMOGLOBIN: CPT | Performed by: OBSTETRICS & GYNECOLOGY

## 2020-10-28 RX ADMIN — SIMETHICONE 80 MG: 80 TABLET, CHEWABLE ORAL at 19:22

## 2020-10-28 RX ADMIN — DOCUSATE SODIUM 50 MG AND SENNOSIDES 8.6 MG 1 TABLET: 8.6; 5 TABLET, FILM COATED ORAL at 08:52

## 2020-10-28 RX ADMIN — OXYCODONE HYDROCHLORIDE 5 MG: 5 TABLET ORAL at 21:33

## 2020-10-28 RX ADMIN — IBUPROFEN 800 MG: 800 TABLET ORAL at 21:32

## 2020-10-28 RX ADMIN — ACETAMINOPHEN 975 MG: 325 TABLET, FILM COATED ORAL at 04:52

## 2020-10-28 RX ADMIN — SERTRALINE HYDROCHLORIDE 25 MG: 25 TABLET ORAL at 08:53

## 2020-10-28 RX ADMIN — ACETAMINOPHEN 975 MG: 325 TABLET, FILM COATED ORAL at 16:49

## 2020-10-28 RX ADMIN — SERTRALINE HYDROCHLORIDE 100 MG: 50 TABLET ORAL at 08:53

## 2020-10-28 RX ADMIN — IBUPROFEN 800 MG: 800 TABLET ORAL at 15:34

## 2020-10-28 RX ADMIN — ACETAMINOPHEN 975 MG: 325 TABLET, FILM COATED ORAL at 23:15

## 2020-10-28 RX ADMIN — ACETAMINOPHEN 975 MG: 325 TABLET, FILM COATED ORAL at 11:08

## 2020-10-28 RX ADMIN — ENOXAPARIN SODIUM 40 MG: 40 INJECTION SUBCUTANEOUS at 08:52

## 2020-10-28 RX ADMIN — IBUPROFEN 800 MG: 800 TABLET ORAL at 08:52

## 2020-10-28 RX ADMIN — DOCUSATE SODIUM 50 MG AND SENNOSIDES 8.6 MG 1 TABLET: 8.6; 5 TABLET, FILM COATED ORAL at 22:04

## 2020-10-28 RX ADMIN — KETOROLAC TROMETHAMINE 30 MG: 30 INJECTION, SOLUTION INTRAMUSCULAR at 03:13

## 2020-10-28 NOTE — PLAN OF CARE
VSS & WDL, incision CDI, pt up to shower today, voiding without difficulty. Pain controlled with oral pain meds. Attempts breast feeding every 2-3 hours

## 2020-10-28 NOTE — PLAN OF CARE
VSS, voiding without difficulty. Up ad calvin. Independent with self and  cares. Pain well-controlled with Tylenol and ketorolac. No complaints of headache, dizziness, chest pain or difficulty of breathing. Saline lock flushed, patent. Breastfeeding well with a latch score of 9.  present at bedside.

## 2020-10-28 NOTE — PROGRESS NOTES
[facility] Obstetrics Post-Op / Progress Note         Assessment and Plan:    Assessment:   Post-operative day #1  Low transverse repeat  section  L&D complications: Scheduled, repeat  section      Doing well.  Clean wound without signs of infection.  No immediate surgical complications identified.  No excessive bleeding  Pain well-controlled.  POD #1 Hgb 10.6      Plan:   Ambulation encouraged  Pain control measures as needed  Reportable signs and symptoms dicussed with the patient  Anticipate discharge tomorrow           Interval History:   Doing well.  Pain is well-controlled.  No fevers.  No history of wound drainage, warmth or significant erythema.  Good appetite.  Denies chest pain, shortness of breath, nausea or vomiting.  Ambulatory.            Significant Problems:      Past Medical History:   Diagnosis Date     Asthma      Depression              Review of Systems:    The patient denies any chest pain, shortness of breath, excessive pain, fever, chills, purulent drainage from the wound, nausea or vomiting.          Medications:   All medications related to the patient's surgery have been reviewed          Physical Exam:     All vitals stable  Patient Vitals for the past 24 hrs:   BP Temp Temp src Pulse Resp SpO2   10/28/20 0458 125/69 97.6  F (36.4  C) Oral 62 16 98 %   10/28/20 0127 115/60 98.3  F (36.8  C) Oral 66 16 99 %   10/27/20 1938 115/63 98  F (36.7  C) Oral 72 16 97 %   10/27/20 1544 126/63 97.7  F (36.5  C) Oral 65 16 98 %   10/27/20 1427 121/56 98.5  F (36.9  C) Oral 68 16 97 %   10/27/20 1327 128/62 -- -- 65 16 98 %   10/27/20 1215 116/63 -- -- 68 -- 97 %   10/27/20 1117 111/63 97.8  F (36.6  C) Oral 62 16 97 %   10/27/20 1049 124/57 97.8  F (36.6  C) Oral -- 15 97 %   10/27/20 1020 114/56 -- -- 71 16 97 %   10/27/20 1005 101/60 -- -- 70 16 97 %   10/27/20 0950 117/69 -- -- 69 16 96 %   10/27/20 0935 116/65 98.2  F (36.8  C) Oral 70 16 96 %   10/27/20 0920 120/69 -- --  -- -- 96 %   10/27/20 0905 114/56 -- -- -- -- 98 %   10/27/20 0850 123/60 -- -- -- 16 97 %   10/27/20 0835 109/53 98  F (36.7  C) Oral -- 16 96 %     Wound clean and dry with minimal or no drainage.  Surrounding skin with minimal erythema.  Ext NT with 1+ edema bilat          Data:     Hemoglobin   Date Value Ref Range Status   10/28/2020 10.6 (L) 11.7 - 15.7 g/dL Final   10/26/2020 12.5 11.7 - 15.7 g/dL Final   08/13/2020 12.0 11.7 - 15.7 g/dL Final   04/20/2020 12.6 11.7 - 15.7 g/dL Final   03/13/2019 13.8 11.7 - 15.7 g/dL Final     -    Aníbal Pearson MD  10/28/2020 7:22 AM

## 2020-10-28 NOTE — PLAN OF CARE
Vital signs stable on room air. Bonding well with infant.  at bedside and supportive. Breastfeeding well with minimal assistance from staff. Rene removed at 1945 and pt has voided once since removal. Up independently. Pain adequately controled with toradol and tylenol. Saline locked.    Patients mobililty level scored using the bedside mobility assistance tool (BMAT). Patient is at a mobility level test number: 4. Mobility equipment used: none required. Required assist of 0 staff members. Further use of BMAT scoring not required.

## 2020-10-28 NOTE — LACTATION NOTE
This note was copied from a baby's chart.  LC visit.  Krystina successfully nursed her last baby and reported that nursing has been going well for this baby as well. She has no concerns.  She denies discomfort but would like infant tongue observed for any ties.  Her first baby had a tongue tie and she had some pain throughout the course nursing for a year.  Infant was sleeping during the time of the visit so she did not want infant disturbed at this time. LC or Pediatrician can follow up tomorrow.

## 2020-10-29 VITALS
OXYGEN SATURATION: 98 % | RESPIRATION RATE: 16 BRPM | BODY MASS INDEX: 36.58 KG/M2 | HEART RATE: 65 BPM | WEIGHT: 247 LBS | HEIGHT: 69 IN | SYSTOLIC BLOOD PRESSURE: 133 MMHG | TEMPERATURE: 98.5 F | DIASTOLIC BLOOD PRESSURE: 71 MMHG

## 2020-10-29 PROCEDURE — 250N000013 HC RX MED GY IP 250 OP 250 PS 637: Performed by: OBSTETRICS & GYNECOLOGY

## 2020-10-29 PROCEDURE — 250N000011 HC RX IP 250 OP 636: Performed by: OBSTETRICS & GYNECOLOGY

## 2020-10-29 RX ORDER — ACETAMINOPHEN 500 MG
500-1000 TABLET ORAL EVERY 6 HOURS PRN
Qty: 30 TABLET | Refills: 0 | Status: SHIPPED | OUTPATIENT
Start: 2020-10-29 | End: 2021-02-24

## 2020-10-29 RX ORDER — IBUPROFEN 800 MG/1
800 TABLET, FILM COATED ORAL EVERY 8 HOURS PRN
Qty: 60 TABLET | Refills: 1 | Status: SHIPPED | OUTPATIENT
Start: 2020-10-29 | End: 2021-02-24

## 2020-10-29 RX ORDER — OXYCODONE HYDROCHLORIDE 5 MG/1
5 TABLET ORAL EVERY 6 HOURS PRN
Qty: 15 TABLET | Refills: 0 | Status: SHIPPED | OUTPATIENT
Start: 2020-10-29 | End: 2020-12-10

## 2020-10-29 RX ADMIN — SIMETHICONE 80 MG: 80 TABLET, CHEWABLE ORAL at 10:04

## 2020-10-29 RX ADMIN — SERTRALINE HYDROCHLORIDE 25 MG: 25 TABLET ORAL at 09:59

## 2020-10-29 RX ADMIN — OXYCODONE HYDROCHLORIDE 5 MG: 5 TABLET ORAL at 12:50

## 2020-10-29 RX ADMIN — SERTRALINE HYDROCHLORIDE 100 MG: 50 TABLET ORAL at 09:59

## 2020-10-29 RX ADMIN — OXYCODONE HYDROCHLORIDE 5 MG: 5 TABLET ORAL at 04:08

## 2020-10-29 RX ADMIN — IBUPROFEN 800 MG: 800 TABLET ORAL at 03:30

## 2020-10-29 RX ADMIN — DOCUSATE SODIUM 50 MG AND SENNOSIDES 8.6 MG 1 TABLET: 8.6; 5 TABLET, FILM COATED ORAL at 09:58

## 2020-10-29 RX ADMIN — IBUPROFEN 800 MG: 800 TABLET ORAL at 09:58

## 2020-10-29 RX ADMIN — ENOXAPARIN SODIUM 40 MG: 40 INJECTION SUBCUTANEOUS at 08:23

## 2020-10-29 RX ADMIN — ACETAMINOPHEN 975 MG: 325 TABLET, FILM COATED ORAL at 05:27

## 2020-10-29 RX ADMIN — ACETAMINOPHEN 975 MG: 325 TABLET, FILM COATED ORAL at 12:49

## 2020-10-29 NOTE — DISCHARGE INSTRUCTIONS
Follow up in 6 weeks    If incision has pain or discharge please call prior to this     If experiencing any post partum depression, crying, feeling down please make earlier appointment   To discuss symptoms    Call [phone] or [phone] for appointment     Call and ask to be seen or talk to a doctor for the following: (You can go to the ob triage button   On answering service line) .     Increased bleeding, fever, general unwell feeling or increased pain.     Dr. Mora Zamudio, DO    OB/GYN   [facility] and [facility]    Postop  Birth Instructions Follow up in 1-2 weeks for post op check and at 6 weeks for postpartum check     Activity       Do not lift more than 10 pounds for 6 weeks after surgery.  Ask family and friends for help when you need it.    No driving until you have stopped taking your pain medications (usually two weeks after surgery).    No heavy exercise or activity for 6 weeks.  Don't do anything that will put a strain on your surgery site.    Don't strain when using the toilet.  Your care team may prescribe a stool softener if you have problems with your bowel movements.     To care for your incision:       Keep the incision clean and dry.    Do not soak your incision in water. No swimming or hot tubs until it has fully healed. You may soak in the bathtub if the water level is below your incision.    Do not use peroxide, gel, cream, lotion, or ointment on your incision.    Adjust your clothes to avoid pressure on your surgery site (check the elastic in your underwear for example).     You may see a small amount of clear or pink drainage and this is normal.  Check with your health care provider:       If the drainage increases or has an odor.    If the incision reddens, you have swelling, or develop a rash.    If you have increased pain and the medicine we prescribed doesn't help.    If you have a fever above 100.4 F (38 C) with or without chills when placing  thermometer under your tongue.   The area around your incision (surgery wound), will feel numb.  This is normal. The numbness should go away in less than a year.     Keep your hands clean:  Always wash your hands before touching your incision (surgery wound). This helps reduce your risk of infection. If your hands aren't dirty, you may use an alcohol hand-rub to clean your hands. Keep your nails clean and short.    Call your healthcare provider if you have any of these symptoms:       You soak a sanitary pad with blood within 1 hour, or you see blood clots larger than a golf ball.    Bleeding that lasts more than 6 weeks.    Vaginal discharge that smells bad.    Severe pain, cramping or tenderness in your lower belly area.    A need to urinate more frequently (use the toilet more often), more urgently (use the toilet very quickly), or it burns when you urinate.    Nausea and vomiting.    Redness, swelling or pain around a vein in your leg.    Problems breastfeeding or a red or painful area on your breast.    Chest pain and cough or are gasping for air.    Problems with coping with sadness, anxiety or depression. If you have concerns about hurting yourself or the baby, call your provider immediately.      You have questions or concerns after you return home.

## 2020-10-29 NOTE — DISCHARGE SUMMARY
36 y/o  2 s/p RCS POD # 2    Hemoglobin   Date Value Ref Range Status   10/28/2020 10.6 (L) 11.7 - 15.7 g/dL Final   ]    d/c home   On  colace, breast pump and ibuprofen oxycodone   Follow-up in 1-2 weeks for post op check   Follow-up in 6 weeks for post partum examination    [facility]   Obstetrics Post-Op / Progress Note         Assessment and Plan:    Assessment:   Post-operative day #2  Low transverse repeat  section  L&D complications: Previous  section [Z98.891]      Doing well.  Pain well-controlled.      Plan:   Ambulation encouraged  Discharge later today            Interval History:     Doing well.  Pain is well-controlled.  No fevers.  No history of wound drainage, warmth or significant erythema.  Good appetite.  Denies chest pain, shortness of breath, nausea or vomiting.  Ambulatory.  Breastfeeding well.          Significant Problems:    None          Review of Systems:    CONSTITUTIONAL: NEGATIVE for fever, chills, change in weight  INTEGUMENTARY/SKIN: NEGATIVE for worrisome rashes, moles or lesions  EYES: NEGATIVE for vision changes or irritation  ENT/MOUTH: NEGATIVE for ear, mouth and throat problems  RESP: NEGATIVE for significant cough or SOB  BREAST: NEGATIVE for masses, tenderness or discharge  CV: NEGATIVE for chest pain, palpitations or peripheral edema  GI: NEGATIVE for nausea, abdominal pain, heartburn, or change in bowel habits  : NEGATIVE for frequency, dysuria, or hematuria  MUSCULOSKELETAL: NEGATIVE for significant arthralgias or myalgia  NEURO: NEGATIVE for weakness, dizziness or paresthesias  ENDOCRINE: NEGATIVE for temperature intolerance, skin/hair changes  HEME: NEGATIVE for bleeding problems  PSYCHIATRIC: NEGATIVE for changes in mood or affect          Medications:   All medications related to the patient's surgery have been reviewed  -          Physical Exam:     All vitals stable  Patient Vitals for the past 8 hrs:   BP Temp Temp src Pulse  Resp   10/29/20 0822 133/71 98.5  F (36.9  C) Oral 65 16     Wound clean and dry with minimal or no drainage.  Surrounding skin with minimal erythema.          Data:     All laboratory data related to this surgery reviewed  Hemoglobin   Date Value Ref Range Status   10/28/2020 10.6 (L) 11.7 - 15.7 g/dL Final   10/26/2020 12.5 11.7 - 15.7 g/dL Final   08/13/2020 12.0 11.7 - 15.7 g/dL Final   04/20/2020 12.6 11.7 - 15.7 g/dL Final   03/13/2019 13.8 11.7 - 15.7 g/dL Final     -    Mora Zamudio, DO      Dr. Mora Zamudio, DO    OB/GYN   [facility]

## 2020-10-29 NOTE — CONSULTS
Brief SW note:  SW consult due to History of Depression, pt is on Zoloft. Due to EPDS Score of 3 with no CARLOZ, SW will not see.  Please re-consult for any other/further needs.  Felipe ASTORGA Case Management  Inpatient   Maternal Child and ED  [facility]    [phone]

## 2020-10-29 NOTE — LACTATION NOTE
This note was copied from a baby's chart.  LC follow up.  Infant has been cluster feeding over the night.  Nipples are doing fine, no cracking or bleeding, but some redness noted.  No infant tongue tie observed  Moderate weight loss noted and Krystina was concerned.   encouraged her to nurse her baby more often today, closer to every 2.5 hours as well as on demand, and consider using HE post feeds to offer additional EBM.  Milk is already transitioning with good volumes easily expressed.  Reassurance provided.    Plan for discharge:  -Feed on demand and every 2.5 hours today  -Hand express post nursing and offer additional breastmilk to infant  -Consider pumping post feeds if poor feeds or difficulty with latching, however, none noted so far.    -Evaluate infant weight tomorrow.  If infant weight is >10% infant loss, at that time pump after each feeding and supplement with all EBM.

## 2020-10-29 NOTE — PLAN OF CARE
VSS.  Pain controlled with ibuprofen, tylenol, and occasional oxycodone.  Incision: WNL  interdry applied to incision to help wick away moisture.  Fundal checks and bleeding WNL.   Voiding without difficulty; frequent voiding encouraged.  +BS.  Passing flatus.  Had BM. Tolerating regular diet.  Independent with breastfeeding.  FOB present overnight.  Patient wishes to discharge today.

## 2020-10-29 NOTE — PLAN OF CARE
Data: Vital signs within normal limits. Postpartum checks within normal limits - see flow record. Patient eating and drinking normally. Patient able to empty bladder independently and is up ambulating. No apparent signs of infection. Incision healing well.Steristrips intact, advised to remove if not fallen off in one week.  Patient performing self cares and is able to care for infant.  Action: Patient medicated during the shift for pain. See MAR. Patient reassessed within 1 hour after each medication and pain was improved - patient stated she was comfortable. BS active and patient has had a BM. Patient education done about . See flow record.  Response: Positive attachment behaviors observed with infant. Support persons spouse  present.   Plan:  discharge today follow up in 1-2 weeks for incision check and at 6 weeks postpartum Discharge teaching completed , no further questions. EPDS score 3, declined need for SWS consult. Ready to leave unit at 14.17

## 2020-10-29 NOTE — PLAN OF CARE
Pt. vitals stable. Pain managed with scheduled tylenol, ibuprofen, and PRN oxy. Incision SYLVESTER, without signs of infection. Voiding adequately. Ambulating well. Independent in personal and infant cares. Breastfeeding infant. Attentive to infant needs and bonding well with infant. FOB at bedside, supportive.

## 2020-11-02 ENCOUNTER — OFFICE VISIT (OUTPATIENT)
Dept: PEDIATRICS | Facility: CLINIC | Age: 35
End: 2020-11-02
Payer: COMMERCIAL

## 2020-11-02 VITALS
DIASTOLIC BLOOD PRESSURE: 76 MMHG | SYSTOLIC BLOOD PRESSURE: 128 MMHG | OXYGEN SATURATION: 96 % | HEART RATE: 77 BPM | TEMPERATURE: 98.4 F

## 2020-11-02 DIAGNOSIS — F33.1 MAJOR DEPRESSIVE DISORDER, RECURRENT EPISODE, MODERATE (H): Primary | ICD-10-CM

## 2020-11-02 PROCEDURE — 99213 OFFICE O/P EST LOW 20 MIN: CPT | Performed by: INTERNAL MEDICINE

## 2020-11-02 RX ORDER — SERTRALINE HYDROCHLORIDE 100 MG/1
150 TABLET, FILM COATED ORAL DAILY
Qty: 45 TABLET | Refills: 3 | Status: SHIPPED | OUTPATIENT
Start: 2020-11-02 | End: 2020-12-14

## 2020-11-02 ASSESSMENT — PATIENT HEALTH QUESTIONNAIRE - PHQ9: SUM OF ALL RESPONSES TO PHQ QUESTIONS 1-9: 3

## 2020-11-02 NOTE — PROGRESS NOTES
Subjective     Krystina Suazo is a 35 year old female who presents to clinic today for the following health issues:    HPI         Depression Followup    How are you doing with your depression since your last visit? Worsened     Are you having other symptoms that might be associated with depression? No    Have you had a significant life event?  OTHER: birth of baby     Are you feeling anxious or having panic attacks?   No    Do you have any concerns with your use of alcohol or other drugs? No    Social History     Tobacco Use     Smoking status: Never Smoker     Smokeless tobacco: Never Used   Substance Use Topics     Alcohol use: No     Drug use: No     PHQ 2/5/2019 5/13/2019 4/5/2020   PHQ-9 Total Score 1 0 2   Q9: Thoughts of better off dead/self-harm past 2 weeks Not at all Not at all Not at all     COURTNEY-7 SCORE 2/5/2019 5/13/2019 4/5/2020   Total Score - - 0 (minimal anxiety)   Total Score 0 0 0     Last PHQ-9 11/2/2020   1.  Little interest or pleasure in doing things 1   2.  Feeling down, depressed, or hopeless 1   3.  Trouble falling or staying asleep, or sleeping too much 0   4.  Feeling tired or having little energy 0   5.  Poor appetite or overeating 0   6.  Feeling bad about yourself 1   7.  Trouble concentrating 0   8.  Moving slowly or restless 0   Q9: Thoughts of better off dead/self-harm past 2 weeks 0   PHQ-9 Total Score 3   Difficulty at work, home, or with people Somewhat difficult     COURTNEY-7  4/5/2020   1. Feeling nervous, anxious, or on edge 0   2. Not being able to stop or control worrying 0   3. Worrying too much about different things 0   4. Trouble relaxing 0   5. Being so restless that it is hard to sit still 0   6. Becoming easily annoyed or irritable 0   7. Feeling afraid, as if something awful might happen 0   COURTNEY-7 Total Score 0   If you checked any problems, how difficult have they made it for you to do your work, take care of things at home, or get along with other people? -      Review of Systems   Constitutional, HEENT, cardiovascular, pulmonary, gi and gu systems are negative, except as otherwise noted.      Objective    /76 (BP Location: Right arm, Patient Position: Sitting, Cuff Size: Adult Regular)   Pulse 77   Temp 98.4  F (36.9  C) (Tympanic)   LMP 01/29/2020   SpO2 96%   There is no height or weight on file to calculate BMI.  Physical Exam   GENERAL: healthy, alert and no distress  PSYCH: mentation appears normal, affect normal/bright and tearful at times          Assessment & Plan     Major depressive disorder, recurrent episode, moderate (H)  New baby at home with 2 year old. Now feels worsening of depression not likely due to sleep deprivation. Discussed option to increase sertraline dose from 125 to 150 and she would like to do so. She will be seeing me in 1 week for recheck with baby so will check in at that time as well.  - sertraline (ZOLOFT) 100 MG tablet; Take 1.5 tablets (150 mg) by mouth daily       FUTURE APPOINTMENTS:       - Follow-up visit in 1 month    No follow-ups on file.    Valeria Daly MD  [facility]

## 2020-11-03 ASSESSMENT — ASTHMA QUESTIONNAIRES: ACT_TOTALSCORE: 25

## 2020-11-10 ENCOUNTER — MYC MEDICAL ADVICE (OUTPATIENT)
Dept: OBGYN | Facility: CLINIC | Age: 35
End: 2020-11-10

## 2020-11-10 NOTE — LETTER
[facility] [place]  [address]  [address]  [facility] [address]  Phone: [phone]  Fax: [phone]    2020        Krystina Suazo  [address] [facility] [address]          To whom it may concern:    RE: Krystina Dary Suazo    Patient may take 8 weeks postpartum leave after  delivery.     Please contact me for questions or concerns.      Sincerely,        Sandy Tran MD

## 2020-11-13 ENCOUNTER — MYC MEDICAL ADVICE (OUTPATIENT)
Dept: OBGYN | Facility: CLINIC | Age: 35
End: 2020-11-13

## 2020-11-13 NOTE — TELEPHONE ENCOUNTER
Letter completed and faxed as requested. [employer] message sent to patient. Marquita Ramirez LPN

## 2020-12-09 ENCOUNTER — MYC MEDICAL ADVICE (OUTPATIENT)
Dept: PEDIATRICS | Facility: CLINIC | Age: 35
End: 2020-12-09

## 2020-12-09 DIAGNOSIS — Z98.891 S/P C-SECTION: ICD-10-CM

## 2020-12-09 DIAGNOSIS — F33.1 MAJOR DEPRESSIVE DISORDER, RECURRENT EPISODE, MODERATE (H): ICD-10-CM

## 2020-12-10 ENCOUNTER — PRENATAL OFFICE VISIT (OUTPATIENT)
Dept: OBGYN | Facility: CLINIC | Age: 35
End: 2020-12-10
Payer: COMMERCIAL

## 2020-12-10 VITALS
DIASTOLIC BLOOD PRESSURE: 70 MMHG | WEIGHT: 235 LBS | SYSTOLIC BLOOD PRESSURE: 120 MMHG | HEIGHT: 69 IN | BODY MASS INDEX: 34.8 KG/M2

## 2020-12-10 PROCEDURE — 99207 PR POST PARTUM EXAM: CPT | Performed by: OBSTETRICS & GYNECOLOGY

## 2020-12-10 ASSESSMENT — PATIENT HEALTH QUESTIONNAIRE - PHQ9: SUM OF ALL RESPONSES TO PHQ QUESTIONS 1-9: 14

## 2020-12-10 ASSESSMENT — MIFFLIN-ST. JEOR: SCORE: 1815.33

## 2020-12-10 NOTE — NURSING NOTE
"Chief Complaint   Patient presents with     Postpartum Care     del 10/27, Rhys, C/S, 8 lbs 6 oz       Initial /70 (BP Location: Left arm, Patient Position: Chair, Cuff Size: Adult Large)   Ht 1.753 m (5' 9\")   Wt 106.6 kg (235 lb)   LMP 2020   Breastfeeding Yes   BMI 34.70 kg/m   Estimated body mass index is 34.7 kg/m  as calculated from the following:    Height as of this encounter: 1.753 m (5' 9\").    Weight as of this encounter: 106.6 kg (235 lb).  BP completed using cuff size: large    Questioned patient about current smoking habits.  Pt. has never smoked.          The following HM Due: NONE  Samira Moralez CMA    "

## 2020-12-10 NOTE — PROGRESS NOTES
SUBJECTIVE:  Kyrstina Suazo,  is here for a postpartum visit.  She had a scheduled repeat  Section  on 10/27/20 delivering a healthy baby boy, named Rhys weighing 8 lbs 6 oz at term.      HPI:  Scheduled repeat C/S.  Large (12-14cm) left lateral fundal myoma noted at time of delivery.  Had been measured at 7cm intrapartum    Last PHQ-9 score on record=   PHQ-9 SCORE 2020   PHQ-9 Total Score MyChart -   PHQ-9 Total Score 3     COURTNEY-7 SCORE 2019   Total Score - - 0 (minimal anxiety)   Total Score 0 0 0       Delivery complications:  No  Breast feeding:  Yes, plans to continue x 1 year  Bladder problems:  No  Bowel problems/hemorrhoids:  No  Episiotomy/laceration/incision healed? N/A  Vaginal flow:  None  Godfrey:  No  Contraception: condoms  Emotional adjustment:  doing well and happy  Back to work:      12 point review of systems negative other than symptoms noted below.    OBJECTIVE:  Vitals: LMP 2020   BMI= There is no height or weight on file to calculate BMI.  General - pleasant female in no acute distress.  Breast -  deferred  Abdomen - Incision well-healed  Pelvic - EG: normal adult female, BUS: within normal limits, Vagina: well rugated, no discharge, Cervix: no lesions or CMT, Uterus: firm, normal sized and nontender, midplane in position.  Unable to appreciate significant uterine enlargement - exam limited by body habitus Adnexae: no masses or tenderness.  Rectovaginal - deferred.    ASSESSMENT:    ICD-10-CM    1. Routine postpartum follow-up  Z39.2        PLAN:  May resume normal activities without restrictions.  Pap smear was not done today.    Full counseling was provided, and all questions were answered.   Return to clinic in one year for an annual visit.   Plans condoms    Dandre Pearson MD

## 2020-12-14 RX ORDER — SERTRALINE HYDROCHLORIDE 100 MG/1
200 TABLET, FILM COATED ORAL DAILY
Qty: 60 TABLET | Refills: 1 | Status: SHIPPED | OUTPATIENT
Start: 2020-12-14 | End: 2021-01-20

## 2021-01-20 ENCOUNTER — VIRTUAL VISIT (OUTPATIENT)
Dept: PEDIATRICS | Facility: CLINIC | Age: 36
End: 2021-01-20
Payer: COMMERCIAL

## 2021-01-20 DIAGNOSIS — F33.1 MAJOR DEPRESSIVE DISORDER, RECURRENT EPISODE, MODERATE (H): ICD-10-CM

## 2021-01-20 PROCEDURE — 99213 OFFICE O/P EST LOW 20 MIN: CPT | Mod: 95 | Performed by: INTERNAL MEDICINE

## 2021-01-20 RX ORDER — SERTRALINE HYDROCHLORIDE 100 MG/1
200 TABLET, FILM COATED ORAL DAILY
Qty: 180 TABLET | Refills: 3 | Status: SHIPPED | OUTPATIENT
Start: 2021-01-20 | End: 2021-12-15

## 2021-01-20 NOTE — PROGRESS NOTES
Krystina is a 35 year old who is being evaluated via a billable video visit.      How would you like to obtain your AVS? MyChart  If the video visit is dropped, the invitation should be resent by:  [phone]   Will anyone else be joining your video visit? No    Video Start Time: 10:33  Assessment & Plan     Major depressive disorder, recurrent episode, moderate (H)  Much improved on 200 mg sertraline. Plan continue at this dose. Has well check for her baby in 2 months and will check in with her then. Discussed COVID vaccination and breastfeeding and current recommendations from CDC.  - sertraline (ZOLOFT) 100 MG tablet; Take 2 tablets (200 mg) by mouth daily      20 minutes spent on the date of the encounter doing chart review, history and exam, documentation and further activities as noted above      See Patient Instructions    No follow-ups on file.    Valeria Daly MD  [facility] JACQUELIN Flaherty is a 35 year old who presents to clinic today for the following health issues    HPI       Depression and Anxiety Follow-Up    How are you doing with your depression since your last visit? Improved     How are you doing with your anxiety since your last visit?  Improved     Are you having other symptoms that might be associated with depression or anxiety? No    Have you had a significant life event? No     Do you have any concerns with your use of alcohol or other drugs? No    Social History     Tobacco Use     Smoking status: Never Smoker     Smokeless tobacco: Never Used   Substance Use Topics     Alcohol use: No     Drug use: No     PHQ 11/2/2020 12/10/2020 1/20/2021   PHQ-9 Total Score 3 14 4   Q9: Thoughts of better off dead/self-harm past 2 weeks Not at all Not at all Not at all     COURTNEY-7 SCORE 5/13/2019 4/5/2020 1/20/2021   Total Score - 0 (minimal anxiety) 3 (minimal anxiety)   Total Score 0 0 3     Last PHQ-9 1/20/2021   1.  Little interest or pleasure in doing things 0    2.  Feeling down, depressed, or hopeless 0   3.  Trouble falling or staying asleep, or sleeping too much 1   4.  Feeling tired or having little energy 1   5.  Poor appetite or overeating 1   6.  Feeling bad about yourself 1   7.  Trouble concentrating 0   8.  Moving slowly or restless 0   Q9: Thoughts of better off dead/self-harm past 2 weeks 0   PHQ-9 Total Score 4   Difficulty at work, home, or with people -     COURTNEY-7  1/20/2021   1. Feeling nervous, anxious, or on edge 1   2. Not being able to stop or control worrying 0   3. Worrying too much about different things 0   4. Trouble relaxing 1   5. Being so restless that it is hard to sit still 0   6. Becoming easily annoyed or irritable 1   7. Feeling afraid, as if something awful might happen 0   COURTNEY-7 Total Score 3   If you checked any problems, how difficult have they made it for you to do your work, take care of things at home, or get along with other people? -       Suicide Assessment Five-step Evaluation and Treatment (SAFE-T)        Review of Systems   Sleeping well, otherwise feeling well.      Objective           Vitals:  No vitals were obtained today due to virtual visit.    Physical Exam   GENERAL: Healthy, alert and no distress  EYES: Eyes grossly normal to inspection.  No discharge or erythema, or obvious scleral/conjunctival abnormalities.  RESP: No audible wheeze, cough, or visible cyanosis.  No visible retractions or increased work of breathing.    SKIN: Visible skin clear. No significant rash, abnormal pigmentation or lesions.  NEURO: Cranial nerves grossly intact.  Mentation and speech appropriate for age.  PSYCH: Mentation appears normal, affect normal/bright, judgement and insight intact, normal speech and appearance well-groomed.      Video-Visit Details    Type of service:  Video Visit    Video End Time:10:51 AM    Originating Location (pt. Location): Home    Distant Location (provider location):  [facility]  used for Video Visit: Brisa

## 2021-02-23 ENCOUNTER — E-VISIT (OUTPATIENT)
Dept: PEDIATRICS | Facility: CLINIC | Age: 36
End: 2021-02-23
Payer: COMMERCIAL

## 2021-02-23 DIAGNOSIS — R39.9 URINARY SYMPTOM OR SIGN: ICD-10-CM

## 2021-02-23 DIAGNOSIS — N39.0 ACUTE UTI (URINARY TRACT INFECTION): ICD-10-CM

## 2021-02-23 DIAGNOSIS — Z20.822 PERSON UNDER INVESTIGATION FOR COVID-19: Primary | ICD-10-CM

## 2021-02-23 PROCEDURE — 99422 OL DIG E/M SVC 11-20 MIN: CPT | Performed by: INTERNAL MEDICINE

## 2021-02-23 RX ORDER — CEFDINIR 300 MG/1
300 CAPSULE ORAL 2 TIMES DAILY
Qty: 10 CAPSULE | Refills: 0 | Status: SHIPPED | OUTPATIENT
Start: 2021-02-23 | End: 2021-02-28

## 2021-02-24 NOTE — PATIENT INSTRUCTIONS
Dear Krystina Suazo    After reviewing your responses, I've been able to diagnose you with a urinary tract infection, which is a common infection of the bladder with bacteria.  This is not a sexually transmitted infection, though urinating immediately after intercourse can help prevent infections.  Drinking lots of fluids is also helpful to clear your current infection and prevent the next one.      I have sent a prescription for antibiotics to your pharmacy to treat this infection.    It is important that you take all of your prescribed medication even if your symptoms are improving after a few doses.  Taking all of your medicine helps prevent the symptoms from returning.     If your symptoms worsen, you develop pain in your back or stomach, develop fevers, or are not improving in 5 days, please contact your primary care provider for an appointment or visit any of our convenient Walk-in or Urgent Care Centers to be seen, which can be found on our website here.    Thanks again for choosing us as your health care partner,    Valeria Daly MD

## 2021-02-25 ENCOUNTER — OFFICE VISIT (OUTPATIENT)
Dept: URGENT CARE | Facility: URGENT CARE | Age: 36
End: 2021-02-25
Attending: INTERNAL MEDICINE
Payer: COMMERCIAL

## 2021-02-25 DIAGNOSIS — Z20.822 PERSON UNDER INVESTIGATION FOR COVID-19: ICD-10-CM

## 2021-02-25 PROCEDURE — U0003 INFECTIOUS AGENT DETECTION BY NUCLEIC ACID (DNA OR RNA); SEVERE ACUTE RESPIRATORY SYNDROME CORONAVIRUS 2 (SARS-COV-2) (CORONAVIRUS DISEASE [COVID-19]), AMPLIFIED PROBE TECHNIQUE, MAKING USE OF HIGH THROUGHPUT TECHNOLOGIES AS DESCRIBED BY CMS-2020-01-R: HCPCS | Performed by: INTERNAL MEDICINE

## 2021-02-25 PROCEDURE — U0005 INFEC AGEN DETEC AMPLI PROBE: HCPCS | Performed by: INTERNAL MEDICINE

## 2021-02-26 LAB
SARS-COV-2 RNA RESP QL NAA+PROBE: NOT DETECTED
SPECIMEN SOURCE: NORMAL

## 2021-03-12 ENCOUNTER — OFFICE VISIT (OUTPATIENT)
Dept: PEDIATRICS | Facility: CLINIC | Age: 36
End: 2021-03-12
Payer: COMMERCIAL

## 2021-03-12 ENCOUNTER — TELEPHONE (OUTPATIENT)
Dept: PEDIATRICS | Facility: CLINIC | Age: 36
End: 2021-03-12

## 2021-03-12 VITALS
OXYGEN SATURATION: 97 % | RESPIRATION RATE: 18 BRPM | BODY MASS INDEX: 35.74 KG/M2 | TEMPERATURE: 97.5 F | WEIGHT: 242 LBS | DIASTOLIC BLOOD PRESSURE: 68 MMHG | SYSTOLIC BLOOD PRESSURE: 136 MMHG | HEART RATE: 78 BPM

## 2021-03-12 DIAGNOSIS — K21.9 GASTROESOPHAGEAL REFLUX DISEASE WITHOUT ESOPHAGITIS: Primary | ICD-10-CM

## 2021-03-12 DIAGNOSIS — Z87.440 RECENT URINARY TRACT INFECTION: ICD-10-CM

## 2021-03-12 DIAGNOSIS — R07.9 CHEST PAIN, UNSPECIFIED TYPE: ICD-10-CM

## 2021-03-12 LAB
ALBUMIN UR-MCNC: NEGATIVE MG/DL
APPEARANCE UR: CLEAR
BILIRUB UR QL STRIP: NEGATIVE
COLOR UR AUTO: YELLOW
GLUCOSE UR STRIP-MCNC: NEGATIVE MG/DL
HGB UR QL STRIP: NEGATIVE
KETONES UR STRIP-MCNC: NEGATIVE MG/DL
LEUKOCYTE ESTERASE UR QL STRIP: NEGATIVE
NITRATE UR QL: NEGATIVE
PH UR STRIP: 6 PH (ref 5–7)
SOURCE: NORMAL
SP GR UR STRIP: 1.01 (ref 1–1.03)
UROBILINOGEN UR STRIP-ACNC: 0.2 EU/DL (ref 0.2–1)

## 2021-03-12 PROCEDURE — 99213 OFFICE O/P EST LOW 20 MIN: CPT | Performed by: PHYSICIAN ASSISTANT

## 2021-03-12 PROCEDURE — 93000 ELECTROCARDIOGRAM COMPLETE: CPT | Performed by: PHYSICIAN ASSISTANT

## 2021-03-12 PROCEDURE — 81003 URINALYSIS AUTO W/O SCOPE: CPT | Performed by: PHYSICIAN ASSISTANT

## 2021-03-12 NOTE — TELEPHONE ENCOUNTER
Pt LM in my  at 10:26 requesting call back.    Called pt back at [phone] to get details, not available. So, left detailed message that we would like to go over her current sx's, so that we can decide whether she need to be seen sooner(if she needed imaging or anything).     Requested pt to call me back. Will await for her call back.     Nisha, RN  Patient Advocate Liason (PAL)  [facility]

## 2021-03-12 NOTE — TELEPHONE ENCOUNTER
"  Symptoms    Describe your symptoms: \"stomach issues\" /abd. pain    Any pain: Yes:     How long have you been having symptoms: unknown    Have you been seen for this:  No    Appointment offered?: Yes: - appt today with Jacinta Dean PA-C.      Triage offered?: Yes: - Provider is requesting patient be triaged prior to appointment.    Home remedies tried: unkown    Requested Pharmacy: n/a    Okay to leave a detailed message? Yes at Cell number on file:    Telephone Information:   Mobile [phone]                 "

## 2021-03-12 NOTE — TELEPHONE ENCOUNTER
Pt called back.     - has mild to moderate pain runs across the whole lower abdomen since Monday which was lasting only for an hour at a time  - for the last 2 days, the occurrences increased lasting for longer time as well  - also feels very bloated, gassy, mid chest discomfort after food intake, belching   - denies fever, chills, SOB, vomiting, diarrhea, constipation, UTI sx's, nausea, dizziness, vomiting bile/blood, blood in stool, severe abdominal pain, RLQ pain, vaginal discharge/itching  - had a BM this morning - WNL  - has a hx of acid reflux in the past, haven't tried any otc meds for GERD  - has 4 month old baby, is breast feeding, finding herself over eating lately(stress eating)  - is under lot of stress lately       Scheduled a sooner appointment, continue pushing fluids & monitor closely. Pt agrees to the plan.     Nisha, RN  Patient Advocate Liason (PAL)  [facility]

## 2021-03-12 NOTE — TELEPHONE ENCOUNTER
Called the Pt to discuss below. No answer. Left message advising them to call back into clinic. Awaiting callback.     Kendra Haro, RN   [facility] -- Triage Nurse

## 2021-03-12 NOTE — PROGRESS NOTES
Assessment & Plan   Gastroesophageal reflux disease without esophagitis  Begin PPI daily x 2-4 weeks. Discussed anti-reflux measures.  - omeprazole (PRILOSEC) 20 MG DR capsule; Take 1 capsule (20 mg) by mouth daily    Chest pain, unspecified type  Due to GERD.  - EKG 12-lead complete w/read - Clinics  - omeprazole (PRILOSEC) 20 MG DR capsule; Take 1 capsule (20 mg) by mouth daily    Recent urinary tract infection  UA clear.   - *UA reflex to Microscopic and Culture ([facility] (except [place] and [place])    [facility] JACQUELIN Flaherty is a 35 year old who presents for the following health issues:    HPI   Abdominal  Onset/Duration: 4 days  Description:   Character: Sharp  Location: right lower quadrant left lower quadrant  Radiation: None  Intermittent  When present, lasts for 4 hours, now approx one hour  Intensity: moderate  Progression of Symptoms:  Improving over the week  Accompanying Signs & Symptoms:  Fever/Chills: no  Gas/Bloating: YES  Nausea: no  Vomitting: no  Diarrhea: no  Constipation: no  No blood in stools   Reflux symptoms present  Dysuria or Hematuria: no  Mild left sided cp radiating to left UE  History:   Trauma: no  Previous similar pain: no  Previous tests done: none  Precipitating factors:   Does the pain change with:     Food: YES    Bowel Movement: no    Urination: no   Other factors:  no  Therapies tried and outcome: gas x and tums with slight relief - pt states she has a hx of GERD    Recent UTI. Finished antibiotics.     Patient is breast feeding.   No periods since giving birth.   Baby is 4 months old.   No LMP recorded.     No history of HTN, HLD, DM, tobacco use, history of clots.    Review of Systems   Constitutional, HEENT, cardiovascular, pulmonary, gi and gu systems are negative, except as otherwise noted.      Objective    /68   Pulse 78   Temp 97.5  F (36.4  C) (Tympanic)   Resp 18   Wt  109.8 kg (242 lb)   SpO2 97%   BMI 35.74 kg/m    Body mass index is 35.74 kg/m .  Physical Exam   GENERAL: alert and no distress  EYES: Eyes grossly normal to inspection, PERRL and conjunctivae and sclerae normal  HENT: ear canals and TM's normal, nose and mouth without ulcers or lesions  NECK: no adenopathy  RESP: lungs clear to auscultation - no rales, rhonchi or wheezes  CV: regular rate and rhythm, normal S1 S2, no S3 or S4  ABDOMEN: soft, mild epigastric tenderness, mild lower abdominal tenderness, no masses and bowel sounds normal  Back: no CVAT    EKG wnl  Results for orders placed or performed in visit on 03/12/21   *UA reflex to Microscopic and Culture ([facility] (except [place] and [place])     Status: None    Specimen: Nasopharyngeal   Result Value Ref Range    Color Urine Yellow     Appearance Urine Clear     Glucose Urine Negative NEG^Negative mg/dL    Bilirubin Urine Negative NEG^Negative    Ketones Urine Negative NEG^Negative mg/dL    Specific Gravity Urine 1.010 1.003 - 1.035    Blood Urine Negative NEG^Negative    pH Urine 6.0 5.0 - 7.0 pH    Protein Albumin Urine Negative NEG^Negative mg/dL    Urobilinogen Urine 0.2 0.2 - 1.0 EU/dL    Nitrite Urine Negative NEG^Negative    Leukocyte Esterase Urine Negative NEG^Negative    Source Nasopharyngeal

## 2021-03-24 ENCOUNTER — OFFICE VISIT (OUTPATIENT)
Dept: OPTOMETRY | Facility: CLINIC | Age: 36
End: 2021-03-24
Payer: COMMERCIAL

## 2021-03-24 DIAGNOSIS — H52.13 MYOPIA OF BOTH EYES: Primary | ICD-10-CM

## 2021-03-24 PROCEDURE — 92015 DETERMINE REFRACTIVE STATE: CPT | Performed by: OPTOMETRIST

## 2021-03-24 PROCEDURE — 92004 COMPRE OPH EXAM NEW PT 1/>: CPT | Performed by: OPTOMETRIST

## 2021-03-24 ASSESSMENT — REFRACTION_MANIFEST
OD_SPHERE: -1.25
METHOD_AUTOREFRACTION: 1
OD_AXIS: 064
OS_CYLINDER: SPHERE
OS_SPHERE: -1.00
OD_CYLINDER: SPHERE
OS_SPHERE: -1.25
OD_SPHERE: -1.00
OD_CYLINDER: +0.25

## 2021-03-24 ASSESSMENT — EXTERNAL EXAM - LEFT EYE: OS_EXAM: NORMAL

## 2021-03-24 ASSESSMENT — TONOMETRY
OS_IOP_MMHG: 14
OD_IOP_MMHG: 14
IOP_METHOD: APPLANATION

## 2021-03-24 ASSESSMENT — VISUAL ACUITY
OD_CC: 20/30
OD_SC: 20/30
OS_CC: 20/30
OS_SC+: +1
OS_CC: 20/25
CORRECTION_TYPE: GLASSES
METHOD: SNELLEN - LINEAR
OD_SC+: +2
OS_CC+: +2
OS_SC: 20/25
OD_CC: 20/20

## 2021-03-24 ASSESSMENT — REFRACTION_WEARINGRX
OD_CYLINDER: +0.75
SPECS_TYPE: SVL
OD_SPHERE: -1.00
OD_AXIS: 101
OS_SPHERE: -0.75

## 2021-03-24 ASSESSMENT — EXTERNAL EXAM - RIGHT EYE: OD_EXAM: NORMAL

## 2021-03-24 ASSESSMENT — CUP TO DISC RATIO
OS_RATIO: 0.25
OD_RATIO: 0.15

## 2021-03-24 ASSESSMENT — CONF VISUAL FIELD
OD_NORMAL: 1
METHOD: COUNTING FINGERS
OS_NORMAL: 1

## 2021-03-24 ASSESSMENT — SLIT LAMP EXAM - LIDS
COMMENTS: NORMAL
COMMENTS: NORMAL

## 2021-03-24 NOTE — LETTER
3/24/2021         RE: Krystina Suazo  [address]  [place] [address]        Dear Colleague,    Thank you for referring your patient, [facility]ina [facility]azo[facility]. Please see a copy of my visit note below.    Chief Complaint   Patient presents with     Annual Eye Exam      Eye fatigue and eye strain.  Trouble with driving as well.  On computers a lot for work.    No other concerns at this time.  Last Eye Exam: 10/2019  Dilated Previously: Pt is breastfeeding, is it ok? Patient prefers to wait.  Also has vasovagal syncope from dilation  What are you currently using to see?  glasses       Distance Vision Acuity: Satisfied with vision    Near Vision Acuity: Satisfied with vision while reading and using computer with glasses    Eye Comfort: good  Do you use eye drops? : No    Rhea Osorio CPO          Medical, surgical and family histories reviewed and updated 3/24/2021.     No longer has any allergic conjunctivitis symptoms, just uses blink as needed   OBJECTIVE: See Ophthalmology exam    ASSESSMENT:    ICD-10-CM    1. Myopia of both eyes  H52.13         PLAN:   Computer adjustments to reduce eyestrain update prescription for distance and off for near work  Artificial tears as needed   Dilate next visit    Cleo Duran OD       Again, thank you for allowing me to participate in the care of your patient.        Sincerely,        Cleo Duran, OD

## 2021-03-24 NOTE — PROGRESS NOTES
Chief Complaint   Patient presents with     Annual Eye Exam      Eye fatigue and eye strain.  Trouble with driving as well.  On computers a lot for work.    No other concerns at this time.  Last Eye Exam: 10/2019  Dilated Previously: Pt is breastfeeding, is it ok? Patient prefers to wait.  Also has vasovagal syncope from dilation  What are you currently using to see?  glasses       Distance Vision Acuity: Satisfied with vision    Near Vision Acuity: Satisfied with vision while reading and using computer with glasses    Eye Comfort: good  Do you use eye drops? : No    Rhea Osorio CPO          Medical, surgical and family histories reviewed and updated 3/24/2021.     No longer has any allergic conjunctivitis symptoms, just uses blink as needed   OBJECTIVE: See Ophthalmology exam    ASSESSMENT:    ICD-10-CM    1. Myopia of both eyes  H52.13         PLAN:   Computer adjustments to reduce eyestrain update prescription for distance and off for near work  Artificial tears as needed   Dilate next visit    Cleo Duran OD

## 2021-03-24 NOTE — PATIENT INSTRUCTIONS
Change in prescription more nearsighted L , no astigmatism in R, more change in right eye  No prescription needed to read or for computer   Patient Education     Understanding Computer Vision Syndrome    Computer vision syndrome (CVS) is a group of eye problems. The problems can include eyes that itch and tear and are dry and red. Your eyes may feel tired. You may not be able to focus well. With CVS these problems are the result of a lot of computer use. Use of e-readers and smart phones may also cause these problems. CVS is very common. Both children and adults can have symptoms of CVS.  What causes computer vision syndrome?  Reading text on a screen is harder for the eyes than reading printed text. This is why working on a computer can cause eye problems, but reading a book may not. People also tend to blink less when using a computer than when reading printed text. This can cause dry eyes, which can also contribute to computer vision syndrome.  Many factors can lead to computer vision syndrome, such as:    Spending several hours a day at the computer    Vision problems (even minor ones) not corrected with lenses    Wearing glasses not suitable for viewing your computer screen    Poor posture while using the computer    Poor lighting    Glare from the computer screen    Sitting too close to the screen    Positioning the screen at a wrong angle    Not taking breaks while you are working    Using an older-style monitor instead of a flat-screen monitor  Dry eye and CVS  Dry eye is a condition where you don t make enough tears to wet the eye. If you have dry eye, this can make CVS worse or more likely. Dry eye is more common in women, and with age. Some medicines and health problems make dry eye more likely. For example, using antihistamines may lead to dry eye. Thyroid disease and some autoimmune diseases may also lead to dry eye.  Symptoms of computer vision syndrome  Computer vision syndrome can cause symptoms such  as:    Tired eyes    Eye discomfort    Dry eye    Red eyes    Eye tearing    Itchy eyes    Blurred vision    Double vision    Headaches  Most of these symptoms last a short time, and lessen or go away when you stop using your computer. In some cases, symptoms may last for a longer time after using a computer.  Symptoms may be mild to severe. This depends on how long you use the computer and other eye problems you may have. Symptoms can get worse without treatment.  Computer use can also lead to neck and shoulder pain. This is often because you may have poor posture when using your computer. Some healthcare providers also consider these symptoms of CVS.  Diagnosing computer vision syndrome  An eye care health provider diagnoses CVS. He or she will ask about your symptoms and your health history. You ll be given an eye exam. You may have tests to check the sharpness of your vision and how well your eyes focus and work together. Eye drops may be used to enlarge (dilate) your irises. This is to help the doctor see into your eye. He or she may use a tool called an ophthalmoscope to look at the back of your eye. You may also have blood tests. These are to check for health problems that can cause dry eye and lead to CVS.  [employer] last reviewed this educational content on 3/1/2018    2696-9871 [employer]. All rights reserved. This information is not intended as a substitute for professional medical care. Always follow your healthcare professional's instructions.           Patient Education     Treatment for Computer Vision Syndrome  Computer vision syndrome (CVS) is a group of eye problems. The problems can include eyes that itch and tear and are dry and red. Your eyes may feel tired. You may not be able to focus well. With CVS these problems are the result of a lot of computer use. Use of e-readers and smart phones may also cause these problems. CVS is very common. Both children and adults can have symptoms  "of CVS.  Types of treatment  Treatment is done by making changes in your use of your computer screen. Changes may include:    Resting your eyes at least 15 minutes after each 2 hours of computer use    Looking away from the computer and into the distance for at least 20 seconds every 20 minutes    Enlarging the text on your computer screen    Reducing glare from nearby light sources    Using a screen glare filter    Using a flat-screen monitor    Positioning your screen so that the center is 4 to 5 inches below your eye level    Positioning your screen so that it is 20 to 28 inches from your eye    Remembering to blink often  Your healthcare provider will also treat any health problems that can cause dry eye and CVS, such as allergies or thyroid disease. You may need to take steps to reduce dry eye. This may include:    Wearing lenses to correct your vision    Using lubricating eye drops    Using a humidifier    Drinking plenty of water    Taking a prescription medicine to increase tear production  Preventing computer vision syndrome  Take steps to create make changes in how you use your computer. See your eye care doctor once a year for a checkup. See your healthcare provider to help manage health conditions that can lead to CVS.  [employer] last reviewed this educational content on 5/1/2018 2000-2020 [employer]. All rights reserved. This information is not intended as a substitute for professional medical care. Always follow your healthcare professional's instructions.    20\" would be ideal distance for your nearsightedness without glasses       "

## 2021-03-29 ENCOUNTER — TELEPHONE (OUTPATIENT)
Dept: PEDIATRICS | Facility: CLINIC | Age: 36
End: 2021-03-29

## 2021-03-29 NOTE — TELEPHONE ENCOUNTER
Dr. Daly,    Please see message and advise.    Pt accidentally took her sertraline and prilosec twice in one day  Pt is unsure if it was yesterday or today    Pt is breastfeeding  Should she dump her breat milk?    Please route back to triage    Thank you  Arabella Ramsay RN on 3/29/2021 at 10:26 AM

## 2021-04-03 ENCOUNTER — MYC MEDICAL ADVICE (OUTPATIENT)
Dept: PEDIATRICS | Facility: CLINIC | Age: 36
End: 2021-04-03

## 2021-04-26 ENCOUNTER — OFFICE VISIT (OUTPATIENT)
Dept: PEDIATRICS | Facility: CLINIC | Age: 36
End: 2021-04-26
Payer: COMMERCIAL

## 2021-04-26 VITALS
HEIGHT: 69 IN | SYSTOLIC BLOOD PRESSURE: 102 MMHG | BODY MASS INDEX: 35.32 KG/M2 | DIASTOLIC BLOOD PRESSURE: 56 MMHG | RESPIRATION RATE: 20 BRPM | WEIGHT: 238.5 LBS | HEART RATE: 60 BPM | OXYGEN SATURATION: 97 % | TEMPERATURE: 97.7 F

## 2021-04-26 DIAGNOSIS — B35.1 ONYCHOMYCOSIS: Primary | ICD-10-CM

## 2021-04-26 DIAGNOSIS — J45.20 MILD INTERMITTENT ASTHMA WITHOUT COMPLICATION: ICD-10-CM

## 2021-04-26 PROBLEM — Z98.891 S/P C-SECTION: Status: RESOLVED | Noted: 2018-09-06 | Resolved: 2021-04-26

## 2021-04-26 PROBLEM — I10 HYPERTENSION: Status: RESOLVED | Noted: 2018-08-30 | Resolved: 2021-04-26

## 2021-04-26 PROCEDURE — 99214 OFFICE O/P EST MOD 30 MIN: CPT | Performed by: INTERNAL MEDICINE

## 2021-04-26 RX ORDER — CICLOPIROX 80 MG/ML
SOLUTION TOPICAL
Qty: 6 ML | Refills: 1 | Status: SHIPPED | OUTPATIENT
Start: 2021-04-26 | End: 2021-12-15

## 2021-04-26 RX ORDER — ALBUTEROL SULFATE 90 UG/1
2 AEROSOL, METERED RESPIRATORY (INHALATION) EVERY 4 HOURS PRN
Qty: 18 G | Refills: 11 | Status: SHIPPED | OUTPATIENT
Start: 2021-04-26 | End: 2022-07-05

## 2021-04-26 ASSESSMENT — MIFFLIN-ST. JEOR: SCORE: 1831.21

## 2021-04-26 NOTE — PATIENT INSTRUCTIONS
Ciclopirox topical nail laquer.     At any point, if you prefer to have me send a fungal culture, or if you would like to see a podiatrist, let me know.

## 2021-04-26 NOTE — PROGRESS NOTES
Assessment & Plan       Onychomycosis  Nail dystrophy vs onychomycosis. Changes on exam mild and medial/distal R great toe. Discussed option for culture vs topical in setting of breastfeeding vs oral which would wait until she is no longer breastfeeding. Also discussed option for natural vinegar soaks. Printed rx for penlac for her, she will consider. Information from [employer] printed as well as from [facility] website.  Discussed duration of oral and topical therapies.   - ciclopirox (PENLAC) 8 % external solution; Apply to adjacent skin and affected nails daily.  Remove with alcohol every 7 days, then repeat.    Lactating mother  Discussed implications of treatment for onychomycosis as well as her recent covid vaccine series for breastfeeding.    Mild intermittent asthma without complication  Refilled. Doing well.   - albuterol (PROAIR HFA/PROVENTIL HFA/VENTOLIN HFA) 108 (90 Base) MCG/ACT inhaler; Inhale 2 puffs into the lungs every 4 hours as needed for shortness of breath / dyspnea or wheezing    Prescription drug management  30 minutes spent on the date of the encounter doing chart review, history and exam, documentation and further activities per the note      Return in about 3 months (around 7/26/2021) for Preventive Visit.    Valeria Daly MD  [facility] JACQUELIN Flaherty is a 35 year old who presents for the following health issues    HPI     Concern - Right great toenail  Onset: 4-5 months  Description: whitening of right great toenail  Intensity: mild  Progression of Symptoms:  worsening and constant  Accompanying Signs & Symptoms: none  Previous history of similar problem: no  Precipitating factors:        Worsened by:   Alleviating factors:        Improved by:   Therapies tried and outcome: None    Asthma Follow-Up    Was ACT completed today?    Yes    ACT Total Scores 11/2/2020   ACT TOTAL SCORE (Goal Greater than or Equal to 20) 25   In the past 12 months, how  "many times did you visit the emergency room for your asthma without being admitted to the hospital? 0   In the past 12 months, how many times were you hospitalized overnight because of your asthma? 0          How many days per week do you miss taking your asthma controller medication?  I do not have an asthma controller medication    Please describe any recent triggers for your asthma: None    Have you had any Emergency Room Visits, Urgent Care Visits, or Hospital Admissions since your last office visit?  No      Review of Systems   Constitutional, HEENT, cardiovascular, pulmonary, gi and gu systems are negative, except as otherwise noted.      Objective    /56 (BP Location: Right arm, Patient Position: Sitting, Cuff Size: Adult Large)   Pulse 60   Temp 97.7  F (36.5  C) (Tympanic)   Resp 20   Ht 1.753 m (5' 9\")   Wt 108.2 kg (238 lb 8 oz)   SpO2 97%   BMI 35.22 kg/m    Body mass index is 35.22 kg/m .  Physical Exam   GENERAL: healthy, alert and no distress  SKIN: no suspicious lesions or rashes and R great toenail with minimal thickening and yellowing of distal nail, with some irregularity of proximal nail.       "

## 2021-04-28 ASSESSMENT — ASTHMA QUESTIONNAIRES: ACT_TOTALSCORE: 24

## 2021-06-03 ENCOUNTER — MYC MEDICAL ADVICE (OUTPATIENT)
Dept: PEDIATRICS | Facility: CLINIC | Age: 36
End: 2021-06-03

## 2021-06-04 ENCOUNTER — OFFICE VISIT (OUTPATIENT)
Dept: PEDIATRICS | Facility: CLINIC | Age: 36
End: 2021-06-04
Payer: COMMERCIAL

## 2021-06-04 VITALS
OXYGEN SATURATION: 98 % | TEMPERATURE: 97.2 F | BODY MASS INDEX: 34.7 KG/M2 | WEIGHT: 235 LBS | HEART RATE: 72 BPM | RESPIRATION RATE: 16 BRPM | SYSTOLIC BLOOD PRESSURE: 90 MMHG | DIASTOLIC BLOOD PRESSURE: 54 MMHG

## 2021-06-04 DIAGNOSIS — R10.9 ABDOMINAL WALL PAIN: Primary | ICD-10-CM

## 2021-06-04 LAB — HCG UR QL: NEGATIVE

## 2021-06-04 PROCEDURE — 99214 OFFICE O/P EST MOD 30 MIN: CPT | Performed by: PHYSICIAN ASSISTANT

## 2021-06-04 PROCEDURE — 81025 URINE PREGNANCY TEST: CPT | Performed by: PHYSICIAN ASSISTANT

## 2021-06-04 NOTE — PROGRESS NOTES
Assessment & Plan   Abdominal wall pain  Proceed with CT abd/pelvis for further evaluation. R/O abdominal wall hernia.  - CT Abdomen Pelvis w Contrast; Future  - HCG Qual, Urine ([id number])    CHEL Amezcua [facility] JACQUELIN Flaherty is a 35 year old who presents for the following health issues:      HPI   Abdominal/Flank Pain  Onset/Duration: 2 months ago   Description:   Character: Sharp moved to dull pain  Location: lucia-umbilical region  Radiation: localized  Intensity: mild  Progression of Symptoms:  Worsening; becoming more prevalent  Accompanying Signs & Symptoms:  Fever/Chills: no  Gas/Bloating: YES  Vaginal spotting present. Periods have not returned  Nausea: no  Vomitting: no  Diarrhea: no  Constipation: no  Dysuria or Hematuria: no  History:   Trauma: no  Previous similar pain: no  Previous tests done: none  Precipitating factors:   Does the pain change with:     Food: no    Bowel Movement: no    Urination: slight but pt states she has a baby and experiencing urine leakage   Other factors: lifting up her children makes it much worse  Therapies tried and outcome: prilosec   No LMP recorded.    Review of Systems   Constitutional, HEENT, cardiovascular, pulmonary, gi and gu systems are negative, except as otherwise noted.      Objective    BP 90/54   Pulse 72   Temp 97.2  F (36.2  C) (Tympanic)   Resp 16   Wt 106.6 kg (235 lb)   SpO2 98%   BMI 34.70 kg/m    Body mass index is 34.7 kg/m .  Physical Exam   GENERAL: healthy, alert and no distress  RESP: lungs clear to auscultation - no rales, rhonchi or wheezes  CV: regular rate and rhythm, normal S1 S2, no S3 or S4, no murmur  ABDOMEN:  soft, obese, nontender, no hepatosplenomegaly, no masses and bowel sounds normal. Upon having patient flex abdomen, along the midline, approx 4-5 inches superior to umbilicus reveals some fullness and tenderness with deep palpation. No obvious hernia present.   BACK: no  CVAT

## 2021-06-17 ENCOUNTER — HOSPITAL ENCOUNTER (OUTPATIENT)
Dept: CT IMAGING | Facility: CLINIC | Age: 36
Discharge: HOME | End: 2021-06-17
Attending: PHYSICIAN ASSISTANT | Admitting: PHYSICIAN ASSISTANT
Payer: COMMERCIAL

## 2021-06-17 DIAGNOSIS — R10.9 ABDOMINAL WALL PAIN: ICD-10-CM

## 2021-06-17 PROCEDURE — 250N000011 HC RX IP 250 OP 636: Performed by: PHYSICIAN ASSISTANT

## 2021-06-17 PROCEDURE — 74177 CT ABD & PELVIS W/CONTRAST: CPT

## 2021-06-17 PROCEDURE — 250N000009 HC RX 250: Performed by: PHYSICIAN ASSISTANT

## 2021-06-17 RX ORDER — IOPAMIDOL 755 MG/ML
500 INJECTION, SOLUTION INTRAVASCULAR ONCE
Status: COMPLETED | OUTPATIENT
Start: 2021-06-17 | End: 2021-06-17

## 2021-06-17 RX ADMIN — SODIUM CHLORIDE 55 ML: 9 INJECTION, SOLUTION INTRAVENOUS at 13:31

## 2021-06-17 RX ADMIN — IOPAMIDOL 100 ML: 755 INJECTION, SOLUTION INTRAVENOUS at 13:31

## 2021-06-18 DIAGNOSIS — K42.9 UMBILICAL HERNIA WITHOUT OBSTRUCTION AND WITHOUT GANGRENE: Primary | ICD-10-CM

## 2021-06-22 ENCOUNTER — OFFICE VISIT (OUTPATIENT)
Dept: SURGERY | Facility: CLINIC | Age: 36
End: 2021-06-22
Payer: COMMERCIAL

## 2021-06-22 ENCOUNTER — TELEPHONE (OUTPATIENT)
Dept: SURGERY | Facility: CLINIC | Age: 36
End: 2021-06-22

## 2021-06-22 ENCOUNTER — PREP FOR PROCEDURE (OUTPATIENT)
Dept: SURGERY | Facility: CLINIC | Age: 36
End: 2021-06-22

## 2021-06-22 VITALS
OXYGEN SATURATION: 99 % | BODY MASS INDEX: 35.25 KG/M2 | DIASTOLIC BLOOD PRESSURE: 54 MMHG | HEART RATE: 72 BPM | WEIGHT: 238 LBS | SYSTOLIC BLOOD PRESSURE: 90 MMHG | RESPIRATION RATE: 16 BRPM | HEIGHT: 69 IN

## 2021-06-22 DIAGNOSIS — K42.9 UMBILICAL HERNIA WITHOUT OBSTRUCTION AND WITHOUT GANGRENE: Primary | ICD-10-CM

## 2021-06-22 PROCEDURE — 99204 OFFICE O/P NEW MOD 45 MIN: CPT | Performed by: SURGERY

## 2021-06-22 ASSESSMENT — MIFFLIN-ST. JEOR: SCORE: 1828.94

## 2021-06-22 NOTE — PROGRESS NOTES
[facility]  New Patient Office Visit    Assessment:   Krystina Suazo is a 35 year old female with primary, reducible umbilical hernia .    Plan:    We will schedule an open umbilical hernia repair with mesh at the patient's convenience.  Will need preop H&P by her PCP (Valeria Daly MD).    We have discussed observation, reduction techniques and importance, incarceration and strangulation signs, symptoms and importance as well as need to seek emergency treatment.      We have had a detailed discussion regarding the nature of umbilical hernias, and that watchful waiting for small asymptomatic hernias is acceptable, but that in general they do tend to enlarge or become symptomatic over time. The patient has been experiencing bothersome symptoms and wishes to proceed with repair.  Surgery, indications, alternatives, risks, benefits, incisions, scarring, anesthesia, recovery, mesh, infection, bleeding, numbness, hernia recurrence, lifting and activity limitations after surgery.  All questions have been answered to the best of my ability.    She understands that the operation will address her umbilical hernia only and will not bring her rectus diastasis back together.     Recommended time off work postop:  1 wks  Recommended time off lifting 20 lb:   4 wks  She has been given literature to review.     She is seen in consultation for umbilical hernia, at the request of David Dean PA-C    HPI:  Krystina Suazo is a 35 year old female who presents for evaluation of pain in the umbilical region.   She first noticed it 2-3 months ago and the pain started gradually.  The pain is sharp and stabbing and occurs when lifting or bending.   Prior incarceration:  No   Nausea/vomitting/bloating:  No   Bulge/mass:  No    Previous herniorrhaphy:  No     Constipation: No  Cough: No  Diabetes: No  Current smoker: No    Heavy lifting > 20 lb: Yes - Lifts her 2 year old son    Past Medical  History:  Past Medical History:   Diagnosis Date     Asthma      Depression      Depressive disorder     Diagnosed around      Hypertension 2018       Current Outpatient Medications   Medication     albuterol (PROAIR HFA/PROVENTIL HFA/VENTOLIN HFA) 108 (90 Base) MCG/ACT inhaler     ciclopirox (PENLAC) 8 % external solution     clobetasol propionate (OLUX) 0.05 % external foam     omeprazole (PRILOSEC) 20 MG DR capsule     Prenatal Vit-Fe Fumarate-FA (PRENATAL MULTIVITAMIN PLUS IRON) 27-0.8 MG TABS per tablet     sertraline (ZOLOFT) 100 MG tablet     No current facility-administered medications for this visit.         Past Surgical History:  Past Surgical History:   Procedure Laterality Date     CARPAL TUNNEL RELEASE RT/LT Bilateral       SECTION N/A 2018    Procedure:  SECTION;   section;  Surgeon: Jay Yanez MD;  Location: [facility] OR      SECTION N/A 10/27/2020    Procedure: REPEAT  SECTION;  Surgeon: Aníbal Pearson MD;  Location: [facility] L+D     GYN SURGERY  16         SINUS SURGERY  2014     TONSILLECTOMY          Social History:  Social History     Tobacco Use     Smoking status: Never Smoker     Smokeless tobacco: Never Used   Substance Use Topics     Alcohol use: No     Drug use: No      Lives in [place] with her spouse. Works for the [employer].    Family History:  Family History   Problem Relation Age of Onset     Esophageal Cancer Mother 57     Diabetes Mother         Type 2     Hypertension Mother      Other Cancer Mother         Esophageal     Depression Mother      Obesity Mother      Asthma Father      Glaucoma No family hx of      Macular Degeneration No family hx of      Breast Cancer No family hx of      Colon Cancer No family hx of      No Family history of bleeding or clotting disorders or reactions to anesthesia    ROS:  The 10 point review of systems is negative other than noted in the HPI and above.    PE:   "  Vitals - BP 90/54   Pulse 72   Resp 16   Ht 1.753 m (5' 9\")   Wt 108 kg (238 lb)   SpO2 99%   BMI 35.15 kg/m    BMI - Body mass index is 35.15 kg/m .  General - Well-developed, well-nourished, patient able to get up on table with difficulty.  HEENT - Mucous membranes moist.  Sclera are nonicteric.  Lymph -  No inguinal lymphadenopathy or masses   Respiratory - regular and non labored  CV - regular rate and rhythm  Abdomen - abdomen is soft without significant tenderness, masses, organomegaly or guarding,   Hernia - Upon standing there is not an obvious bulge at the umbilicus. The umbilical stalk is deep and the fascial defect is not easily palpated. Palpation at the umbilicus does illicit pain.   Extremities - without edema  Psych - mood and affect are appropriate  Neurologic - alert, speech is clear, moves all extremities with good strength  Integument - without lesions, rashes, or jaundice    CT abd/pelvis reviewed. In the setting of a 7 cm rectus diastasis of the upper abdomen, there is a 1.7 cm umbilical hernia defect with herniated fat. The fat is mildly stranded, indicating inflammation.     This note may have been created using voice recognition software. Undetected word substitutions or other errors may have occurred.     45 minutes total time spent on the date of this encounter doing: chart review, review of test results, patient visit, physical exam, education, counseling, developing plan of care, and documenting.      Erin Mccarthy MD  06/22/21 3:20 PM     Please route or send letter to:  Referring Provider      "

## 2021-06-22 NOTE — LETTER
[facility]    [address], [address]  [place], [place] [address]  Phone [phone]  Fax [phone] [address], [address]  [facility] [address]  Phone [phone]  Fax [phone]    [email or web address]   Krystina Suazo     You have been scheduled for a COVID-19 testing appointment at [facility].    7/19/2021 at 2:00 PM     The clinic is located at:  [address] [place] [address] [place] [address]    Please wear a mask or face covering to the testing site.      Following your testing, you will be required to self-isolate until your surgery.  If you need a note for your employer due to this, please let us know.

## 2021-06-22 NOTE — LETTER
[facility]    [address], [address]  [place], [place] [address]  Phone [phone]  Fax [phone] [address], [address]  [facility] [place] [address]  Phone [phone]  Fax [phone]    [email or web address]   June 22, 2021    Krystina Suazo  [address] [address]    We realize with scheduling surgery, one of your first questions is, how much will this cost?  Below we have provided you with the information you will need to receive an estimate for your surgery.    You are scheduled for the following procedure:  OPEN REPAIR UMBILICAL HERNIA WITH MESH     Surgeon:  Erin Mccarthy MD      Physician Assistant:  Yes      Please make sure to have your insurance card available at the time of calling.    Surgeon & Physician Assistant charges and facility charges for [facility], [facility] or [facility]:    Consumer Price Line at [phone]   -  It is important to note that there may be a Physician Assistant assisting with your surgery.  Please be sure to mention this when calling for the estimate.      If you prefer, you can also request a price estimate online by completing the secure form at:  [email or web address]    Facility Charges at [facility] [place], [facility] or [facility]:  [facility] at [phone]  [facility] at [phone]  [facility] at [phone] or [phone]    Anesthesiologist Charges:  [facility] at [phone]    [facility] - Nurse Anesthetist Charges:  [facility] at [phone]

## 2021-06-22 NOTE — TELEPHONE ENCOUNTER
Type of surgery: OPEN REPAIR UMBILICAL HERNIA WITH MESH       Location of surgery: [facility]  Date and time of surgery: 7/28/2021 @ 1:05PM   Surgeon: Erin Mccarthy MD   Pre-Op Appt Date: PATIENT TO SCHEDULE    Post-Op Appt Date: PATIENT TO SCHEDULE     Packet sent out: Yes  Pre-cert/Authorization completed:  Not Applicable  Date: 6/22/2021 updated 7/1/2021       OPEN REPAIR UMBILICAL HERNIA WITH MESH     GENERAL PT INST  TO HAVE H&P WITH DR CANTRELL 75 MIN REQ PA ASSIST MGB NMS

## 2021-06-23 DIAGNOSIS — Z11.59 ENCOUNTER FOR SCREENING FOR OTHER VIRAL DISEASES: ICD-10-CM

## 2021-06-23 PROBLEM — K42.9 UMBILICAL HERNIA WITHOUT OBSTRUCTION AND WITHOUT GANGRENE: Status: ACTIVE | Noted: 2021-06-23

## 2021-06-25 ENCOUNTER — ANCILLARY PROCEDURE (OUTPATIENT)
Dept: ULTRASOUND IMAGING | Facility: CLINIC | Age: 36
End: 2021-06-25
Attending: OBSTETRICS & GYNECOLOGY
Payer: COMMERCIAL

## 2021-06-25 DIAGNOSIS — D25.9 FIBROID UTERUS: ICD-10-CM

## 2021-06-25 PROCEDURE — 76830 TRANSVAGINAL US NON-OB: CPT | Performed by: FAMILY MEDICINE

## 2021-06-25 PROCEDURE — 76856 US EXAM PELVIC COMPLETE: CPT | Performed by: FAMILY MEDICINE

## 2021-07-17 ASSESSMENT — PATIENT HEALTH QUESTIONNAIRE - PHQ9: SUM OF ALL RESPONSES TO PHQ QUESTIONS 1-9: 2

## 2021-07-19 ENCOUNTER — OFFICE VISIT (OUTPATIENT)
Dept: ORTHOPEDICS | Facility: CLINIC | Age: 36
End: 2021-07-19
Attending: INTERNAL MEDICINE
Payer: COMMERCIAL

## 2021-07-19 VITALS
HEIGHT: 70 IN | SYSTOLIC BLOOD PRESSURE: 120 MMHG | BODY MASS INDEX: 32.64 KG/M2 | WEIGHT: 228 LBS | DIASTOLIC BLOOD PRESSURE: 68 MMHG

## 2021-07-19 DIAGNOSIS — G89.29 CHRONIC RIGHT-SIDED LOW BACK PAIN WITHOUT SCIATICA: Primary | ICD-10-CM

## 2021-07-19 DIAGNOSIS — M54.50 CHRONIC RIGHT-SIDED LOW BACK PAIN WITHOUT SCIATICA: Primary | ICD-10-CM

## 2021-07-19 DIAGNOSIS — M25.552 HIP PAIN, LEFT: ICD-10-CM

## 2021-07-19 PROCEDURE — 99213 OFFICE O/P EST LOW 20 MIN: CPT | Performed by: ORTHOPAEDIC SURGERY

## 2021-07-19 ASSESSMENT — MIFFLIN-ST. JEOR: SCORE: 1799.45

## 2021-07-19 NOTE — PROGRESS NOTES
HISTORY OF PRESENT ILLNESS:    Krystina Suazo is a 35 year old female who is seen in consultation at the request of Dr. Daly for left hip pain. Onset of pain over 1 year ago. She reports initial onset of pain during pregnancy which she related to the weight gain, and pregnancy. Pain has persisted over the last 9 months. Currently working from home, desk/ office work.   Present symptoms: Left sided lateral hip pain. Pain worsens at nighttime when side lying on the left side, when seated on the floor in cross legged or figure-4 position. Mild increase of pain with prolonged walking.     There is no bowel bladder dysfunction symptoms.    Treatments tried to this point: heat, ibuprofen, stretching, chiropractic   Orthopedic PMH: history of right sided hip pain due to fall as teenager     Past Medical History:   Diagnosis Date     Asthma      Depression      Depressive disorder     Diagnosed around      Hypertension 2018       Past Surgical History:   Procedure Laterality Date     CARPAL TUNNEL RELEASE RT/LT Bilateral       SECTION N/A 2018    Procedure:  SECTION;   section;  Surgeon: Jay Yanez MD;  Location: [facility] OR      SECTION N/A 10/27/2020    Procedure: REPEAT  SECTION;  Surgeon: Aníbal Pearson MD;  Location: [facility] L+D     GYN SURGERY  18         SINUS SURGERY  2014     TONSILLECTOMY         Family History   Problem Relation Age of Onset     Esophageal Cancer Mother 57     Diabetes Mother         Type 2     Hypertension Mother      Other Cancer Mother         Esophageal     Depression Mother      Obesity Mother      Asthma Father      Glaucoma No family hx of      Macular Degeneration No family hx of      Breast Cancer No family hx of      Colon Cancer No family hx of        Social History     Socioeconomic History     Marital status:      Spouse name: Yakov     Number of children: 1     Years of  education: 16     Highest education level: Bachelor's degree (e.g., BA, AB, BS)   Occupational History     Occupation:    Tobacco Use     Smoking status: Never Smoker     Smokeless tobacco: Never Used   Substance and Sexual Activity     Alcohol use: No     Drug use: No     Sexual activity: Yes     Partners: Male     Birth control/protection: Condom   Other Topics Concern     Parent/sibling w/ CABG, MI or angioplasty before 65F 55M? No   Social History Narrative     Not on file     Social Determinants of Health     Financial Resource Strain: Low Risk      Difficulty of Paying Living Expenses: Not hard at all   Food Insecurity: No Food Insecurity     Worried About Running Out of Food in the Last Year: Never true     Ran Out of Food in the Last Year: Never true   Transportation Needs: No Transportation Needs     Lack of Transportation (Medical): No     Lack of Transportation (Non-Medical): No   Physical Activity:      Days of Exercise per Week:      Minutes of Exercise per Session:    Stress:      Feeling of Stress :    Social Connections:      Frequency of Communication with Friends and Family:      Frequency of Social Gatherings with Friends and Family:      Attends Evangelical Services:      Active Member of Clubs or Organizations:      Attends Club or Organization Meetings:      Marital Status:    Intimate Partner Violence:      Fear of Current or Ex-Partner:      Emotionally Abused:      Physically Abused:      Sexually Abused:        Current Outpatient Medications   Medication Sig Dispense Refill     albuterol (PROAIR HFA/PROVENTIL HFA/VENTOLIN HFA) 108 (90 Base) MCG/ACT inhaler Inhale 2 puffs into the lungs every 4 hours as needed for shortness of breath / dyspnea or wheezing 18 g 11     ciclopirox (PENLAC) 8 % external solution Apply to adjacent skin and affected nails daily.  Remove with alcohol every 7 days, then repeat. 6 mL 1     clobetasol propionate (OLUX) 0.05 % external foam Apply to AA on  "scalp bid for 7 days then once per day for 2 weeks . Then when needed 100 g 3     omeprazole (PRILOSEC) 20 MG DR capsule TAKE ONE CAPSULE BY MOUTH ONCE DAILY 90 capsule 2     Prenatal Vit-Fe Fumarate-FA (PRENATAL MULTIVITAMIN PLUS IRON) 27-0.8 MG TABS per tablet Take 1 tablet by mouth daily 100 tablet 3     sertraline (ZOLOFT) 100 MG tablet Take 2 tablets (200 mg) by mouth daily 180 tablet 3       Allergies   Allergen Reactions     Dust Mite Extract      Pollen and animal dander.     Seasonal Allergies        REVIEW OF SYSTEMS:  CONSTITUTIONAL:  NEGATIVE for fever, chills, change in weight  INTEGUMENTARY/SKIN:  NEGATIVE for worrisome rashes, moles or lesions  EYES:  NEGATIVE for vision changes or irritation  ENT/MOUTH:  NEGATIVE for ear, mouth and throat problems  RESP:  NEGATIVE for significant cough or SOB  BREAST:  NEGATIVE for masses, tenderness or discharge  CV:  NEGATIVE for chest pain, palpitations or peripheral edema  GI:  NEGATIVE for nausea, abdominal pain, heartburn, or change in bowel habits  :  Negative   MUSCULOSKELETAL:  See HPI above  NEURO:  NEGATIVE for weakness, dizziness or paresthesias  ENDOCRINE:  NEGATIVE for temperature intolerance, skin/hair changes  HEME/ALLERGY/IMMUNE:  NEGATIVE for bleeding problems  PSYCHIATRIC:  NEGATIVE for changes in mood or affect      PHYSICAL EXAM:  /68 (BP Location: Right arm, Patient Position: Chair, Cuff Size: Adult Regular)   Ht 1.778 m (5' 10\")   Wt 103.4 kg (228 lb)   BMI 32.71 kg/m    Body mass index is 32.71 kg/m .   GENERAL APPEARANCE: healthy, alert and no distress   HEENT: No apparent thyroid megaly. Clear sclera with normal ocular movement  RESPIRATORY: No labored breathing  SKIN: no suspicious lesions or rashes  NEURO: Normal strength and tone, mentation intact and speech normal  VASCULAR: Good pulses, and capillary refill   LYMPH: no lymphadenopathy   PSYCH:  mentation appears normal and affect normal/bright    MUSCULOSKELETAL:  Not in " acute distress  No difficulty of getting up from sitting  Normal walking    Obvious significant valgus deformities both knees  No swelling or erythema in the knees    She is able to walk on her toes and heels    Other motor groups in the lower extremities are full in terms of strength    No focal sensory deficit in the lower extremities  Negative straight leg raising    Both hips with full range of motion  No pain with the range of motion of the hips    Minimal tenderness at the left greater trochanteric region    Well localized the pain at the left lumbosacral region  SI joint was not tender       ASSESSMENT:  Chronic low back pain      PLAN:  Based on the physical examination findings, her pain was felt to be coming away from the hip joint.  We felt that her main pathology was that of chronic low back pain with lumbar dysfunction.    I would not recommend doing anything to the hips including the trochanteric bursa.    We felt that core strengthening through physical therapy would be beneficial.  A referral to physical therapy was made.    We will see her back as needed.    Imaging Interpretation: None today      James Velazco MD  Department of Orthopedic Surgery        Disclaimer: This note consists of symbols derived from keyboarding, dictation and/or voice recognition software. As a result, there may be errors in the script that have gone undetected. Please consider this when interpreting information found in this chart.

## 2021-07-19 NOTE — PATIENT INSTRUCTIONS
A referral to physical therapy was made today.  We recommended core strengthening.  Lumbar support that he can purchase over-the-counter may be helpful.  Follow-up as needed.

## 2021-07-19 NOTE — LETTER
2021         RE: Krystina Suazo  [address]  [place] [address]        Dear Colleague,    Thank you for referring your patient, Krystina Suazo, to the [facility] [place]. Please see a copy of my visit note below.    HISTORY OF PRESENT ILLNESS:    Krystina Suazo is a 35 year old female who is seen in consultation at the request of Dr. Daly for left hip pain. Onset of pain over 1 year ago. She reports initial onset of pain during pregnancy which she related to the weight gain, and pregnancy. Pain has persisted over the last 9 months. Currently working from home, desk/ office work.   Present symptoms: Left sided lateral hip pain. Pain worsens at nighttime when side lying on the left side, when seated on the floor in cross legged or figure-4 position. Mild increase of pain with prolonged walking.     There is no bowel bladder dysfunction symptoms.    Treatments tried to this point: heat, ibuprofen, stretching, chiropractic   Orthopedic PMH: history of right sided hip pain due to fall as teenager     Past Medical History:   Diagnosis Date     Asthma      Depression      Depressive disorder     Diagnosed around      Hypertension 2018       Past Surgical History:   Procedure Laterality Date     CARPAL TUNNEL RELEASE RT/LT Bilateral       SECTION N/A 2018    Procedure:  SECTION;   section;  Surgeon: Jay Yanez MD;  Location: [facility] OR      SECTION N/A 10/27/2020    Procedure: REPEAT  SECTION;  Surgeon: Aníbal Pearson MD;  Location: [facility] L[facility]     GYN SURGERY  18         SINUS SURGERY  2014     TONSILLECTOMY  2014       Family History   Problem Relation Age of Onset     Esophageal Cancer Mother 57     Diabetes Mother         Type 2     Hypertension Mother      Other Cancer Mother         Esophageal     Depression Mother      Obesity Mother      Asthma Father      Glaucoma  No family hx of      Macular Degeneration No family hx of      Breast Cancer No family hx of      Colon Cancer No family hx of        Social History     Socioeconomic History     Marital status:      Spouse name: Yakov     Number of children: 1     Years of education: 16     Highest education level: Bachelor's degree (e.g., BA, AB, BS)   Occupational History     Occupation:    Tobacco Use     Smoking status: Never Smoker     Smokeless tobacco: Never Used   Substance and Sexual Activity     Alcohol use: No     Drug use: No     Sexual activity: Yes     Partners: Male     Birth control/protection: Condom   Other Topics Concern     Parent/sibling w/ CABG, MI or angioplasty before 65F 55M? No   Social History Narrative     Not on file     Social Determinants of Health     Financial Resource Strain: Low Risk      Difficulty of Paying Living Expenses: Not hard at all   Food Insecurity: No Food Insecurity     Worried About Running Out of Food in the Last Year: Never true     Ran Out of Food in the Last Year: Never true   Transportation Needs: No Transportation Needs     Lack of Transportation (Medical): No     Lack of Transportation (Non-Medical): No   Physical Activity:      Days of Exercise per Week:      Minutes of Exercise per Session:    Stress:      Feeling of Stress :    Social Connections:      Frequency of Communication with Friends and Family:      Frequency of Social Gatherings with Friends and Family:      Attends Taoist Services:      Active Member of Clubs or Organizations:      Attends Club or Organization Meetings:      Marital Status:    Intimate Partner Violence:      Fear of Current or Ex-Partner:      Emotionally Abused:      Physically Abused:      Sexually Abused:        Current Outpatient Medications   Medication Sig Dispense Refill     albuterol (PROAIR HFA/PROVENTIL HFA/VENTOLIN HFA) 108 (90 Base) MCG/ACT inhaler Inhale 2 puffs into the lungs every 4 hours as needed for  "shortness of breath / dyspnea or wheezing 18 g 11     ciclopirox (PENLAC) 8 % external solution Apply to adjacent skin and affected nails daily.  Remove with alcohol every 7 days, then repeat. 6 mL 1     clobetasol propionate (OLUX) 0.05 % external foam Apply to AA on scalp bid for 7 days then once per day for 2 weeks . Then when needed 100 g 3     omeprazole (PRILOSEC) 20 MG DR capsule TAKE ONE CAPSULE BY MOUTH ONCE DAILY 90 capsule 2     Prenatal Vit-Fe Fumarate-FA (PRENATAL MULTIVITAMIN PLUS IRON) 27-0.8 MG TABS per tablet Take 1 tablet by mouth daily 100 tablet 3     sertraline (ZOLOFT) 100 MG tablet Take 2 tablets (200 mg) by mouth daily 180 tablet 3       Allergies   Allergen Reactions     Dust Mite Extract      Pollen and animal dander.     Seasonal Allergies        REVIEW OF SYSTEMS:  CONSTITUTIONAL:  NEGATIVE for fever, chills, change in weight  INTEGUMENTARY/SKIN:  NEGATIVE for worrisome rashes, moles or lesions  EYES:  NEGATIVE for vision changes or irritation  ENT/MOUTH:  NEGATIVE for ear, mouth and throat problems  RESP:  NEGATIVE for significant cough or SOB  BREAST:  NEGATIVE for masses, tenderness or discharge  CV:  NEGATIVE for chest pain, palpitations or peripheral edema  GI:  NEGATIVE for nausea, abdominal pain, heartburn, or change in bowel habits  :  Negative   MUSCULOSKELETAL:  See HPI above  NEURO:  NEGATIVE for weakness, dizziness or paresthesias  ENDOCRINE:  NEGATIVE for temperature intolerance, skin/hair changes  HEME/ALLERGY/IMMUNE:  NEGATIVE for bleeding problems  PSYCHIATRIC:  NEGATIVE for changes in mood or affect      PHYSICAL EXAM:  /68 (BP Location: Right arm, Patient Position: Chair, Cuff Size: Adult Regular)   Ht 1.778 m (5' 10\")   Wt 103.4 kg (228 lb)   BMI 32.71 kg/m    Body mass index is 32.71 kg/m .   GENERAL APPEARANCE: healthy, alert and no distress   HEENT: No apparent thyroid megaly. Clear sclera with normal ocular movement  RESPIRATORY: No labored " breathing  SKIN: no suspicious lesions or rashes  NEURO: Normal strength and tone, mentation intact and speech normal  VASCULAR: Good pulses, and capillary refill   LYMPH: no lymphadenopathy   PSYCH:  mentation appears normal and affect normal/bright    MUSCULOSKELETAL:  Not in acute distress  No difficulty of getting up from sitting  Normal walking    Obvious significant valgus deformities both knees  No swelling or erythema in the knees    She is able to walk on her toes and heels    Other motor groups in the lower extremities are full in terms of strength    No focal sensory deficit in the lower extremities  Negative straight leg raising    Both hips with full range of motion  No pain with the range of motion of the hips    Minimal tenderness at the left greater trochanteric region    Well localized the pain at the left lumbosacral region  SI joint was not tender       ASSESSMENT:  Chronic low back pain      PLAN:  Based on the physical examination findings, her pain was felt to be coming away from the hip joint.  We felt that her main pathology was that of chronic low back pain with lumbar dysfunction.    I would not recommend doing anything to the hips including the trochanteric bursa.    We felt that core strengthening through physical therapy would be beneficial.  A referral to physical therapy was made.    We will see her back as needed.    Imaging Interpretation: None today      James Velazco MD  Department of Orthopedic Surgery        Disclaimer: This note consists of symbols derived from keyboarding, dictation and/or voice recognition software. As a result, there may be errors in the script that have gone undetected. Please consider this when interpreting information found in this chart.        Again, thank you for allowing me to participate in the care of your patient.        Sincerely,        James Velazco MD

## 2021-07-21 ENCOUNTER — OFFICE VISIT (OUTPATIENT)
Dept: PEDIATRICS | Facility: CLINIC | Age: 36
End: 2021-07-21
Payer: COMMERCIAL

## 2021-07-21 VITALS
SYSTOLIC BLOOD PRESSURE: 120 MMHG | WEIGHT: 231.6 LBS | TEMPERATURE: 97 F | HEART RATE: 68 BPM | OXYGEN SATURATION: 98 % | HEIGHT: 70 IN | DIASTOLIC BLOOD PRESSURE: 70 MMHG | BODY MASS INDEX: 33.16 KG/M2

## 2021-07-21 DIAGNOSIS — K42.9 UMBILICAL HERNIA WITHOUT OBSTRUCTION AND WITHOUT GANGRENE: ICD-10-CM

## 2021-07-21 DIAGNOSIS — N92.1 MENORRHAGIA WITH IRREGULAR CYCLE: ICD-10-CM

## 2021-07-21 DIAGNOSIS — D25.9 UTERINE LEIOMYOMA, UNSPECIFIED LOCATION: ICD-10-CM

## 2021-07-21 DIAGNOSIS — R35.0 URINARY FREQUENCY: ICD-10-CM

## 2021-07-21 DIAGNOSIS — Z01.818 PREOP GENERAL PHYSICAL EXAM: Primary | ICD-10-CM

## 2021-07-21 LAB
ALBUMIN UR-MCNC: NEGATIVE MG/DL
APPEARANCE UR: CLEAR
BILIRUB UR QL STRIP: NEGATIVE
COLOR UR AUTO: YELLOW
ERYTHROCYTE [DISTWIDTH] IN BLOOD BY AUTOMATED COUNT: 11.7 % (ref 10–15)
GLUCOSE UR STRIP-MCNC: NEGATIVE MG/DL
HCT VFR BLD AUTO: 42.1 % (ref 35–47)
HGB BLD-MCNC: 14.1 G/DL (ref 11.7–15.7)
HGB UR QL STRIP: NEGATIVE
KETONES UR STRIP-MCNC: NEGATIVE MG/DL
LEUKOCYTE ESTERASE UR QL STRIP: NEGATIVE
MCH RBC QN AUTO: 31.7 PG (ref 26.5–33)
MCHC RBC AUTO-ENTMCNC: 33.5 G/DL (ref 31.5–36.5)
MCV RBC AUTO: 95 FL (ref 78–100)
NITRATE UR QL: NEGATIVE
PH UR STRIP: 7.5 [PH] (ref 5–7)
PLATELET # BLD AUTO: 224 10E3/UL (ref 150–450)
RBC # BLD AUTO: 4.45 10E6/UL (ref 3.8–5.2)
SP GR UR STRIP: 1.01 (ref 1–1.03)
UROBILINOGEN UR STRIP-ACNC: 0.2 E.U./DL
WBC # BLD AUTO: 7.7 10E3/UL (ref 4–11)

## 2021-07-21 PROCEDURE — 81003 URINALYSIS AUTO W/O SCOPE: CPT | Performed by: INTERNAL MEDICINE

## 2021-07-21 PROCEDURE — 99214 OFFICE O/P EST MOD 30 MIN: CPT | Performed by: INTERNAL MEDICINE

## 2021-07-21 PROCEDURE — 85027 COMPLETE CBC AUTOMATED: CPT | Performed by: INTERNAL MEDICINE

## 2021-07-21 PROCEDURE — 36415 COLL VENOUS BLD VENIPUNCTURE: CPT | Performed by: INTERNAL MEDICINE

## 2021-07-21 ASSESSMENT — MIFFLIN-ST. JEOR: SCORE: 1815.78

## 2021-07-21 NOTE — PROGRESS NOTES
[facility]  [address]  [address] [place] [address]  Phone: [phone]  Fax: [phone]  Primary Provider: Valeria Daly  Pre-op Performing Provider: VALERIA DALY      PREOPERATIVE EVALUATION:  Today's date: 7/21/2021    Krystina Suazo is a 35 year old female who presents for a preoperative evaluation.    Surgical Information:  Surgery/Procedure: Umbilical hernia without obstruction and without gangrene  Surgery Location: [facility]   Surgeon: Dr. Erin cMcarthy   Surgery Date: 7/28/21  Time of Surgery: 11:30 am  Where patient plans to recover: At home with family  Fax number for surgical facility: Note does not need to be faxed, will be available electronically in Epic.    Type of Anesthesia Anticipated: to be determined    Assessment & Plan     The proposed surgical procedure is considered INTERMEDIATE risk.    Preop general physical exam    - CBC with platelets; Future    Umbilical hernia without obstruction and without gangrene  Discussed hernia repair and timing questions with options for surgical treatment of uterine fibroids and menorrhagia. She has upcoming appointment with ob/gyn and will discuss options and timing. Also discussed lifting restrictions since she has a toddler and a big 9 month old at home. I am concerned she might be unable to avoid lifting her baby, but her  will be taking time off to be home with her during recovery.    Urinary frequency  Noticed since last pregnancy. Plan UA  - UA Macro with Reflex to Micro and Culture - lab collect; Future    Uterine leiomyoma, unspecified location  Discussed implications of treatment decisions for upcoming surgery    5. Menorrhagia with irregular cycle  Meeting with ob/gyn this week.      Risks and Recommendations:  The patient has the following additional risks and recommendations for perioperative complications:   - No identified additional risk factors other than previously  addressed    Medication Instructions:  Patient is to take all scheduled medications on the day of surgery    RECOMMENDATION:  APPROVAL GIVEN to proceed with proposed procedure, without further diagnostic evaluation.    31 minutes spent on the date of the encounter doing chart review, history and exam, documentation and further activities per the note      Subjective     HPI related to upcoming procedure: Umbilical hernia without obstruction and without gangrene    Preop Questions 7/16/2021   1. Have you ever had a heart attack or stroke? No   2. Have you ever had surgery on your heart or blood vessels, such as a stent placement, a coronary artery bypass, or surgery on an artery in your head, neck, heart, or legs? No   3. Do you have chest pain with activity? No   4. Do you have a history of  heart failure? No   5. Do you currently have a cold, bronchitis or symptoms of other infection? No   6. Do you have a cough, shortness of breath, or wheezing? No   7. Do you or anyone in your family have previous history of blood clots? No   8. Do you or does anyone in your family have a serious bleeding problem such as prolonged bleeding following surgeries or cuts? No   9. Have you ever had problems with anemia or been told to take iron pills? No   10. Have you had any abnormal blood loss such as black, tarry or bloody stools, or abnormal vaginal bleeding? Yes   11. Have you ever had a blood transfusion? No   12. Are you willing to have a blood transfusion if it is medically needed before, during, or after your surgery? Yes   13. Have you or any of your relatives ever had problems with anesthesia? No   14. Do you have sleep apnea, excessive snoring or daytime drowsiness? No   15. Do you have any artifical heart valves or other implanted medical devices like a pacemaker, defibrillator, or continuous glucose monitor? No   16. Do you have artificial joints? No   17. Are you allergic to latex? No   18. Is there any chance that you  may be pregnant? No       Health Care Directive:  Patient does not have a Health Care Directive or Living Will: Discussed advance care planning with patient; information given to patient to review.    Preoperative Review of :   reviewed - no record of controlled substances prescribed.    Status of Chronic Conditions:  See problem list for active medical problems.  Problems all longstanding and stable, except as noted/documented.  See ROS for pertinent symptoms related to these conditions.    DEPRESSION - Patient has a long history of Depression of moderate severity requiring medication for control with recent symptoms being stable..Current symptoms of depression include none.       Review of Systems  CONSTITUTIONAL: NEGATIVE for fever, chills, change in weight  INTEGUMENTARY/SKIN: NEGATIVE for worrisome rashes, moles or lesions  EYES: NEGATIVE for vision changes or irritation  ENT/MOUTH: NEGATIVE for ear, mouth and throat problems  RESP: NEGATIVE for significant cough or SOB  CV: NEGATIVE for chest pain, palpitations or peripheral edema  GI: NEGATIVE for nausea, abdominal pain, heartburn, or change in bowel habits  : NEGATIVE for frequency, dysuria, or hematuria  MUSCULOSKELETAL: NEGATIVE for significant arthralgias or myalgia  NEURO: NEGATIVE for weakness, dizziness or paresthesias  ENDOCRINE: NEGATIVE for temperature intolerance, skin/hair changes  HEME: NEGATIVE for bleeding problems  PSYCHIATRIC: NEGATIVE for changes in mood or affect    Patient Active Problem List    Diagnosis Date Noted     Umbilical hernia without obstruction and without gangrene 2021     Priority: Medium     Added automatically from request for surgery [id number]       Previous  section 2020     Priority: Medium     Added automatically from request for surgery [id number]       Uterine fibroid in pregnancy 2020     Priority: Medium     Obesity (BMI 35.0-39.9) with comorbidity (H) 04/10/2019     Priority: Medium      Vasovagal syncope 2018     Priority: Medium     COURTNEY (generalized anxiety disorder) 2017     Priority: Medium     Major depressive disorder, recurrent episode, moderate (H) 2017     Priority: Medium     Allergic rhinitis 2009     Priority: Medium     Overview:   Rhinitis Allergic  NOS       Mild intermittent asthma without complication 2009     Priority: Medium      Past Medical History:   Diagnosis Date     Asthma      Depression      Depressive disorder     Diagnosed around      Hypertension 2018     Past Surgical History:   Procedure Laterality Date     CARPAL TUNNEL RELEASE RT/LT Bilateral       SECTION N/A 2018    Procedure:  SECTION;   section;  Surgeon: Jay Yanez MD;  Location: [facility] OR      SECTION N/A 10/27/2020    Procedure: REPEAT  SECTION;  Surgeon: Aníbal Pearson MD;  Location: [facility] L+D     GYN SURGERY  18         SINUS SURGERY       TONSILLECTOMY       Current Outpatient Medications   Medication Sig Dispense Refill     albuterol (PROAIR HFA/PROVENTIL HFA/VENTOLIN HFA) 108 (90 Base) MCG/ACT inhaler Inhale 2 puffs into the lungs every 4 hours as needed for shortness of breath / dyspnea or wheezing 18 g 11     ciclopirox (PENLAC) 8 % external solution Apply to adjacent skin and affected nails daily.  Remove with alcohol every 7 days, then repeat. 6 mL 1     clobetasol propionate (OLUX) 0.05 % external foam Apply to AA on scalp bid for 7 days then once per day for 2 weeks . Then when needed 100 g 3     omeprazole (PRILOSEC) 20 MG DR capsule TAKE ONE CAPSULE BY MOUTH ONCE DAILY 90 capsule 2     Prenatal Vit-Fe Fumarate-FA (PRENATAL MULTIVITAMIN PLUS IRON) 27-0.8 MG TABS per tablet Take 1 tablet by mouth daily 100 tablet 3     sertraline (ZOLOFT) 100 MG tablet Take 2 tablets (200 mg) by mouth daily 180 tablet 3       Allergies   Allergen Reactions     Dust Mite Extract      Pollen and  "animal dander.     Seasonal Allergies         Social History     Tobacco Use     Smoking status: Never Smoker     Smokeless tobacco: Never Used   Substance Use Topics     Alcohol use: No     Family History   Problem Relation Age of Onset     Esophageal Cancer Mother 57     Diabetes Mother         Type 2     Hypertension Mother      Other Cancer Mother         Esophageal     Depression Mother      Obesity Mother      Asthma Father      Glaucoma No family hx of      Macular Degeneration No family hx of      Breast Cancer No family hx of      Colon Cancer No family hx of      History   Drug Use No         Objective     /70   Pulse 68   Temp 97  F (36.1  C) (Temporal)   Ht 1.778 m (5' 10\")   Wt 105.1 kg (231 lb 9.6 oz)   LMP 07/12/2021 (Within Days)   SpO2 98%   BMI 33.23 kg/m      Physical Exam    GENERAL APPEARANCE: healthy, alert and no distress     EYES: EOMI,  PERRL     HENT: ear canals and TM's normal and nose and mouth without ulcers or lesions     NECK: no adenopathy, no asymmetry, masses, or scars and thyroid normal to palpation     RESP: lungs clear to auscultation - no rales, rhonchi or wheezes     CV: regular rates and rhythm, normal S1 S2, no S3 or S4 and no murmur, click or rub     ABDOMEN:  soft, nontender, no HSM or masses and bowel sounds normal     MS: extremities normal- no gross deformities noted, no evidence of inflammation in joints, FROM in all extremities.     SKIN: no suspicious lesions or rashes     NEURO: Normal strength and tone, sensory exam grossly normal, mentation intact and speech normal     PSYCH: mentation appears normal. and affect normal/bright     LYMPHATICS: No cervical adenopathy        Diagnostics:  Recent Results (from the past 48 hour(s))   UA Macro with Reflex to Micro and Culture - lab collect    Collection Time: 07/21/21  2:21 PM    Specimen: Urine, Midstream   Result Value Ref Range    Color Urine Yellow Colorless, Straw, Light Yellow, Yellow    Appearance " Urine Clear Clear    Glucose Urine Negative Negative mg/dL    Bilirubin Urine Negative Negative    Ketones Urine Negative Negative mg/dL    Specific Gravity Urine 1.010 1.003 - 1.035    Blood Urine Negative Negative    pH Urine 7.5 (H) 5.0 - 7.0    Protein Albumin Urine Negative Negative mg/dL    Urobilinogen Urine 0.2 0.2, 1.0 E.U./dL    Nitrite Urine Negative Negative    Leukocyte Esterase Urine Negative Negative   CBC with platelets    Collection Time: 07/21/21  2:34 PM   Result Value Ref Range    WBC Count 7.7 4.0 - 11.0 10e3/uL    RBC Count 4.45 3.80 - 5.20 10e6/uL    Hemoglobin 14.1 11.7 - 15.7 g/dL    Hematocrit 42.1 35.0 - 47.0 %    MCV 95 78 - 100 fL    MCH 31.7 26.5 - 33.0 pg    MCHC 33.5 31.5 - 36.5 g/dL    RDW 11.7 10.0 - 15.0 %    Platelet Count 224 150 - 450 10e3/uL      No EKG required, no history of coronary heart disease, significant arrhythmia, peripheral arterial disease or other structural heart disease.    Revised Cardiac Risk Index (RCRI):  The patient has the following serious cardiovascular risks for perioperative complications:   - No serious cardiac risks = 0 points     RCRI Interpretation: 0 points: Class I (very low risk - 0.4% complication rate)           Signed Electronically by: Valeria Daly MD  Copy of this evaluation report is provided to requesting physician.      Answers for HPI/ROS submitted by the patient on 7/16/2021  If you checked off any problems, how difficult have these problems made it for you to do your work, take care of things at home, or get along with other people?: Not difficult at all  PHQ9 TOTAL SCORE: 2

## 2021-07-21 NOTE — LETTER
My Asthma Action Plan    Name: Krystina Suazo   YOB: 1985  Date: 7/21/2021   My doctor: Valeria Daly MD   My clinic: [facility]        My Rescue Medicine:   Albuterol inhaler (Proair/Ventolin/Proventil HFA)  2-4 puffs EVERY 4 HOURS as needed. Use a spacer if recommended by your provider.   My Asthma Severity:   Mild Persistent  Know your asthma triggers: upper respiratory infections, dust mites, pollens, animal dander, humidity, strong odors and fumes and cold air  None          GREEN ZONE   Good Control    I feel good    No cough or wheeze    Can work, sleep and play without asthma symptoms       Take your asthma control medicine every day.     1. If exercise triggers your asthma, take your rescue medication    15 minutes before exercise or sports, and    During exercise if you have asthma symptoms  2. Spacer to use with inhaler: If you have a spacer, make sure to use it with your inhaler             YELLOW ZONE Getting Worse  I have ANY of these:    I do not feel good    Cough or wheeze    Chest feels tight    Wake up at night   1. Keep taking your Green Zone medications  2. Start taking your rescue medicine:    every 20 minutes for up to 1 hour. Then every 4 hours for 24-48 hours.  3. If you stay in the Yellow Zone for more than 12-24 hours, contact your doctor.  4. If you do not return to the Green Zone in 12-24 hours or you get worse, start taking your oral steroid medicine if prescribed by your provider.           RED ZONE Medical Alert - Get Help  I have ANY of these:    I feel awful    Medicine is not helping    Breathing getting harder    Trouble walking or talking    Nose opens wide to breathe       1. Take your rescue medicine NOW  2. If your provider has prescribed an oral steroid medicine, start taking it NOW  3. Call your doctor NOW  4. If you are still in the Red Zone after 20 minutes and you have not reached your doctor:    Take your rescue medicine  again and    Call 911 or go to the emergency room right away    See your regular doctor within 2 weeks of an Emergency Room or Urgent Care visit for follow-up treatment.          Annual Reminders:  Meet with Asthma Educator,  Flu Shot in the Fall, consider Pneumonia Vaccination for patients with asthma (aged 19 and older).    Pharmacy: [facility] [place] - [address]     Electronically signed by Valeria Daly MD   Date: 07/21/21                    Asthma Triggers  How To Control Things That Make Your Asthma Worse    Triggers are things that make your asthma worse.  Look at the list below to help you find your triggers and   what you can do about them. You can help prevent asthma flare-ups by staying away from your triggers.      Trigger                                                          What you can do   Cigarette Smoke  Tobacco smoke can make asthma worse. Do not allow smoking in your home, car or around you.  Be sure no one smokes at a child s day care or school.  If you smoke, ask your health care provider for ways to help you quit.  Ask family members to quit too.  Ask your health care provider for a referral to Quit Plan to help you quit smoking, or call 7-635-490-PLAN.     Colds, Flu, Bronchitis  These are common triggers of asthma. Wash your hands often.  Don t touch your eyes, nose or mouth.  Get a flu shot every year.     Dust Mites  These are tiny bugs that live in cloth or carpet. They are too small to see. Wash sheets and blankets in hot water every week.   Encase pillows and mattress in dust mite proof covers.  Avoid having carpet if you can. If you have carpet, vacuum weekly.   Use a dust mask and HEPA vacuum.   Pollen and Outdoor Mold  Some people are allergic to trees, grass, or weed pollen, or molds. Try to keep your windows closed.  Limit time out doors when pollen count is high.   Ask you health care provider about taking medicine during allergy  season.     Animal Dander  Some people are allergic to skin flakes, urine or saliva from pets with fur or feathers. Keep pets with fur or feathers out of your home.    If you can t keep the pet outdoors, then keep the pet out of your bedroom.  Keep the bedroom door closed.  Keep pets off cloth furniture and away from stuffed toys.     Mice, Rats, and Cockroaches  Some people are allergic to the waste from these pests.   Cover food and garbage.  Clean up spills and food crumbs.  Store grease in the refrigerator.   Keep food out of the bedroom.   Indoor Mold  This can be a trigger if your home has high moisture. Fix leaking faucets, pipes, or other sources of water.   Clean moldy surfaces.  Dehumidify basement if it is damp and smelly.   Smoke, Strong Odors, and Sprays  These can reduce air quality. Stay away from strong odors and sprays, such as perfume, powder, hair spray, paints, smoke incense, paint, cleaning products, candles and new carpet.   Exercise or Sports  Some people with asthma have this trigger. Be active!  Ask your doctor about taking medicine before sports or exercise to prevent symptoms.    Warm up for 5-10 minutes before and after sports or exercise.     Other Triggers of Asthma  Cold air:  Cover your nose and mouth with a scarf.  Sometimes laughing or crying can be a trigger.  Some medicines and food can trigger asthma.

## 2021-07-21 NOTE — PATIENT INSTRUCTIONS

## 2021-07-22 ASSESSMENT — ASTHMA QUESTIONNAIRES: ACT_TOTALSCORE: 25

## 2021-07-23 ENCOUNTER — OFFICE VISIT (OUTPATIENT)
Dept: OBGYN | Facility: CLINIC | Age: 36
End: 2021-07-23
Payer: COMMERCIAL

## 2021-07-23 VITALS
HEART RATE: 72 BPM | DIASTOLIC BLOOD PRESSURE: 70 MMHG | WEIGHT: 231 LBS | SYSTOLIC BLOOD PRESSURE: 128 MMHG | BODY MASS INDEX: 33.07 KG/M2 | HEIGHT: 70 IN

## 2021-07-23 DIAGNOSIS — D25.1 INTRAMURAL AND SUBMUCOUS LEIOMYOMA OF UTERUS: Primary | ICD-10-CM

## 2021-07-23 DIAGNOSIS — D25.0 INTRAMURAL AND SUBMUCOUS LEIOMYOMA OF UTERUS: Primary | ICD-10-CM

## 2021-07-23 PROCEDURE — 99214 OFFICE O/P EST MOD 30 MIN: CPT | Performed by: OBSTETRICS & GYNECOLOGY

## 2021-07-23 ASSESSMENT — MIFFLIN-ST. JEOR: SCORE: 1813.06

## 2021-07-23 NOTE — NURSING NOTE
"Chief Complaint   Patient presents with     Consult     Discuss fibroids on US, heavy period this month and spotting since April.        Initial /70   Pulse 72   Ht 1.778 m (5' 10\")   Wt 104.8 kg (231 lb)   LMP 2021 (Within Days)   Breastfeeding No   BMI 33.15 kg/m   Estimated body mass index is 33.15 kg/m  as calculated from the following:    Height as of this encounter: 1.778 m (5' 10\").    Weight as of this encounter: 104.8 kg (231 lb).  BP completed using cuff size: regular    Questioned patient about current smoking habits.  Pt. has never smoked.          The following HM Due: NONE      The following patient reported/Care Every where data was sent to:  P ABSTRACT QUALITY INITIATIVES [[id number]]  Marquita Ramirez LPN             "

## 2021-07-23 NOTE — PROGRESS NOTES
"  SUBJECTIVE:                                                     Krystina Suazo is a 35 year old female  who presents to clinic today for increased vaginal bleeding, urinary frequency, and known large uterine fibroids.    She has a history of known uterine fibroids, at the time of  one large left lateral fundal fibroid measuring up to 14 cm in diameter per Dr. Pearson's operative note. She had a CT scan  due to abdominal pain, and at that time a predominant fibroid measuring approximately 10.2 cm was noted.  She subsequently had a pelvic ultrasound, which showed multiple fibroids, the largest of which was posterior measuring 6.8 x 4.7 x 9.5 cm.  We discussed today that the most accurate imaging study for determining but the location and the size as well as the number of uterine fibroids would be pelvic MRI.  She has not yet had the study done.    CT scan was initially done due to abdominal pain that was determined to be from an umbilical hernia.  She is scheduled for umbilical hernia repair next week.  The question arose as to whether she might have a myomectomy done at the same time as the umbilical hernia repair.  She is here to discuss that and her options for treatment.    She states that she and her  are \"99% sure\" that they do not desire more children.  However, she cannot say that there is no circumstance under which she would want to have more children, at least not today and without talking to her  more.  She does tell me that they tell everyone that they are not having more children.  She just feels that she is somewhat young to make that decision permanent, most of the time.      Problem list and histories reviewed & adjusted, as indicated.  Additional history: as documented.    Patient Active Problem List   Diagnosis     COURTNEY (generalized anxiety disorder)     Major depressive disorder, recurrent episode, moderate (H)     Allergic rhinitis     Mild intermittent " asthma without complication     Vasovagal syncope     Obesity (BMI 35.0-39.9) with comorbidity (H)     Uterine fibroid in pregnancy     Previous  section     Umbilical hernia without obstruction and without gangrene     Past Surgical History:   Procedure Laterality Date     CARPAL TUNNEL RELEASE RT/LT Bilateral       SECTION N/A 2018    Procedure:  SECTION;   section;  Surgeon: Jay Yanez MD;  Location: [facility] OR      SECTION N/A 10/27/2020    Procedure: REPEAT  SECTION;  Surgeon: Aníbal Pearson MD;  Location: [facility] L+D     GYN SURGERY  18         SINUS SURGERY       TONSILLECTOMY        Social History     Tobacco Use     Smoking status: Never Smoker     Smokeless tobacco: Never Used   Substance Use Topics     Alcohol use: No      Problem (# of Occurrences) Relation (Name,Age of Onset)    Asthma (1) Father (Father)    Depression (1) Mother (Echo)    Diabetes (1) Mother (Echo): Type 2    Esophageal Cancer (1) Mother (Echo, 57)    Hypertension (1) Mother (Echo)    Obesity (1) Mother (Echo)    Other Cancer (1) Mother (Echo): Esophageal       Negative family history of: Glaucoma, Macular Degeneration, Breast Cancer, Colon Cancer            albuterol (PROAIR HFA/PROVENTIL HFA/VENTOLIN HFA) 108 (90 Base) MCG/ACT inhaler, Inhale 2 puffs into the lungs every 4 hours as needed for shortness of breath / dyspnea or wheezing  ciclopirox (PENLAC) 8 % external solution, Apply to adjacent skin and affected nails daily.  Remove with alcohol every 7 days, then repeat.  clobetasol propionate (OLUX) 0.05 % external foam, Apply to AA on scalp bid for 7 days then once per day for 2 weeks . Then when needed  omeprazole (PRILOSEC) 20 MG DR capsule, TAKE ONE CAPSULE BY MOUTH ONCE DAILY  Prenatal Vit-Fe Fumarate-FA (PRENATAL MULTIVITAMIN PLUS IRON) 27-0.8 MG TABS per tablet, Take 1 tablet by mouth daily  sertraline (ZOLOFT) 100 MG tablet, Take 2  tablets (200 mg) by mouth daily    No current facility-administered medications on file prior to visit.    Allergies   Allergen Reactions     Dust Mite Extract      Pollen and animal dander.     Seasonal Allergies        ROS:  Positive for an increase in the number and heaviness of bleeding days, positive for occasional urinary incontinence, positive for urinary frequency and pelvic discomfort.  Discomfort is increased on the left side    OBJECTIVE:     Exam:  Constitutional:  Appearance: Well nourished, well developed alert, in no acute distress  Chest:  Respiratory Effort:  Breathing unlabored  Gastrointestinal:  Abdominal Examination:  Abdomen nontender to palpation, tone normal without rigidity or guarding, left lateral fibroid is palpable approximately 3 to 4 fingerbreadths below the umbilicus.    In-Clinic Test Results:  No results found for this or any previous visit (from the past 24 hour(s)).    ASSESSMENT/PLAN:                                                        ICD-10-CM    1. Intramural and submucous leiomyoma of uterus  D25.1 MR Pelvis (GYN) w Contrast    D25.0          We discussed uterine fibroids, including the fact that this is a common finding, that these are benign growths, and that most of the time there is no effect on fertility. This can be a cause of heavy periods. Treatment options include medical therapy for heavy periods (oral contraceptive pills, progestin birth control methods, cyclic progesterone, Mirena IUD) and surgical therapy (myomectomy) for those wishing to preserve fertility; MRI-guided focused US, uterine artery embolization, or hysterectomy for those who are not planning children.     It is technically feasible to perform myomectomy or hysterectomy at the same time as a planned umbilical hernia repair, but this would likely require more coordination then can happen before next week.  In addition, if she is not absolutely certain that she does not desire more children, her  best option is either medical therapy or myomectomy.  If she knows that she is done having children, I would recommend hysterectomy over myomectomy, as it actually has a lower complication rate and risk of blood loss as compared to myomectomy in most cases.  It also eliminates recurrence of uterine fibroids that might require intervention in the future.    Whether she opts for an interventional radiology consultation for possible UAE, myomectomy, or hysterectomy, she would benefit from a pelvic MRI to further elucidate the size, number, and location of the uterine fibroids.  This was ordered today.    If she wants to pursue myomectomy, we talked about the differences between robotic assisted myomectomy with morcellation and open myomectomy.  I would recommend an attempt at robotic myomectomy if the MRI shows that this is feasible, and would refer her to one of my partners who performs that procedure if that were the case.  If open myomectomy is deemed to be the only option, I could certainly perform that for her.  We discussed that this would not be done through the same incision that would be used to repair her umbilical hernia.  However, the umbilicus is often used for robotic surgery, and she may want to consider delaying her umbilical hernia repair until after the MRI and further consultation regarding her plans for treatment of fibroids.    Pelvic MRI was ordered today.  She will continue to discuss options with her partner, and we will be back in touch after the MRI results are available.  If she is interested in hysterectomy, we could discuss timing of that, whether suppressive Lupron for 6 months prior to attempt might increase the possibility of laparoscopic hysterectomy, and answer any further questions she might have.    Sandy Tran MD  [facility]

## 2021-07-26 ENCOUNTER — LAB (OUTPATIENT)
Dept: LAB | Facility: CLINIC | Age: 36
End: 2021-07-26
Payer: COMMERCIAL

## 2021-07-26 PROCEDURE — U0005 INFEC AGEN DETEC AMPLI PROBE: HCPCS

## 2021-07-26 PROCEDURE — U0003 INFECTIOUS AGENT DETECTION BY NUCLEIC ACID (DNA OR RNA); SEVERE ACUTE RESPIRATORY SYNDROME CORONAVIRUS 2 (SARS-COV-2) (CORONAVIRUS DISEASE [COVID-19]), AMPLIFIED PROBE TECHNIQUE, MAKING USE OF HIGH THROUGHPUT TECHNOLOGIES AS DESCRIBED BY CMS-2020-01-R: HCPCS

## 2021-07-27 LAB — SARS-COV-2 RNA RESP QL NAA+PROBE: NEGATIVE

## 2021-07-28 ENCOUNTER — ANESTHESIA EVENT (OUTPATIENT)
Dept: SURGERY | Facility: CLINIC | Age: 36
End: 2021-07-28
Payer: COMMERCIAL

## 2021-07-28 ENCOUNTER — HOSPITAL ENCOUNTER (OUTPATIENT)
Facility: CLINIC | Age: 36
Discharge: HOME | End: 2021-07-28
Attending: SURGERY | Admitting: SURGERY
Payer: COMMERCIAL

## 2021-07-28 ENCOUNTER — APPOINTMENT (OUTPATIENT)
Dept: SURGERY | Facility: PHYSICIAN GROUP | Age: 36
End: 2021-07-28
Payer: COMMERCIAL

## 2021-07-28 ENCOUNTER — ANESTHESIA (OUTPATIENT)
Dept: SURGERY | Facility: CLINIC | Age: 36
End: 2021-07-28
Payer: COMMERCIAL

## 2021-07-28 VITALS
DIASTOLIC BLOOD PRESSURE: 60 MMHG | OXYGEN SATURATION: 96 % | SYSTOLIC BLOOD PRESSURE: 124 MMHG | RESPIRATION RATE: 16 BRPM | TEMPERATURE: 97.7 F | HEIGHT: 70 IN | HEART RATE: 65 BPM | BODY MASS INDEX: 32.64 KG/M2 | WEIGHT: 228 LBS

## 2021-07-28 DIAGNOSIS — K42.9 UMBILICAL HERNIA WITHOUT OBSTRUCTION AND WITHOUT GANGRENE: ICD-10-CM

## 2021-07-28 PROCEDURE — 49585 PR REPAIR UMBILICAL HERN,5+Y/O,REDUC: CPT | Mod: AS | Performed by: PHYSICIAN ASSISTANT

## 2021-07-28 PROCEDURE — 710N000009 HC RECOVERY PHASE 1, LEVEL 1, PER MIN: Performed by: SURGERY

## 2021-07-28 PROCEDURE — 999N000141 HC STATISTIC PRE-PROCEDURE NURSING ASSESSMENT: Performed by: SURGERY

## 2021-07-28 PROCEDURE — 360N000075 HC SURGERY LEVEL 2, PER MIN: Performed by: SURGERY

## 2021-07-28 PROCEDURE — 250N000013 HC RX MED GY IP 250 OP 250 PS 637: Performed by: SURGERY

## 2021-07-28 PROCEDURE — 250N000009 HC RX 250: Performed by: SURGERY

## 2021-07-28 PROCEDURE — 250N000009 HC RX 250: Performed by: NURSE ANESTHETIST, CERTIFIED REGISTERED

## 2021-07-28 PROCEDURE — 710N000012 HC RECOVERY PHASE 2, PER MINUTE: Performed by: SURGERY

## 2021-07-28 PROCEDURE — 258N000003 HC RX IP 258 OP 636: Performed by: ANESTHESIOLOGY

## 2021-07-28 PROCEDURE — 370N000017 HC ANESTHESIA TECHNICAL FEE, PER MIN: Performed by: SURGERY

## 2021-07-28 PROCEDURE — 250N000011 HC RX IP 250 OP 636: Performed by: ANESTHESIOLOGY

## 2021-07-28 PROCEDURE — 272N000001 HC OR GENERAL SUPPLY STERILE: Performed by: SURGERY

## 2021-07-28 PROCEDURE — 49585 PR REPAIR UMBILICAL HERN,5+Y/O,REDUC: CPT | Performed by: SURGERY

## 2021-07-28 PROCEDURE — 250N000011 HC RX IP 250 OP 636: Performed by: NURSE ANESTHETIST, CERTIFIED REGISTERED

## 2021-07-28 PROCEDURE — C1781 MESH (IMPLANTABLE): HCPCS | Performed by: SURGERY

## 2021-07-28 PROCEDURE — 250N000011 HC RX IP 250 OP 636: Performed by: SURGERY

## 2021-07-28 DEVICE — MESH VENTRALEX HERNIA 2.5" CIRCLE MED W/STRAP 5950008: Type: IMPLANTABLE DEVICE | Site: ABDOMEN | Status: FUNCTIONAL

## 2021-07-28 RX ORDER — MEPERIDINE HYDROCHLORIDE 25 MG/ML
12.5 INJECTION INTRAMUSCULAR; INTRAVENOUS; SUBCUTANEOUS
Status: DISCONTINUED | OUTPATIENT
Start: 2021-07-28 | End: 2021-07-28 | Stop reason: HOSPADM

## 2021-07-28 RX ORDER — LIDOCAINE 40 MG/G
CREAM TOPICAL
Status: DISCONTINUED | OUTPATIENT
Start: 2021-07-28 | End: 2021-07-28 | Stop reason: HOSPADM

## 2021-07-28 RX ORDER — FENTANYL CITRATE 50 UG/ML
50 INJECTION, SOLUTION INTRAMUSCULAR; INTRAVENOUS EVERY 5 MIN PRN
Status: DISCONTINUED | OUTPATIENT
Start: 2021-07-28 | End: 2021-07-28 | Stop reason: HOSPADM

## 2021-07-28 RX ORDER — FENTANYL CITRATE 50 UG/ML
INJECTION, SOLUTION INTRAMUSCULAR; INTRAVENOUS PRN
Status: DISCONTINUED | OUTPATIENT
Start: 2021-07-28 | End: 2021-07-28

## 2021-07-28 RX ORDER — OXYCODONE HYDROCHLORIDE 5 MG/1
5-10 TABLET ORAL EVERY 4 HOURS PRN
Qty: 12 TABLET | Refills: 0 | Status: SHIPPED | OUTPATIENT
Start: 2021-07-28 | End: 2021-09-17

## 2021-07-28 RX ORDER — CEFAZOLIN SODIUM 2 G/100ML
2 INJECTION, SOLUTION INTRAVENOUS SEE ADMIN INSTRUCTIONS
Status: DISCONTINUED | OUTPATIENT
Start: 2021-07-28 | End: 2021-07-28 | Stop reason: HOSPADM

## 2021-07-28 RX ORDER — PROPOFOL 10 MG/ML
INJECTION, EMULSION INTRAVENOUS PRN
Status: DISCONTINUED | OUTPATIENT
Start: 2021-07-28 | End: 2021-07-28

## 2021-07-28 RX ORDER — SODIUM CHLORIDE, SODIUM LACTATE, POTASSIUM CHLORIDE, CALCIUM CHLORIDE 600; 310; 30; 20 MG/100ML; MG/100ML; MG/100ML; MG/100ML
INJECTION, SOLUTION INTRAVENOUS CONTINUOUS
Status: DISCONTINUED | OUTPATIENT
Start: 2021-07-28 | End: 2021-07-28 | Stop reason: HOSPADM

## 2021-07-28 RX ORDER — OXYCODONE HYDROCHLORIDE 5 MG/1
5 TABLET ORAL
Status: COMPLETED | OUTPATIENT
Start: 2021-07-28 | End: 2021-07-28

## 2021-07-28 RX ORDER — DEXAMETHASONE SODIUM PHOSPHATE 4 MG/ML
INJECTION, SOLUTION INTRA-ARTICULAR; INTRALESIONAL; INTRAMUSCULAR; INTRAVENOUS; SOFT TISSUE PRN
Status: DISCONTINUED | OUTPATIENT
Start: 2021-07-28 | End: 2021-07-28

## 2021-07-28 RX ORDER — ONDANSETRON 4 MG/1
4 TABLET, ORALLY DISINTEGRATING ORAL EVERY 30 MIN PRN
Status: DISCONTINUED | OUTPATIENT
Start: 2021-07-28 | End: 2021-07-28 | Stop reason: HOSPADM

## 2021-07-28 RX ORDER — ONDANSETRON 2 MG/ML
INJECTION INTRAMUSCULAR; INTRAVENOUS PRN
Status: DISCONTINUED | OUTPATIENT
Start: 2021-07-28 | End: 2021-07-28

## 2021-07-28 RX ORDER — HALOPERIDOL 5 MG/ML
1 INJECTION INTRAMUSCULAR
Status: DISCONTINUED | OUTPATIENT
Start: 2021-07-28 | End: 2021-07-28 | Stop reason: HOSPADM

## 2021-07-28 RX ORDER — ALBUTEROL SULFATE 0.83 MG/ML
2.5 SOLUTION RESPIRATORY (INHALATION) EVERY 4 HOURS PRN
Status: DISCONTINUED | OUTPATIENT
Start: 2021-07-28 | End: 2021-07-28 | Stop reason: HOSPADM

## 2021-07-28 RX ORDER — GLYCINE 1.5 G/100ML
SOLUTION IRRIGATION PRN
Status: DISCONTINUED | OUTPATIENT
Start: 2021-07-28 | End: 2021-07-28 | Stop reason: HOSPADM

## 2021-07-28 RX ORDER — LIDOCAINE HYDROCHLORIDE 10 MG/ML
INJECTION, SOLUTION INFILTRATION; PERINEURAL PRN
Status: DISCONTINUED | OUTPATIENT
Start: 2021-07-28 | End: 2021-07-28

## 2021-07-28 RX ORDER — ONDANSETRON 2 MG/ML
4 INJECTION INTRAMUSCULAR; INTRAVENOUS EVERY 30 MIN PRN
Status: DISCONTINUED | OUTPATIENT
Start: 2021-07-28 | End: 2021-07-28 | Stop reason: HOSPADM

## 2021-07-28 RX ORDER — GLYCOPYRROLATE 0.2 MG/ML
INJECTION, SOLUTION INTRAMUSCULAR; INTRAVENOUS PRN
Status: DISCONTINUED | OUTPATIENT
Start: 2021-07-28 | End: 2021-07-28

## 2021-07-28 RX ORDER — PHYSOSTIGMINE SALICYLATE 1 MG/ML
1 INJECTION INTRAVENOUS
Status: DISCONTINUED | OUTPATIENT
Start: 2021-07-28 | End: 2021-07-28 | Stop reason: HOSPADM

## 2021-07-28 RX ORDER — LABETALOL HYDROCHLORIDE 5 MG/ML
10 INJECTION, SOLUTION INTRAVENOUS
Status: DISCONTINUED | OUTPATIENT
Start: 2021-07-28 | End: 2021-07-28 | Stop reason: HOSPADM

## 2021-07-28 RX ORDER — IBUPROFEN 200 MG
600 TABLET ORAL EVERY 6 HOURS PRN
COMMUNITY
Start: 2021-07-28 | End: 2021-09-17

## 2021-07-28 RX ORDER — CEFAZOLIN SODIUM 2 G/100ML
2 INJECTION, SOLUTION INTRAVENOUS
Status: COMPLETED | OUTPATIENT
Start: 2021-07-28 | End: 2021-07-28

## 2021-07-28 RX ORDER — ACETAMINOPHEN 325 MG/1
650 TABLET ORAL
Status: DISCONTINUED | OUTPATIENT
Start: 2021-07-28 | End: 2021-07-28 | Stop reason: HOSPADM

## 2021-07-28 RX ORDER — OXYCODONE HYDROCHLORIDE 5 MG/1
5 TABLET ORAL EVERY 4 HOURS PRN
Status: DISCONTINUED | OUTPATIENT
Start: 2021-07-28 | End: 2021-07-28 | Stop reason: HOSPADM

## 2021-07-28 RX ORDER — KETOROLAC TROMETHAMINE 30 MG/ML
INJECTION, SOLUTION INTRAMUSCULAR; INTRAVENOUS PRN
Status: DISCONTINUED | OUTPATIENT
Start: 2021-07-28 | End: 2021-07-28

## 2021-07-28 RX ORDER — ACETAMINOPHEN 500 MG
1000 TABLET ORAL EVERY 6 HOURS PRN
COMMUNITY
Start: 2021-07-28 | End: 2021-09-17

## 2021-07-28 RX ORDER — HYDRALAZINE HYDROCHLORIDE 20 MG/ML
2.5-5 INJECTION INTRAMUSCULAR; INTRAVENOUS EVERY 10 MIN PRN
Status: DISCONTINUED | OUTPATIENT
Start: 2021-07-28 | End: 2021-07-28 | Stop reason: HOSPADM

## 2021-07-28 RX ORDER — BUPIVACAINE HYDROCHLORIDE 5 MG/ML
INJECTION, SOLUTION EPIDURAL; INTRACAUDAL PRN
Status: DISCONTINUED | OUTPATIENT
Start: 2021-07-28 | End: 2021-07-28 | Stop reason: HOSPADM

## 2021-07-28 RX ORDER — HYDROMORPHONE HCL IN WATER/PF 6 MG/30 ML
0.2 PATIENT CONTROLLED ANALGESIA SYRINGE INTRAVENOUS EVERY 5 MIN PRN
Status: DISCONTINUED | OUTPATIENT
Start: 2021-07-28 | End: 2021-07-28 | Stop reason: HOSPADM

## 2021-07-28 RX ORDER — DIAZEPAM 10 MG/2ML
2.5 INJECTION, SOLUTION INTRAMUSCULAR; INTRAVENOUS
Status: DISCONTINUED | OUTPATIENT
Start: 2021-07-28 | End: 2021-07-28 | Stop reason: HOSPADM

## 2021-07-28 RX ORDER — MAGNESIUM HYDROXIDE 1200 MG/15ML
LIQUID ORAL PRN
Status: DISCONTINUED | OUTPATIENT
Start: 2021-07-28 | End: 2021-07-28 | Stop reason: HOSPADM

## 2021-07-28 RX ADMIN — MIDAZOLAM 2 MG: 1 INJECTION INTRAMUSCULAR; INTRAVENOUS at 13:02

## 2021-07-28 RX ADMIN — FENTANYL CITRATE 100 MCG: 50 INJECTION, SOLUTION INTRAMUSCULAR; INTRAVENOUS at 13:07

## 2021-07-28 RX ADMIN — CEFAZOLIN SODIUM 2 G: 2 INJECTION, SOLUTION INTRAVENOUS at 13:02

## 2021-07-28 RX ADMIN — HYDROMORPHONE HYDROCHLORIDE 0.2 MG: 1 INJECTION, SOLUTION INTRAMUSCULAR; INTRAVENOUS; SUBCUTANEOUS at 14:44

## 2021-07-28 RX ADMIN — ONDANSETRON HYDROCHLORIDE 4 MG: 2 INJECTION, SOLUTION INTRAVENOUS at 13:07

## 2021-07-28 RX ADMIN — FENTANYL CITRATE 50 MCG: 50 INJECTION, SOLUTION INTRAMUSCULAR; INTRAVENOUS at 13:20

## 2021-07-28 RX ADMIN — DEXAMETHASONE SODIUM PHOSPHATE 8 MG: 4 INJECTION, SOLUTION INTRA-ARTICULAR; INTRALESIONAL; INTRAMUSCULAR; INTRAVENOUS; SOFT TISSUE at 13:07

## 2021-07-28 RX ADMIN — OXYCODONE HYDROCHLORIDE 5 MG: 5 TABLET ORAL at 16:19

## 2021-07-28 RX ADMIN — SODIUM CHLORIDE, POTASSIUM CHLORIDE, SODIUM LACTATE AND CALCIUM CHLORIDE: 600; 310; 30; 20 INJECTION, SOLUTION INTRAVENOUS at 12:52

## 2021-07-28 RX ADMIN — GLYCOPYRROLATE 0.2 MG: 0.2 INJECTION, SOLUTION INTRAMUSCULAR; INTRAVENOUS at 13:07

## 2021-07-28 RX ADMIN — LIDOCAINE HYDROCHLORIDE 50 MG: 10 INJECTION, SOLUTION INFILTRATION; PERINEURAL at 13:08

## 2021-07-28 RX ADMIN — SODIUM CHLORIDE, POTASSIUM CHLORIDE, SODIUM LACTATE AND CALCIUM CHLORIDE: 600; 310; 30; 20 INJECTION, SOLUTION INTRAVENOUS at 14:31

## 2021-07-28 RX ADMIN — KETOROLAC TROMETHAMINE 30 MG: 30 INJECTION, SOLUTION INTRAMUSCULAR at 13:07

## 2021-07-28 RX ADMIN — FENTANYL CITRATE 50 MCG: 50 INJECTION, SOLUTION INTRAMUSCULAR; INTRAVENOUS at 13:37

## 2021-07-28 RX ADMIN — PROPOFOL 200 MG: 10 INJECTION, EMULSION INTRAVENOUS at 13:07

## 2021-07-28 ASSESSMENT — MIFFLIN-ST. JEOR: SCORE: 1799.45

## 2021-07-28 NOTE — ANESTHESIA POSTPROCEDURE EVALUATION
Patient: Krystina Suazo    Procedure(s):  Open Umbilical Hernia Repair with Mesh    Diagnosis:Umbilical hernia without obstruction and without gangrene [K42.9]  Diagnosis Additional Information: No value filed.    Anesthesia Type:  General    Note:  Disposition: Outpatient   Postop Pain Control: Uneventful            Sign Out: Well controlled pain   PONV: No   Neuro/Psych: Uneventful            Sign Out: Acceptable/Baseline neuro status   Airway/Respiratory: Uneventful            Sign Out: Acceptable/Baseline resp. status   CV/Hemodynamics: Uneventful            Sign Out: Acceptable CV status; No obvious hypovolemia; No obvious fluid overload   Other NRE: NONE   DID A NON-ROUTINE EVENT OCCUR? No           Last vitals:  Vitals Value Taken Time   /69 07/28/21 1500   Temp 97.4  F (36.3  C) 07/28/21 1416   Pulse 61 07/28/21 1511   Resp 9 07/28/21 1511   SpO2 95 % 07/28/21 1516   Vitals shown include unvalidated device data.    Electronically Signed By: Michael Rachel MD  July 28, 2021  3:36 PM

## 2021-07-28 NOTE — DISCHARGE INSTRUCTIONS
FREQUENTLY ASKED QUESTIONS AFTER SURGERY  RODRIGUEZ Maria, NATALIYA Smith R. O'Donnell  & STEPHANIE Blackmon      Q:  How should my incision look?    A:  Normally your incision will appear slightly swollen with light redness directly along the incision itself as it heals.  It may feel like a bump or ridge as the healing/scarring happens, and over time (3-4 months) this bump or ridge feeling should slowly go away.  In general, clear or pink watery drainage can be normal at first as your incision heals, but should decrease over time.    Q:  How do I know if my incision is infected?  A:  Look at your incision for signs of infection, like redness around the incision spreading to surrounding skin, or drainage of cloudy or foul-smelling drainage.  If you feel warm, check your temperature to see if you are running a fever.    **If any of these things occur, please notify the nurse at our office.  We may need you to come into the office for an incision check.      Q:  How do I take care of my incision?  A:  If you have a dressing in place - Starting the day after surgery, replace the dressing 1-2 times a day until there is no further drainage from the incision.  At that time, a dressing is no longer needed.  Try to minimize tape on the skin if irritation is occurring at the tape sites.  If you have significant irritation from tape on the skin, please call the office to discuss other method of dressing your incision.    Small pieces of tape called  steri-strips  may be present directly overlying your incision; these may be removed 10 days after surgery unless otherwise specified by your surgeon.  If these tapes start to loosen at the ends, you may trim them back until they fall off or are removed.    A:  If you had  Dermabond  tissue glue used as a dressing (this causes your incision to look shiny with a clear covering over it) - This type of dressing wears off with time and does not require more dressings over  the top unless it is draining around the glue as it wears off.  Do not apply ointments or lotions over the incisions until the glue has completely worn off.    Q:  There is a piece of tape or a sticky  lead  still on my skin.  Can I remove this?  A:  Sometimes the sticky  leads  used for monitoring during surgery or for evaluation in the emergency department are not all removed while you are in the hospital.  These sometimes have a tab or metal dot on them.  You can easily remove these on your own, like taking off a band-aid.  If there is a gel substance under the  lead , simply wipe/clean it off with a washcloth or paper towel.      Q:  What can I do to minimize constipation (very hard stools, or lack of stools)?  A:  Stay well hydrated.  Increase your dietary fiber intake or take a fiber supplement -with plenty of water.  Walk around frequently.  You may consider an over-the-counter stool-softener.  Your Pharmacist can assist you with choosing one that is stocked at your pharmacy.  Constipation is also one of the most common side effects of pain medication.  If you are using pain medication, be pro-active and try to PREVENT problems with constipation by taking the steps above BEFORE constipation becomes a problem.    Q:  What do I do if I need more pain medications?  A:  Call the office to receive refills.  Be aware that certain pain meds cannot be called into a pharmacy and actually require a paper prescription.  A change may be made in your pain med as you progress thru your recovery period or if you have side effects to certain meds.    --Pain meds are NOT refilled after 5pm on weekdays, and NOT AT ALL on the weekends, so please look ahead to prevent problems.                  Q:  Why am I having a hard time sleeping now that I am at home?  A:  Many medications you receive while you are in the hospital can impact your sleep for a number of days after your surgery/hospitalization.  Decreased level of activity  and naps during the day may also make sleeping at night difficult.  Try to minimize day-time naps, and get up frequently during the day to walk around your home during your recovery time.  Sleep aides may be of some help, but are not recommended for long-term use.      Q:  I am having some back discomfort.  What should I do?  A:  This may be related to certain positioning that was required for your surgery, extended periods of time in bed, or other changes in your overall activity level.  You may try ice, heat, acetaminophen, or ibuprofen to treat this temporarily.  Note that many pain medications have acetaminophen in them and would state this on the prescription bottle.  Be sure not to exceed the maximum of 4000mg per day of acetaminophen.     **If the pain you are having does not resolve, is severe, or is a flare of back pain you have had on other occasions prior to surgery, please contact your primary physician for further recommendations or for an appointment to be examined at their office.    Q:  Why am I having headaches?  A:  Headaches can be caused by many things:  caffeine withdrawal, use of pain meds, dehydration, high blood pressure, lack of sleep, over-activity/exhaustion, flare-up of usual migraine headaches.  If you feel this is related to muscle tension (a band-like feeling around the head, or a pressure at the low-back of the head) you may try ice or heat to this area.  You may need to drink more fluids (try electrolyte drink like Gatorade), rest, or take your usual migraine medications.   **If your headaches do not resolve, worsen, are accompanied by other symptoms, or if your blood pressure is high, please call your primary physician for recommendation and/or examination.    Q:  I am unable to urinate.  What do I do?  A:  A small percentage of people can have difficulty urinating initially after surgery.  This includes being able to urinate only a very small amount at a time and feeling discomfort  or pressure in the very low abdomen.  This is called  urinary retention , and is actually an urgent situation.  Proceed to your nearest Emergency department for evaluation (not an Urgent Care Center).  Sometimes the bladder does not work correctly after certain medications you receive during surgery, or related to certain procedures.  You may need to have a catheter placed until your bladder recovers.  When planning to go to an Emergency department, it may help to call the ER to let them know you are coming in for this problem after a surgery.  This may help you get in quicker to be evaluated.  **If you have symptoms of a urinary tract infection, please contact your primary physician for the proper evaluation and treatment.              If you have other questions, please call the office Monday thru Friday between 8am and 5pm to discuss with the nurse or physician assistant.  #[phone]    There is a surgeon ON CALL on weekday evenings and over the weekend in case of urgent need only, and may be contacted at the same number.    If you are having an emergency, call 911 or proceed to your nearest emergency department.      GENERAL ANESTHESIA OR SEDATION ADULT DISCHARGE INSTRUCTIONS   SPECIAL PRECAUTIONS FOR 24 HOURS AFTER SURGERY    IT IS NOT UNUSUAL TO FEEL LIGHT-HEADED OR FAINT, UP TO 24 HOURS AFTER SURGERY OR WHILE TAKING PAIN MEDICATION.  IF YOU HAVE THESE SYMPTOMS; SIT FOR A FEW MINUTES BEFORE STANDING AND HAVE SOMEONE ASSIST YOU WHEN YOU GET UP TO WALK OR USE THE BATHROOM.    YOU SHOULD REST AND RELAX FOR THE NEXT 24 HOURS AND YOU MUST MAKE ARRANGEMENTS TO HAVE SOMEONE STAY WITH YOU FOR AT LEAST 24 HOURS AFTER YOUR DISCHARGE.  AVOID HAZARDOUS AND STRENUOUS ACTIVITIES.  DO NOT MAKE IMPORTANT DECISIONS FOR 24 HOURS.    DO NOT DRIVE ANY VEHICLE OR OPERATE MECHANICAL EQUIPMENT FOR 24 HOURS FOLLOWING THE END OF YOUR SURGERY.  EVEN THOUGH YOU MAY FEEL NORMAL, YOUR REACTIONS MAY BE AFFECTED BY THE MEDICATION YOU  HAVE RECEIVED.    DO NOT DRINK ALCOHOLIC BEVERAGES FOR 24 HOURS FOLLOWING YOUR SURGERY.    DRINK CLEAR LIQUIDS (APPLE JUICE, GINGER ALE, 7-UP, BROTH, ETC.).  PROGRESS TO YOUR REGULAR DIET AS YOU FEEL ABLE.    YOU MAY HAVE A DRY MOUTH, A SORE THROAT, MUSCLES ACHES OR TROUBLE SLEEPING.  THESE SHOULD GO AWAY AFTER 24 HOURS.    CALL YOUR DOCTOR FOR ANY OF THE FOLLOWING:  SIGNS OF INFECTION (FEVER, GROWING TENDERNESS AT THE SURGERY SITE, A LARGE AMOUNT OF DRAINAGE OR BLEEDING, SEVERE PAIN, FOUL-SMELLING DRAINAGE, REDNESS OR SWELLING.    IT HAS BEEN OVER 8 TO 10 HOURS SINCE SURGERY AND YOU ARE STILL NOT ABLE TO URINATE (PASS WATER).       You received Toradol, an IV form of ibuprofen (Motrin) at 1:10pm.  Do not take any ibuprofen products until 5:10pm.

## 2021-07-28 NOTE — OP NOTE
[facility]   Operative Note    Pre-operative diagnosis: Umbilical hernia without obstruction and without gangrene [K42.9]   Post-operative diagnosis    Procedure: Procedure(s):  Open Umbilical Hernia Repair with Mesh   Surgeon(s): Surgeon(s) and Role:     * Erin Mccarthy MD - Primary     * Salina Cox PA-C - Assisting   Assistant:      Estimated blood loss: Salina Cox PA-C  The Physician Assistant was medically necessary for their expertise in prepping, camera management, suctioning, suturing and retraction.  20 ml   Specimens: None   Findings: Small hernia defect. Hernia sac with trace ascites.      Indication for procedure:   This is a 35 year old female who presented to clinic for a symptomatic umbilical hernia and desired repair. We discussed hernia repair with mesh and the patient agreed to proceed.    Description of procedure:   Patient was brought to the operating room, placed on the operating table in supine position. Anesthesia was induced. Her pressure points were padded and she was secured with a safety strap. A timeout was called to verify the patient, site of procedure and procedure to be performed.    After injecting 0.5% marcaine, a 3 cm curvilinear infraumbilical incision was created. The subcutaneous tissue was dissected down to the fascia with cautery. The umbilical stalk was encircled bluntly and then transected with cautery. The hernia was identified and was about 1 cm diameter.   A 6.4 cm diameter circular piece of polypropylene mesh was then opened.     The fascia around the hernia was defined circumferentially and the hernia sac was reduced. The hernia sac was entered and a trace amount of ascites was present within it. The sac was closed with 3-0 vicryl to ensure no contact between the mesh and the intraperitoneal space.  The mesh was placed subfascial, in a preperitoneal location. Interrupted 0 vicryl was used to secure the mesh at four corners. 0  vicryl was used to close the fascia over the mesh.  Local was injected along the fascia, subcutaneous tissues and skin. The umbilicus was reinverted using a 3-0 vicryl stitch. The skin was closed with running 4-0 vicryl and the skin was covered with dermabond.    Sponge and needle counts were correct prior to case completion.      Erin Mccarthy MD

## 2021-07-28 NOTE — ANESTHESIA PROCEDURE NOTES
Airway       Patient location during procedure: OR  Staff -        CRNA: Mary Ellen Viveros APRN CRNA       Performed By: CRNA  Consent for Airway        Urgency: elective  Indications and Patient Condition       Indications for airway management: lucia-procedural       Induction type:intravenous       Mask difficulty assessment: 1 - vent by mask    Final Airway Details       Final airway type: supraglottic airway    Supraglottic Airway Details        Type: LMA       Brand: I-Gel       LMA size: 4    Post intubation assessment        Placement verified by: capnometry, equal breath sounds and chest rise        Number of attempts at approach: 1       Number of other approaches attempted: 0       Secured with: plastic tape       Ease of procedure: easy       Dentition: Intact and Unchanged

## 2021-07-28 NOTE — ANESTHESIA PREPROCEDURE EVALUATION
Anesthesia Pre-Procedure Evaluation    Patient: Krystina Suazo   MRN: [id number] : 1985        Preoperative Diagnosis: Umbilical hernia without obstruction and without gangrene [K42.9]   Procedure : Procedure(s):  Open Umbilical Hernia Repair with Mesh     Past Medical History:   Diagnosis Date     Asthma      Depression      Depressive disorder     Diagnosed around      Hypertension 2018      Past Surgical History:   Procedure Laterality Date     CARPAL TUNNEL RELEASE RT/LT Bilateral       SECTION N/A 2018    Procedure:  SECTION;   section;  Surgeon: Jay Yanez MD;  Location: [facility] OR      SECTION N/A 10/27/2020    Procedure: REPEAT  SECTION;  Surgeon: Aníbal Pearson MD;  Location: [facility] L+D     GYN SURGERY  18         SINUS SURGERY       TONSILLECTOMY        Allergies   Allergen Reactions     Dust Mite Extract      Pollen and animal dander.     Seasonal Allergies       Social History     Tobacco Use     Smoking status: Never Smoker     Smokeless tobacco: Never Used   Substance Use Topics     Alcohol use: No      Wt Readings from Last 1 Encounters:   21 103.4 kg (228 lb)        Anesthesia Evaluation   Pt has had prior anesthetic. Type: General and Regional.    No history of anesthetic complications       ROS/MED HX  ENT/Pulmonary:     (+) allergic rhinitis, Intermittent, asthma Treatment: Inhaler prn,      Neurologic:  - neg neurologic ROS     Cardiovascular: Comment: In pregnancy    (+) hypertension-----    METS/Exercise Tolerance:     Hematologic:  - neg hematologic  ROS     Musculoskeletal:  - neg musculoskeletal ROS     GI/Hepatic: Comment: Umbilical hernia      Renal/Genitourinary:  - neg Renal ROS     Endo: Comment: Class 1 obesity    (+) Obesity,     Psychiatric/Substance Use:     (+) psychiatric history anxiety     Infectious Disease:  - neg infectious disease ROS     Malignancy:  - neg malignancy  ROS     Other:  - neg other ROS          Physical Exam    Airway        Mallampati: II   TM distance: > 3 FB   Neck ROM: full   Mouth opening: > 3 cm    Respiratory Devices and Support         Dental  no notable dental history         Cardiovascular   cardiovascular exam normal       Rhythm and rate: regular and normal     Pulmonary   pulmonary exam normal        breath sounds clear to auscultation       Other findings: Lab Test        07/21/21     10/28/20     10/26/20     08/13/20     04/20/20                       1434          0628          1309          1602          1053          WBC          7.7           --           --          8.9          6.1           HGB          14.1         10.6*        12.5         12.0         12.6          MCV          95            --           --          97           93            PLT          224           --           --          195          222            Lab Test        10/27/20     04/25/19     09/06/18     09/05/18     09/04/18     08/30/18                       1112          0914          0720          1016          1530          1628          NA            --           --           --           --          136          136           POTASSIUM     --           --           --           --          3.9          4.2           CHLORIDE      --           --           --           --          106          107           CO2           --           --           --           --          22           22            BUN           --           --           --           --          13           13            CR           0.47*         --          0.62         0.66         0.61         0.64          ANIONGAP      --           --           --           --          8            7             LORNA           --           --           --           --          8.6          8.5           GLC           --          98            --           --          98           98                    EKG  Interpretation:   Sinus  Rhythm   -  Negative precordial T-waves.     WITHIN NORMAL LIMITS        OUTSIDE LABS:  CBC:   Lab Results   Component Value Date    WBC 7.7 07/21/2021    WBC 8.9 08/13/2020    HGB 14.1 07/21/2021    HGB 10.6 (L) 10/28/2020    HCT 42.1 07/21/2021    HCT 35.4 08/13/2020     07/21/2021     08/13/2020     BMP:   Lab Results   Component Value Date     09/04/2018     08/30/2018    POTASSIUM 3.9 09/04/2018    POTASSIUM 4.2 08/30/2018    CHLORIDE 106 09/04/2018    CHLORIDE 107 08/30/2018    CO2 22 09/04/2018    CO2 22 08/30/2018    BUN 13 09/04/2018    BUN 13 08/30/2018    CR 0.47 (L) 10/27/2020    CR 0.62 09/06/2018    GLC 98 04/25/2019    GLC 98 09/04/2018     COAGS: No results found for: PTT, INR, FIBR  POC:   Lab Results   Component Value Date    HCG Negative 06/04/2021     HEPATIC:   Lab Results   Component Value Date    ALBUMIN 2.6 (L) 09/04/2018    PROTTOTAL 6.0 (L) 09/04/2018    ALT 14 09/06/2018    AST 14 09/06/2018    ALKPHOS 351 (H) 09/04/2018    BILITOTAL 0.1 (L) 09/04/2018     OTHER:   Lab Results   Component Value Date    A1C 5.3 04/25/2019    LORNA 8.6 09/04/2018    MAG 4.2 (H) 09/06/2018    TSH 1.70 03/13/2019       Anesthesia Plan    ASA Status:  2      Anesthesia Type: General.     - Airway: LMA   Induction: Intravenous.   Maintenance: Balanced.        Consents    Anesthesia Plan(s) and associated risks, benefits, and realistic alternatives discussed. Questions answered and patient/representative(s) expressed understanding.     - Discussed with:  Patient      - Extended Intubation/Ventilatory Support Discussed: No.      - Patient is DNR/DNI Status: No    Use of blood products discussed: No .     Postoperative Care    Pain management: IV analgesics, Oral pain medications, Multi-modal analgesia.   PONV prophylaxis: Ondansetron (or other 5HT-3), Dexamethasone or Solumedrol     Comments:                Michael Rachel MD

## 2021-07-28 NOTE — ANESTHESIA CARE TRANSFER NOTE
Patient: Krystina Suazo    Procedure(s):  Open Umbilical Hernia Repair with Mesh    Diagnosis: Umbilical hernia without obstruction and without gangrene [K42.9]  Diagnosis Additional Information: No value filed.    Anesthesia Type:   General     Note:    Oropharynx: oropharynx clear of all foreign objects  Level of Consciousness: awake  Oxygen Supplementation: face mask  Level of Supplemental Oxygen (L/min / FiO2): 6  Independent Airway: airway patency satisfactory and stable  Dentition: dentition unchanged  Vital Signs Stable: post-procedure vital signs reviewed and stable  Report to RN Given: handoff report given  Patient transferred to: PACU    Handoff Report: Identifed the Patient, Identified the Reponsible Provider, Reviewed the pertinent medical history, Discussed the surgical course, Reviewed Intra-OP anesthesia mangement and issues during anesthesia, Set expectations for post-procedure period and Allowed opportunity for questions and acknowledgement of understanding      Vitals:  Vitals Value Taken Time   /77 07/28/21 1416   Temp     Pulse 58 07/28/21 1419   Resp 10 07/28/21 1419   SpO2 100 % 07/28/21 1419   Vitals shown include unvalidated device data.    Electronically Signed By: JAMIN Harris CRNA  July 28, 2021  2:20 PM

## 2021-08-03 NOTE — NURSING NOTE
"Chief Complaint   Patient presents with     Postpartum Care     6 wk PP visit after  18. Incision healing well per pt.       Initial /62  Ht 5' 9\" (1.753 m)  Wt 250 lb (113.4 kg)  Breastfeeding? Yes  BMI 36.92 kg/m2 Estimated body mass index is 36.92 kg/(m^2) as calculated from the following:    Height as of this encounter: 5' 9\" (1.753 m).    Weight as of this encounter: 250 lb (113.4 kg).  BP completed using cuff size: regular        The following HM Due: NONE  Marquita Ramirez LPN               "
Weakness

## 2021-08-06 ENCOUNTER — TELEPHONE (OUTPATIENT)
Dept: SURGERY | Facility: CLINIC | Age: 36
End: 2021-08-06

## 2021-08-06 NOTE — TELEPHONE ENCOUNTER
S/p Open Umbilical Hernia Repair with Mesh  Date: 7/28/21  Surgeon: Dr. Mccarthy    Patient reports that she is just noticing bruising that extends from below her umbilicus to lower abdomen.  It ranges in color from yellowish to purple. Wondering if this is ok?     No firm mass noted.  Abdomen is soft.   She is otherwise doing well and no long has the need for narcotic pain med.  Some incisional soreness but it is not painful.     Reassured patient that bruising should resolve on its own.   She is encouraged to call clinic if any further concerns or questions.

## 2021-08-09 ENCOUNTER — TELEPHONE (OUTPATIENT)
Dept: SURGERY | Facility: CLINIC | Age: 36
End: 2021-08-09

## 2021-08-09 NOTE — TELEPHONE ENCOUNTER
Name of caller:Krystina      Reason for Call:  wondering if its safe/ok to do an MRI tomorrow. Pt states it is not related to hernia but will be an MRI of pelvis area.    Surgeon:  Dr. Mccarthy    Recent Surgery:  Yes.    If yes, when & what type:  7/28, Open Umbilical Hernia Repair with Mesh      Best phone number to reach pt at is: [phone]  Ok to leave a message with medical info? Yes.    Pharmacy preferred (if calling for a refill): NA

## 2021-08-09 NOTE — TELEPHONE ENCOUNTER
Called patient and informed her that it is okay to proceed with MRI as scheduled.    Aura Shanks RN-BSN

## 2021-08-10 ENCOUNTER — HOSPITAL ENCOUNTER (OUTPATIENT)
Dept: MRI IMAGING | Facility: CLINIC | Age: 36
Discharge: HOME | End: 2021-08-10
Attending: OBSTETRICS & GYNECOLOGY | Admitting: OBSTETRICS & GYNECOLOGY
Payer: COMMERCIAL

## 2021-08-10 DIAGNOSIS — D25.0 INTRAMURAL AND SUBMUCOUS LEIOMYOMA OF UTERUS: ICD-10-CM

## 2021-08-10 DIAGNOSIS — D25.1 INTRAMURAL AND SUBMUCOUS LEIOMYOMA OF UTERUS: ICD-10-CM

## 2021-08-10 PROCEDURE — 72197 MRI PELVIS W/O & W/DYE: CPT

## 2021-08-10 PROCEDURE — A9585 GADOBUTROL INJECTION: HCPCS | Performed by: OBSTETRICS & GYNECOLOGY

## 2021-08-10 PROCEDURE — 255N000002 HC RX 255 OP 636: Performed by: OBSTETRICS & GYNECOLOGY

## 2021-08-10 RX ORDER — GADOBUTROL 604.72 MG/ML
10 INJECTION INTRAVENOUS ONCE
Status: COMPLETED | OUTPATIENT
Start: 2021-08-10 | End: 2021-08-10

## 2021-08-10 RX ADMIN — GADOBUTROL 10 ML: 604.72 INJECTION INTRAVENOUS at 15:30

## 2021-08-17 ENCOUNTER — MYC MEDICAL ADVICE (OUTPATIENT)
Dept: SURGERY | Facility: CLINIC | Age: 36
End: 2021-08-17

## 2021-08-17 NOTE — TELEPHONE ENCOUNTER
Attempted to call patient for post op check.  No answer.  Message was left for patient to call back if they had any questions of concerns. Platter message was sent as well.    Salina Cox PA-C

## 2021-08-17 NOTE — TELEPHONE ENCOUNTER
Krystina was returning your call. She does have a question if she can see a chiropractor for her back pain she is having.  You can reach her at [phone].  Ria

## 2021-08-18 NOTE — TELEPHONE ENCOUNTER
Returned patient call, no answer.  Message was left stating that she may see chiropractor for back pain.  Requested patient call again if she has any further questions or concerns.    Salina Cox PA-C

## 2021-09-03 ENCOUNTER — E-VISIT (OUTPATIENT)
Dept: PEDIATRICS | Facility: CLINIC | Age: 36
End: 2021-09-03
Payer: COMMERCIAL

## 2021-09-03 DIAGNOSIS — J01.90 ACUTE SINUSITIS WITH SYMPTOMS > 10 DAYS: Primary | ICD-10-CM

## 2021-09-03 PROCEDURE — 99422 OL DIG E/M SVC 11-20 MIN: CPT | Performed by: INTERNAL MEDICINE

## 2021-09-08 RX ORDER — IPRATROPIUM BROMIDE 21 UG/1
2 SPRAY, METERED NASAL EVERY 8 HOURS PRN
Qty: 30 ML | Refills: 0 | Status: SHIPPED | OUTPATIENT
Start: 2021-09-08 | End: 2021-09-29

## 2021-09-08 NOTE — PATIENT INSTRUCTIONS
Dear Krystina Suazo    After reviewing your responses, I've been able to diagnose you with?a sinus infection caused by bacteria.?     Based on your responses and diagnosis, I have prescribed augmentin and atrovent nasal spray to treat your symptoms. I have sent this to your pharmacy.?     It is also important to stay well hydrated, get lots of rest and take over-the-counter decongestants,?tylenol?or ibuprofen if you?are able to?take those medications per your primary care provider to help relieve discomfort.?     It is important that you take?all of?your prescribed medication even if your symptoms are improving after a few doses.? Taking?all of?your medicine helps prevent the symptoms from returning.?     If your symptoms worsen, you develop severe headache, vomiting, high fever (>102), or are not improving in 7 days, please contact your primary care provider for an appointment or visit any of our convenient Walk-in Care or Urgent Care Centers to be seen which can be found on our website?here.?     Thanks again for choosing?us?as your health care partner,?   ?  Valeria Daly MD?     Sinusitis (Antibiotic Treatment)    The sinuses are air-filled spaces within the bones of the face. They connect to the inside of the nose. Sinusitis is an inflammation of the tissue that lines the sinuses. Sinusitis can occur during a cold. It can also happen due to allergies to pollens and other particles in the air. Sinusitis can cause symptoms of sinus congestion and a feeling of fullness. A sinus infection causes fever, headache, and facial pain. There is often green or yellow fluid draining from the nose or into the back of the throat (post-nasal drip). You have been given antibiotics to treat this condition.   Home care    Take the full course of antibiotics as instructed. Don't stop taking them, even when you feel better.    Drink plenty of water, hot tea, and other liquids as directed by the healthcare provider.  This may help thin nasal mucus. It also may help your sinuses drain fluids.    Heat may help soothe painful areas of your face. Use a towel soaked in hot water. Or,  the shower and direct the warm spray onto your face. Using a vaporizer along with a menthol rub at night may also help soothe symptoms.     An expectorant with guaifenesin may help thin nasal mucus and help your sinuses drain fluids. Talk with your provider or pharmacists before taking an over-the-counter (OTC) medicine if you have any questions about it or its side effects..    You can use an OTC decongestant, unless a similar medicine was prescribed to you. Nasal sprays work the fastest. Use one that contains phenylephrine or oxymetazoline. First blow your nose gently. Then use the spray. Don't use these medicines more often than directed on the label. If you do, your symptoms may get worse. You may also take pills that contain pseudoephedrine. Don t use products that combine multiple medicines. This is because side effects may be increased. Read labels. You can also ask the pharmacist for help. (People with high blood pressure should not use decongestants. They can raise blood pressure.) Talk with your provider or pharmacist if you have any questions about the medicine..    OTC antihistamines may help if allergies contributed to your sinusitis. Talk with your provider or pharmacist if you have any questions about the medicine..    Don't use nasal rinses or irrigation during an acute sinus infection, unless your healthcare provider tells you to. Rinsing may spread the infection to other areas in your sinuses.    Use acetaminophen or ibuprofen to control pain, unless another pain medicine was prescribed to you. If you have chronic liver or kidney disease or ever had a stomach ulcer, talk with your healthcare provider before using these medicines. Never give aspirin to anyone under age 18 who is ill with a fever. It may cause severe liver  damage.    Don't smoke. This can make symptoms worse.    Follow-up care  Follow up with your healthcare provider, or as advised.   When to seek medical advice  Call your healthcare provider if any of these occur:     Facial pain or headache that gets worse    Stiff neck    Unusual drowsiness or confusion    Swelling of your forehead or eyelids    Symptoms don't go away in 10 days    Vision problems, such as blurred or double vision    Fever of 100.4 F (38 C) or higher, or as directed by your healthcare provider  Call 911  Call 911 if any of these occur:     Seizure    Trouble breathing    Feeling dizzy or faint    Fingernails, skin or lips look blue, purple , or gray  Prevention  Here are steps you can take to help prevent an infection:     Keep good hand washing habits.    Don t have close contact with people who have sore throats, colds, or other upper respiratory infections.    Don t smoke, and stay away from secondhand smoke.    Stay up to date with of your vaccines.  [employer] last reviewed this educational content on 12/1/2019 2000-2021 [employer]. All rights reserved. This information is not intended as a substitute for professional medical care. Always follow your healthcare professional's instructions.

## 2021-09-17 ENCOUNTER — OFFICE VISIT (OUTPATIENT)
Dept: OBGYN | Facility: CLINIC | Age: 36
End: 2021-09-17
Payer: COMMERCIAL

## 2021-09-17 VITALS
BODY MASS INDEX: 33.36 KG/M2 | DIASTOLIC BLOOD PRESSURE: 70 MMHG | WEIGHT: 233 LBS | SYSTOLIC BLOOD PRESSURE: 118 MMHG | HEIGHT: 70 IN

## 2021-09-17 DIAGNOSIS — D25.0 INTRAMURAL AND SUBMUCOUS LEIOMYOMA OF UTERUS: ICD-10-CM

## 2021-09-17 DIAGNOSIS — D25.1 INTRAMURAL AND SUBMUCOUS LEIOMYOMA OF UTERUS: ICD-10-CM

## 2021-09-17 DIAGNOSIS — B37.89 YEAST INFECTION OF NIPPLE, POSTPARTUM: Primary | ICD-10-CM

## 2021-09-17 PROCEDURE — 99214 OFFICE O/P EST MOD 30 MIN: CPT | Performed by: OBSTETRICS & GYNECOLOGY

## 2021-09-17 RX ORDER — NYSTATIN 100000 U/G
OINTMENT TOPICAL 2 TIMES DAILY
Qty: 30 G | Refills: 0 | Status: SHIPPED | OUTPATIENT
Start: 2021-09-17 | End: 2021-09-27

## 2021-09-17 ASSESSMENT — MIFFLIN-ST. JEOR: SCORE: 1817.13

## 2021-09-17 NOTE — PROGRESS NOTES
SUBJECTIVE:                                                   Krystina Suazo is a 36 year old female who presents to clinic today to follow up for large uterine fibroid. Please see my last note for complete details.    MRI was done. We reviewed this, the uterus is quite large, measuring 16.5 x 8.4 x 14.5 cm. Large fundal fibroid measures 11.3 x 7.7 x 13 cm.    She and her  have decided they definitely are not having more children, and therefore she would like to discuss hysterectomy for definitive treatment.    Previously, we discussed options for surgery given the uterine size. She is now thinking that she would prefer total abdominal hysterectomy, given size and associated difficulties, potentially, of attempted total laparoscopic hysterectomy or robotic total laparoscopic hysterectomy . She is also concerned about the need for morcellation, which would be necessary (though it would be/could be contained morcellation.)    Recently stopped nursing, is pumping still, and notes nipple pain bilaterally as well.      Problem list and histories reviewed & adjusted, as indicated.  Additional history: as documented.    Patient Active Problem List   Diagnosis     COURTNEY (generalized anxiety disorder)     Major depressive disorder, recurrent episode, moderate (H)     Allergic rhinitis     Mild intermittent asthma without complication     Vasovagal syncope     Obesity (BMI 35.0-39.9) with comorbidity (H)     Uterine fibroid in pregnancy     Previous  section     Umbilical hernia without obstruction and without gangrene     Past Surgical History:   Procedure Laterality Date     CARPAL TUNNEL RELEASE RT/LT Bilateral       SECTION N/A 2018    Procedure:  SECTION;   section;  Surgeon: Jay Yanez MD;  Location: [facility] OR      SECTION N/A 10/27/2020    Procedure: REPEAT  SECTION;  Surgeon: Aníbal Pearson MD;  Location: [facility]     GYN SURGERY  18          HERNIORRHAPHY UMBILICAL N/A 2021    Procedure: Open Umbilical Hernia Repair with Mesh;  Surgeon: Erin Mccarthy MD;  Location: [facility] OR     SINUS SURGERY       TONSILLECTOMY        Social History     Tobacco Use     Smoking status: Never Smoker     Smokeless tobacco: Never Used   Substance Use Topics     Alcohol use: No      Problem (# of Occurrences) Relation (Name,Age of Onset)    Asthma (1) Father (Father)    Depression (1) Mother (Echo)    Diabetes (1) Mother (Echo): Type 2    Esophageal Cancer (1) Mother (Echo, 57)    Hypertension (1) Mother (Echo)    Obesity (1) Mother (Echo)    Other Cancer (1) Mother (Echo): Esophageal       Negative family history of: Glaucoma, Macular Degeneration, Breast Cancer, Colon Cancer            albuterol (PROAIR HFA/PROVENTIL HFA/VENTOLIN HFA) 108 (90 Base) MCG/ACT inhaler, Inhale 2 puffs into the lungs every 4 hours as needed for shortness of breath / dyspnea or wheezing  ciclopirox (PENLAC) 8 % external solution, Apply to adjacent skin and affected nails daily.  Remove with alcohol every 7 days, then repeat.  clobetasol propionate (OLUX) 0.05 % external foam, Apply to AA on scalp bid for 7 days then once per day for 2 weeks . Then when needed  ipratropium (ATROVENT) 0.03 % nasal spray, Spray 2 sprays into both nostrils every 8 hours as needed for rhinitis  omeprazole (PRILOSEC) 20 MG DR capsule, TAKE ONE CAPSULE BY MOUTH ONCE DAILY  Prenatal Vit-Fe Fumarate-FA (PRENATAL MULTIVITAMIN PLUS IRON) 27-0.8 MG TABS per tablet, Take 1 tablet by mouth daily  sertraline (ZOLOFT) 100 MG tablet, Take 2 tablets (200 mg) by mouth daily    No current facility-administered medications on file prior to visit.    Allergies   Allergen Reactions     Dust Mite Extract      Pollen and animal dander.     Seasonal Allergies        ROS:  5 point ROS negative except as noted above in HPI, including Gen., Resp., CV, GI &  system review.    OBJECTIVE:     /70   " Ht 1.778 m (5' 10\")   Wt 105.7 kg (233 lb)   LMP 09/17/2021   Breastfeeding Yes   BMI 33.43 kg/m    Breasts: symmetric, no mass. Both nipples show redness and slight cracking, with changes consistent with possible yeast.    In-Clinic Test Results:  No results found for this or any previous visit (from the past 24 hour(s)).    ASSESSMENT/PLAN:                                                        ICD-10-CM    1. Yeast infection of nipple, postpartum  O91.02 nystatin (MYCOSTATIN) 386716 UNIT/GM external ointment    B37.89    2. Intramural and submucous leiomyoma of uterus  D25.1     D25.0          Nystatin to the nipples daily for a week--apply, wipe off before pumping, reapply.    We discussed the risks of surgery including, but not limited to, risks of anesthesia, risk of bleeding with possibility of transfusion, infection, injury to abdominal/pelvic organs, blood vessels, or nerves and possible need for second surgery for unrecognized injury. She stated her understanding and was encouraged to contact me if she has any other questions prior to the day of her scheduled procedure.    She is electing to proceed with total abdominal hysterectomy, bilateral salpingectomy, and will be contacted by the  to arrange. She knows she will need H&P with her primary care provider.     Sandy Tran MD  [facility]"

## 2021-09-17 NOTE — NURSING NOTE
"Chief Complaint   Patient presents with     RECHECK     Discuss MRI results and bilateral nipple pain x several weeks.        Initial /70   Ht 1.778 m (5' 10\")   Wt 105.7 kg (233 lb)   LMP 2021   Breastfeeding Yes   BMI 33.43 kg/m   Estimated body mass index is 33.43 kg/m  as calculated from the following:    Height as of this encounter: 1.778 m (5' 10\").    Weight as of this encounter: 105.7 kg (233 lb).  BP completed using cuff size: regular    Questioned patient about current smoking habits.  Pt. has never smoked.          The following HM Due: NONE      The following patient reported/Care Every where data was sent to:  P ABSTRACT QUALITY INITIATIVES [[id number]]  Marquita Ramirez LPN               "

## 2021-09-20 ENCOUNTER — PREP FOR PROCEDURE (OUTPATIENT)
Dept: OBGYN | Facility: CLINIC | Age: 36
End: 2021-09-20

## 2021-09-20 ENCOUNTER — TELEPHONE (OUTPATIENT)
Dept: OBGYN | Facility: CLINIC | Age: 36
End: 2021-09-20

## 2021-09-20 ENCOUNTER — THERAPY VISIT (OUTPATIENT)
Dept: PHYSICAL THERAPY | Facility: CLINIC | Age: 36
End: 2021-09-20
Payer: COMMERCIAL

## 2021-09-20 DIAGNOSIS — D25.9 FIBROID UTERUS: Primary | ICD-10-CM

## 2021-09-20 DIAGNOSIS — M54.42 BILATERAL LOW BACK PAIN WITH LEFT-SIDED SCIATICA: ICD-10-CM

## 2021-09-20 PROCEDURE — 97161 PT EVAL LOW COMPLEX 20 MIN: CPT | Mod: GP | Performed by: PHYSICAL THERAPIST

## 2021-09-20 PROCEDURE — 97112 NEUROMUSCULAR REEDUCATION: CPT | Mod: GP | Performed by: PHYSICAL THERAPIST

## 2021-09-20 PROCEDURE — 97110 THERAPEUTIC EXERCISES: CPT | Mod: GP | Performed by: PHYSICAL THERAPIST

## 2021-09-20 NOTE — PROGRESS NOTES
Physical Therapy Initial Evaluation  Subjective:  The history is provided by the patient. No  was used.   Patient Health History  Krystina Suazo being seen for lower back pain/hip pain.     Problem began: 10/27/2020 (MD orders 7-19-21).   Problem occurred: Recent pain started after the birth of my second child   Pain is reported as 1/10 and 2/10 on pain scale.  General health as reported by patient is good.  Pertinent medical history includes: asthma, depression and overweight.     Medical allergies: none.   Surgeries include:  Orthopedic surgery and other (umbilical hernia surgery July 2021). Other surgery history details: carpal tunnel (both sides), sinus surgery, tonsils out, umbilical hernia.    Current medications:  Anti-depressants.    Current occupation is [employer].   Primary job tasks include:  Computer work and prolonged sitting.                  Therapist Generated HPI Evaluation  Problem details: Pt reports onset of left hip and low back pain during pregnancy. She had difficulty lying on left side. Son was born Oct 27, 2021. She then had pain left side with lifting son, feeding him, sitting. She still has sharp pain with lying on left side or supine. Past history of low back and right hip pain. Pain is increased with prolonged walking, prolonged sitting, sit to stand. She feels better with heat and changing positions. She is comfortable lying on right side..         Type of problem:  Lumbar.    This is a new condition.  Condition occurred with:  Insidious onset.  Where condition occurred: for unknown reasons.  Patient reports pain:  Lower lumbar spine and lumbar spine left.    Pain radiates to:  Other (left hip). Pain is worse in the P.M..  Since onset symptoms are gradually improving.       Previous treatment includes physical therapy (PT in past of low back, neck and right hip). There was significant improvement following previous treatment.  Restrictions due to condition  include:  Working in normal job without restrictions.  Barriers include:  Other (prolonged sitting).                        Objective:  System         Lumbar/SI Evaluation  ROM:    AROM Lumbar:   Flexion:          To toes, low back pdm  Ext:                    75% pdm central lbp   Side Bend:        Left:     Right:   Rotation:           Left:     Right:   Side Glide:        Left:  75%     Right:  75% pdm        Strength: weakness core abd fair   Lumbar Myotomes:  normal            Lumbar DTR's:  not assessed        Lumbar Dermtomes:  normal                Neural Tension/Mobility:  Lumbar:  Normal        Lumbar Palpation:    Tenderness present at Left:    Erector Spinae; Piriformis; PSIS and Vertebral  Tenderness present at Right: Vertebral    Lumbar Provocation:    Left positive with:  PROM hip    Right negative with:  PROM hip                                      Hip Evaluation    Hip Strength:    Flexion:   Left: 4+/5   Pain:  Right: 4+/5   Pain:                    Extension:  Left: 4-/5  Pain:Right: 4-/5    Pain:    Abduction:  Left: 3-/5    -   Pain:Right: 3/5   -   Pain:                                 General     ROS    Assessment/Plan:    Patient is a 36 year old female with lumbar complaints.    Patient has the following significant findings with corresponding treatment plan.                Diagnosis 1:  Low back/left hip pain  Pain -  hot/cold therapy, manual therapy, self management and education  Decreased ROM/flexibility - manual therapy, therapeutic exercise, therapeutic activity and home program  Decreased strength - therapeutic exercise, therapeutic activities and home program  Decreased function - therapeutic activities and home program  Impaired posture - neuro re-education, therapeutic activities and home program    Therapy Evaluation Codes:     Cumulative Therapy Evaluation is: Low complexity.    Previous and current functional limitations:  (See Goal Flow Sheet for this information)    Short  term and Long term goals: (See Goal Flow Sheet for this information)     Communication ability:  Patient appears to be able to clearly communicate and understand verbal and written communication and follow directions correctly.  Treatment Explanation - The following has been discussed with the patient:   RX ordered/plan of care  Anticipated outcomes  Possible risks and side effects  This patient would benefit from PT intervention to resume normal activities.   Rehab potential is excellent.    Frequency:  1 X week, once daily  Duration:  for 5 weeks tapering to 2 X a month over 6 weeks  Discharge Plan:  Achieve all LTG.  Independent in home treatment program.  Reach maximal therapeutic benefit.    Please refer to the daily flowsheet for treatment today, total treatment time and time spent performing 1:1 timed codes.

## 2021-09-20 NOTE — TELEPHONE ENCOUNTER
Surgeon:YAO TRAN   Assist:  Yes   Location: [facility]   Date/time preference:  per patient     Surgery:  total abdominal hysterectomy, bilateral salpingectomy   Length of Surgery:  2hours   Diagnosis:  large uterine fibroid   Anesthesia type:  GENERAL     Special instructions / equipment:  LIgasure Impact   Am admit or same day: AM ADMIT   Bowel prep: No   Pre op: PCP   Office visit with surgeon prior to surgery: No   Schedule postop visit: 2 and 6 weeks     Thanks,   Yao Tran MD

## 2021-09-21 RX ORDER — ACETAMINOPHEN 325 MG/1
975 TABLET ORAL ONCE
Status: CANCELLED | OUTPATIENT
Start: 2021-09-21 | End: 2021-09-21

## 2021-09-22 NOTE — TELEPHONE ENCOUNTER
Type of surgery: total abdominal hysterectomy, bilateral salpingectomy  Location of surgery: [facility]  Date and time of surgery: 12/22/21 @ 7:30 am  Surgeon: Dr. Tran  Pre-Op Appt Date: Patient advised to schedule.  Post-Op Appt Date: 1/7/22 and 2/4/22   Packet sent out: Yes  Pre-cert/Authorization completed:  No  Date: 9/22/21

## 2021-09-27 ENCOUNTER — THERAPY VISIT (OUTPATIENT)
Dept: PHYSICAL THERAPY | Facility: CLINIC | Age: 36
End: 2021-09-27
Payer: COMMERCIAL

## 2021-09-27 DIAGNOSIS — M54.42 BILATERAL LOW BACK PAIN WITH LEFT-SIDED SCIATICA: Primary | ICD-10-CM

## 2021-09-27 PROCEDURE — 97110 THERAPEUTIC EXERCISES: CPT | Mod: GP | Performed by: PHYSICAL THERAPIST

## 2021-09-27 PROCEDURE — 97530 THERAPEUTIC ACTIVITIES: CPT | Mod: GP | Performed by: PHYSICAL THERAPIST

## 2021-09-27 PROCEDURE — 97112 NEUROMUSCULAR REEDUCATION: CPT | Mod: GP | Performed by: PHYSICAL THERAPIST

## 2021-09-29 ENCOUNTER — E-VISIT (OUTPATIENT)
Dept: PEDIATRICS | Facility: CLINIC | Age: 36
End: 2021-09-29
Payer: COMMERCIAL

## 2021-09-29 DIAGNOSIS — J01.90 ACUTE SINUSITIS WITH SYMPTOMS > 10 DAYS: Primary | ICD-10-CM

## 2021-09-29 PROCEDURE — 99422 OL DIG E/M SVC 11-20 MIN: CPT | Performed by: INTERNAL MEDICINE

## 2021-09-29 RX ORDER — IPRATROPIUM BROMIDE 21 UG/1
2 SPRAY, METERED NASAL EVERY 8 HOURS PRN
Qty: 30 ML | Refills: 11 | Status: SHIPPED | OUTPATIENT
Start: 2021-09-29 | End: 2022-07-05

## 2021-09-29 NOTE — PATIENT INSTRUCTIONS
Dear Krystina Suazo    If your symptoms worsen, you develop severe headache, vomiting, high fever (>102), or are not improving in 7 days, please contact your primary care provider for an appointment or visit any of our convenient Walk-in Care or Urgent Care Centers to be seen which can be found on our website?here.?     Thanks again for choosing?us?as your health care partner,?   ?  Valeria Daly MD?

## 2021-10-01 ENCOUNTER — LAB (OUTPATIENT)
Dept: URGENT CARE | Facility: URGENT CARE | Age: 36
End: 2021-10-01
Attending: INTERNAL MEDICINE
Payer: COMMERCIAL

## 2021-10-01 DIAGNOSIS — J01.90 ACUTE SINUSITIS WITH SYMPTOMS > 10 DAYS: ICD-10-CM

## 2021-10-01 PROCEDURE — U0003 INFECTIOUS AGENT DETECTION BY NUCLEIC ACID (DNA OR RNA); SEVERE ACUTE RESPIRATORY SYNDROME CORONAVIRUS 2 (SARS-COV-2) (CORONAVIRUS DISEASE [COVID-19]), AMPLIFIED PROBE TECHNIQUE, MAKING USE OF HIGH THROUGHPUT TECHNOLOGIES AS DESCRIBED BY CMS-2020-01-R: HCPCS

## 2021-10-01 PROCEDURE — U0005 INFEC AGEN DETEC AMPLI PROBE: HCPCS

## 2021-10-02 LAB — SARS-COV-2 RNA RESP QL NAA+PROBE: NEGATIVE

## 2021-10-04 ENCOUNTER — THERAPY VISIT (OUTPATIENT)
Dept: PHYSICAL THERAPY | Facility: CLINIC | Age: 36
End: 2021-10-04
Payer: COMMERCIAL

## 2021-10-04 DIAGNOSIS — M54.42 BILATERAL LOW BACK PAIN WITH LEFT-SIDED SCIATICA: ICD-10-CM

## 2021-10-04 PROCEDURE — 97112 NEUROMUSCULAR REEDUCATION: CPT | Mod: GP | Performed by: PHYSICAL THERAPIST

## 2021-10-04 PROCEDURE — 97110 THERAPEUTIC EXERCISES: CPT | Mod: GP | Performed by: PHYSICAL THERAPIST

## 2021-10-09 ENCOUNTER — IMMUNIZATION (OUTPATIENT)
Dept: PEDIATRICS | Facility: CLINIC | Age: 36
End: 2021-10-09
Payer: COMMERCIAL

## 2021-10-09 DIAGNOSIS — Z23 NEED FOR PROPHYLACTIC VACCINATION AND INOCULATION AGAINST INFLUENZA: Primary | ICD-10-CM

## 2021-10-09 PROCEDURE — 90471 IMMUNIZATION ADMIN: CPT

## 2021-10-09 PROCEDURE — 99207 PR NO CHARGE NURSE ONLY: CPT

## 2021-10-09 PROCEDURE — 90686 IIV4 VACC NO PRSV 0.5 ML IM: CPT

## 2021-10-14 ENCOUNTER — THERAPY VISIT (OUTPATIENT)
Dept: PHYSICAL THERAPY | Facility: CLINIC | Age: 36
End: 2021-10-14
Payer: COMMERCIAL

## 2021-10-14 DIAGNOSIS — M54.42 BILATERAL LOW BACK PAIN WITH LEFT-SIDED SCIATICA: ICD-10-CM

## 2021-10-14 PROCEDURE — 97110 THERAPEUTIC EXERCISES: CPT | Mod: GP | Performed by: PHYSICAL THERAPIST

## 2021-10-14 PROCEDURE — 97112 NEUROMUSCULAR REEDUCATION: CPT | Mod: GP | Performed by: PHYSICAL THERAPIST

## 2021-10-14 PROCEDURE — 97530 THERAPEUTIC ACTIVITIES: CPT | Mod: GP | Performed by: PHYSICAL THERAPIST

## 2021-10-19 ENCOUNTER — VIRTUAL VISIT (OUTPATIENT)
Dept: PEDIATRICS | Facility: CLINIC | Age: 36
End: 2021-10-19
Payer: COMMERCIAL

## 2021-10-19 DIAGNOSIS — N92.1 MENORRHAGIA WITH IRREGULAR CYCLE: Primary | ICD-10-CM

## 2021-10-19 DIAGNOSIS — D25.9 UTERINE LEIOMYOMA, UNSPECIFIED LOCATION: ICD-10-CM

## 2021-10-19 PROCEDURE — 99214 OFFICE O/P EST MOD 30 MIN: CPT | Mod: GT | Performed by: INTERNAL MEDICINE

## 2021-10-19 NOTE — PROGRESS NOTES
Krystina is a 36 year old who is being evaluated via a billable video visit.      How would you like to obtain your AVS? MyChart  If the video visit is dropped, the invitation should be resent by: Text to cell phone: [phone]   Will anyone else be joining your video visit? No    Video Start Time: 11:57 AM    Assessment & Plan       ICD-10-CM    1. Menorrhagia with irregular cycle  N92.1    2. Uterine leiomyoma, unspecified location  D25.9      Discussed her current symptoms, and implications/risks associated with surgery. Answered her questions to the best of my ability.    38 minutes spent on the date of the encounter doing chart review, history and exam, documentation and further activities per the note     FUTURE APPOINTMENTS:       - Follow-up for annual visit or as needed    No follow-ups on file.    Valeria Daly MD  [facility] Flaherty is a 36 year old who presents for the following health issues    History of Present Illness       She eats 2-3 servings of fruits and vegetables daily.She consumes 1 sweetened beverage(s) daily.She exercises with enough effort to increase her heart rate 9 or less minutes per day.  She exercises with enough effort to increase her heart rate 3 or less days per week.   She is taking medications regularly.       Discuss possible hysterectomy, meet with OB/GYN and surgery scheduled for 12/22/21    TOTAL ABDOMINAL HYSTERECTOMY, BILATERAL SALPINGECTOMY          Review of Systems   CONSTITUTIONAL: NEGATIVE for fever, chills, change in weight  ENT/MOUTH: NEGATIVE for ear, mouth and throat problems  RESP: NEGATIVE for significant cough or SOB  CV: NEGATIVE for chest pain, palpitations or peripheral edema      Objective           Vitals:  No vitals were obtained today due to virtual visit.    Physical Exam   GENERAL: Healthy, alert and no distress  EYES: Eyes grossly normal to inspection.  No discharge or erythema, or obvious  scleral/conjunctival abnormalities.  RESP: No audible wheeze, cough, or visible cyanosis.  No visible retractions or increased work of breathing.    SKIN: Visible skin clear. No significant rash, abnormal pigmentation or lesions.  NEURO: Cranial nerves grossly intact.  Mentation and speech appropriate for age.  PSYCH: Mentation appears normal, affect normal/bright, judgement and insight intact, normal speech and appearance well-groomed.      Video-Visit Details    Type of service:  Video Visit    Video End Time:12:34    Originating Location (pt. Location): Home    Distant Location (provider location):  [facility]     Platform used for Video Visit: [facility]

## 2021-10-21 ENCOUNTER — THERAPY VISIT (OUTPATIENT)
Dept: PHYSICAL THERAPY | Facility: CLINIC | Age: 36
End: 2021-10-21
Payer: COMMERCIAL

## 2021-10-21 DIAGNOSIS — M54.42 BILATERAL LOW BACK PAIN WITH LEFT-SIDED SCIATICA: ICD-10-CM

## 2021-10-21 PROCEDURE — 97110 THERAPEUTIC EXERCISES: CPT | Mod: GP | Performed by: PHYSICAL THERAPIST

## 2021-10-21 PROCEDURE — 97112 NEUROMUSCULAR REEDUCATION: CPT | Mod: GP | Performed by: PHYSICAL THERAPIST

## 2021-10-25 ENCOUNTER — THERAPY VISIT (OUTPATIENT)
Dept: PHYSICAL THERAPY | Facility: CLINIC | Age: 36
End: 2021-10-25
Payer: COMMERCIAL

## 2021-10-25 DIAGNOSIS — M54.42 BILATERAL LOW BACK PAIN WITH LEFT-SIDED SCIATICA: ICD-10-CM

## 2021-10-25 PROCEDURE — 97110 THERAPEUTIC EXERCISES: CPT | Mod: GP | Performed by: PHYSICAL THERAPIST

## 2021-10-25 PROCEDURE — 97112 NEUROMUSCULAR REEDUCATION: CPT | Mod: GP | Performed by: PHYSICAL THERAPIST

## 2021-10-25 NOTE — PROGRESS NOTES
Subjective:  HPI  Physical Exam                    Objective:  System    Physical Exam    General     ROS    Assessment/Plan:    DISCHARGE REPORT    Progress reporting period is from 9-20-21 to 10-25-21.       SUBJECTIVE    Subjective: Pt reports she is able to control symptoms well for most of the work day. She has been walking shorter walks and also on elliptical without radiating symptoms     Current Pain level: 0/10.     Previous pain level was  2/10 Initial Pain level: 4/10.   Changes in function:  Yes (See Goal flowsheet attached for changes in current functional level)  Adverse reaction to treatment or activity: None    OBJECTIVE  Changes noted in objective findings:  Yes, improved ROM, strength and function.  Objective: Lumbar AROM FB wnl,  BB wnl, MMT right hip flex 5/5, right glut med 4/5, right glut max 4/5, left hip flex 5/5, left glut med 4/5, left glut max 4/5     ASSESSMENT/PLAN  Updated problem list and treatment plan: Diagnosis 1:  Low back pain  Decreased strength - therapeutic exercise and home program  Decreased function - therapeutic activities and home program  STG/LTGs have been met or progress has been made towards goals:  Yes (See Goal flow sheet completed today.)  Assessment of Progress: The patient has met all of their long term goals.  Self Management Plans:  Patient is independent in a home treatment program.  I have re-evaluated this patient and find that the nature, scope, duration and intensity of the therapy is appropriate for the medical condition of the patient.  Krystina continues to require the following intervention to meet STG and LTG's:  PT intervention is no longer required to meet STG/LTG.    Recommendations:  This patient is ready to be discharged from therapy and continue their home treatment program.    Please refer to the daily flowsheet for treatment today, total treatment time and time spent performing 1:1 timed codes.

## 2021-11-29 DIAGNOSIS — Z11.59 ENCOUNTER FOR SCREENING FOR OTHER VIRAL DISEASES: ICD-10-CM

## 2021-12-13 SDOH — ECONOMIC STABILITY: TRANSPORTATION INSECURITY
IN THE PAST 12 MONTHS, HAS LACK OF TRANSPORTATION KEPT YOU FROM MEETINGS, WORK, OR FROM GETTING THINGS NEEDED FOR DAILY LIVING?: NO

## 2021-12-13 SDOH — ECONOMIC STABILITY: FOOD INSECURITY: WITHIN THE PAST 12 MONTHS, THE FOOD YOU BOUGHT JUST DIDN'T LAST AND YOU DIDN'T HAVE MONEY TO GET MORE.: NEVER TRUE

## 2021-12-13 SDOH — ECONOMIC STABILITY: INCOME INSECURITY: IN THE LAST 12 MONTHS, WAS THERE A TIME WHEN YOU WERE NOT ABLE TO PAY THE MORTGAGE OR RENT ON TIME?: NO

## 2021-12-13 SDOH — ECONOMIC STABILITY: INCOME INSECURITY: HOW HARD IS IT FOR YOU TO PAY FOR THE VERY BASICS LIKE FOOD, HOUSING, MEDICAL CARE, AND HEATING?: NOT VERY HARD

## 2021-12-13 SDOH — HEALTH STABILITY: PHYSICAL HEALTH: ON AVERAGE, HOW MANY DAYS PER WEEK DO YOU ENGAGE IN MODERATE TO STRENUOUS EXERCISE (LIKE A BRISK WALK)?: 4 DAYS

## 2021-12-13 SDOH — HEALTH STABILITY: PHYSICAL HEALTH: ON AVERAGE, HOW MANY MINUTES DO YOU ENGAGE IN EXERCISE AT THIS LEVEL?: 30 MIN

## 2021-12-13 SDOH — ECONOMIC STABILITY: FOOD INSECURITY: WITHIN THE PAST 12 MONTHS, YOU WORRIED THAT YOUR FOOD WOULD RUN OUT BEFORE YOU GOT MONEY TO BUY MORE.: NEVER TRUE

## 2021-12-13 SDOH — ECONOMIC STABILITY: TRANSPORTATION INSECURITY
IN THE PAST 12 MONTHS, HAS THE LACK OF TRANSPORTATION KEPT YOU FROM MEDICAL APPOINTMENTS OR FROM GETTING MEDICATIONS?: NO

## 2021-12-13 ASSESSMENT — SOCIAL DETERMINANTS OF HEALTH (SDOH)
IN A TYPICAL WEEK, HOW MANY TIMES DO YOU TALK ON THE PHONE WITH FAMILY, FRIENDS, OR NEIGHBORS?: THREE TIMES A WEEK
HOW OFTEN DO YOU GET TOGETHER WITH FRIENDS OR RELATIVES?: ONCE A WEEK
HOW OFTEN DO YOU ATTEND CHURCH OR RELIGIOUS SERVICES?: PATIENT DECLINED
DO YOU BELONG TO ANY CLUBS OR ORGANIZATIONS SUCH AS CHURCH GROUPS UNIONS, FRATERNAL OR ATHLETIC GROUPS, OR SCHOOL GROUPS?: YES

## 2021-12-13 ASSESSMENT — LIFESTYLE VARIABLES
HOW OFTEN DO YOU HAVE SIX OR MORE DRINKS ON ONE OCCASION: NEVER
HOW MANY STANDARD DRINKS CONTAINING ALCOHOL DO YOU HAVE ON A TYPICAL DAY: 1 OR 2
HOW OFTEN DO YOU HAVE A DRINK CONTAINING ALCOHOL: MONTHLY OR LESS

## 2021-12-15 ENCOUNTER — OFFICE VISIT (OUTPATIENT)
Dept: PEDIATRICS | Facility: CLINIC | Age: 36
End: 2021-12-15
Payer: COMMERCIAL

## 2021-12-15 VITALS
HEART RATE: 78 BPM | DIASTOLIC BLOOD PRESSURE: 64 MMHG | OXYGEN SATURATION: 97 % | BODY MASS INDEX: 32.33 KG/M2 | RESPIRATION RATE: 20 BRPM | HEIGHT: 70 IN | TEMPERATURE: 98.3 F | WEIGHT: 225.8 LBS | SYSTOLIC BLOOD PRESSURE: 126 MMHG

## 2021-12-15 DIAGNOSIS — F33.1 MAJOR DEPRESSIVE DISORDER, RECURRENT EPISODE, MODERATE (H): ICD-10-CM

## 2021-12-15 DIAGNOSIS — N92.4 EXCESSIVE BLEEDING IN PREMENOPAUSAL PERIOD: ICD-10-CM

## 2021-12-15 DIAGNOSIS — N85.8 UTERINE MASS: ICD-10-CM

## 2021-12-15 DIAGNOSIS — Z01.818 PREOP GENERAL PHYSICAL EXAM: Primary | ICD-10-CM

## 2021-12-15 DIAGNOSIS — L40.9 SCALP PSORIASIS: ICD-10-CM

## 2021-12-15 PROBLEM — E66.01 MORBID OBESITY (H): Status: RESOLVED | Noted: 2019-04-10 | Resolved: 2021-12-15

## 2021-12-15 PROBLEM — R42 DIZZINESS: Status: ACTIVE | Noted: 2021-12-15

## 2021-12-15 PROCEDURE — 99214 OFFICE O/P EST MOD 30 MIN: CPT | Performed by: INTERNAL MEDICINE

## 2021-12-15 RX ORDER — CLOBETASOL PROPIONATE 0.5 MG/G
AEROSOL, FOAM TOPICAL
Qty: 100 G | Refills: 3 | Status: SHIPPED | OUTPATIENT
Start: 2021-12-15 | End: 2022-07-05

## 2021-12-15 RX ORDER — SERTRALINE HYDROCHLORIDE 100 MG/1
200 TABLET, FILM COATED ORAL DAILY
Qty: 180 TABLET | Refills: 3 | Status: SHIPPED | OUTPATIENT
Start: 2021-12-15 | End: 2022-04-20

## 2021-12-15 ASSESSMENT — MIFFLIN-ST. JEOR: SCORE: 1784.47

## 2021-12-15 NOTE — PATIENT INSTRUCTIONS

## 2021-12-15 NOTE — H&P (VIEW-ONLY)
[facility]  [address]  [address] [place] [address]  Phone: [phone]  Fax: [phone]  Primary Provider: Valeria Daly  Pre-op Performing Provider: VALERIA DALY      PREOPERATIVE EVALUATION:  Today's date: 12/15/2021    Krystina Suazo is a 36 year old female who presents for a preoperative evaluation.    Surgical Information:  Surgery/Procedure: TOTAL ABDOMINAL HYSTERECTOMY, BILATERAL SALPINGECTOMY  Surgery Location:  OR  Surgeon: Sandy Tran MD  Surgery Date: 12/22/21  Time of Surgery: 0730  Where patient plans to recover: At home with family  Fax number for surgical facility: Note does not need to be faxed, will be available electronically in Epic.    Type of Anesthesia Anticipated: General    Assessment & Plan     The proposed surgical procedure is considered INTERMEDIATE risk.    Preop general physical exam      Excessive bleeding in premenopausal period      Uterine mass - likely large fibroid    Breastfeeding status       Major depressive disorder, recurrent episode, moderate (H)  Good control.   - sertraline (ZOLOFT) 100 MG tablet; Take 2 tablets (200 mg) by mouth daily    Scalp psoriasis  Refilled, no current isses  - clobetasol propionate (OLUX) 0.05 % external foam; Apply to AA on scalp bid for 7 days then once per day for 2 weeks . Then when needed. May substitute cream or solution at same sig  and dispense         Risks and Recommendations:  The patient has the following additional risks and recommendations for perioperative complications:   - No identified additional risk factors other than previously addressed    Medication Instructions:  Patient is to take all scheduled medications on the day of surgery    RECOMMENDATION:  APPROVAL GIVEN to proceed with proposed procedure, without further diagnostic evaluation.      30 minutes spent on the date of the encounter doing chart review, history and exam, documentation and further  activities per the note      Subjective     HPI related to upcoming procedure: heavy menstrual bleeding, pelvic pain considered due to likely uterine fibroid, urinary incontinence.    Preop Questions 12/13/2021   1. Have you ever had a heart attack or stroke? No   2. Have you ever had surgery on your heart or blood vessels, such as a stent placement, a coronary artery bypass, or surgery on an artery in your head, neck, heart, or legs? No   3. Do you have chest pain with activity? No   4. Do you have a history of  heart failure? No   5. Do you currently have a cold, bronchitis or symptoms of other infection? No   6. Do you have a cough, shortness of breath, or wheezing? No   7. Do you or anyone in your family have previous history of blood clots? No   8. Do you or does anyone in your family have a serious bleeding problem such as prolonged bleeding following surgeries or cuts? No   9. Have you ever had problems with anemia or been told to take iron pills? YES - due to heavy periods as a teen   10. Have you had any abnormal blood loss such as black, tarry or bloody stools, or abnormal vaginal bleeding? YES    11. Have you ever had a blood transfusion? No   12. Are you willing to have a blood transfusion if it is medically needed before, during, or after your surgery? Yes   13. Have you or any of your relatives ever had problems with anesthesia? No   14. Do you have sleep apnea, excessive snoring or daytime drowsiness? No   15. Do you have any artifical heart valves or other implanted medical devices like a pacemaker, defibrillator, or continuous glucose monitor? No   16. Do you have artificial joints? No   17. Are you allergic to latex? No   18. Is there any chance that you may be pregnant? No       Health Care Directive:  Patient does not have a Health Care Directive or Living Will: Discussed advance care planning with patient; however, patient declined at this time.    Preoperative Review of :   reviewed -  norco rx in 2021 after hernia surgery      Status of Chronic Conditions:  See problem list for active medical problems.  Problems all longstanding and stable, except as noted/documented.  See ROS for pertinent symptoms related to these conditions.    ASTHMA - Patient has a longstanding history of moderate-severe Asthma . Patient has been doing well overall noting NO SYMPTOMS and has not required inhalers in a long time    DEPRESSION - Patient has a long history of Depression of moderate severity requiring medication for control with recent symptoms being well-controlled. Current symptoms of depression include none.       Review of Systems  CONSTITUTIONAL: NEGATIVE for fever, chills, change in weight  INTEGUMENTARY/SKIN: NEGATIVE for worrisome rashes, moles or lesions  EYES: NEGATIVE for vision changes or irritation  ENT/MOUTH: NEGATIVE for ear, mouth and throat problems  RESP: NEGATIVE for significant cough or SOB  CV: NEGATIVE for chest pain, palpitations or peripheral edema  GI: NEGATIVE for nausea, abdominal pain, heartburn, or change in bowel habits  : NEGATIVE for frequency, dysuria, or hematuria  MUSCULOSKELETAL: NEGATIVE for significant arthralgias or myalgia  NEURO: NEGATIVE for weakness, dizziness or paresthesias  ENDOCRINE: NEGATIVE for temperature intolerance, skin/hair changes  HEME: NEGATIVE for bleeding problems  PSYCHIATRIC: NEGATIVE for changes in mood or affect       Patient Active Problem List    Diagnosis Date Noted     Umbilical hernia without obstruction and without gangrene 2021     Priority: Medium     Added automatically from request for surgery [id number]       Previous  section 2020     Priority: Medium     Added automatically from request for surgery [id number]       Uterine fibroid in pregnancy 2020     Priority: Medium     Obesity (BMI 35.0-39.9) with comorbidity (H) 04/10/2019     Priority: Medium     Vasovagal syncope 2018     Priority: Medium     COURTNEY  (generalized anxiety disorder) 2017     Priority: Medium     Major depressive disorder, recurrent episode, moderate (H) 2017     Priority: Medium     Allergic rhinitis 2009     Priority: Medium     Overview:   Rhinitis Allergic  NOS       Mild intermittent asthma without complication 2009     Priority: Medium      Past Medical History:   Diagnosis Date     Asthma      Depression      Depressive disorder     Diagnosed around      Hypertension 2018     Vasovagal syncope      Past Surgical History:   Procedure Laterality Date     CARPAL TUNNEL RELEASE RT/LT Bilateral       SECTION N/A 2018    Procedure:  SECTION;   section;  Surgeon: Jay Yanez MD;  Location: [facility] OR      SECTION N/A 10/27/2020    Procedure: REPEAT  SECTION;  Surgeon: Aníbal Pearson MD;  Location: [facility] L+D     GYN SURGERY  18         HERNIORRHAPHY UMBILICAL N/A 2021    Procedure: Open Umbilical Hernia Repair with Mesh;  Surgeon: Erin Mccarthy MD;  Location: [facility] OR     SINUS SURGERY       TONSILLECTOMY       Current Outpatient Medications   Medication Sig Dispense Refill     albuterol (PROAIR HFA/PROVENTIL HFA/VENTOLIN HFA) 108 (90 Base) MCG/ACT inhaler Inhale 2 puffs into the lungs every 4 hours as needed for shortness of breath / dyspnea or wheezing 18 g 11     clobetasol propionate (OLUX) 0.05 % external foam Apply to AA on scalp bid for 7 days then once per day for 2 weeks . Then when needed. May substitute cream or solution at same sig  and dispense 100 g 3     ipratropium (ATROVENT) 0.03 % nasal spray Spray 2 sprays into both nostrils every 8 hours as needed for rhinitis 30 mL 11     omeprazole (PRILOSEC) 20 MG DR capsule TAKE ONE CAPSULE BY MOUTH ONCE DAILY 90 capsule 2     Prenatal Vit-Fe Fumarate-FA (PRENATAL MULTIVITAMIN PLUS IRON) 27-0.8 MG TABS per tablet Take 1 tablet by mouth daily 100 tablet 3     sertraline  "(ZOLOFT) 100 MG tablet Take 2 tablets (200 mg) by mouth daily 180 tablet 3       Allergies   Allergen Reactions     Dust Mite Extract      Pollen and animal dander.     Seasonal Allergies         Social History     Tobacco Use     Smoking status: Never Smoker     Smokeless tobacco: Never Used   Substance Use Topics     Alcohol use: No     Family History   Problem Relation Age of Onset     Esophageal Cancer Mother 57     Diabetes Mother         Type 2     Hypertension Mother      Other Cancer Mother         Esophageal     Depression Mother      Obesity Mother      Asthma Father      Glaucoma No family hx of      Macular Degeneration No family hx of      Breast Cancer No family hx of      Colon Cancer No family hx of      History   Drug Use No         Objective     /64 (BP Location: Right arm, Patient Position: Sitting, Cuff Size: Adult Regular)   Pulse 78   Temp 98.3  F (36.8  C) (Tympanic)   Resp 20   Ht 1.778 m (5' 10\")   Wt 102.4 kg (225 lb 12.8 oz)   SpO2 97%   BMI 32.40 kg/m      Physical Exam    GENERAL APPEARANCE: healthy, alert and no distress     EYES: EOMI     NECK: no adenopathy, no asymmetry, masses, or scars and thyroid normal to palpation     RESP: lungs clear to auscultation - no rales, rhonchi or wheezes     CV: regular rates and rhythm, normal S1 S2, no S3 or S4 and no murmur, click or rub     ABDOMEN:  soft, nontender, no HSM or masses and bowel sounds normal     MS: extremities normal- no gross deformities noted     SKIN: no suspicious lesions or rashes     NEURO: Normal strength and tone, mentation intact and speech normal     PSYCH: mentation appears normal. and affect normal/bright     LYMPHATICS: No cervical adenopathy    Recent Labs   Lab Test 07/21/21  1434 10/28/20  0628 10/27/20  1112 10/26/20  1309 08/13/20  1602   HGB 14.1 10.6*  --    < > 12.0     --   --   --  195   CR  --   --  0.47*  --   --     < > = values in this interval not displayed.        Diagnostics:  No " labs were ordered during this visit.   No EKG required, no history of coronary heart disease, significant arrhythmia, peripheral arterial disease or other structural heart disease.    Revised Cardiac Risk Index (RCRI):  The patient has the following serious cardiovascular risks for perioperative complications:   - No serious cardiac risks = 0 points     RCRI Interpretation: 0 points: Class I (very low risk - 0.4% complication rate)           Signed Electronically by: aVleria Daly MD  Copy of this evaluation report is provided to requesting physician.

## 2021-12-15 NOTE — PROGRESS NOTES
[facility]  [address]  [address] [place] [address]  Phone: [phone]  Fax: [phone]  Primary Provider: Valeria Daly  Pre-op Performing Provider: VALERIA DALY      PREOPERATIVE EVALUATION:  Today's date: 12/15/2021    Krystina Suazo is a 36 year old female who presents for a preoperative evaluation.    Surgical Information:  Surgery/Procedure: TOTAL ABDOMINAL HYSTERECTOMY, BILATERAL SALPINGECTOMY  Surgery Location:  OR  Surgeon: Sandy Tran MD  Surgery Date: 12/22/21  Time of Surgery: 0730  Where patient plans to recover: At home with family  Fax number for surgical facility: Note does not need to be faxed, will be available electronically in Epic.    Type of Anesthesia Anticipated: General    Assessment & Plan     The proposed surgical procedure is considered INTERMEDIATE risk.    Preop general physical exam      Excessive bleeding in premenopausal period      Uterine mass - likely large fibroid    Breastfeeding status       Major depressive disorder, recurrent episode, moderate (H)  Good control.   - sertraline (ZOLOFT) 100 MG tablet; Take 2 tablets (200 mg) by mouth daily    Scalp psoriasis  Refilled, no current isses  - clobetasol propionate (OLUX) 0.05 % external foam; Apply to AA on scalp bid for 7 days then once per day for 2 weeks . Then when needed. May substitute cream or solution at same sig  and dispense         Risks and Recommendations:  The patient has the following additional risks and recommendations for perioperative complications:   - No identified additional risk factors other than previously addressed    Medication Instructions:  Patient is to take all scheduled medications on the day of surgery    RECOMMENDATION:  APPROVAL GIVEN to proceed with proposed procedure, without further diagnostic evaluation.      30 minutes spent on the date of the encounter doing chart review, history and exam, documentation and further  activities per the note      Subjective     HPI related to upcoming procedure: heavy menstrual bleeding, pelvic pain considered due to likely uterine fibroid, urinary incontinence.    Preop Questions 12/13/2021   1. Have you ever had a heart attack or stroke? No   2. Have you ever had surgery on your heart or blood vessels, such as a stent placement, a coronary artery bypass, or surgery on an artery in your head, neck, heart, or legs? No   3. Do you have chest pain with activity? No   4. Do you have a history of  heart failure? No   5. Do you currently have a cold, bronchitis or symptoms of other infection? No   6. Do you have a cough, shortness of breath, or wheezing? No   7. Do you or anyone in your family have previous history of blood clots? No   8. Do you or does anyone in your family have a serious bleeding problem such as prolonged bleeding following surgeries or cuts? No   9. Have you ever had problems with anemia or been told to take iron pills? YES - due to heavy periods as a teen   10. Have you had any abnormal blood loss such as black, tarry or bloody stools, or abnormal vaginal bleeding? YES    11. Have you ever had a blood transfusion? No   12. Are you willing to have a blood transfusion if it is medically needed before, during, or after your surgery? Yes   13. Have you or any of your relatives ever had problems with anesthesia? No   14. Do you have sleep apnea, excessive snoring or daytime drowsiness? No   15. Do you have any artifical heart valves or other implanted medical devices like a pacemaker, defibrillator, or continuous glucose monitor? No   16. Do you have artificial joints? No   17. Are you allergic to latex? No   18. Is there any chance that you may be pregnant? No       Health Care Directive:  Patient does not have a Health Care Directive or Living Will: Discussed advance care planning with patient; however, patient declined at this time.    Preoperative Review of :   reviewed -  norco rx in 2021 after hernia surgery      Status of Chronic Conditions:  See problem list for active medical problems.  Problems all longstanding and stable, except as noted/documented.  See ROS for pertinent symptoms related to these conditions.    ASTHMA - Patient has a longstanding history of moderate-severe Asthma . Patient has been doing well overall noting NO SYMPTOMS and has not required inhalers in a long time    DEPRESSION - Patient has a long history of Depression of moderate severity requiring medication for control with recent symptoms being well-controlled. Current symptoms of depression include none.       Review of Systems  CONSTITUTIONAL: NEGATIVE for fever, chills, change in weight  INTEGUMENTARY/SKIN: NEGATIVE for worrisome rashes, moles or lesions  EYES: NEGATIVE for vision changes or irritation  ENT/MOUTH: NEGATIVE for ear, mouth and throat problems  RESP: NEGATIVE for significant cough or SOB  CV: NEGATIVE for chest pain, palpitations or peripheral edema  GI: NEGATIVE for nausea, abdominal pain, heartburn, or change in bowel habits  : NEGATIVE for frequency, dysuria, or hematuria  MUSCULOSKELETAL: NEGATIVE for significant arthralgias or myalgia  NEURO: NEGATIVE for weakness, dizziness or paresthesias  ENDOCRINE: NEGATIVE for temperature intolerance, skin/hair changes  HEME: NEGATIVE for bleeding problems  PSYCHIATRIC: NEGATIVE for changes in mood or affect       Patient Active Problem List    Diagnosis Date Noted     Umbilical hernia without obstruction and without gangrene 2021     Priority: Medium     Added automatically from request for surgery [id number]       Previous  section 2020     Priority: Medium     Added automatically from request for surgery [id number]       Uterine fibroid in pregnancy 2020     Priority: Medium     Obesity (BMI 35.0-39.9) with comorbidity (H) 04/10/2019     Priority: Medium     Vasovagal syncope 2018     Priority: Medium     COURTNEY  (generalized anxiety disorder) 2017     Priority: Medium     Major depressive disorder, recurrent episode, moderate (H) 2017     Priority: Medium     Allergic rhinitis 2009     Priority: Medium     Overview:   Rhinitis Allergic  NOS       Mild intermittent asthma without complication 2009     Priority: Medium      Past Medical History:   Diagnosis Date     Asthma      Depression      Depressive disorder     Diagnosed around      Hypertension 2018     Vasovagal syncope      Past Surgical History:   Procedure Laterality Date     CARPAL TUNNEL RELEASE RT/LT Bilateral       SECTION N/A 2018    Procedure:  SECTION;   section;  Surgeon: Jay Yanez MD;  Location: [facility] OR      SECTION N/A 10/27/2020    Procedure: REPEAT  SECTION;  Surgeon: Aníbal Pearson MD;  Location: [facility] L+D     GYN SURGERY  18         HERNIORRHAPHY UMBILICAL N/A 2021    Procedure: Open Umbilical Hernia Repair with Mesh;  Surgeon: Erin Mccarthy MD;  Location: [facility] OR     SINUS SURGERY       TONSILLECTOMY       Current Outpatient Medications   Medication Sig Dispense Refill     albuterol (PROAIR HFA/PROVENTIL HFA/VENTOLIN HFA) 108 (90 Base) MCG/ACT inhaler Inhale 2 puffs into the lungs every 4 hours as needed for shortness of breath / dyspnea or wheezing 18 g 11     clobetasol propionate (OLUX) 0.05 % external foam Apply to AA on scalp bid for 7 days then once per day for 2 weeks . Then when needed. May substitute cream or solution at same sig  and dispense 100 g 3     ipratropium (ATROVENT) 0.03 % nasal spray Spray 2 sprays into both nostrils every 8 hours as needed for rhinitis 30 mL 11     omeprazole (PRILOSEC) 20 MG DR capsule TAKE ONE CAPSULE BY MOUTH ONCE DAILY 90 capsule 2     Prenatal Vit-Fe Fumarate-FA (PRENATAL MULTIVITAMIN PLUS IRON) 27-0.8 MG TABS per tablet Take 1 tablet by mouth daily 100 tablet 3     sertraline  "(ZOLOFT) 100 MG tablet Take 2 tablets (200 mg) by mouth daily 180 tablet 3       Allergies   Allergen Reactions     Dust Mite Extract      Pollen and animal dander.     Seasonal Allergies         Social History     Tobacco Use     Smoking status: Never Smoker     Smokeless tobacco: Never Used   Substance Use Topics     Alcohol use: No     Family History   Problem Relation Age of Onset     Esophageal Cancer Mother 57     Diabetes Mother         Type 2     Hypertension Mother      Other Cancer Mother         Esophageal     Depression Mother      Obesity Mother      Asthma Father      Glaucoma No family hx of      Macular Degeneration No family hx of      Breast Cancer No family hx of      Colon Cancer No family hx of      History   Drug Use No         Objective     /64 (BP Location: Right arm, Patient Position: Sitting, Cuff Size: Adult Regular)   Pulse 78   Temp 98.3  F (36.8  C) (Tympanic)   Resp 20   Ht 1.778 m (5' 10\")   Wt 102.4 kg (225 lb 12.8 oz)   SpO2 97%   BMI 32.40 kg/m      Physical Exam    GENERAL APPEARANCE: healthy, alert and no distress     EYES: EOMI     NECK: no adenopathy, no asymmetry, masses, or scars and thyroid normal to palpation     RESP: lungs clear to auscultation - no rales, rhonchi or wheezes     CV: regular rates and rhythm, normal S1 S2, no S3 or S4 and no murmur, click or rub     ABDOMEN:  soft, nontender, no HSM or masses and bowel sounds normal     MS: extremities normal- no gross deformities noted     SKIN: no suspicious lesions or rashes     NEURO: Normal strength and tone, mentation intact and speech normal     PSYCH: mentation appears normal. and affect normal/bright     LYMPHATICS: No cervical adenopathy    Recent Labs   Lab Test 07/21/21  1434 10/28/20  0628 10/27/20  1112 10/26/20  1309 08/13/20  1602   HGB 14.1 10.6*  --    < > 12.0     --   --   --  195   CR  --   --  0.47*  --   --     < > = values in this interval not displayed.        Diagnostics:  No " labs were ordered during this visit.   No EKG required, no history of coronary heart disease, significant arrhythmia, peripheral arterial disease or other structural heart disease.    Revised Cardiac Risk Index (RCRI):  The patient has the following serious cardiovascular risks for perioperative complications:   - No serious cardiac risks = 0 points     RCRI Interpretation: 0 points: Class I (very low risk - 0.4% complication rate)           Signed Electronically by: Valeria Daly MD  Copy of this evaluation report is provided to requesting physician.

## 2021-12-20 ENCOUNTER — LAB (OUTPATIENT)
Dept: LAB | Facility: CLINIC | Age: 36
End: 2021-12-20
Payer: COMMERCIAL

## 2021-12-20 DIAGNOSIS — Z11.59 ENCOUNTER FOR SCREENING FOR OTHER VIRAL DISEASES: ICD-10-CM

## 2021-12-20 LAB — SARS-COV-2 RNA RESP QL NAA+PROBE: NEGATIVE

## 2021-12-20 PROCEDURE — U0003 INFECTIOUS AGENT DETECTION BY NUCLEIC ACID (DNA OR RNA); SEVERE ACUTE RESPIRATORY SYNDROME CORONAVIRUS 2 (SARS-COV-2) (CORONAVIRUS DISEASE [COVID-19]), AMPLIFIED PROBE TECHNIQUE, MAKING USE OF HIGH THROUGHPUT TECHNOLOGIES AS DESCRIBED BY CMS-2020-01-R: HCPCS

## 2021-12-20 PROCEDURE — U0005 INFEC AGEN DETEC AMPLI PROBE: HCPCS

## 2021-12-20 NOTE — PHARMACY-ADMISSION MEDICATION HISTORY
Medication history and patient interview completed by pharmacy intern/student or pre-admitting RN.  Reviewed by pharmacist, including [employer] dispense records, [facility] Care Everywhere, and chart review.       Mikel Goodrich, Pharm.D., BCPS      Nurse Complete Set By: Barb Bagley, RN at 12/17/2021 3:32 PM      Prior to Admission medications    Medication Sig Last Dose Taking? Auth Provider   albuterol (PROAIR HFA/PROVENTIL HFA/VENTOLIN HFA) 108 (90 Base) MCG/ACT inhaler Inhale 2 puffs into the lungs every 4 hours as needed for shortness of breath / dyspnea or wheezing  Yes Valeria Daly MD   clobetasol propionate (OLUX) 0.05 % external foam Apply to AA on scalp bid for 7 days then once per day for 2 weeks . Then when needed. May substitute cream or solution at same sig  and dispense  Yes Valeria Daly MD   ipratropium (ATROVENT) 0.03 % nasal spray Spray 2 sprays into both nostrils every 8 hours as needed for rhinitis  Yes Valeria Daly MD   omeprazole (PRILOSEC) 20 MG DR capsule TAKE ONE CAPSULE BY MOUTH ONCE DAILY  Yes Valeria Daly MD   Prenatal Vit-Fe Fumarate-FA (PRENATAL MULTIVITAMIN PLUS IRON) 27-0.8 MG TABS per tablet Take 1 tablet by mouth daily  Yes Otilia Jacobo MD   sertraline (ZOLOFT) 100 MG tablet Take 2 tablets (200 mg) by mouth daily  Yes Valeria Daly MD

## 2021-12-22 ENCOUNTER — HOSPITAL ENCOUNTER (INPATIENT)
Facility: CLINIC | Age: 36
LOS: 1 days | Discharge: HOME | DRG: 742 | End: 2021-12-23
Attending: OBSTETRICS & GYNECOLOGY | Admitting: OBSTETRICS & GYNECOLOGY
Payer: COMMERCIAL

## 2021-12-22 ENCOUNTER — ANESTHESIA (OUTPATIENT)
Dept: SURGERY | Facility: CLINIC | Age: 36
DRG: 742 | End: 2021-12-22
Payer: COMMERCIAL

## 2021-12-22 ENCOUNTER — ANESTHESIA EVENT (OUTPATIENT)
Dept: SURGERY | Facility: CLINIC | Age: 36
DRG: 742 | End: 2021-12-22
Payer: COMMERCIAL

## 2021-12-22 DIAGNOSIS — Z90.710 S/P TAH (TOTAL ABDOMINAL HYSTERECTOMY): Primary | ICD-10-CM

## 2021-12-22 LAB
ABO/RH(D): NORMAL
ANTIBODY SCREEN: NEGATIVE
HCG UR QL: NEGATIVE
HGB BLD-MCNC: 11.9 G/DL (ref 11.7–15.7)
SPECIMEN EXPIRATION DATE: NORMAL

## 2021-12-22 PROCEDURE — 250N000011 HC RX IP 250 OP 636: Performed by: OBSTETRICS & GYNECOLOGY

## 2021-12-22 PROCEDURE — 120N000001 HC R&B MED SURG/OB

## 2021-12-22 PROCEDURE — 250N000011 HC RX IP 250 OP 636: Performed by: ANESTHESIOLOGY

## 2021-12-22 PROCEDURE — 272N000001 HC OR GENERAL SUPPLY STERILE: Performed by: OBSTETRICS & GYNECOLOGY

## 2021-12-22 PROCEDURE — 250N000011 HC RX IP 250 OP 636: Performed by: NURSE ANESTHETIST, CERTIFIED REGISTERED

## 2021-12-22 PROCEDURE — 258N000003 HC RX IP 258 OP 636: Performed by: NURSE ANESTHETIST, CERTIFIED REGISTERED

## 2021-12-22 PROCEDURE — 250N000013 HC RX MED GY IP 250 OP 250 PS 637: Performed by: OBSTETRICS & GYNECOLOGY

## 2021-12-22 PROCEDURE — 999N000141 HC STATISTIC PRE-PROCEDURE NURSING ASSESSMENT: Performed by: OBSTETRICS & GYNECOLOGY

## 2021-12-22 PROCEDURE — 250N000013 HC RX MED GY IP 250 OP 250 PS 637: Performed by: ANESTHESIOLOGY

## 2021-12-22 PROCEDURE — 0UT70ZZ RESECTION OF BILATERAL FALLOPIAN TUBES, OPEN APPROACH: ICD-10-PCS | Performed by: OBSTETRICS & GYNECOLOGY

## 2021-12-22 PROCEDURE — 58150 TOTAL HYSTERECTOMY: CPT | Performed by: OBSTETRICS & GYNECOLOGY

## 2021-12-22 PROCEDURE — 81025 URINE PREGNANCY TEST: CPT | Performed by: OBSTETRICS & GYNECOLOGY

## 2021-12-22 PROCEDURE — 710N000009 HC RECOVERY PHASE 1, LEVEL 1, PER MIN: Performed by: OBSTETRICS & GYNECOLOGY

## 2021-12-22 PROCEDURE — 360N000076 HC SURGERY LEVEL 3, PER MIN: Performed by: OBSTETRICS & GYNECOLOGY

## 2021-12-22 PROCEDURE — 250N000009 HC RX 250: Performed by: NURSE ANESTHETIST, CERTIFIED REGISTERED

## 2021-12-22 PROCEDURE — 258N000003 HC RX IP 258 OP 636: Performed by: OBSTETRICS & GYNECOLOGY

## 2021-12-22 PROCEDURE — 58150 TOTAL HYSTERECTOMY: CPT | Mod: 80 | Performed by: FAMILY MEDICINE

## 2021-12-22 PROCEDURE — 0UT90ZZ RESECTION OF UTERUS, OPEN APPROACH: ICD-10-PCS | Performed by: OBSTETRICS & GYNECOLOGY

## 2021-12-22 PROCEDURE — 258N000003 HC RX IP 258 OP 636: Performed by: ANESTHESIOLOGY

## 2021-12-22 PROCEDURE — 370N000017 HC ANESTHESIA TECHNICAL FEE, PER MIN: Performed by: OBSTETRICS & GYNECOLOGY

## 2021-12-22 PROCEDURE — 86850 RBC ANTIBODY SCREEN: CPT | Performed by: OBSTETRICS & GYNECOLOGY

## 2021-12-22 PROCEDURE — 85018 HEMOGLOBIN: CPT | Performed by: ANESTHESIOLOGY

## 2021-12-22 PROCEDURE — 88307 TISSUE EXAM BY PATHOLOGIST: CPT | Mod: TC | Performed by: OBSTETRICS & GYNECOLOGY

## 2021-12-22 PROCEDURE — 250N000009 HC RX 250: Performed by: OBSTETRICS & GYNECOLOGY

## 2021-12-22 RX ORDER — MAGNESIUM HYDROXIDE 1200 MG/15ML
LIQUID ORAL PRN
Status: DISCONTINUED | OUTPATIENT
Start: 2021-12-22 | End: 2021-12-22 | Stop reason: HOSPADM

## 2021-12-22 RX ORDER — OXYCODONE HYDROCHLORIDE 5 MG/1
5 TABLET ORAL EVERY 4 HOURS PRN
Status: DISCONTINUED | OUTPATIENT
Start: 2021-12-22 | End: 2021-12-22 | Stop reason: HOSPADM

## 2021-12-22 RX ORDER — NALOXONE HYDROCHLORIDE 0.4 MG/ML
0.4 INJECTION, SOLUTION INTRAMUSCULAR; INTRAVENOUS; SUBCUTANEOUS
Status: DISCONTINUED | OUTPATIENT
Start: 2021-12-22 | End: 2021-12-23 | Stop reason: HOSPADM

## 2021-12-22 RX ORDER — SERTRALINE HYDROCHLORIDE 100 MG/1
200 TABLET, FILM COATED ORAL DAILY
Status: DISCONTINUED | OUTPATIENT
Start: 2021-12-23 | End: 2021-12-23 | Stop reason: HOSPADM

## 2021-12-22 RX ORDER — NEOSTIGMINE METHYLSULFATE 1 MG/ML
VIAL (ML) INJECTION PRN
Status: DISCONTINUED | OUTPATIENT
Start: 2021-12-22 | End: 2021-12-22

## 2021-12-22 RX ORDER — LABETALOL HYDROCHLORIDE 5 MG/ML
10 INJECTION, SOLUTION INTRAVENOUS
Status: DISCONTINUED | OUTPATIENT
Start: 2021-12-22 | End: 2021-12-22 | Stop reason: HOSPADM

## 2021-12-22 RX ORDER — NALOXONE HYDROCHLORIDE 0.4 MG/ML
0.2 INJECTION, SOLUTION INTRAMUSCULAR; INTRAVENOUS; SUBCUTANEOUS
Status: DISCONTINUED | OUTPATIENT
Start: 2021-12-22 | End: 2021-12-23 | Stop reason: HOSPADM

## 2021-12-22 RX ORDER — FENTANYL CITRATE 50 UG/ML
25 INJECTION, SOLUTION INTRAMUSCULAR; INTRAVENOUS EVERY 5 MIN PRN
Status: DISCONTINUED | OUTPATIENT
Start: 2021-12-22 | End: 2021-12-22 | Stop reason: HOSPADM

## 2021-12-22 RX ORDER — HYDRALAZINE HYDROCHLORIDE 20 MG/ML
2.5-5 INJECTION INTRAMUSCULAR; INTRAVENOUS EVERY 10 MIN PRN
Status: DISCONTINUED | OUTPATIENT
Start: 2021-12-22 | End: 2021-12-22 | Stop reason: HOSPADM

## 2021-12-22 RX ORDER — PROPOFOL 10 MG/ML
INJECTION, EMULSION INTRAVENOUS CONTINUOUS PRN
Status: DISCONTINUED | OUTPATIENT
Start: 2021-12-22 | End: 2021-12-22

## 2021-12-22 RX ORDER — PROPOFOL 10 MG/ML
INJECTION, EMULSION INTRAVENOUS PRN
Status: DISCONTINUED | OUTPATIENT
Start: 2021-12-22 | End: 2021-12-22

## 2021-12-22 RX ORDER — CEFAZOLIN SODIUM/WATER 2 G/20 ML
2 SYRINGE (ML) INTRAVENOUS SEE ADMIN INSTRUCTIONS
Status: DISCONTINUED | OUTPATIENT
Start: 2021-12-22 | End: 2021-12-22 | Stop reason: HOSPADM

## 2021-12-22 RX ORDER — ONDANSETRON 2 MG/ML
INJECTION INTRAMUSCULAR; INTRAVENOUS PRN
Status: DISCONTINUED | OUTPATIENT
Start: 2021-12-22 | End: 2021-12-22

## 2021-12-22 RX ORDER — LIDOCAINE HYDROCHLORIDE 10 MG/ML
INJECTION, SOLUTION INFILTRATION; PERINEURAL PRN
Status: DISCONTINUED | OUTPATIENT
Start: 2021-12-22 | End: 2021-12-22

## 2021-12-22 RX ORDER — ONDANSETRON 2 MG/ML
4 INJECTION INTRAMUSCULAR; INTRAVENOUS EVERY 6 HOURS PRN
Status: DISCONTINUED | OUTPATIENT
Start: 2021-12-22 | End: 2021-12-23 | Stop reason: HOSPADM

## 2021-12-22 RX ORDER — ALBUTEROL SULFATE 0.83 MG/ML
2.5 SOLUTION RESPIRATORY (INHALATION) EVERY 4 HOURS PRN
Status: DISCONTINUED | OUTPATIENT
Start: 2021-12-22 | End: 2021-12-22 | Stop reason: HOSPADM

## 2021-12-22 RX ORDER — HYDROXYZINE HYDROCHLORIDE 25 MG/1
25 TABLET, FILM COATED ORAL EVERY 6 HOURS PRN
Status: DISCONTINUED | OUTPATIENT
Start: 2021-12-22 | End: 2021-12-23 | Stop reason: HOSPADM

## 2021-12-22 RX ORDER — LIDOCAINE 40 MG/G
CREAM TOPICAL
Status: DISCONTINUED | OUTPATIENT
Start: 2021-12-22 | End: 2021-12-22 | Stop reason: HOSPADM

## 2021-12-22 RX ORDER — BISACODYL 10 MG
10 SUPPOSITORY, RECTAL RECTAL DAILY PRN
Status: DISCONTINUED | OUTPATIENT
Start: 2021-12-22 | End: 2021-12-23 | Stop reason: HOSPADM

## 2021-12-22 RX ORDER — ACETAMINOPHEN 325 MG/1
975 TABLET ORAL EVERY 6 HOURS
Status: DISCONTINUED | OUTPATIENT
Start: 2021-12-22 | End: 2021-12-23 | Stop reason: HOSPADM

## 2021-12-22 RX ORDER — IBUPROFEN 800 MG/1
800 TABLET, FILM COATED ORAL EVERY 6 HOURS
Status: DISCONTINUED | OUTPATIENT
Start: 2021-12-22 | End: 2021-12-23 | Stop reason: HOSPADM

## 2021-12-22 RX ORDER — ONDANSETRON 2 MG/ML
4 INJECTION INTRAMUSCULAR; INTRAVENOUS EVERY 30 MIN PRN
Status: DISCONTINUED | OUTPATIENT
Start: 2021-12-22 | End: 2021-12-22 | Stop reason: HOSPADM

## 2021-12-22 RX ORDER — SODIUM CHLORIDE, SODIUM LACTATE, POTASSIUM CHLORIDE, CALCIUM CHLORIDE 600; 310; 30; 20 MG/100ML; MG/100ML; MG/100ML; MG/100ML
INJECTION, SOLUTION INTRAVENOUS CONTINUOUS
Status: DISCONTINUED | OUTPATIENT
Start: 2021-12-22 | End: 2021-12-23 | Stop reason: HOSPADM

## 2021-12-22 RX ORDER — GLYCOPYRROLATE 0.2 MG/ML
INJECTION, SOLUTION INTRAMUSCULAR; INTRAVENOUS PRN
Status: DISCONTINUED | OUTPATIENT
Start: 2021-12-22 | End: 2021-12-22

## 2021-12-22 RX ORDER — ONDANSETRON 4 MG/1
4 TABLET, ORALLY DISINTEGRATING ORAL EVERY 30 MIN PRN
Status: DISCONTINUED | OUTPATIENT
Start: 2021-12-22 | End: 2021-12-22 | Stop reason: HOSPADM

## 2021-12-22 RX ORDER — KETOROLAC TROMETHAMINE 15 MG/ML
15 INJECTION, SOLUTION INTRAMUSCULAR; INTRAVENOUS EVERY 6 HOURS
Status: DISCONTINUED | OUTPATIENT
Start: 2021-12-22 | End: 2021-12-23 | Stop reason: HOSPADM

## 2021-12-22 RX ORDER — EPHEDRINE SULFATE 50 MG/ML
INJECTION, SOLUTION INTRAVENOUS PRN
Status: DISCONTINUED | OUTPATIENT
Start: 2021-12-22 | End: 2021-12-22

## 2021-12-22 RX ORDER — AMOXICILLIN 250 MG
1 CAPSULE ORAL 2 TIMES DAILY
Status: DISCONTINUED | OUTPATIENT
Start: 2021-12-22 | End: 2021-12-23 | Stop reason: HOSPADM

## 2021-12-22 RX ORDER — ALBUTEROL SULFATE 90 UG/1
2 AEROSOL, METERED RESPIRATORY (INHALATION) EVERY 4 HOURS PRN
Status: DISCONTINUED | OUTPATIENT
Start: 2021-12-22 | End: 2021-12-23 | Stop reason: HOSPADM

## 2021-12-22 RX ORDER — ACETAMINOPHEN 325 MG/1
650 TABLET ORAL EVERY 6 HOURS
Status: DISCONTINUED | OUTPATIENT
Start: 2021-12-25 | End: 2021-12-23 | Stop reason: HOSPADM

## 2021-12-22 RX ORDER — HYDROMORPHONE HCL IN WATER/PF 6 MG/30 ML
0.2 PATIENT CONTROLLED ANALGESIA SYRINGE INTRAVENOUS EVERY 5 MIN PRN
Status: DISCONTINUED | OUTPATIENT
Start: 2021-12-22 | End: 2021-12-22 | Stop reason: HOSPADM

## 2021-12-22 RX ORDER — ACETAMINOPHEN 325 MG/1
975 TABLET ORAL ONCE
Status: DISCONTINUED | OUTPATIENT
Start: 2021-12-22 | End: 2021-12-22 | Stop reason: HOSPADM

## 2021-12-22 RX ORDER — ACETAMINOPHEN 325 MG/1
975 TABLET ORAL ONCE
Status: COMPLETED | OUTPATIENT
Start: 2021-12-22 | End: 2021-12-22

## 2021-12-22 RX ORDER — CEFAZOLIN SODIUM/WATER 2 G/20 ML
2 SYRINGE (ML) INTRAVENOUS
Status: COMPLETED | OUTPATIENT
Start: 2021-12-22 | End: 2021-12-22

## 2021-12-22 RX ORDER — SODIUM CHLORIDE, SODIUM LACTATE, POTASSIUM CHLORIDE, CALCIUM CHLORIDE 600; 310; 30; 20 MG/100ML; MG/100ML; MG/100ML; MG/100ML
INJECTION, SOLUTION INTRAVENOUS CONTINUOUS PRN
Status: DISCONTINUED | OUTPATIENT
Start: 2021-12-22 | End: 2021-12-22

## 2021-12-22 RX ORDER — SODIUM CHLORIDE, SODIUM LACTATE, POTASSIUM CHLORIDE, CALCIUM CHLORIDE 600; 310; 30; 20 MG/100ML; MG/100ML; MG/100ML; MG/100ML
INJECTION, SOLUTION INTRAVENOUS CONTINUOUS
Status: DISCONTINUED | OUTPATIENT
Start: 2021-12-22 | End: 2021-12-22 | Stop reason: HOSPADM

## 2021-12-22 RX ORDER — LIDOCAINE 40 MG/G
CREAM TOPICAL
Status: DISCONTINUED | OUTPATIENT
Start: 2021-12-22 | End: 2021-12-23 | Stop reason: HOSPADM

## 2021-12-22 RX ORDER — FENTANYL CITRATE 50 UG/ML
INJECTION, SOLUTION INTRAMUSCULAR; INTRAVENOUS PRN
Status: DISCONTINUED | OUTPATIENT
Start: 2021-12-22 | End: 2021-12-22

## 2021-12-22 RX ORDER — OXYCODONE HYDROCHLORIDE 5 MG/1
10 TABLET ORAL EVERY 4 HOURS PRN
Status: DISCONTINUED | OUTPATIENT
Start: 2021-12-22 | End: 2021-12-23 | Stop reason: HOSPADM

## 2021-12-22 RX ORDER — ONDANSETRON 4 MG/1
4 TABLET, ORALLY DISINTEGRATING ORAL EVERY 6 HOURS PRN
Status: DISCONTINUED | OUTPATIENT
Start: 2021-12-22 | End: 2021-12-23 | Stop reason: HOSPADM

## 2021-12-22 RX ORDER — DIMENHYDRINATE 50 MG/ML
25 INJECTION, SOLUTION INTRAMUSCULAR; INTRAVENOUS
Status: DISCONTINUED | OUTPATIENT
Start: 2021-12-22 | End: 2021-12-22 | Stop reason: HOSPADM

## 2021-12-22 RX ORDER — HYDROMORPHONE HCL IN WATER/PF 6 MG/30 ML
0.4 PATIENT CONTROLLED ANALGESIA SYRINGE INTRAVENOUS
Status: DISCONTINUED | OUTPATIENT
Start: 2021-12-22 | End: 2021-12-23 | Stop reason: HOSPADM

## 2021-12-22 RX ORDER — DEXAMETHASONE SODIUM PHOSPHATE 4 MG/ML
INJECTION, SOLUTION INTRA-ARTICULAR; INTRALESIONAL; INTRAMUSCULAR; INTRAVENOUS; SOFT TISSUE PRN
Status: DISCONTINUED | OUTPATIENT
Start: 2021-12-22 | End: 2021-12-22

## 2021-12-22 RX ORDER — HYDROMORPHONE HCL IN WATER/PF 6 MG/30 ML
0.2 PATIENT CONTROLLED ANALGESIA SYRINGE INTRAVENOUS
Status: DISCONTINUED | OUTPATIENT
Start: 2021-12-22 | End: 2021-12-23 | Stop reason: HOSPADM

## 2021-12-22 RX ORDER — PROCHLORPERAZINE MALEATE 10 MG
10 TABLET ORAL EVERY 6 HOURS PRN
Status: DISCONTINUED | OUTPATIENT
Start: 2021-12-22 | End: 2021-12-23 | Stop reason: HOSPADM

## 2021-12-22 RX ORDER — OXYCODONE HYDROCHLORIDE 5 MG/1
5 TABLET ORAL EVERY 4 HOURS PRN
Status: DISCONTINUED | OUTPATIENT
Start: 2021-12-22 | End: 2021-12-23 | Stop reason: HOSPADM

## 2021-12-22 RX ORDER — KETOROLAC TROMETHAMINE 30 MG/ML
INJECTION, SOLUTION INTRAMUSCULAR; INTRAVENOUS PRN
Status: DISCONTINUED | OUTPATIENT
Start: 2021-12-22 | End: 2021-12-22

## 2021-12-22 RX ADMIN — FENTANYL CITRATE 100 MCG: 50 INJECTION, SOLUTION INTRAMUSCULAR; INTRAVENOUS at 07:54

## 2021-12-22 RX ADMIN — OXYCODONE HYDROCHLORIDE 5 MG: 5 TABLET ORAL at 11:54

## 2021-12-22 RX ADMIN — SODIUM CHLORIDE, POTASSIUM CHLORIDE, SODIUM LACTATE AND CALCIUM CHLORIDE: 600; 310; 30; 20 INJECTION, SOLUTION INTRAVENOUS at 07:47

## 2021-12-22 RX ADMIN — IBUPROFEN 800 MG: 800 TABLET, FILM COATED ORAL at 16:07

## 2021-12-22 RX ADMIN — SODIUM CHLORIDE, POTASSIUM CHLORIDE, SODIUM LACTATE AND CALCIUM CHLORIDE: 600; 310; 30; 20 INJECTION, SOLUTION INTRAVENOUS at 08:30

## 2021-12-22 RX ADMIN — ACETAMINOPHEN 975 MG: 325 TABLET, FILM COATED ORAL at 16:07

## 2021-12-22 RX ADMIN — FENTANYL CITRATE 25 MCG: 50 INJECTION, SOLUTION INTRAMUSCULAR; INTRAVENOUS at 10:49

## 2021-12-22 RX ADMIN — HYDROMORPHONE HYDROCHLORIDE 0.2 MG: 0.2 INJECTION, SOLUTION INTRAMUSCULAR; INTRAVENOUS; SUBCUTANEOUS at 11:46

## 2021-12-22 RX ADMIN — IBUPROFEN 800 MG: 800 TABLET, FILM COATED ORAL at 21:52

## 2021-12-22 RX ADMIN — ONDANSETRON HYDROCHLORIDE 4 MG: 2 INJECTION, SOLUTION INTRAVENOUS at 07:54

## 2021-12-22 RX ADMIN — ROCURONIUM BROMIDE 50 MG: 50 INJECTION, SOLUTION INTRAVENOUS at 07:54

## 2021-12-22 RX ADMIN — HYDROMORPHONE HYDROCHLORIDE 0.2 MG: 0.2 INJECTION, SOLUTION INTRAMUSCULAR; INTRAVENOUS; SUBCUTANEOUS at 12:03

## 2021-12-22 RX ADMIN — ACETAMINOPHEN 975 MG: 325 TABLET, FILM COATED ORAL at 21:52

## 2021-12-22 RX ADMIN — PROPOFOL 75 MCG/KG/MIN: 10 INJECTION, EMULSION INTRAVENOUS at 07:54

## 2021-12-22 RX ADMIN — MIDAZOLAM 2 MG: 1 INJECTION INTRAMUSCULAR; INTRAVENOUS at 07:48

## 2021-12-22 RX ADMIN — GLYCOPYRROLATE 0.8 MG: 0.2 INJECTION, SOLUTION INTRAMUSCULAR; INTRAVENOUS at 09:47

## 2021-12-22 RX ADMIN — HYDROMORPHONE HYDROCHLORIDE 0.2 MG: 0.2 INJECTION, SOLUTION INTRAMUSCULAR; INTRAVENOUS; SUBCUTANEOUS at 14:31

## 2021-12-22 RX ADMIN — Medication 2 G: at 07:50

## 2021-12-22 RX ADMIN — OXYCODONE HYDROCHLORIDE 5 MG: 5 TABLET ORAL at 19:36

## 2021-12-22 RX ADMIN — SODIUM CHLORIDE, POTASSIUM CHLORIDE, SODIUM LACTATE AND CALCIUM CHLORIDE: 600; 310; 30; 20 INJECTION, SOLUTION INTRAVENOUS at 10:38

## 2021-12-22 RX ADMIN — HYDROMORPHONE HYDROCHLORIDE 0.25 MG: 1 INJECTION, SOLUTION INTRAMUSCULAR; INTRAVENOUS; SUBCUTANEOUS at 08:49

## 2021-12-22 RX ADMIN — ROCURONIUM BROMIDE 15 MG: 50 INJECTION, SOLUTION INTRAVENOUS at 08:45

## 2021-12-22 RX ADMIN — ACETAMINOPHEN 975 MG: 325 TABLET, FILM COATED ORAL at 06:28

## 2021-12-22 RX ADMIN — NEOSTIGMINE METHYLSULFATE 4 MG: 1 INJECTION, SOLUTION INTRAVENOUS at 09:47

## 2021-12-22 RX ADMIN — HYDROMORPHONE HYDROCHLORIDE 1 MG: 1 INJECTION, SOLUTION INTRAMUSCULAR; INTRAVENOUS; SUBCUTANEOUS at 07:54

## 2021-12-22 RX ADMIN — SODIUM CHLORIDE, POTASSIUM CHLORIDE, SODIUM LACTATE AND CALCIUM CHLORIDE: 600; 310; 30; 20 INJECTION, SOLUTION INTRAVENOUS at 16:07

## 2021-12-22 RX ADMIN — KETOROLAC TROMETHAMINE 30 MG: 30 INJECTION, SOLUTION INTRAMUSCULAR at 07:54

## 2021-12-22 RX ADMIN — PROPOFOL 200 MG: 10 INJECTION, EMULSION INTRAVENOUS at 07:54

## 2021-12-22 RX ADMIN — FENTANYL CITRATE 25 MCG: 50 INJECTION, SOLUTION INTRAMUSCULAR; INTRAVENOUS at 10:36

## 2021-12-22 RX ADMIN — GLYCOPYRROLATE 0.2 MG: 0.2 INJECTION, SOLUTION INTRAMUSCULAR; INTRAVENOUS at 07:54

## 2021-12-22 RX ADMIN — SENNOSIDES AND DOCUSATE SODIUM 1 TABLET: 50; 8.6 TABLET ORAL at 19:36

## 2021-12-22 RX ADMIN — EPHEDRINE SULFATE 5 MG: 50 INJECTION, SOLUTION INTRAVENOUS at 08:40

## 2021-12-22 RX ADMIN — DEXAMETHASONE SODIUM PHOSPHATE 8 MG: 4 INJECTION, SOLUTION INTRA-ARTICULAR; INTRALESIONAL; INTRAMUSCULAR; INTRAVENOUS; SOFT TISSUE at 07:54

## 2021-12-22 RX ADMIN — HYDROMORPHONE HYDROCHLORIDE 0.2 MG: 0.2 INJECTION, SOLUTION INTRAMUSCULAR; INTRAVENOUS; SUBCUTANEOUS at 11:05

## 2021-12-22 RX ADMIN — LIDOCAINE HYDROCHLORIDE 50 MG: 10 INJECTION, SOLUTION INFILTRATION; PERINEURAL at 07:54

## 2021-12-22 RX ADMIN — HYDROMORPHONE HYDROCHLORIDE 0.2 MG: 0.2 INJECTION, SOLUTION INTRAMUSCULAR; INTRAVENOUS; SUBCUTANEOUS at 11:21

## 2021-12-22 ASSESSMENT — ACTIVITIES OF DAILY LIVING (ADL)
ADLS_ACUITY_SCORE: 5
ADLS_ACUITY_SCORE: 7
ADLS_ACUITY_SCORE: 5
ADLS_ACUITY_SCORE: 3
ADLS_ACUITY_SCORE: 9
ADLS_ACUITY_SCORE: 5
ADLS_ACUITY_SCORE: 7
ADLS_ACUITY_SCORE: 5
ADLS_ACUITY_SCORE: 5
ADLS_ACUITY_SCORE: 9
ADLS_ACUITY_SCORE: 5
ADLS_ACUITY_SCORE: 7
ADLS_ACUITY_SCORE: 7
ADLS_ACUITY_SCORE: 5
ADLS_ACUITY_SCORE: 5
ADLS_ACUITY_SCORE: 9

## 2021-12-22 ASSESSMENT — MIFFLIN-ST. JEOR: SCORE: 1776.31

## 2021-12-22 NOTE — ANESTHESIA PREPROCEDURE EVALUATION
Anesthesia Pre-Procedure Evaluation    Patient: Krystina Suazo   MRN: [id number] : 1985        Preoperative Diagnosis: Fibroid uterus [D25.9]    Procedure : Procedure(s):  TOTAL ABDOMINAL HYSTERECTOMY, BILATERAL SALPINGECTOMY          Past Medical History:   Diagnosis Date     Asthma      Depression      Depressive disorder     Diagnosed around      Hypertension 2018     Vasovagal syncope       Past Surgical History:   Procedure Laterality Date     CARPAL TUNNEL RELEASE RT/LT Bilateral       SECTION N/A 2018    Procedure:  SECTION;   section;  Surgeon: Jay Yanez MD;  Location: [facility] OR      SECTION N/A 10/27/2020    Procedure: REPEAT  SECTION;  Surgeon: Aníbal Pearson MD;  Location: [facility] L+D     GYN SURGERY  18         HERNIORRHAPHY UMBILICAL N/A 2021    Procedure: Open Umbilical Hernia Repair with Mesh;  Surgeon: Erin Mccarthy MD;  Location: [facility] OR     SINUS SURGERY       TONSILLECTOMY  2014      Allergies   Allergen Reactions     Dust Mite Extract      Pollen and animal dander.     Seasonal Allergies       Social History     Tobacco Use     Smoking status: Never Smoker     Smokeless tobacco: Never Used   Substance Use Topics     Alcohol use: No      Wt Readings from Last 1 Encounters:   21 101.6 kg (224 lb)        Anesthesia Evaluation   Pt has had prior anesthetic. Type: General.    History of anesthetic complications   syncope.    ROS/MED HX  ENT/Pulmonary:     (+) Intermittent, asthma Treatment: Inhaler prn,      Neurologic:  - neg neurologic ROS     Cardiovascular:     (+) hypertension-----    METS/Exercise Tolerance:     Hematologic:  - neg hematologic  ROS     Musculoskeletal:  - neg musculoskeletal ROS     GI/Hepatic:  - neg GI/hepatic ROS     Renal/Genitourinary:  - neg Renal ROS     Endo: Comment: Class 1 obesity    (+) Obesity,     Psychiatric/Substance Use:     (+) psychiatric history  anxiety and depression     Infectious Disease:  - neg infectious disease ROS     Malignancy:  - neg malignancy ROS     Other:  - neg other ROS          Physical Exam    Airway        Mallampati: II   TM distance: > 3 FB   Neck ROM: full   Mouth opening: > 3 cm    Respiratory Devices and Support         Dental  no notable dental history         Cardiovascular   cardiovascular exam normal       Rhythm and rate: regular and normal     Pulmonary   pulmonary exam normal        breath sounds clear to auscultation       Other findings: Lab Test        07/21/21     10/28/20     10/26/20     08/13/20     04/20/20                       1434          0628          1309          1602          1053          WBC          7.7           --           --          8.9          6.1           HGB          14.1         10.6*        12.5         12.0         12.6          MCV          95            --           --          97           93            PLT          224           --           --          195          222            Lab Test        10/27/20     04/25/19     09/06/18     09/05/18     09/04/18     08/30/18                       1112          0914          0720          1016          1530          1628          NA            --           --           --           --          136          136           POTASSIUM     --           --           --           --          3.9          4.2           CHLORIDE      --           --           --           --          106          107           CO2           --           --           --           --          22           22            BUN           --           --           --           --          13           13            CR           0.47*         --          0.62         0.66         0.61         0.64          ANIONGAP      --           --           --           --          8            7             LORNA           --           --           --           --          8.6          8.5            GLC           --          98            --           --          98           98                        OUTSIDE LABS:  CBC:   Lab Results   Component Value Date    WBC 7.7 07/21/2021    WBC 8.9 08/13/2020    HGB 14.1 07/21/2021    HGB 10.6 (L) 10/28/2020    HCT 42.1 07/21/2021    HCT 35.4 08/13/2020     07/21/2021     08/13/2020     BMP:   Lab Results   Component Value Date     09/04/2018     08/30/2018    POTASSIUM 3.9 09/04/2018    POTASSIUM 4.2 08/30/2018    CHLORIDE 106 09/04/2018    CHLORIDE 107 08/30/2018    CO2 22 09/04/2018    CO2 22 08/30/2018    BUN 13 09/04/2018    BUN 13 08/30/2018    CR 0.47 (L) 10/27/2020    CR 0.62 09/06/2018    GLC 98 04/25/2019    GLC 98 09/04/2018     COAGS: No results found for: PTT, INR, FIBR  POC:   Lab Results   Component Value Date    HCG Negative 06/04/2021     HEPATIC:   Lab Results   Component Value Date    ALBUMIN 2.6 (L) 09/04/2018    PROTTOTAL 6.0 (L) 09/04/2018    ALT 14 09/06/2018    AST 14 09/06/2018    ALKPHOS 351 (H) 09/04/2018    BILITOTAL 0.1 (L) 09/04/2018     OTHER:   Lab Results   Component Value Date    A1C 5.3 04/25/2019    LORNA 8.6 09/04/2018    MAG 4.2 (H) 09/06/2018    TSH 1.70 03/13/2019       Anesthesia Plan    ASA Status:  2      Anesthesia Type: General.     - Airway: ETT   Induction: Intravenous.   Maintenance: Balanced.        Consents    Anesthesia Plan(s) and associated risks, benefits, and realistic alternatives discussed. Questions answered and patient/representative(s) expressed understanding.     - Discussed: Risks, Benefits and Alternatives for BOTH SEDATION and the PROCEDURE were discussed     - Discussed with:  Patient      - Extended Intubation/Ventilatory Support Discussed: No.      - Patient is DNR/DNI Status: No    Use of blood products discussed: No .     Postoperative Care    Pain management: IV analgesics, Oral pain medications, Multi-modal analgesia.   PONV prophylaxis: Ondansetron (or other 5HT-3),  Dexamethasone or Solumedrol, Background Propofol Infusion     Comments:    Other Comments: Consider TAP blocks post op            Michael Rachel MD

## 2021-12-22 NOTE — ANESTHESIA CARE TRANSFER NOTE
Patient: Krystina Suazo    Procedure: Procedure(s):  TOTAL ABDOMINAL HYSTERECTOMY, BILATERAL SALPINGECTOMY       Diagnosis: Fibroid uterus [D25.9]  Diagnosis Additional Information: No value filed.    Anesthesia Type:   General     Note:    Oropharynx: spontaneously breathing  Level of Consciousness: awake  Oxygen Supplementation: face mask    Independent Airway: airway patency satisfactory and stable  Dentition: dentition unchanged  Vital Signs Stable: post-procedure vital signs reviewed and stable  Report to RN Given: handoff report given  Patient transferred to: PACU  Comments: To PACU, report to RN, oxygen per face  Mask.        Vitals:  Vitals Value Taken Time   /62 12/22/21 1005   Temp 98.3  F (36.8  C) 12/22/21 1004   Pulse 86 12/22/21 1008   Resp 12 12/22/21 1008   SpO2 96 % 12/22/21 1008   Vitals shown include unvalidated device data.    Electronically Signed By: JAMIN Rangel CRNA  December 22, 2021  10:10 AM

## 2021-12-22 NOTE — OP NOTE
MINAL/BS OPERATIVE REPORT     DATE OF PROCEDURE:   STAFF SURGEON: Sandy Tran MD  PREOPERATIVE DIAGNOSIS: large fibroid uterus, menorrhagia  POSTOPERATIVE DIAGNOSIS: Same   PROCEDURE: Total abdominal hysterectomy,  bilateral salpingectomy   ASSISTANT SURGEON: Dr. Zamudio   ANESTHESIA: General endotracheal anesthesia.  COMPLICATIONS: none noted  URINE OUTPUT: 800 cc  IV FLUIDS: LR 1300 cc  EBL: 200 cc  SPECIMENS: uterus with cervix, bilateral fallopian tubes  FINDINGS: large fibroid uterus with central fundal fibroid     INDICATIONS: Menorrhagia and large fibroid on CT and US.  She was counseled on the risks, benefits, and alternatives of the procedure, and informed consent was signed.     DESCRIPTION OF PROCEDURE:  The patient was taken to the operating room where general endotracheal anesthesia was induced without difficulty and found to be adequate. She was prepped and draped in the normal sterile fashion in the supine position. A Rene catheter was placed in the bladder. A Pfannensteil incision was made and dissection carried with electrocautery to the fascia, which was then incised sharply. The myofascial place was developed in the cephalad and caudad directions, and the recti muscles were gently  in the midline. The peritoneal cavity was entered without difficulty. Above findings were noted. Patient was placed in Trendelenburg position. The bowels were packed from the pelvis with two long laparotomy packs, and a shaina retractor was placed. Long, curved clamps were used to grasp each cornua for traction and exposure. The left round ligament was grasped, divided, and ligated with a stick tie. The left broad ligament was opened sharply, and the ureter identified on the medial leaf of the broad ligament in its normal anatomic position. The left utero-ovarian ligament was grasped, ligated, and divided with the LigaSure device, well away from the position of the left ureter. Uterine vessels were  skeletonized. The bladder flap partially developed. Attention was then turned to the opposite side, where these steps were repeated, again visualizing the ureter on the right side to be sure it was free of the operative field. The bladder flap was developed anteriorly. The uterine vessels were skeletonized on the right, and the LigaSure device was used to grasp, coagulate, and divide the cardinal ligament in 2 bites. The cardinal ligament on the left side was divided in a similar fashion. Curved Shannan clamps were placed across the vagina at the level of the cervix. The vagina was entered on the left side, and circumferentially incised. The uterus and cervix were thus handed off the operative field. The vaginal angles were ligated with a Shannan stitch. The remaining vaginal cuff was closed with a running stitch of 0 Vicryl. The pevlis was irrigated, and light electrocautery used to obtain hemostasis on the posterior cuff peritoneum. A small amount of oozing at the right vaginal angle was controlled with a single figure of 8 2.0 vicryl suture. Hemostasis was then noted to be excellent. All instruments and packs were removed from the pelvis. Counts were reported to me as correct. The subfascial space was inspected and bleeding vessels were cauterized. A small defect in the superior fascia was closed with 0 vicryl. Fascia was closed with looped 0 PDS suture. The subcutaneous tissues were irrigated and the space closed with 3.0 plain gut running suture. Skin was closed with subcuticular 3.0 monocryl. Steri strips were placed on the incision, and a sterile dressing applied.     Urine output was adequate and clear. Patient was awakened and taken to recovery in good condition.    I requested the aid of Dr. Zamudio for exposure and assistance with the case, which was anticipated to be difficult based on uterine size.    Sandy Tran MD  10:03 AM

## 2021-12-22 NOTE — ANESTHESIA POSTPROCEDURE EVALUATION
Patient: Krystina Suazo    Procedure: Procedure(s):  TOTAL ABDOMINAL HYSTERECTOMY, BILATERAL SALPINGECTOMY       Diagnosis:Fibroid uterus [D25.9]  Diagnosis Additional Information: No value filed.    Anesthesia Type:  General    Note:  Disposition: Inpatient   Postop Pain Control: Uneventful            Sign Out: Well controlled pain   PONV: No   Neuro/Psych: Uneventful            Sign Out: Acceptable/Baseline neuro status   Airway/Respiratory: Uneventful            Sign Out: Acceptable/Baseline resp. status   CV/Hemodynamics: Uneventful            Sign Out: Acceptable CV status; No obvious hypovolemia; No obvious fluid overload   Other NRE: NONE   DID A NON-ROUTINE EVENT OCCUR? No           Last vitals:  Vitals Value Taken Time   /64 12/22/21 1025   Temp 98.3  F (36.8  C) 12/22/21 1004   Pulse 62 12/22/21 1030   Resp 16 12/22/21 1030   SpO2 100 % 12/22/21 1030   Vitals shown include unvalidated device data.    Electronically Signed By: Michael Rachel MD  December 22, 2021  10:31 AM

## 2021-12-22 NOTE — PLAN OF CARE
End of Shift Summary  For vital signs and complete assessments, please see documentation flowsheets.     Pertinent assessments: VSS, TMAX 99.7. c/o abd pain, ice applied and IV dilaudid given. Dressing has some shadowing,  marked.  Small amount vaginal bleeding. D/t void, warren discontinued at 1240 in PACU. Up 1 assist to BSC.     Major Shift Events admitted to the floor. IV dilaudid given for pain. D/T void. Warren discontinued at 1240. Pt is 14 weeks post partum, pumping breast milk.     Treatment Plan: Monitor vaginal bleeding. Diet advancement. Pain control.

## 2021-12-23 VITALS
RESPIRATION RATE: 18 BRPM | HEART RATE: 82 BPM | HEIGHT: 70 IN | BODY MASS INDEX: 32.07 KG/M2 | OXYGEN SATURATION: 95 % | SYSTOLIC BLOOD PRESSURE: 139 MMHG | DIASTOLIC BLOOD PRESSURE: 64 MMHG | TEMPERATURE: 98.4 F | WEIGHT: 224 LBS

## 2021-12-23 LAB
CREAT SERPL-MCNC: 0.66 MG/DL (ref 0.52–1.04)
GFR SERPL CREATININE-BSD FRML MDRD: >90 ML/MIN/1.73M2
GLUCOSE BLDC GLUCOMTR-MCNC: 92 MG/DL (ref 70–99)
PATH REPORT.COMMENTS IMP SPEC: NORMAL
PATH REPORT.COMMENTS IMP SPEC: NORMAL
PATH REPORT.FINAL DX SPEC: NORMAL
PATH REPORT.GROSS SPEC: NORMAL
PATH REPORT.MICROSCOPIC SPEC OTHER STN: NORMAL
PATH REPORT.MICROSCOPIC SPEC OTHER STN: NORMAL
PATH REPORT.RELEVANT HX SPEC: NORMAL
PHOTO IMAGE: NORMAL

## 2021-12-23 PROCEDURE — 82565 ASSAY OF CREATININE: CPT | Performed by: OBSTETRICS & GYNECOLOGY

## 2021-12-23 PROCEDURE — 88307 TISSUE EXAM BY PATHOLOGIST: CPT | Mod: 26 | Performed by: PATHOLOGY

## 2021-12-23 PROCEDURE — 250N000013 HC RX MED GY IP 250 OP 250 PS 637: Performed by: OBSTETRICS & GYNECOLOGY

## 2021-12-23 PROCEDURE — 36415 COLL VENOUS BLD VENIPUNCTURE: CPT | Performed by: OBSTETRICS & GYNECOLOGY

## 2021-12-23 RX ORDER — IBUPROFEN 800 MG/1
800 TABLET, FILM COATED ORAL EVERY 6 HOURS
Qty: 30 TABLET | Refills: 0 | Status: SHIPPED | OUTPATIENT
Start: 2021-12-23 | End: 2022-01-19

## 2021-12-23 RX ORDER — OXYCODONE HYDROCHLORIDE 5 MG/1
5 TABLET ORAL EVERY 4 HOURS PRN
Qty: 20 TABLET | Refills: 0 | Status: SHIPPED | OUTPATIENT
Start: 2021-12-23 | End: 2022-01-19

## 2021-12-23 RX ADMIN — OMEPRAZOLE 20 MG: 20 CAPSULE, DELAYED RELEASE ORAL at 06:51

## 2021-12-23 RX ADMIN — SENNOSIDES AND DOCUSATE SODIUM 1 TABLET: 50; 8.6 TABLET ORAL at 07:58

## 2021-12-23 RX ADMIN — IBUPROFEN 800 MG: 800 TABLET, FILM COATED ORAL at 04:14

## 2021-12-23 RX ADMIN — ACETAMINOPHEN 975 MG: 325 TABLET, FILM COATED ORAL at 16:50

## 2021-12-23 RX ADMIN — ACETAMINOPHEN 975 MG: 325 TABLET, FILM COATED ORAL at 04:14

## 2021-12-23 RX ADMIN — IBUPROFEN 800 MG: 800 TABLET, FILM COATED ORAL at 11:02

## 2021-12-23 RX ADMIN — SERTRALINE HYDROCHLORIDE 200 MG: 100 TABLET ORAL at 07:59

## 2021-12-23 ASSESSMENT — ACTIVITIES OF DAILY LIVING (ADL)
ADLS_ACUITY_SCORE: 7

## 2021-12-23 NOTE — DISCHARGE INSTRUCTIONS
Follow up in clinic in 2 weeks and 6 weeks.         Discharge Instructions and Follow-Up:   Discharge diet: Regular   Discharge activity: Lifting restricted to 15 pounds  No lifting, driving, or strenuous exercise for 6 week(s)  No heavy lifting for 6 week(s)  No heavy lifting, pushing, pulling for 6 week(s)  No driving for 1-2 week(s)  Nothing per vagina for 6 weeks.          Thank you for choosing [facility].

## 2021-12-23 NOTE — DISCHARGE SUMMARY
[facility]    Discharge Summary  Gynecology    Date of Admission:  12/22/2021  Date of Discharge:  12/23/2021  Discharging Provider: Sandy Tran MD    Discharge Diagnoses   Uterine fibroids  Status post total abdominal hysterectomy, bilateral salpingectomy     Procedure/Surgery Information   Procedure: Procedure(s):  TOTAL ABDOMINAL HYSTERECTOMY, BILATERAL SALPINGECTOMY   Surgeon(s): Surgeon(s) and Role:     * Sandy Tran MD - Primary     * Mora Zamudio DO - Assisting   Specimens: ID Type Source Tests Collected by Time Destination   1 : uterus, cervix, bilateral fallopian tubes Tissue Uterus, Cervix, Bilateral Fallopian Tubes SURGICAL PATHOLOGY EXAM Sandy Tran MD 12/22/2021  9:06 AM       Non-operative procedures None performed     History of Present Illness   Krystina Suazo is a 36 year old female who presented with uterine fibroids for planned total abdominal hysterectomy, bilateral salpingectomy.    Hospital Course   The patient's hospital course was unremarkable.  She recovered as anticipated and experienced no post-operative complications.     Post-operative pain control: included Hydromorphone (dilaudid) IV, Toradol and po oxycodone and will be Oxycodone, ibuprofen, and tylenol on discharge.     Medications discontinued or adjusted during this hospitalization: No change     Antibiotics prescribed at discharge: None prescribed     Imaging study follow up needs:   -None performed    Sandy Tran MD    Discharge Disposition   Discharged to home   Condition at discharge: Stable    Pending Results   Final pathology results: all benign.    Unresulted Labs Ordered in the Past 30 Days of this Admission     No orders found for last 31 day(s).          Primary Care Physician   Valeria Daly    Physical Exam   Temp: 98.4  F (36.9  C) Temp src: Oral BP: 139/64 Pulse: 82   Resp: 18 SpO2: 95 % O2 Device: None (Room air)    Vitals:    12/22/21 0609   Weight:  101.6 kg (224 lb)     Vital Signs with Ranges  Temp:  [98.4  F (36.9  C)-99.7  F (37.6  C)] 98.4  F (36.9  C)  Pulse:  [66-82] 82  Resp:  [15-20] 18  BP: (116-139)/(60-64) 139/64  SpO2:  [95 %] 95 %  I/O last 3 completed shifts:  In: 3240.75 [P.O.:740; I.V.:2500.75]  Out: 1775 [Urine:1775]    Abdomen: mildly tender just at the incision as expected, incision is clean, dry, and intact.  Extremities: nontender.    Consultations This Hospital Stay   None    Time Spent on this Encounter   I have spent less than 30 minutes on this discharge.    Discharge Orders   No discharge procedures on file.  Discharge Medications   Current Discharge Medication List      START taking these medications    Details   ibuprofen (ADVIL/MOTRIN) 800 MG tablet Take 1 tablet (800 mg) by mouth every 6 hours  Qty: 30 tablet, Refills: 0    Associated Diagnoses: S/P MINAL (total abdominal hysterectomy)      oxyCODONE (ROXICODONE) 5 MG tablet Take 1 tablet (5 mg) by mouth every 4 hours as needed for moderate to severe pain  Qty: 20 tablet, Refills: 0    Associated Diagnoses: S/P MINAL (total abdominal hysterectomy)         CONTINUE these medications which have NOT CHANGED    Details   albuterol (PROAIR HFA/PROVENTIL HFA/VENTOLIN HFA) 108 (90 Base) MCG/ACT inhaler Inhale 2 puffs into the lungs every 4 hours as needed for shortness of breath / dyspnea or wheezing  Qty: 18 g, Refills: 11    Comments: Pharmacy may dispense brand covered by insurance (Proair, or proventil or ventolin or generic albuterol inhaler)  Associated Diagnoses: Mild intermittent asthma without complication      clobetasol propionate (OLUX) 0.05 % external foam Apply to AA on scalp bid for 7 days then once per day for 2 weeks . Then when needed. May substitute cream or solution at same sig  and dispense  Qty: 100 g, Refills: 3    Associated Diagnoses: Scalp psoriasis      ipratropium (ATROVENT) 0.03 % nasal spray Spray 2 sprays into both nostrils every 8 hours as needed for  rhinitis  Qty: 30 mL, Refills: 11    Associated Diagnoses: Acute sinusitis with symptoms > 10 days      omeprazole (PRILOSEC) 20 MG DR capsule TAKE ONE CAPSULE BY MOUTH ONCE DAILY  Qty: 90 capsule, Refills: 2    Associated Diagnoses: Chest pain, unspecified type; Gastroesophageal reflux disease without esophagitis      Prenatal Vit-Fe Fumarate-FA (PRENATAL MULTIVITAMIN PLUS IRON) 27-0.8 MG TABS per tablet Take 1 tablet by mouth daily  Qty: 100 tablet, Refills: 3    Associated Diagnoses: Encounter for supervision of normal first pregnancy in first trimester      sertraline (ZOLOFT) 100 MG tablet Take 2 tablets (200 mg) by mouth daily  Qty: 180 tablet, Refills: 3    Associated Diagnoses: Major depressive disorder, recurrent episode, moderate (H)           Allergies   Allergies   Allergen Reactions     Dust Mite Extract      Pollen and animal dander.     Seasonal Allergies      Data   Most Recent 3 CBC's:Recent Labs   Lab Test 12/22/21  0620 07/21/21  1434 10/28/20  0628 10/26/20  1309 08/13/20  1602 04/20/20  1053   WBC  --  7.7  --   --  8.9 6.1   HGB 11.9 14.1 10.6*   < > 12.0 12.6   MCV  --  95  --   --  97 93   PLT  --  224  --   --  195 222    < > = values in this interval not displayed.      Most Recent 3 BMP's:  Recent Labs   Lab Test 12/23/21  0724 12/23/21  0643 10/27/20  1112 04/25/19  0914 09/06/18  0720 09/05/18  1016 09/04/18  1530 08/30/18  1628   NA  --   --   --   --   --   --  136 136   POTASSIUM  --   --   --   --   --   --  3.9 4.2   CHLORIDE  --   --   --   --   --   --  106 107   CO2  --   --   --   --   --   --  22 22   BUN  --   --   --   --   --   --  13 13   CR 0.66  --  0.47*  --  0.62   < > 0.61 0.64   ANIONGAP  --   --   --   --   --   --  8 7   LORNA  --   --   --   --   --   --  8.6 8.5   GLC  --  92  --  98  --   --  98 98    < > = values in this interval not displayed.     Most Recent 2 LFT's:  Recent Labs   Lab Test 09/06/18  0720 09/05/18  1016 09/04/18  1530   AST 14 10 14   ALT 14 16  18   ALKPHOS  --   --  351*   BILITOTAL  --   --  0.1*     Most Recent INR's and Anticoagulation Dosing History:  Anticoagulation Dose History    There is no flowsheet data to display.       Most Recent 3 Troponin's:No lab results found.  Most Recent Cholesterol Panel:  Recent Labs   Lab Test 04/25/19  0914   CHOL 198   *   HDL 52   TRIG 111     Most Recent 6 Bacteria Isolates From Any Culture (See EPIC Reports for Culture Details):  Recent Labs   Lab Test 10/02/20  1530 08/06/20  0902 04/20/20  1053 08/16/18  1500 02/13/18  1039   CULT Streptococcus agalactiae sero group B  isolated  * 10,000 to 50,000 colonies/mL  Streptococcus agalactiae sero group B  This organism is susceptible to ampicillin, penicillin, vancomycin and the cephalosporins.   If treatment is required AND your patient is allergic to penicillin, contact the   Microbiology Lab within 5 days to request susceptibility testing.  *  >100,000 colonies/mL  mixed urogenital iban    Multiple morphotypes present with no predominant organism.  Growth consistent with   probable contamination during collection.  Suggest repeat specimen if clinically   indicated.    Group B Streptococcus may be significant in OB patients, however, it is part of the normal   urogenital iban.   10,000 to 50,000 colonies/mL  mixed urogenital iban  Susceptibility testing not routinely done   Beta hemolytic Streptococcus group B  isolated  * 50,000 to 100,000 colonies/mL  mixed urogenital iban  Susceptibility testing not routinely done       Most Recent TSH, T4 and A1c Labs:  Recent Labs   Lab Test 04/25/19  0914 03/13/19  1658   TSH  --  1.70   A1C 5.3  --

## 2021-12-23 NOTE — PLAN OF CARE
End of Shift Summary  For vital signs and complete assessments, please see documentation flowsheets.     Pertinent assessments: AOx4, able to communicate needs. Pt denies N/V or diarrhea. Bowel faint. Reports no BM but passing gas. Changed pad once with little spotting. Dressing dry and intact with no change from previously marked scant drainage area. Complained of abdominal pain, scheduled pain meds given. Denies any abdominal discomfort such bloating or cramping. SBA, ambulated to the bathroom with walker and gait belt x3. Lung sounds clear with no sign of respiratory discomfort. Denies any pain with urination. Voiding adequately.    Major Shift Events: none    Treatment Plan: pain management, monitor for bleeding at incision, encourage OOB activity    Bedside Nurse: Sofia Childs RN

## 2021-12-23 NOTE — PLAN OF CARE
Pertinent assessments: A&Ox4, reports pain 5-7/10 that is manageable with Tylenol, Ibuprofen, and Oxycodone. Tolerating CLD with some crackers. Up with Ax1, reports feeling dizzy but has Hx of Vasovagal Syncope. Lungs clear, cardiac WNL. Pumping. Voiding appropriately.  Major Shift Events: voided post warren removal  Treatment Plan: pain management, monitor for bleeding at incision, encourage OOB activity

## 2021-12-23 NOTE — PLAN OF CARE
End of Shift Summary  For vital signs and complete assessments, please see documentation flowsheets.     Pertinent assessments: A&Ox4. C/o abd pain, scheduled tylenol and ibuprofen given. Ice applied. Scant amount of spotting on pad. ABD dressing with some shadowing, marked. Up IND. Ambulating in halls. Voiding adequately     Major Shift Events: Jonah. Diet advancement. Ambulating in lamb.    Treatment Plan: pain management, monitor for bleeding, encourage OOB activity

## 2021-12-23 NOTE — MR AVS SNAPSHOT
After Visit Summary   9/19/2017    Krystina Suazo    MRN: [id number]           Patient Information     Date Of Birth          1985        Visit Information        Provider Department      9/19/2017 4:00 PM Michelle Bacon MD [facility] - Primary Care Skin        Today's Diagnoses     Scalp psoriasis    -  1    Eczema, unspecified type          Care Instructions    FUTURE APPOINTMENTS  Follow up in 3 week(s).    TOPICAL STEROID INSTRUCTIONS - for control of scalp psoriasis  Clobetasol propionate 0.05% solution.    Apply a few drops and rub into the affected areas on the scalp, two times per day for 1 week.    After the first week, decrease to once daily application for 2 weeks    Not to be used on face or groin.    SHAMPOO INSTRUCTIONS - for control of scalp psoriasis  Ketoconazole 2% shampoo. Wash hair twice a week with this shampoo, applying it to damp skin. Leave on for 5 minutes before rinsing.    During the other days of the week that you shampoo your hair, use one of the following shampoos. Consider alternating use of shampoos with different active ingredients every 4 week(s).   Coal Tar shampoos : Neutrogena T/Gel, Denorex, DHS Tar, Ionil-T, Tegrin, X-Seb T, Zetar  Zinc Pyrithione shampoos : Head & Shoulders, Denorex Advance Formula, DHS Zinc, Zincon  Salicylic Acid shampoos : Neutrogena T/Sal, DHS Sal, Ionil, P&S, Sebulex, X-Seb  Selenium Sulfide shampoos : Selsun Blue shampoo  Sulfur shampoos : Sebulex      DRY SKIN INSTRUCTIONS - for control of eczema on arms  Routine use of moisturizer is important for healthy, resilient skin.    Twice daily use of a moisturizer such as over-the-counter (OTC) CeraVe moisturizer cream (in the jar), OTC Cetaphil moisturizer cream or OTC Vanicream. CeraVe products contain ceramides and filaggrin proteins that can help to maintain the body's moisture layer.    Always apply moisturizer after washing, within 3 minutes of drying off for best  "effect.    Do not overuse soap. Just apply soap on the groin and armpits, unless you have been sweating extensively. Recommended products include OTC unscented Dove sensitive skin or OTC Vanicream cleansing bar.    Good facial cleansers include OTC CeraVe hydrating facial cleanser or OTC Cetaphil daily facial cleanser.    Avoid use of scented products and/or antistatic dryer sheets.    TOPICAL STEROID INSTRUCTIONS - for control of eczema   Triamcinolone 0.1% cream.    Apply an amount equal to half of a fingertip (0.25 g) to the affected area(s) on the arms/legs/trunk, two times per day for 10-14 days.    \"Fingertip Amount\"      Not to be used on face or groin.    Topical steroid use is for short-term treatment only. If after initial treatment, you are using this for prolonged periods of time, return to clinic for re-evaluation of treatment.    Keep in mind to also regularly use moisturizer, as this preventative measure can help maintain your skin's natural moisture barrier.          Follow-ups after your visit        Who to contact     If you have questions or need follow up information about today's clinic visit or your schedule please contact [facility] - PRIMARY CARE SKIN directly at [phone].  Normal or non-critical lab and imaging results will be communicated to you by [employer], letter or phone within 4 business days after the clinic has received the results. If you do not hear from us within 7 days, please contact the clinic through [employer] or phone. If you have a critical or abnormal lab result, we will notify you by phone as soon as possible.  Submit refill requests through [employer] or call your pharmacy and they will forward the refill request to us. Please allow 3 business days for your refill to be completed.          Additional Information About Your Visit        [employer] Information     [employer] gives you secure access to your electronic health record. If you see a primary care provider, you can " [NI] : Normal also send messages to your care team and make appointments. If you have questions, please call your primary care clinic.  If you do not have a primary care provider, please call [phone] and they will assist you.        Care EveryWhere ID     This is your Care EveryWhere ID. This could be used by other organizations to access your [facility] medical records  [id number]         Blood Pressure from Last 3 Encounters:   09/05/17 118/68   05/23/17 118/64   05/19/17 125/70    Weight from Last 3 Encounters:   09/05/17 232 lb 1.6 oz (105.3 kg)   05/23/17 222 lb 11.2 oz (101 kg)   05/19/17 220 lb (99.8 kg)              Today, you had the following     No orders found for display         Today's Medication Changes          These changes are accurate as of: 9/19/17  4:04 PM.  If you have any questions, ask your nurse or doctor.               Start taking these medicines.        Dose/Directions    clobetasol 0.05 % external solution   Commonly known as:  TEMOVATE   Used for:  Scalp psoriasis   Started by:  Michelle Bacon MD        Apply topically 2 times daily Apply to affected areas on scalp bid as needed   Quantity:  50 mL   Refills:  3       ketoconazole 2 % shampoo   Commonly known as:  NIZORAL   Used for:  Scalp psoriasis   Started by:  Michelle Bacon MD        Apply to the affected area on scalp and wash off after 5 minutes.   Quantity:  120 mL   Refills:  11       triamcinolone 0.1 % cream   Commonly known as:  KENALOG   Used for:  Eczema, unspecified type   Started by:  Michelle Bacon MD        Apply to affected areas on arms, legs or trunk bid 10-14 consecutive days, not to be used on face or groin   Quantity:  80 g   Refills:  0            Where to get your medicines      [facility]  [facility] [place] - [address]  [address] [facility] [address] [facility], [place] [address]     Phone:  [phone]     clobetasol 0.05 % external solution     [de-identified] : mild early Dupuytrens contracture over 3rd MC of right hand.  NORMAL TABLETOP TEST   No issues with hand use  No problem w any activity\par Left Hand fine, no contracture at all ketoconazole 2 % shampoo    triamcinolone 0.1 % cream                Primary Care Provider Office Phone # Fax #    Valeria Daly [phone] [address] [place] [address]        Equal Access to Services     LEANDRAJOSE ANGEL TESSIE : Hadii aad ku hadnayano Soomaali, waaxda luqadaha, qaybta kaalmada adeegyada, waxay idiin haydavidn adeheri wilson latomerwalt jimmy. So [facility] [phone].    ATENCIÓN: Si habla español, tiene a wesley disposición servicios gratuitos de asistencia lingüística. Llame al [phone].    We comply with applicable federal civil rights laws and [place] laws. We do not discriminate on the basis of race, color, national origin, age, disability sex, sexual orientation or gender identity.            Thank you!     Thank you for choosing [facility] - PRIMARY CARE SKIN  for your care. Our goal is always to provide you with excellent care. Hearing back from our patients is one way we can continue to improve our services. Please take a few minutes to complete the written survey that you may receive in the mail after your visit with us. Thank you!             Your Updated Medication List - Protect others around you: Learn how to safely use, store and throw away your medicines at www.disposemymeds.org.          This list is accurate as of: 9/19/17  4:04 PM.  Always use your most recent med list.                   Brand Name Dispense Instructions for use Diagnosis    albuterol 108 (90 BASE) MCG/ACT Inhaler    PROAIR HFA/PROVENTIL HFA/VENTOLIN HFA     Inhale 2 puffs into the lungs        betamethasone dipropionate 0.05 % lotion    DIPROSONE    60 mL    Apply sparingly to affected area twice daily as needed.  Do not apply to face.    Scalp psoriasis       cetirizine 10 MG tablet    zyrTEC    90 tablet    Take 1 tablet (10 mg) by mouth every evening    Chronic rhinitis       clobetasol 0.05 % external solution    TEMOVATE    50 mL    Apply topically 2 times daily Apply to affected  areas on scalp bid as needed    Scalp psoriasis       ketoconazole 2 % shampoo    NIZORAL    120 mL    Apply to the affected area on scalp and wash off after 5 minutes.    Scalp psoriasis       omeprazole 20 MG CR capsule    priLOSEC          sertraline 50 MG tablet    ZOLOFT    135 tablet    Take 1.5 tablets (75 mg) by mouth daily    COURTNEY (generalized anxiety disorder), Major depressive disorder, recurrent episode, moderate (H)       triamcinolone 0.1 % cream    KENALOG    80 g    Apply to affected areas on arms, legs or trunk bid 10-14 consecutive days, not to be used on face or groin    Eczema, unspecified type

## 2021-12-23 NOTE — PROGRESS NOTES
Pt to D/C to Home.  Pt provided with d/c instructions, including new medications, when medications were last given, and when to take them again.  Pt also informed to f/u with in clinic in 2 and 6 weeks.  Pt verbalized understanding of all d/c and f/u instructions.  All questions were answered at this time.  Copy of paperwork sent with pt.  Scripts (ibuprofen and oxycodone) sent with pt.   to provide transport.  All personal belongings sent with pt.

## 2022-01-19 ENCOUNTER — E-VISIT (OUTPATIENT)
Dept: URGENT CARE | Facility: CLINIC | Age: 37
End: 2022-01-19
Payer: COMMERCIAL

## 2022-01-19 DIAGNOSIS — B96.89 ACUTE BACTERIAL SINUSITIS: Primary | ICD-10-CM

## 2022-01-19 DIAGNOSIS — J01.90 ACUTE BACTERIAL SINUSITIS: Primary | ICD-10-CM

## 2022-01-19 PROCEDURE — 99421 OL DIG E/M SVC 5-10 MIN: CPT | Performed by: NURSE PRACTITIONER

## 2022-01-20 NOTE — PATIENT INSTRUCTIONS
Dear Krystina Suazo    After reviewing your responses, I've been able to diagnose you with?a sinus infection caused by bacteria.?     Based on your responses and diagnosis, I have prescribed augmentin to treat your symptoms. I have sent this to your pharmacy.?     It is also important to stay well hydrated, get lots of rest and take over-the-counter decongestants,?tylenol?or ibuprofen if you?are able to?take those medications per your primary care provider to help relieve discomfort.?     It is important that you take?all of?your prescribed medication even if your symptoms are improving after a few doses.? Taking?all of?your medicine helps prevent the symptoms from returning.?     If your symptoms worsen, you develop severe headache, vomiting, high fever (>102), or are not improving in 7 days, please contact your primary care provider for an appointment or visit any of our convenient Walk-in Care or Urgent Care Centers to be seen which can be found on our website?here.?     Thanks again for choosing?us?as your health care partner,?   ?  Ria Mckinley, CNP?

## 2022-01-29 ENCOUNTER — HEALTH MAINTENANCE LETTER (OUTPATIENT)
Age: 37
End: 2022-01-29

## 2022-02-01 ENCOUNTER — OFFICE VISIT (OUTPATIENT)
Dept: OBGYN | Facility: CLINIC | Age: 37
End: 2022-02-01
Payer: COMMERCIAL

## 2022-02-01 VITALS — WEIGHT: 224.7 LBS | BODY MASS INDEX: 32.24 KG/M2 | DIASTOLIC BLOOD PRESSURE: 68 MMHG | SYSTOLIC BLOOD PRESSURE: 130 MMHG

## 2022-02-01 DIAGNOSIS — Z98.890 POSTOPERATIVE STATE: Primary | ICD-10-CM

## 2022-02-01 PROCEDURE — 99024 POSTOP FOLLOW-UP VISIT: CPT | Performed by: OBSTETRICS & GYNECOLOGY

## 2022-02-01 RX ORDER — CLOBETASOL PROPIONATE 0.5 MG/ML
SOLUTION TOPICAL
COMMUNITY
Start: 2021-12-15 | End: 2022-12-15

## 2022-02-01 NOTE — NURSING NOTE
"Chief Complaint   Patient presents with     Post-op Visit     MINAL/MERCEDES 2021        Initial /68 (BP Location: Right arm, Cuff Size: Adult Regular)   Wt 101.9 kg (224 lb 11.2 oz)   Breastfeeding No   BMI 32.24 kg/m   Estimated body mass index is 32.24 kg/m  as calculated from the following:    Height as of 21: 1.778 m (5' 10\").    Weight as of this encounter: 101.9 kg (224 lb 11.2 oz).  BP completed using cuff size: regular    Questioned patient about current smoking habits.  Pt. has never smoked.          The following HM Due: NONE      Ria May, KEV on 2022 at 1:01 PM     "

## 2022-02-01 NOTE — PROGRESS NOTES
SUBJECTIVE:                                                   Krystina Suazo is a 36 year old female who presents to clinic today for the following health issue(s):  Postoperative visit    HPI:  She is status post Total Abdominal Hysterectomy with bilateral salpingectomy 6 weeks ago. Reports no bleeding, no fevers, chills, or other concerns.   Bladder and bowel function are normal.    Path reviewed, benign.    Problem list and histories reviewed & adjusted, as indicated.  Additional history: as documented.    Patient Active Problem List   Diagnosis     COURTNEY (generalized anxiety disorder)     Major depressive disorder, recurrent episode, moderate (H)     Allergic rhinitis     Mild intermittent asthma without complication     Vasovagal syncope     Uterine fibroid in pregnancy     Previous  section     Umbilical hernia without obstruction and without gangrene     S/P MINAL (total abdominal hysterectomy)     Past Surgical History:   Procedure Laterality Date     CARPAL TUNNEL RELEASE RT/LT Bilateral       SECTION N/A 2018    Procedure:  SECTION;   section;  Surgeon: Jay Yanez MD;  Location: [facility] OR      SECTION N/A 10/27/2020    Procedure: REPEAT  SECTION;  Surgeon: Aníbal Pearson MD;  Location: [facility] L+D     GYN SURGERY  18         HERNIORRHAPHY UMBILICAL N/A 2021    Procedure: Open Umbilical Hernia Repair with Mesh;  Surgeon: Erin Mccarthy MD;  Location: [facility] OR     HYSTERECTOMY TOTAL ABDOMINAL, SALPINGECTOMY N/A 2021    Procedure: TOTAL ABDOMINAL HYSTERECTOMY, BILATERAL SALPINGECTOMY;  Surgeon: Sandy Tran MD;  Location: [facility] OR     SINUS SURGERY       TONSILLECTOMY  2014      Social History     Tobacco Use     Smoking status: Never Smoker     Smokeless tobacco: Never Used   Substance Use Topics     Alcohol use: No      Problem (# of Occurrences) Relation (Name,Age of Onset)    Asthma (1) Father (Father)     Depression (1) Mother (Echo)    Diabetes (1) Mother (Echo): Type 2    Esophageal Cancer (1) Mother (Echo, 57)    Hypertension (1) Mother (Echo)    Obesity (1) Mother (Echo)    Other Cancer (1) Mother (Echo): Esophageal       Negative family history of: Glaucoma, Macular Degeneration, Breast Cancer, Colon Cancer            albuterol (PROAIR HFA/PROVENTIL HFA/VENTOLIN HFA) 108 (90 Base) MCG/ACT inhaler, Inhale 2 puffs into the lungs every 4 hours as needed for shortness of breath / dyspnea or wheezing  clobetasol (TEMOVATE) 0.05 % external solution,   clobetasol propionate (OLUX) 0.05 % external foam, Apply to AA on scalp bid for 7 days then once per day for 2 weeks . Then when needed. May substitute cream or solution at same sig  and dispense  ipratropium (ATROVENT) 0.03 % nasal spray, Spray 2 sprays into both nostrils every 8 hours as needed for rhinitis  omeprazole (PRILOSEC) 20 MG DR capsule, TAKE ONE CAPSULE BY MOUTH ONCE DAILY  Prenatal Vit-Fe Fumarate-FA (PRENATAL MULTIVITAMIN PLUS IRON) 27-0.8 MG TABS per tablet, Take 1 tablet by mouth daily  sertraline (ZOLOFT) 100 MG tablet, Take 2 tablets (200 mg) by mouth daily    No current facility-administered medications on file prior to visit.    Allergies   Allergen Reactions     Dust Mite Extract      Pollen and animal dander.     Seasonal Allergies          OBJECTIVE:     /68 (BP Location: Right arm, Cuff Size: Adult Regular)   Wt 101.9 kg (224 lb 11.2 oz)   Breastfeeding No   BMI 32.24 kg/m     BMI: Body mass index is 32.24 kg/m .  General: Alert and oriented, no distress.  Psychiatric: Mood and affect within normal limits.  Abdomen: soft, nontender. Incision clean, dry, and intact.  Pelvis: External genitalia, [place]th[place], urethral, and [place] glands within normal limits. On speculum exam, vaginal cuff is well supported, without lesions.    In-Clinic Test Results:  No results found for this or any previous visit (from the past 24  hour(s)).    ASSESSMENT/PLAN:                                                      Status post Total Abdominal Hysterectomy      No further restrictions needed.  Follow up as needed for routine gyn care.    Sandy Tran MD  [facility] [place]

## 2022-02-13 ENCOUNTER — MYC MEDICAL ADVICE (OUTPATIENT)
Dept: PEDIATRICS | Facility: CLINIC | Age: 37
End: 2022-02-13
Payer: COMMERCIAL

## 2022-02-22 NOTE — TELEPHONE ENCOUNTER
I think it would be okay to discontinue omeprazole and see how you do without it. Still practice good anti-reflux measures including smaller meals, staying upright after eating, losing weight, etc.     I haven't seen anything come through for your son. If you would like to discuss starting some therapies, I would like to see him at my next available or with one of my pediatric partners.     David Dean PA-C

## 2022-02-23 NOTE — TELEPHONE ENCOUNTER
Called and talked with Krystina and she was off of her omeprazole for one week and had bad GI reflux so went back on her omeprazole OTC.    Scheduled px for June.    Has therapies set up for her son.    Franca Pereira LPN

## 2022-03-16 ENCOUNTER — E-VISIT (OUTPATIENT)
Dept: PEDIATRICS | Facility: CLINIC | Age: 37
End: 2022-03-16
Payer: COMMERCIAL

## 2022-03-16 DIAGNOSIS — Z53.9 ERRONEOUS ENCOUNTER--DISREGARD: Primary | ICD-10-CM

## 2022-03-16 ASSESSMENT — PATIENT HEALTH QUESTIONNAIRE - PHQ9
SUM OF ALL RESPONSES TO PHQ QUESTIONS 1-9: 11
10. IF YOU CHECKED OFF ANY PROBLEMS, HOW DIFFICULT HAVE THESE PROBLEMS MADE IT FOR YOU TO DO YOUR WORK, TAKE CARE OF THINGS AT HOME, OR GET ALONG WITH OTHER PEOPLE: SOMEWHAT DIFFICULT
SUM OF ALL RESPONSES TO PHQ QUESTIONS 1-9: 11

## 2022-03-16 ASSESSMENT — ANXIETY QUESTIONNAIRES
6. BECOMING EASILY ANNOYED OR IRRITABLE: MORE THAN HALF THE DAYS
5. BEING SO RESTLESS THAT IT IS HARD TO SIT STILL: SEVERAL DAYS
2. NOT BEING ABLE TO STOP OR CONTROL WORRYING: NOT AT ALL
GAD7 TOTAL SCORE: 7
7. FEELING AFRAID AS IF SOMETHING AWFUL MIGHT HAPPEN: SEVERAL DAYS
4. TROUBLE RELAXING: SEVERAL DAYS
3. WORRYING TOO MUCH ABOUT DIFFERENT THINGS: SEVERAL DAYS
GAD7 TOTAL SCORE: 7
GAD7 TOTAL SCORE: 7
8. IF YOU CHECKED OFF ANY PROBLEMS, HOW DIFFICULT HAVE THESE MADE IT FOR YOU TO DO YOUR WORK, TAKE CARE OF THINGS AT HOME, OR GET ALONG WITH OTHER PEOPLE?: SOMEWHAT DIFFICULT
7. FEELING AFRAID AS IF SOMETHING AWFUL MIGHT HAPPEN: SEVERAL DAYS
1. FEELING NERVOUS, ANXIOUS, OR ON EDGE: SEVERAL DAYS

## 2022-03-17 ASSESSMENT — PATIENT HEALTH QUESTIONNAIRE - PHQ9
SUM OF ALL RESPONSES TO PHQ QUESTIONS 1-9: 9
10. IF YOU CHECKED OFF ANY PROBLEMS, HOW DIFFICULT HAVE THESE PROBLEMS MADE IT FOR YOU TO DO YOUR WORK, TAKE CARE OF THINGS AT HOME, OR GET ALONG WITH OTHER PEOPLE: VERY DIFFICULT
SUM OF ALL RESPONSES TO PHQ QUESTIONS 1-9: 11
SUM OF ALL RESPONSES TO PHQ QUESTIONS 1-9: 9

## 2022-03-17 ASSESSMENT — ANXIETY QUESTIONNAIRES
GAD7 TOTAL SCORE: 7
4. TROUBLE RELAXING: SEVERAL DAYS
7. FEELING AFRAID AS IF SOMETHING AWFUL MIGHT HAPPEN: SEVERAL DAYS
6. BECOMING EASILY ANNOYED OR IRRITABLE: MORE THAN HALF THE DAYS
5. BEING SO RESTLESS THAT IT IS HARD TO SIT STILL: SEVERAL DAYS
GAD7 TOTAL SCORE: 7
1. FEELING NERVOUS, ANXIOUS, OR ON EDGE: SEVERAL DAYS
2. NOT BEING ABLE TO STOP OR CONTROL WORRYING: NOT AT ALL
3. WORRYING TOO MUCH ABOUT DIFFERENT THINGS: SEVERAL DAYS
GAD7 TOTAL SCORE: 7
GAD7 TOTAL SCORE: 7
7. FEELING AFRAID AS IF SOMETHING AWFUL MIGHT HAPPEN: SEVERAL DAYS

## 2022-03-17 NOTE — PATIENT INSTRUCTIONS
Thank you for choosing us for your care. Dr. Daly is away from clinic on an extended leave until June 2022. I think an in-clinic visit would be best next steps based on your symptoms. I have asked someone from the clinic to call you to assist in scheduling and appointment with a covering provider at her clinic; you won t be charged for this eVisit.      You can also schedule an appointment right here in [facility], or call [phone]

## 2022-03-17 NOTE — TELEPHONE ENCOUNTER
E-visit canceled - pt needs to be seen for mental health concerns - can be seen by any provider and this can be video or in person.  However, need triage on leg pains - and if this is something that needs eval with exam or labs, should schedule in clinic appt.    Karla Jack MD

## 2022-03-18 ENCOUNTER — OFFICE VISIT (OUTPATIENT)
Dept: PEDIATRICS | Facility: CLINIC | Age: 37
End: 2022-03-18
Payer: COMMERCIAL

## 2022-03-18 VITALS
HEART RATE: 57 BPM | DIASTOLIC BLOOD PRESSURE: 70 MMHG | HEIGHT: 70 IN | WEIGHT: 224 LBS | OXYGEN SATURATION: 97 % | BODY MASS INDEX: 32.07 KG/M2 | RESPIRATION RATE: 16 BRPM | TEMPERATURE: 97.5 F | SYSTOLIC BLOOD PRESSURE: 124 MMHG

## 2022-03-18 DIAGNOSIS — F33.1 MAJOR DEPRESSIVE DISORDER, RECURRENT EPISODE, MODERATE (H): Primary | ICD-10-CM

## 2022-03-18 LAB
DEPRECATED CALCIDIOL+CALCIFEROL SERPL-MC: 33 UG/L (ref 20–75)
ERYTHROCYTE [DISTWIDTH] IN BLOOD BY AUTOMATED COUNT: 12.6 % (ref 10–15)
FERRITIN SERPL-MCNC: 23 NG/ML (ref 12–150)
HCT VFR BLD AUTO: 41.5 % (ref 35–47)
HGB BLD-MCNC: 13.5 G/DL (ref 11.7–15.7)
MCH RBC QN AUTO: 30.3 PG (ref 26.5–33)
MCHC RBC AUTO-ENTMCNC: 32.5 G/DL (ref 31.5–36.5)
MCV RBC AUTO: 93 FL (ref 78–100)
PLATELET # BLD AUTO: 208 10E3/UL (ref 150–450)
RBC # BLD AUTO: 4.46 10E6/UL (ref 3.8–5.2)
TSH SERPL DL<=0.005 MIU/L-ACNC: 1.74 MU/L (ref 0.4–4)
WBC # BLD AUTO: 6.4 10E3/UL (ref 4–11)

## 2022-03-18 PROCEDURE — 82306 VITAMIN D 25 HYDROXY: CPT | Performed by: NURSE PRACTITIONER

## 2022-03-18 PROCEDURE — 99214 OFFICE O/P EST MOD 30 MIN: CPT | Performed by: NURSE PRACTITIONER

## 2022-03-18 PROCEDURE — 96127 BRIEF EMOTIONAL/BEHAV ASSMT: CPT | Performed by: NURSE PRACTITIONER

## 2022-03-18 PROCEDURE — 84443 ASSAY THYROID STIM HORMONE: CPT | Performed by: NURSE PRACTITIONER

## 2022-03-18 PROCEDURE — 82728 ASSAY OF FERRITIN: CPT | Performed by: NURSE PRACTITIONER

## 2022-03-18 PROCEDURE — 36415 COLL VENOUS BLD VENIPUNCTURE: CPT | Performed by: NURSE PRACTITIONER

## 2022-03-18 PROCEDURE — 85027 COMPLETE CBC AUTOMATED: CPT | Performed by: NURSE PRACTITIONER

## 2022-03-18 RX ORDER — BUPROPION HYDROCHLORIDE 150 MG/1
150 TABLET ORAL EVERY MORNING
Qty: 30 TABLET | Refills: 1 | Status: SHIPPED | OUTPATIENT
Start: 2022-03-18 | End: 2022-04-20

## 2022-03-18 ASSESSMENT — PATIENT HEALTH QUESTIONNAIRE - PHQ9
10. IF YOU CHECKED OFF ANY PROBLEMS, HOW DIFFICULT HAVE THESE PROBLEMS MADE IT FOR YOU TO DO YOUR WORK, TAKE CARE OF THINGS AT HOME, OR GET ALONG WITH OTHER PEOPLE: VERY DIFFICULT
SUM OF ALL RESPONSES TO PHQ QUESTIONS 1-9: 9

## 2022-03-18 ASSESSMENT — ANXIETY QUESTIONNAIRES: GAD7 TOTAL SCORE: 7

## 2022-03-18 ASSESSMENT — ASTHMA QUESTIONNAIRES: ACT_TOTALSCORE: 25

## 2022-03-18 NOTE — PROGRESS NOTES
Assessment & Plan     Major depressive disorder, recurrent episode, moderate (H)  - Recommend labs as below which could be contributing to depression  - Recommend augmenting with bupropion. Discussed risks and benefits of medication  - Follow up in 1 month, or sooner as needed  - CBC with platelets; Future  - Ferritin; Future  - Vitamin D Deficiency; Future  - TSH with free T4 reflex; Future  - buPROPion (WELLBUTRIN XL) 150 MG 24 hr tablet; Take 1 tablet (150 mg) by mouth every morning  - CBC with platelets  - Ferritin  - Vitamin D Deficiency  - TSH with free T4 reflex        Patient Instructions   Start taking Wellbutrin daily  You can take with your Zoloft in the morning    We did other labs today for fatigue and poor motivation    Schedule follow up in 1 month      Return in about 1 month (around 4/18/2022) for Mental Health.    JAMIN Rodgers [facility] JACQUELIN Flaherty is a 36 year old who presents for the following health issues     History of Present Illness       Mental Health Follow-up:  Patient presents to follow-up on Depression & Anxiety.Patient's depression since last visit has been:  Worse  The patient is having other symptoms associated with depression.  Patient's anxiety since last visit has been:  Worse  The patient is having other symptoms associated with anxiety.  Any significant life events: job concerns and other  Patient is feeling anxious or having panic attacks.  Patient has no concerns about alcohol or drug use.       Today's PHQ-9         PHQ-9 Total Score: 9  PHQ-9 Q9 Thoughts of better off dead/self-harm past 2 weeks :   (P) Not at all    How difficult have these problems made it for you to do your work, take care of things at home, or get along with other people: Very difficult    Today's COURTNEY-7 Score: 7    She eats 2-3 servings of fruits and vegetables daily.She consumes 0 sweetened beverage(s) daily.She exercises with enough effort to increase  "her heart rate 10 to 19 minutes per day.  She exercises with enough effort to increase her heart rate 3 or less days per week.   She is taking medications regularly.     S/p TSH Dec 2021  Doing well from a recovery standpoint    Notes worsening depression over past few weeks  Stopped breast feeding 2 weeks ago  Has a 3.5 and 1.5 year old at home  On sertraline 200 mg  Sleeping 6 hours/night  Able to get out of bed and care for her family  Notes feeling more sad, depressed, crying  No suicidal or homicidal thoughts  Hard to feel motivated at work    Was previously on citalopram. Switched to sertraline when trying to get pregnant      Review of Systems   Constitutional, HEENT, cardiovascular, pulmonary, gi and gu systems are negative, except as otherwise noted.      Objective    /70   Pulse 57   Temp 97.5  F (36.4  C)   Resp 16   Ht 1.778 m (5' 10\")   Wt 101.6 kg (224 lb)   LMP  (Approximate)   SpO2 97%   BMI 32.14 kg/m    Body mass index is 32.14 kg/m .  Physical Exam   GENERAL: healthy, alert and no distress  PSYCH: mentation appears normal, affect normal/bright            "

## 2022-03-18 NOTE — PATIENT INSTRUCTIONS
Start taking Wellbutrin daily  You can take with your Zoloft in the morning    We did other labs today for fatigue and poor motivation    Schedule follow up in 1 month

## 2022-03-26 ENCOUNTER — MYC MEDICAL ADVICE (OUTPATIENT)
Dept: PEDIATRICS | Facility: CLINIC | Age: 37
End: 2022-03-26
Payer: COMMERCIAL

## 2022-03-27 ENCOUNTER — NURSE TRIAGE (OUTPATIENT)
Dept: NURSING | Facility: CLINIC | Age: 37
End: 2022-03-27
Payer: COMMERCIAL

## 2022-03-27 NOTE — TELEPHONE ENCOUNTER
"Caller states that she woke up this morning with back pain, and it has been getting worse as the day progresses. The only position she has found more comfortable is lying on either of her sides. Hx of previous injury and she saw PT last year. Caller wants to know if she should be seen and where ? She has been seen in past by a chiropractor.  Currently pain =\"10\". She has difficulty when she is out of bed: standing, walking, quick movements. Sharp pain in the lower back. \"This is the worst it has ever been.\". She has taken Ibuprofen: 400 mg this morning, 600 mg this afternoon. She states it did not help.   Last week she had dull leg pains. She put on her compression stockings. \"Over the weekend those pains disappeared.\"  She states she is barely able to walk, has been on the couch (lying) except for trips to the bathroom.     Triaged to a disposition of Go to ED now (or PCP triage).  Dr Drea Newton on call, paged via am com @ 6:02pm.  Patient should be evaluated in the UC or ER.   Contacted patient with this information. She intends to go to the [facility] now.     Tricia Carrillo RN Triage Nurse Advisor 6:11 PM 3/27/2022  Reason for Disposition    Unable to walk    Additional Information    Negative: Passed out (i.e., lost consciousness, collapsed and was not responding)    Negative: Shock suspected (e.g., cold/pale/clammy skin, too weak to stand, low BP, rapid pulse)    Negative: Sounds like a life-threatening emergency to the triager    Negative: Major injury to the back (e.g., MVA, fall > 10 feet or 3 meters, penetrating injury, etc.)    Negative: Followed a tailbone injury    Negative: [1] Pain in the upper back over the ribs (rib cage) AND [2] radiates (travels, goes) into chest    Negative: [1] Pain in the upper back over the ribs (rib cage) AND [2] worsened by coughing (or clearly increases with breathing)    Negative: Back pain during pregnancy    Negative: Pain mainly in flank (i.e., in the side, over " the lower ribs or just below the ribs)    Negative: [1] SEVERE back pain (e.g., excruciating) AND [2] sudden onset AND [3] age > 60    Negative: [1] Unable to urinate (or only a few drops) > 4 hours AND [2] bladder feels very full (e.g., palpable bladder or strong urge to urinate)    Negative: [1] Loss of bladder or bowel control (urine or bowel incontinence; wetting self, leaking stool) AND [2] new onset    Negative: Numbness in groin or rectal area (i.e., loss of sensation)    Negative: [1] SEVERE abdominal pain AND [2] present > 1 hour    Negative: [1] Abdominal pain AND [2] age > 60    Negative: Weakness of a leg or foot (e.g., unable to bear weight, dragging foot)    Protocols used: BACK PAIN-A-AH  COVID 19 Nurse Triage Plan/Patient Instructions    Please be aware that novel coronavirus (COVID-19) may be circulating in the community. If you develop symptoms such as fever, cough, or SOB or if you have concerns about the presence of another infection including coronavirus (COVID-19), please contact your health care provider or visit [email or web address].     Disposition/Instructions    In-Person Visit with provider recommended. Reference Visit Selection Guide.    Thank you for taking steps to prevent the spread of this virus.  o Limit your contact with others.  o Wear a simple mask to cover your cough.  o Wash your hands well and often.    Resources    [facility]: About COVID-19: [email or web address]    CDC: What to Do If You're Sick: www.cdc.gov/coronavirus/2019-ncov/about/steps-when-sick.html    CDC: Ending Home Isolation: www.cdc.gov/coronavirus/2019-ncov/hcp/disposition-in-home-patients.html     CDC: Caring for Someone: www.cdc.gov/coronavirus/2019-ncov/if-you-are-sick/care-for-someone.html     [facility]: Interim Guidance for Hospital Discharge to Home: www.health.[facility].mn.us/diseases/coronavirus/hcp/hospdischarge.pdf    [facility] clinical trials (COVID-19 research studies):  [email or web address]-clinical-trials     Below are the COVID-19 hotlines at the [place] Department of [facility] ([facility]). Interpreters are available.   o For health questions: Call 072-263-0075 or 1-709.760.6311 (7 a.m. to 7 p.m.)  o For questions about schools and childcare: Call 507-934-1654 or 1-564.248.8755 (7 a.m. to 7 p.m.)

## 2022-03-28 ENCOUNTER — OFFICE VISIT (OUTPATIENT)
Dept: URGENT CARE | Facility: URGENT CARE | Age: 37
End: 2022-03-28
Payer: COMMERCIAL

## 2022-03-28 VITALS
SYSTOLIC BLOOD PRESSURE: 120 MMHG | BODY MASS INDEX: 32 KG/M2 | HEART RATE: 88 BPM | OXYGEN SATURATION: 98 % | RESPIRATION RATE: 16 BRPM | WEIGHT: 223 LBS | DIASTOLIC BLOOD PRESSURE: 68 MMHG | TEMPERATURE: 98.4 F

## 2022-03-28 DIAGNOSIS — K21.9 GASTROESOPHAGEAL REFLUX DISEASE WITHOUT ESOPHAGITIS: ICD-10-CM

## 2022-03-28 DIAGNOSIS — R07.9 CHEST PAIN, UNSPECIFIED TYPE: ICD-10-CM

## 2022-03-28 DIAGNOSIS — M54.50 ACUTE BILATERAL LOW BACK PAIN WITHOUT SCIATICA: Primary | ICD-10-CM

## 2022-03-28 DIAGNOSIS — F33.1 MAJOR DEPRESSIVE DISORDER, RECURRENT EPISODE, MODERATE (H): ICD-10-CM

## 2022-03-28 PROCEDURE — 99214 OFFICE O/P EST MOD 30 MIN: CPT | Performed by: PHYSICIAN ASSISTANT

## 2022-03-28 RX ORDER — CYCLOBENZAPRINE HCL 5 MG
5-10 TABLET ORAL 3 TIMES DAILY PRN
Qty: 30 TABLET | Refills: 0 | Status: SHIPPED | OUTPATIENT
Start: 2022-03-28 | End: 2022-07-05

## 2022-03-28 RX ORDER — METHYLPREDNISOLONE 4 MG
TABLET, DOSE PACK ORAL
Qty: 21 TABLET | Refills: 0 | Status: SHIPPED | OUTPATIENT
Start: 2022-03-28 | End: 2022-07-05

## 2022-03-28 RX ORDER — SERTRALINE HYDROCHLORIDE 100 MG/1
200 TABLET, FILM COATED ORAL DAILY
Qty: 180 TABLET | Refills: 3 | OUTPATIENT
Start: 2022-03-28

## 2022-03-28 NOTE — TELEPHONE ENCOUNTER
Refusing refill request for sertraline due to receiving a year supply of medication on 12/15/21.     Prescription omeprazole  approved per [facility] Refill Protocol.    Ashwini Dodson RN on 3/28/2022 at 11:19 AM

## 2022-03-28 NOTE — PROGRESS NOTES
SUBJECTIVE  HPI: Krystina Suazo is a 36 year old female who presents for evaluation of back pain  Symptoms began 1 day(s) ago, have been onset gradual and are worse.  Pain is located in the low back bilateral region, with radiation to radiates into the right buttocks and radiates into the left buttocks, and are at worst a 7 on a scale of 1-10.  Recent injury:none recalled by the patient  Personal hx of back pain is recurrent self limited episodes of low back pain in the past.  No hx of herniated disc, surgery or fractures.  Went to PT in the past for back issues   Pain is exacerbated by: walking, sitting, bending and changing position.  Pain is relieved by: ice and rest[unfilled] sx include: denies, fecal incontinence (Direct patient to the ER), lower extremity numbness bilateral, tingling, urinary incontinence (Diret patient to the ER), radicular lower extremity symptoms bilateral and lower extremity weakness bilateral.  Red flag symptoms: negative for, fever, chills, night sweats, weight loss, weight gain, headaches, dizziness, fatigue, weakness.    Past Medical History:   Diagnosis Date     Asthma      Depression      Depressive disorder     Diagnosed around 2010     Hypertension 8/30/2018     Vasovagal syncope      Current Outpatient Medications   Medication Sig Dispense Refill     albuterol (PROAIR HFA/PROVENTIL HFA/VENTOLIN HFA) 108 (90 Base) MCG/ACT inhaler Inhale 2 puffs into the lungs every 4 hours as needed for shortness of breath / dyspnea or wheezing 18 g 11     buPROPion (WELLBUTRIN XL) 150 MG 24 hr tablet Take 1 tablet (150 mg) by mouth every morning 30 tablet 1     clobetasol (TEMOVATE) 0.05 % external solution        clobetasol propionate (OLUX) 0.05 % external foam Apply to AA on scalp bid for 7 days then once per day for 2 weeks . Then when needed. May substitute cream or solution at same sig  and dispense 100 g 3     ipratropium (ATROVENT) 0.03 % nasal spray Spray 2 sprays into both  nostrils every 8 hours as needed for rhinitis 30 mL 11     omeprazole (PRILOSEC) 20 MG DR capsule TAKE ONE CAPSULE BY MOUTH ONCE DAILY 90 capsule 3     Prenatal Vit-Fe Fumarate-FA (PRENATAL MULTIVITAMIN PLUS IRON) 27-0.8 MG TABS per tablet Take 1 tablet by mouth daily 100 tablet 3     sertraline (ZOLOFT) 100 MG tablet Take 2 tablets (200 mg) by mouth daily 180 tablet 3     Social History     Tobacco Use     Smoking status: Never Smoker     Smokeless tobacco: Never Used   Substance Use Topics     Alcohol use: No       ROS:  Review of systems negative except as stated above.    OBJECTIVE:  /68 (BP Location: Right arm, Patient Position: Sitting, Cuff Size: Adult Large)   Pulse 88   Temp 98.4  F (36.9  C) (Tympanic)   Resp 16   Wt 101.2 kg (223 lb)   LMP 12/08/2021   SpO2 98%   BMI 32.00 kg/m    Back examination: Back symmetric, no curvature. No CVA tenderness., positive findings: limitation of motion - flexion: Moderate, extension: Moderate, rotation: Mild and lateral bending: Mild, paraspinal muscle spasm, tenderness to palpation across low back  Generalized  No pain midline  [unfilled] leg raise test: negative  GENERAL APPEARANCE: healthy, alert and no distress  RESP: lungs clear to auscultation - no rales, rhonchi or wheezes  CV: regular rates and rhythm, normal S1 S2, no murmur noted  ABDOMEN:  soft, nontender, no HSM or masses and bowel sounds normal  NEURO: Normal strength and tone with no weakness or sensory deficit noted, reflexes normal   SKIN: no suspicious lesions or rashes    ASSESSMENT/IMPRESSION:  myofascial low back strain    PLAN:1) PLEASE SEE ORDER SUMMARY    Flexeril  And Medrol as directed.   Side affects  Discussed. Ice and heat to affected area.  Red flag signs discussed. Follow-up with PCP as needed if sx worsen  :      1.  Continue stretching and strengthening exercises.       2.  Continue prn heat or ice application.

## 2022-04-20 ENCOUNTER — VIRTUAL VISIT (OUTPATIENT)
Dept: PEDIATRICS | Facility: CLINIC | Age: 37
End: 2022-04-20
Payer: COMMERCIAL

## 2022-04-20 DIAGNOSIS — F33.1 MAJOR DEPRESSIVE DISORDER, RECURRENT EPISODE, MODERATE (H): ICD-10-CM

## 2022-04-20 PROCEDURE — 99213 OFFICE O/P EST LOW 20 MIN: CPT | Mod: GT | Performed by: NURSE PRACTITIONER

## 2022-04-20 RX ORDER — SERTRALINE HYDROCHLORIDE 100 MG/1
200 TABLET, FILM COATED ORAL DAILY
Qty: 180 TABLET | Refills: 1 | Status: SHIPPED | OUTPATIENT
Start: 2022-04-20 | End: 2022-07-05

## 2022-04-20 RX ORDER — BUPROPION HYDROCHLORIDE 150 MG/1
150 TABLET ORAL EVERY MORNING
Qty: 90 TABLET | Refills: 1 | Status: SHIPPED | OUTPATIENT
Start: 2022-04-20 | End: 2022-07-05 | Stop reason: DRUGHIGH

## 2022-04-20 ASSESSMENT — PATIENT HEALTH QUESTIONNAIRE - PHQ9
SUM OF ALL RESPONSES TO PHQ QUESTIONS 1-9: 3
10. IF YOU CHECKED OFF ANY PROBLEMS, HOW DIFFICULT HAVE THESE PROBLEMS MADE IT FOR YOU TO DO YOUR WORK, TAKE CARE OF THINGS AT HOME, OR GET ALONG WITH OTHER PEOPLE: SOMEWHAT DIFFICULT
SUM OF ALL RESPONSES TO PHQ QUESTIONS 1-9: 3

## 2022-04-20 ASSESSMENT — ANXIETY QUESTIONNAIRES
GAD7 TOTAL SCORE: 1
5. BEING SO RESTLESS THAT IT IS HARD TO SIT STILL: NOT AT ALL
7. FEELING AFRAID AS IF SOMETHING AWFUL MIGHT HAPPEN: NOT AT ALL
7. FEELING AFRAID AS IF SOMETHING AWFUL MIGHT HAPPEN: NOT AT ALL
4. TROUBLE RELAXING: NOT AT ALL
2. NOT BEING ABLE TO STOP OR CONTROL WORRYING: NOT AT ALL
1. FEELING NERVOUS, ANXIOUS, OR ON EDGE: NOT AT ALL
GAD7 TOTAL SCORE: 1
6. BECOMING EASILY ANNOYED OR IRRITABLE: SEVERAL DAYS
3. WORRYING TOO MUCH ABOUT DIFFERENT THINGS: NOT AT ALL
GAD7 TOTAL SCORE: 1

## 2022-04-20 NOTE — PROGRESS NOTES
Krystina is a 36 year old who is being evaluated via a billable video visit.      How would you like to obtain your AVS? MyChart  If the video visit is dropped, the invitation should be resent by: text  Will anyone else be joining your video visit? No      Video Start Time: 4:10 PM    Assessment & Plan     Major depressive disorder, recurrent episode, moderate (H)  - Improvement in anxiety and depressive symptoms since augmenting with bupropion. Tolerating w/o SE. Continue with same dosage. Should see further improvement in anxiety over next 3-4 weeks. Follow up in 2-3 months, or sooner as needed  - Reviewed recent labs done to r/u contributing causes for fatigue. TSH, CBC, vit D and ferritin normal. She notes reduction in fatigue with addition of medication  - buPROPion (WELLBUTRIN XL) 150 MG 24 hr tablet; Take 1 tablet (150 mg) by mouth every morning  - sertraline (ZOLOFT) 100 MG tablet; Take 2 tablets (200 mg) by mouth daily        Return in about 3 months (around 7/20/2022).    [facility]    [facility]   Krystina is a 36 year old who presents for the following health issues     HPI     Last visit augmented sertraline with bupropion 150 mg  Notes improvement in depression and anxiety  Less sadness and less crying  Less anxiety past 1-2 weeks  GAD7 from 7 to 1  No suicidal thoughts  No SE  Energy is better and she is sleeping better    Review of Systems   Constitutional, HEENT, cardiovascular, pulmonary, gi and gu systems are negative, except as otherwise noted.      Objective           Vitals:  No vitals were obtained today due to virtual visit.    Physical Exam   GENERAL: Healthy, alert and no distress  EYES: Eyes grossly normal to inspection.  No discharge or erythema, or obvious scleral/conjunctival abnormalities.  RESP: No audible wheeze, cough, or visible cyanosis.  No visible retractions or increased work of breathing.    SKIN: Visible skin clear. No  significant rash, abnormal pigmentation or lesions.  NEURO: Cranial nerves grossly intact.  Mentation and speech appropriate for age.  PSYCH: Mentation appears normal, affect normal/bright, judgement and insight intact, normal speech and appearance well-groomed.    Office Visit on 03/18/2022   Component Date Value Ref Range Status     WBC Count 03/18/2022 6.4  4.0 - 11.0 10e3/uL Final     RBC Count 03/18/2022 4.46  3.80 - 5.20 10e6/uL Final     Hemoglobin 03/18/2022 13.5  11.7 - 15.7 g/dL Final     Hematocrit 03/18/2022 41.5  35.0 - 47.0 % Final     MCV 03/18/2022 93  78 - 100 fL Final     MCH 03/18/2022 30.3  26.5 - 33.0 pg Final     MCHC 03/18/2022 32.5  31.5 - 36.5 g/dL Final     RDW 03/18/2022 12.6  10.0 - 15.0 % Final     Platelet Count 03/18/2022 208  150 - 450 10e3/uL Final     Ferritin 03/18/2022 23  12 - 150 ng/mL Final     Vitamin D, Total (25-Hydroxy) 03/18/2022 33  20 - 75 ug/L Final     TSH 03/18/2022 1.74  0.40 - 4.00 mU/L Final               Video-Visit Details    Type of service:  Video Visit    Video End Time:4:25 PM    Originating Location (pt. Location): Home    Distant Location (provider location):  [facility] JACQUELIN     Platform used for Video Visit: [employer]

## 2022-04-21 ASSESSMENT — ANXIETY QUESTIONNAIRES: GAD7 TOTAL SCORE: 1

## 2022-04-21 ASSESSMENT — PATIENT HEALTH QUESTIONNAIRE - PHQ9: SUM OF ALL RESPONSES TO PHQ QUESTIONS 1-9: 3

## 2022-05-24 ENCOUNTER — E-VISIT (OUTPATIENT)
Dept: URGENT CARE | Facility: CLINIC | Age: 37
End: 2022-05-24
Payer: COMMERCIAL

## 2022-05-24 DIAGNOSIS — B37.31 CANDIDAL VULVOVAGINITIS: Primary | ICD-10-CM

## 2022-05-24 PROCEDURE — 99421 OL DIG E/M SVC 5-10 MIN: CPT | Performed by: NURSE PRACTITIONER

## 2022-05-25 RX ORDER — FLUCONAZOLE 150 MG/1
150 TABLET ORAL ONCE
Qty: 1 TABLET | Refills: 0 | Status: SHIPPED | OUTPATIENT
Start: 2022-05-25 | End: 2022-05-25

## 2022-05-25 NOTE — PATIENT INSTRUCTIONS
Yeast Infection (Candida Vaginal Infection)    You have a Candida vaginal infection. This is also known as a yeast infection. It's most often caused by a type of yeast (fungus) called Candida. Candida are normally found in the vagina. But if they increase in number, this can lead to infection and cause symptoms.   Symptoms of a yeast infection can include:     Clumpy or thin, white discharge, which may look like cottage cheese    Itching or burning    Burning with urination  Certain factors can make a yeast infection more likely. These can include:     Taking certain medicines, such as antibiotics or birth control pills    Pregnancy    Diabetes    Weak immune system  A yeast infection is most often treated with antifungal medicine. This may be given as a vaginal cream or pills you take by mouth. Treatment may last for about 1 to 7 days. Women with severe or recurrent infections may need longer courses of treatment.   Home care    If you re prescribed medicine, be sure to use it as directed. Finish all of the medicine, even if your symptoms go away. Don t try to treat yourself using over-the-counter products without talking with your provider first. They will let you know if this is a good option for you.    Ask your provider what steps you can take to help reduce your risk of having a yeast infection in the future.    Follow-up care  Follow up with your healthcare provider, or as directed.   When to seek medical advice  Call your healthcare provider right away if:     You have a fever of 100.4 F (38 C) or higher, or as directed by your provider.    Your symptoms worsen, or they don t go away within a few days of starting treatment.    You have new pain in the lower belly or pelvic region.    You have side effects that bother you or a reaction to the cream or pills you re prescribed.    You or any partners you have sex with have new symptoms, such as a rash, joint pain, or sores.  [employer] last reviewed this  educational content on 7/1/2020 2000-2021 [employer]. All rights reserved. This information is not intended as a substitute for professional medical care. Always follow your healthcare professional's instructions.

## 2022-06-28 ASSESSMENT — ENCOUNTER SYMPTOMS
DIARRHEA: 0
JOINT SWELLING: 0
BREAST MASS: 0
NAUSEA: 0
CHILLS: 0
CONSTIPATION: 0
DYSURIA: 0
HEADACHES: 0
COUGH: 0
ARTHRALGIAS: 0
SORE THROAT: 0
PARESTHESIAS: 0
DIZZINESS: 0
HEMATOCHEZIA: 0
HEMATURIA: 0
HEARTBURN: 0
ABDOMINAL PAIN: 0
PALPITATIONS: 0
EYE PAIN: 0
MYALGIAS: 0
FEVER: 0
NERVOUS/ANXIOUS: 0
FREQUENCY: 0
WEAKNESS: 0
SHORTNESS OF BREATH: 0

## 2022-07-01 DIAGNOSIS — J45.20 MILD INTERMITTENT ASTHMA WITHOUT COMPLICATION: ICD-10-CM

## 2022-07-05 ENCOUNTER — OFFICE VISIT (OUTPATIENT)
Dept: DERMATOLOGY | Facility: CLINIC | Age: 37
End: 2022-07-05
Payer: COMMERCIAL

## 2022-07-05 ENCOUNTER — OFFICE VISIT (OUTPATIENT)
Dept: PEDIATRICS | Facility: CLINIC | Age: 37
End: 2022-07-05
Payer: COMMERCIAL

## 2022-07-05 VITALS
TEMPERATURE: 97.2 F | WEIGHT: 217.5 LBS | OXYGEN SATURATION: 97 % | SYSTOLIC BLOOD PRESSURE: 122 MMHG | HEART RATE: 79 BPM | BODY MASS INDEX: 32.22 KG/M2 | DIASTOLIC BLOOD PRESSURE: 60 MMHG | HEIGHT: 69 IN | RESPIRATION RATE: 16 BRPM

## 2022-07-05 DIAGNOSIS — L73.9 FOLLICULITIS: Primary | ICD-10-CM

## 2022-07-05 DIAGNOSIS — L91.8 SKIN TAG: ICD-10-CM

## 2022-07-05 DIAGNOSIS — F33.1 MAJOR DEPRESSIVE DISORDER, RECURRENT EPISODE, MODERATE (H): ICD-10-CM

## 2022-07-05 DIAGNOSIS — Z13.220 LIPID SCREENING: ICD-10-CM

## 2022-07-05 DIAGNOSIS — Z11.59 NEED FOR HEPATITIS C SCREENING TEST: ICD-10-CM

## 2022-07-05 DIAGNOSIS — J31.0 CHRONIC RHINITIS: ICD-10-CM

## 2022-07-05 DIAGNOSIS — Z83.3 FAMILY HISTORY OF DIABETES MELLITUS TYPE II: ICD-10-CM

## 2022-07-05 DIAGNOSIS — Z80.0 FAMILY HISTORY OF ESOPHAGEAL CANCER: ICD-10-CM

## 2022-07-05 DIAGNOSIS — L30.9 DERMATITIS: ICD-10-CM

## 2022-07-05 DIAGNOSIS — R55 VASOVAGAL SYNCOPE: ICD-10-CM

## 2022-07-05 DIAGNOSIS — R12 HEARTBURN: ICD-10-CM

## 2022-07-05 DIAGNOSIS — Z00.00 ROUTINE GENERAL MEDICAL EXAMINATION AT A HEALTH CARE FACILITY: Primary | ICD-10-CM

## 2022-07-05 PROBLEM — D25.9 UTERINE FIBROID IN PREGNANCY: Status: RESOLVED | Noted: 2020-04-20 | Resolved: 2022-07-05

## 2022-07-05 PROBLEM — O34.10 UTERINE FIBROID IN PREGNANCY: Status: RESOLVED | Noted: 2020-04-20 | Resolved: 2022-07-05

## 2022-07-05 LAB
CHOLEST SERPL-MCNC: 185 MG/DL
FASTING STATUS PATIENT QL REPORTED: YES
FASTING STATUS PATIENT QL REPORTED: YES
GLUCOSE BLD-MCNC: 101 MG/DL (ref 70–99)
HDLC SERPL-MCNC: 57 MG/DL
LDLC SERPL CALC-MCNC: 101 MG/DL
NONHDLC SERPL-MCNC: 128 MG/DL
TRIGL SERPL-MCNC: 133 MG/DL

## 2022-07-05 PROCEDURE — 99395 PREV VISIT EST AGE 18-39: CPT | Performed by: INTERNAL MEDICINE

## 2022-07-05 PROCEDURE — 80061 LIPID PANEL: CPT | Performed by: INTERNAL MEDICINE

## 2022-07-05 PROCEDURE — 86803 HEPATITIS C AB TEST: CPT | Performed by: INTERNAL MEDICINE

## 2022-07-05 PROCEDURE — 99203 OFFICE O/P NEW LOW 30 MIN: CPT | Mod: 25 | Performed by: DERMATOLOGY

## 2022-07-05 PROCEDURE — 99214 OFFICE O/P EST MOD 30 MIN: CPT | Mod: 25 | Performed by: INTERNAL MEDICINE

## 2022-07-05 PROCEDURE — 11200 RMVL SKIN TAGS UP TO&INC 15: CPT | Performed by: DERMATOLOGY

## 2022-07-05 PROCEDURE — 82947 ASSAY GLUCOSE BLOOD QUANT: CPT | Performed by: INTERNAL MEDICINE

## 2022-07-05 PROCEDURE — 96127 BRIEF EMOTIONAL/BEHAV ASSMT: CPT | Performed by: INTERNAL MEDICINE

## 2022-07-05 PROCEDURE — 36415 COLL VENOUS BLD VENIPUNCTURE: CPT | Performed by: INTERNAL MEDICINE

## 2022-07-05 RX ORDER — BUPROPION HYDROCHLORIDE 300 MG/1
300 TABLET ORAL EVERY MORNING
Qty: 90 TABLET | Refills: 1 | Status: SHIPPED | OUTPATIENT
Start: 2022-07-05 | End: 2023-01-10

## 2022-07-05 RX ORDER — CLINDAMYCIN PHOSPHATE 10 UG/ML
LOTION TOPICAL
Qty: 60 ML | Refills: 4 | Status: SHIPPED | OUTPATIENT
Start: 2022-07-05 | End: 2024-01-22

## 2022-07-05 RX ORDER — TRIAMCINOLONE ACETONIDE 1 MG/G
OINTMENT TOPICAL
Qty: 80 G | Refills: 2 | Status: SHIPPED | OUTPATIENT
Start: 2022-07-05

## 2022-07-05 RX ORDER — SERTRALINE HYDROCHLORIDE 100 MG/1
200 TABLET, FILM COATED ORAL DAILY
Qty: 180 TABLET | Refills: 4 | Status: SHIPPED | OUTPATIENT
Start: 2022-07-05 | End: 2023-07-31

## 2022-07-05 RX ORDER — IPRATROPIUM BROMIDE 21 UG/1
2 SPRAY, METERED NASAL EVERY 8 HOURS PRN
Qty: 30 ML | Refills: 11 | Status: SHIPPED | OUTPATIENT
Start: 2022-07-05 | End: 2023-09-25

## 2022-07-05 RX ORDER — ALBUTEROL SULFATE 90 UG/1
2 AEROSOL, METERED RESPIRATORY (INHALATION) EVERY 4 HOURS PRN
Qty: 8.5 G | Refills: 11 | Status: SHIPPED | OUTPATIENT
Start: 2022-07-05

## 2022-07-05 ASSESSMENT — ENCOUNTER SYMPTOMS
NAUSEA: 0
NERVOUS/ANXIOUS: 0
PALPITATIONS: 0
SHORTNESS OF BREATH: 0
MYALGIAS: 0
CHILLS: 0
HEMATURIA: 0
HEADACHES: 0
HEMATOCHEZIA: 0
DIZZINESS: 0
BREAST MASS: 0
EYE PAIN: 0
PARESTHESIAS: 0
DIARRHEA: 0
ARTHRALGIAS: 0
HEARTBURN: 0
ABDOMINAL PAIN: 0
CONSTIPATION: 0
SORE THROAT: 0
FEVER: 0
FREQUENCY: 0
WEAKNESS: 0
JOINT SWELLING: 0
COUGH: 0
DYSURIA: 0

## 2022-07-05 ASSESSMENT — ANXIETY QUESTIONNAIRES
7. FEELING AFRAID AS IF SOMETHING AWFUL MIGHT HAPPEN: SEVERAL DAYS
1. FEELING NERVOUS, ANXIOUS, OR ON EDGE: SEVERAL DAYS
6. BECOMING EASILY ANNOYED OR IRRITABLE: MORE THAN HALF THE DAYS
IF YOU CHECKED OFF ANY PROBLEMS ON THIS QUESTIONNAIRE, HOW DIFFICULT HAVE THESE PROBLEMS MADE IT FOR YOU TO DO YOUR WORK, TAKE CARE OF THINGS AT HOME, OR GET ALONG WITH OTHER PEOPLE: NOT DIFFICULT AT ALL
2. NOT BEING ABLE TO STOP OR CONTROL WORRYING: NOT AT ALL
5. BEING SO RESTLESS THAT IT IS HARD TO SIT STILL: SEVERAL DAYS
GAD7 TOTAL SCORE: 7
3. WORRYING TOO MUCH ABOUT DIFFERENT THINGS: SEVERAL DAYS
GAD7 TOTAL SCORE: 7

## 2022-07-05 ASSESSMENT — PAIN SCALES - GENERAL: PAINLEVEL: NO PAIN (0)

## 2022-07-05 ASSESSMENT — PATIENT HEALTH QUESTIONNAIRE - PHQ9
SUM OF ALL RESPONSES TO PHQ QUESTIONS 1-9: 10
5. POOR APPETITE OR OVEREATING: SEVERAL DAYS

## 2022-07-05 NOTE — PROGRESS NOTES
SUBJECTIVE:   CC: Krystina Suazo is an 36 year old woman who presents for preventive health visit.       Patient has been advised of split billing requirements and indicates understanding: Yes  Healthy Habits:     Getting at least 3 servings of Calcium per day:  Yes    Bi-annual eye exam:  Yes    Dental care twice a year:  Yes    Sleep apnea or symptoms of sleep apnea:  None    Diet:  Regular (no restrictions)    Frequency of exercise:  2-3 days/week    Duration of exercise:  15-30 minutes    Taking medications regularly:  Yes    Medication side effects:  None    PHQ-2 Total Score: 1    Additional concerns today:  Yes (MOLE ON GROING AREA X 2YRS)      Depression and Anxiety Follow-Up    How are you doing with your depression since your last visit? Worsened job change    How are you doing with your anxiety since your last visit?  No change    Are you having other symptoms that might be associated with depression or anxiety? No    Have you had a significant life event? OTHER: job stress     Do you have any concerns with your use of alcohol or other drugs? No    Social History     Tobacco Use     Smoking status: Never Smoker     Smokeless tobacco: Never Used   Vaping Use     Vaping Use: Never used   Substance Use Topics     Alcohol use: No     Drug use: No     PHQ 3/16/2022 3/17/2022 4/20/2022   PHQ-9 Total Score 11 9 3   Q9: Thoughts of better off dead/self-harm past 2 weeks Not at all Not at all Not at all     COURTNEY-7 SCORE 3/16/2022 3/17/2022 4/20/2022   Total Score 7 (mild anxiety) 7 (mild anxiety) 1 (minimal anxiety)   Total Score 7 7 1     Last PHQ-9 7/5/2022   1.  Little interest or pleasure in doing things 1   2.  Feeling down, depressed, or hopeless 2   3.  Trouble falling or staying asleep, or sleeping too much 1   4.  Feeling tired or having little energy 2   5.  Poor appetite or overeating 2   6.  Feeling bad about yourself 0   7.  Trouble concentrating 1   8.  Moving slowly or restless 1   Q9:  Thoughts of better off dead/self-harm past 2 weeks 0   PHQ-9 Total Score 10   Difficulty at work, home, or with people Somewhat difficult     COURTNEY-7  7/5/2022   1. Feeling nervous, anxious, or on edge 1   2. Not being able to stop or control worrying 0   3. Worrying too much about different things 1   4. Trouble relaxing 1   5. Being so restless that it is hard to sit still 1   6. Becoming easily annoyed or irritable 2   7. Feeling afraid, as if something awful might happen 1   COURTNEY-7 Total Score 7   If you checked any problems, how difficult have they made it for you to do your work, take care of things at home, or get along with other people? Not difficult at all       Today's PHQ-2 Score:   PHQ-2 ( 1999 Pfizer) 6/28/2022   Q1: Little interest or pleasure in doing things 0   Q2: Feeling down, depressed or hopeless 1   PHQ-2 Score 1   PHQ-2 Total Score (12-17 Years)- Positive if 3 or more points; Administer PHQ-A if positive -   Q1: Little interest or pleasure in doing things Not at all   Q2: Feeling down, depressed or hopeless Several days   PHQ-2 Score 1       Abuse: Current or Past (Physical, Sexual or Emotional) - No  Do you feel safe in your environment? Yes        Social History     Tobacco Use     Smoking status: Never Smoker     Smokeless tobacco: Never Used   Substance Use Topics     Alcohol use: No     If you drink alcohol do you typically have >3 drinks per day or >7 drinks per week? No    Alcohol Use 7/5/2022   Prescreen: >3 drinks/day or >7 drinks/week? -   Prescreen: >3 drinks/day or >7 drinks/week? No       Reviewed orders with patient.  Reviewed health maintenance and updated orders accordingly - Yes  Labs reviewed in EPIC    Breast Cancer Screening:    Breast CA Risk Assessment (FHS-7) 12/13/2021   Do you have a family history of breast, colon, or ovarian cancer? No / Unknown       Patient under 40 years of age: Routine Mammogram Screening not recommended.   Pertinent mammograms are reviewed under  "the imaging tab.    History of abnormal Pap smear: NO - age 30-65 PAP every 5 years with negative HPV co-testing recommended  PAP / HPV Latest Ref Rng & Units 4/10/2019   PAP (Historical) - NIL   HPV16 NEG:Negative Negative   HPV18 NEG:Negative Negative   HRHPV NEG:Negative Negative     Reviewed and updated as needed this visit by clinical staff   Tobacco  Allergies    Med Hx  Surg Hx  Fam Hx  Soc Hx          Reviewed and updated as needed this visit by Provider                       Review of Systems   Constitutional: Negative for chills and fever.   HENT: Negative for congestion, ear pain, hearing loss and sore throat.    Eyes: Negative for pain and visual disturbance.   Respiratory: Negative for cough and shortness of breath.    Cardiovascular: Negative for chest pain, palpitations and peripheral edema.   Gastrointestinal: Negative for abdominal pain, constipation, diarrhea, heartburn, hematochezia and nausea.   Breasts:  Negative for tenderness, breast mass and discharge.   Genitourinary: Negative for dysuria, frequency, genital sores, hematuria, pelvic pain, urgency, vaginal bleeding and vaginal discharge.   Musculoskeletal: Negative for arthralgias, joint swelling and myalgias.   Skin: Negative for rash.   Neurological: Negative for dizziness, weakness, headaches and paresthesias.   Psychiatric/Behavioral: Negative for mood changes. The patient is not nervous/anxious.        OBJECTIVE:   /60   Pulse 79   Temp 97.2  F (36.2  C) (Tympanic)   Resp 16   Ht 1.753 m (5' 9\")   Wt 98.7 kg (217 lb 8 oz)   LMP 12/08/2021   SpO2 97%   BMI 32.12 kg/m    Physical Exam  GENERAL: healthy, alert and no distress  EYES: Eyes grossly normal to inspection, PERRL and conjunctivae and sclerae normal  NECK: no adenopathy, no asymmetry, masses, or scars and thyroid normal to palpation  RESP: lungs clear to auscultation - no rales, rhonchi or wheezes  CV: regular rate and rhythm, normal S1 S2, no S3 or S4, no " murmur, click or rub, no peripheral edema and peripheral pulses strong  ABDOMEN: soft, nontender, no hepatosplenomegaly, no masses and bowel sounds normal  MS: no gross musculoskeletal defects noted, no edema  SKIN: L groin with 2 mm firm skin tag  PSYCH: mentation appears normal, affect normal/bright    Diagnostic Test Results:  Labs reviewed in Epic    ASSESSMENT/PLAN:     1. Routine general medical examination at a health care facility  Seeing derm today for skin tag    2. Major depressive disorder, recurrent episode, moderate (H)  Inadequate control. Plan increase wellbutrin and follow up 1 month  - sertraline (ZOLOFT) 100 MG tablet; Take 2 tablets (200 mg) by mouth daily  Dispense: 180 tablet; Refill: 4  - buPROPion (WELLBUTRIN XL) 300 MG 24 hr tablet; Take 1 tablet (300 mg) by mouth every morning  Dispense: 90 tablet; Refill: 1    3. Heartburn  Mom with history of uncontrolled heartburn and esophageal cancer. Discussed option to try decreasing PPI and transition to H2 blocker, but also recommended she consider meeting GI, especially if she is unable to wean off PPI  - omeprazole (PRILOSEC) 20 MG DR capsule; TAKE ONE CAPSULE BY MOUTH ONCE DAILY  Dispense: 90 capsule; Refill: 3  - Adult GI  Referral - Consult Only; Future    4. Vasovagal syncope  No recent symptoms or concerns. Seems to be related to blood draws, etc    5. Chronic rhinitis  atrovent use prn. helps    6. Lipid screening    - Lipid panel reflex to direct LDL Fasting; Future  - Lipid panel reflex to direct LDL Fasting    7. Family history of esophageal cancer      8. Family history of diabetes mellitus type II    - Glucose; Future  - Glucose    9. Need for hepatitis C screening test    - Hepatitis C Screen Reflex to HCV RNA Quant and Genotype; Future  - Hepatitis C Screen Reflex to HCV RNA Quant and Genotype      COUNSELING:  Reviewed preventive health counseling, as reflected in patient instructions    Estimated body mass index is 32.12  "kg/m  as calculated from the following:    Height as of this encounter: 1.753 m (5' 9\").    Weight as of this encounter: 98.7 kg (217 lb 8 oz).    Weight management plan: Discussed healthy diet and exercise guidelines    She reports that she has never smoked. She has never used smokeless tobacco.      Counseling Resources:  ATP IV Guidelines  Pooled Cohorts Equation Calculator  Breast Cancer Risk Calculator  BRCA-Related Cancer Risk Assessment: FHS-7 Tool  FRAX Risk Assessment  ICSI Preventive Guidelines  Dietary Guidelines for Americans, 2010  USDA's MyPlate  ASA Prophylaxis  Lung CA Screening    Valeria Daly MD  [facility]  "

## 2022-07-05 NOTE — PROGRESS NOTES
Dermatology Clinic Note    Dermatology Problem List:  1. Skin tags  2. Folliculitis  3. Atopic dermatitis   4. 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 Irritated skin tag on the L inguinal crease. Treated with 2 10 sec freeze cycles. Wound info discussed. Insurance coverage discussed.     2. Folliculitis on the thighs. Clindamycin lotion BID as needed.     3. Atopic dermatitis. Triamcinolone 0.1% ointment BID to rash on the extremities. Emollients daily.     4. 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 Benign features.     RTC prn.       Thank you for involving me in this patient's care.     Krystina Young MD   of Dermatology  [facility]    CC: No referring provider defined for this encounter.    Valeria Daly MD    ____________________________________________________________________________________________________________________________________________    CC: Patient presents with:  New Patient: Add on per Dr Young- pt of Dr Morrison          HPI: Krystina Suazo is a 36 year old female presenting for initial evaluation of growth in the L inguinal crease seen at the request of Valeria Daly. Developed during pregnancy 20 months ago. Notes that it is irritated due to location. Has underlying history of atopic dermatitis. Not currently using topical medications.     Patient Active Problem List   Diagnosis     COURTNEY (generalized anxiety disorder)     Major depressive disorder, recurrent episode, moderate (H)     Allergic rhinitis     Mild intermittent asthma without complication     Vasovagal syncope     Previous  section     Umbilical hernia without obstruction and without gangrene     S/P MINAL (total abdominal hysterectomy)     Family history of esophageal cancer     Family history of diabetes mellitus type II       Allergies   Allergen Reactions     Dust Mite Extract      Pollen and animal dander.     Seasonal Allergies          Current  Outpatient Medications   Medication     albuterol (PROAIR HFA/PROVENTIL HFA/VENTOLIN HFA) 108 (90 Base) MCG/ACT inhaler     buPROPion (WELLBUTRIN XL) 300 MG 24 hr tablet     clobetasol (TEMOVATE) 0.05 % external solution     clobetasol propionate (OLUX) 0.05 % external foam     ipratropium (ATROVENT) 0.03 % nasal spray     omeprazole (PRILOSEC) 20 MG DR capsule     Prenatal Vit-Fe Fumarate-FA (PRENATAL MULTIVITAMIN PLUS IRON) 27-0.8 MG TABS per tablet     sertraline (ZOLOFT) 100 MG tablet     No current facility-administered medications for this visit.       Family History   Problem Relation Age of Onset     Esophageal Cancer Mother 57     Diabetes Mother         Type 2     Hypertension Mother      Other Cancer Mother         Esophageal     Depression Mother      Obesity Mother      Asthma Father      Glaucoma No family hx of      Macular Degeneration No family hx of      Breast Cancer No family hx of      Colon Cancer No family hx of        Social History     Tobacco Use     Smoking status: Never Smoker     Smokeless tobacco: Never Used   Vaping Use     Vaping Use: Never used   Substance Use Topics     Alcohol use: No     Drug use: No         ROS: Patient in normal state of health and is feeling well on complete ROS.     EXAM:  LMP 12/08/2021   GEN: Alert, no distress  SKIN:   --Follicularly based pustules on the medial thighs  --fleshy 3 mm papule on the L inguinal crease  --Xerosis with thin pink brown plaques on the ankles/shins  --Medium brown oval approx 6 cm plaque on the R lateral thigh

## 2022-07-05 NOTE — PATIENT INSTRUCTIONS
If/when you feel ready to try decreasing omeprazole (if you have to be on this long term, OK, but it's good to try to stop it)  Decrease to 10 mg daily for a month or two with additional pepcid twice daily OTC. Keep going with pepcid after you stop the prilosec. After a few weeks or a month, if you acid is still bothering you, go back to omeprazole and we can discuss at your next visit.     You'll get a call from [facility] scheduling.   Please call [facility] at [phone] to set up an appointment.   [address], [place]    With your depression, if you feel like your symptoms aren't improving or in good control once you settle into your new employment situation, we could increase your wellbutrin to 300 mg.    Consider adding one dose of calcium with vitamin D per day.  Preventive Health Recommendations  Female Ages 26 - 39  Yearly exam:   See your health care provider every year in order to  Review health changes.   Discuss preventive care.    Review your medicines if you your doctor has prescribed any.    Until age 30: Get a Pap test every three years (more often if you have had an abnormal result).    After age 30: Talk to your doctor about whether you should have a Pap test every 3 years or have a Pap test with HPV screening every 5 years.   You do not need a Pap test if your uterus was removed (hysterectomy) and you have not had cancer.  You should be tested each year for STDs (sexually transmitted diseases), if you're at risk.   Talk to your provider about how often to have your cholesterol checked.  If you are at risk for diabetes, you should have a diabetes test (fasting glucose).  Shots: Get a flu shot each year. Get a tetanus shot every 10 years.   Nutrition:   Eat at least 5 servings of fruits and vegetables each day.  Eat whole-grain bread, whole-wheat pasta and brown rice instead of white grains and rice.  Get adequate Calcium and Vitamin D.      Lifestyle  Exercise at least 150 minutes a week (30 minutes a day, 5 days of the week). This will help you control your weight and prevent disease.  Limit alcohol to one drink per day.  No smoking.   Wear sunscreen to prevent skin cancer.  See your dentist every six months for an exam and cleaning.

## 2022-07-05 NOTE — COMMUNITY RESOURCES LIST (ENGLISH)
07/05/2022   [facility] - Outpatient Clinics  N/A  For questions about this resource list or additional care needs, please contact your primary care clinic or care manager.  Phone: [phone]   Email: N/A   Address: [address] [place] [address]   Hours: N/A        Hotlines and Helplines       Hotline - Crisis help  1  [facility] Crisis Response Unit - Suicide and Crisis Response Hotline Distance: 5.04 miles      COVID-19 Status: Phone/Virtual   [address] [place] [address]  Language: English  Hours: Mon - Sun Open 24 Hours   Phone: (334) 278-3959 Email: cru@[facility]. Website: https://www.[facility]/[employer]/HandlingEmergencies/Help     2  [facility] - Adult Protection - 24-Hour Crisis Response Distance: 5.06 miles      COVID-19 Status: Phone/Virtual   [address] [place] [address]  Language: English  Hours: Mon - Sun Open 24 Hours   Phone: (116) 185-3083 Email: social.services@[facility] Website: https://www.[facility] [facility]/[employer]/AdultProtection/Pages/default.aspx          Mental Health       Individual counseling  3  [facility] Distance: 2.12 miles      COVID-19 Status: Regular Operations, COVID-19 Status: Phone/Virtual   [address] [place] [address]  Language: English  Hours: Mon - Thu 8:30 AM - 9:00 PM , Fri 8:30 AM - 5:00 PM , Sat 8:00 AM - 4:00 PM  Fees: Insurance, Self Pay, Sliding Fee   Phone: (486) 481-7887 Email: contactus@[employer] Website: https://www.[employer]/locations/zuleika     4  [employer] [facility] [place] - Music Therapy Distance: 2.49 miles      COVID-19 Status: Phone/Virtual   [address] [place] [address]  Language: English, Eritrean, Polish  Hours: Mon - Fri 8:00 AM - 4:30 PM  Fees: Insurance, Self Pay   Phone: (233) 985-6167 Email: GO-SIM@[employer] Website: http://www.[employer].Popbasic     Mental health crisis care  5   [facility] Distance: 2.12 miles      COVID-19 Status: Regular Operations, COVID-19 Status: Phone/Virtual   [address] [place] [address]  Language: English  Hours: Mon - Thu 8:30 AM - 9:00 PM , Fri 8:30 AM - 5:00 PM , Sat 8:00 AM - 4:00 PM  Fees: Insurance, Self Pay, Sliding Fee   Phone: (155) 232-9863 Email: priscila@[employer] Website: https://www.[employer]/locations/[place]     6  [employer] - 24-Hour Mental Health Practitioner Distance: 4.61 miles      COVID-19 Status: Regular Operations, COVID-19 Status: Phone/Virtual   [address] [place] [address]  Language: English, Hmong  Hours: Mon - Fri 8:00 AM - 4:30 PM  Fees: Insurance, Self Pay   Phone: (584) 784-7359 Email: info@[employer] Website: http://www.[employer]/     Mental health support group  7  [facility] / [facility] Distance: 2.67 miles      COVID-19 Status: Phone/Virtual   [address] [address] [place] [address]  Language: English  Hours: Mon - Fri 9:00 AM - 5:00 PM Appt. Only  Fees: Free   Phone: (552) 693-6247 Email: fern@[facility] Website: http://www.[employer].ASP64/     8  [facility] Community Support Member [place] Distance: 5.73 miles      COVID-19 Status: Regular Operations, COVID-19 Status: Phone/Virtual   [address] [place] [address]  Language: English  Hours: Mon - Fri 12:00 PM - 4:00 PM  Fees: Insurance   Phone: (481) 760-3119 Email: info@[employer] Website: https://[employer].org/mental-health/          Important Numbers & Websites       Emergency Services   911  City Services   311  Poison Control   (138) 907-9199  Suicide Prevention Lifeline   (783) 331-5267 (TALK)  Child Abuse Hotline   (989) 810-3129 (4-A-Child)  Sexual Assault Hotline   (394) 573-2124 (HOPE)  National Runaway Safeline   (761) 537-8487 (RUNAWAY)  All-Options Talkline   (392)  232-7043  Substance Abuse Referral   (530) 125-1905 (HELP)

## 2022-07-05 NOTE — LETTER
7/5/2022      RE: Krystina Suazo  [address]  [place] [address]     Dear Colleague,    Thank you for the opportunity to participate in the care of your patient, Krystina Suazo, at the [facility] [place] at . Please see a copy of my visit note below.    Dermatology Clinic Note    Dermatology Problem List:  1. Skin tags  2. Folliculitis  3. Atopic dermatitis   4. 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 Irritated skin tag on the L inguinal crease. Treated with 2 10 sec freeze cycles. Wound info discussed. Insurance coverage discussed.     2. Folliculitis on the thighs. Clindamycin lotion BID as needed.     3. Atopic dermatitis. Triamcinolone 0.1% ointment BID to rash on the extremities. Emollients daily.     4. 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 Benign features.     RTC prn.       Thank you for involving me in this patient's care.     Krystina Young MD   of Dermatology  [facility]    CC: No referring provider defined for this encounter.    Valeria Daly MD    ____________________________________________________________________________________________________________________________________________    CC: Patient presents with:  New Patient: Add on per Dr Young- pt of Dr Morrison          HPI: Krystina Suazo is a 36 year old female presenting for initial evaluation of growth in the L inguinal crease seen at the request of Valeria Daly. Developed during pregnancy 20 months ago. Notes that it is irritated due to location. Has underlying history of atopic dermatitis. Not currently using topical medications.     Patient Active Problem List   Diagnosis     COURTNEY (generalized anxiety disorder)     Major depressive disorder, recurrent episode, moderate (H)     Allergic rhinitis     Mild intermittent asthma without complication     Vasovagal syncope     Previous   section     Umbilical hernia without obstruction and without gangrene     S/P MINAL (total abdominal hysterectomy)     Family history of esophageal cancer     Family history of diabetes mellitus type II       Allergies   Allergen Reactions     Dust Mite Extract      Pollen and animal dander.     Seasonal Allergies          Current Outpatient Medications   Medication     albuterol (PROAIR HFA/PROVENTIL HFA/VENTOLIN HFA) 108 (90 Base) MCG/ACT inhaler     buPROPion (WELLBUTRIN XL) 300 MG 24 hr tablet     clobetasol (TEMOVATE) 0.05 % external solution     clobetasol propionate (OLUX) 0.05 % external foam     ipratropium (ATROVENT) 0.03 % nasal spray     omeprazole (PRILOSEC) 20 MG DR capsule     Prenatal Vit-Fe Fumarate-FA (PRENATAL MULTIVITAMIN PLUS IRON) 27-0.8 MG TABS per tablet     sertraline (ZOLOFT) 100 MG tablet     No current facility-administered medications for this visit.       Family History   Problem Relation Age of Onset     Esophageal Cancer Mother 57     Diabetes Mother         Type 2     Hypertension Mother      Other Cancer Mother         Esophageal     Depression Mother      Obesity Mother      Asthma Father      Glaucoma No family hx of      Macular Degeneration No family hx of      Breast Cancer No family hx of      Colon Cancer No family hx of        Social History     Tobacco Use     Smoking status: Never Smoker     Smokeless tobacco: Never Used   Vaping Use     Vaping Use: Never used   Substance Use Topics     Alcohol use: No     Drug use: No         ROS: Patient in normal state of health and is feeling well on complete ROS.     EXAM:  LMP 2021   GEN: Alert, no distress  SKIN:   --Follicularly based pustules on the medial thighs  --fleshy 3 mm papule on the L inguinal crease  --Xerosis with thin pink brown plaques on the ankles/shins  --Medium brown oval approx 6 cm plaque on the R lateral thigh    Please do not hesitate to contact me if you have any questions/concerns.      Sincerely,       Krystina Young MD

## 2022-07-06 LAB — HCV AB SERPL QL IA: NONREACTIVE

## 2022-07-19 ENCOUNTER — TELEPHONE (OUTPATIENT)
Dept: GASTROENTEROLOGY | Facility: CLINIC | Age: 37
End: 2022-07-19

## 2022-07-19 NOTE — TELEPHONE ENCOUNTER
[facility] Call Center    Phone Message    May a detailed message be left on voicemail: Yes    Reason for Call: Other: Patient is currently scheduled on 10/27/2022, as a patient New Esophageal Urgent. This is outside the expected timeline for this schedule. Paitent has been added to the waitlist.      Action Taken: Message routed to:  Other: GI REFERRAL TRIAGE POOL     Travel Screening: Not Applicable

## 2022-08-02 NOTE — TELEPHONE ENCOUNTER
REFERRAL INFORMATION:    Referring Provider:  Dr. Valeria Daly     Referring Clinic:  [facility]     Reason for Visit/Diagnosis: Heartburn     FUTURE VISIT INFORMATION:    Appointment Date: 10/27/2022    Appointment Time: 12:40 PM      NOTES STATUS DETAILS   OFFICE NOTE from Referring Provider Internal 7/5/2022 Office visit with Dr. Daly      OFFICE NOTE from Other Specialist Internal 3/12/2021 Office visit with ZACHARY Phillips- ([facility])      HOSPITAL DISCHARGE SUMMARY/  ED VISITS N/A    OPERATIVE REPORT N/A    MEDICATION LIST Internal         ENDOSCOPY  N/A    COLONOSCOPY N/A    ERCP N/A    EUS N/A    STOOL TESTING N/A    PERTINENT LABS Internal    PATHOLOGY REPORTS (RELATED) N/A    IMAGING (CT, MRI, EGD, MRCP, Small Bowel Follow Through/SBT, MR/CT Enterography) Internal CT Abdomen Pelvis: 6/17/2021

## 2022-08-15 ENCOUNTER — OFFICE VISIT (OUTPATIENT)
Dept: URGENT CARE | Facility: URGENT CARE | Age: 37
End: 2022-08-15
Payer: COMMERCIAL

## 2022-08-15 VITALS
SYSTOLIC BLOOD PRESSURE: 128 MMHG | RESPIRATION RATE: 20 BRPM | DIASTOLIC BLOOD PRESSURE: 78 MMHG | OXYGEN SATURATION: 98 % | TEMPERATURE: 98 F | HEART RATE: 78 BPM

## 2022-08-15 DIAGNOSIS — S63.501A SPRAIN OF RIGHT WRIST, INITIAL ENCOUNTER: Primary | ICD-10-CM

## 2022-08-15 PROCEDURE — 99213 OFFICE O/P EST LOW 20 MIN: CPT | Performed by: PHYSICIAN ASSISTANT

## 2022-08-15 NOTE — PATIENT INSTRUCTIONS
Take the brace off for 10-15 min every few hours.   You can take tylenol and or motrin/advil for pain and swelling.   Ice and rest 4-5 days. Limited lifting with the right hand, <5lb ok.

## 2022-08-15 NOTE — PROGRESS NOTES
URGENT CARE VISIT:    SUBJECTIVE:   Chief Complaint   Patient presents with     Musculoskeletal Problem     R wrist pain      Krystina Suazo is a 36 year old female who presents with a chief complaint of right wrist pain.  Symptoms began 1 day(s) ago, are moderate and sudden onset  Context:  Injury:Yes: Son landed on her wrist in bouncy house yesterday.  Injury happened while playing. How: direct blow immediate pain.   Pain exacerbated by movement, twisting and flexion/extension Relieved by ice and wearing her carpal tunnel brace.  She treated it initially with ice. This is the first time this type of injury has occurred to this patient. History of carpal tunnel surgery on both sides. Still painful this morning with a little swelling. Took ibuprofen yesterday.     Past Medical History:   Diagnosis Date     Asthma      Depression      Depressive disorder     Diagnosed around 2010     Hypertension 8/30/2018     Uterine fibroid in pregnancy 4/20/2020     Vasovagal syncope      Current Outpatient Medications   Medication Sig Dispense Refill     albuterol (PROAIR HFA/PROVENTIL HFA/VENTOLIN HFA) 108 (90 Base) MCG/ACT inhaler INHALE 2 PUFFS INTO THE LUNGS EVERY 4 HOURS AS NEEDED FOR SHORTNESS OF BREATH / DYSPNEA OR WHEEZING 8.5 g 11     buPROPion (WELLBUTRIN XL) 300 MG 24 hr tablet Take 1 tablet (300 mg) by mouth every morning 90 tablet 1     clindamycin (CLEOCIN T) 1 % external lotion Twice daily to acne rash on thighs as needed. 60 mL 4     clobetasol (TEMOVATE) 0.05 % external solution        ipratropium (ATROVENT) 0.03 % nasal spray Spray 2 sprays into both nostrils every 8 hours as needed for rhinitis Patient will call when needs filled. 30 mL 11     omeprazole (PRILOSEC) 20 MG DR capsule TAKE ONE CAPSULE BY MOUTH ONCE DAILY 90 capsule 3     Prenatal Vit-Fe Fumarate-FA (PRENATAL MULTIVITAMIN PLUS IRON) 27-0.8 MG TABS per tablet Take 1 tablet by mouth daily 100 tablet 3     sertraline (ZOLOFT) 100 MG tablet Take 2  tablets (200 mg) by mouth daily 180 tablet 4     triamcinolone (KENALOG) 0.1 % external ointment Twice daily to eczema rash on the arms and legs until clear, then as needed. 80 g 2     Social History     Tobacco Use     Smoking status: Never Smoker     Smokeless tobacco: Never Used   Substance Use Topics     Alcohol use: No       ROS:  Review of systems negative except as stated below    EXAM:   /78   Pulse 78   Temp 98  F (36.7  C) (Tympanic)   Resp 20   LMP 12/08/2021   SpO2 98%   M/S Exam: right wrist mild swelling, mild tenderness to palpation and decreased ROM extension, rotation.   GENERAL APPEARANCE: healthy, alert and no distress  EXTREMITIES: peripheral pulses normal  SKIN: no suspicious lesions or rashes  NEURO: Normal strength and tone, sensory exam grossly normal, mentation intact and speech normal    X-RAY was not done.    ASSESSMENT:  Right wrist sprain.     PLAN:  See patient instructions    Diagnosis and treatment plan were discussed with patient and/or parent. If symptoms worsen or do not improve in the next few days, follow-up with your primary care provider or visit an [facility] urgent care clinic location.  Patient verbalizes understanding of all things discussed. All questions were addressed and answered.     Malka Astudillo PA-C

## 2022-08-16 ENCOUNTER — VIRTUAL VISIT (OUTPATIENT)
Dept: GASTROENTEROLOGY | Facility: CLINIC | Age: 37
End: 2022-08-16
Attending: INTERNAL MEDICINE
Payer: COMMERCIAL

## 2022-08-16 VITALS — WEIGHT: 208 LBS | BODY MASS INDEX: 30.72 KG/M2

## 2022-08-16 DIAGNOSIS — R12 HEARTBURN: ICD-10-CM

## 2022-08-16 PROCEDURE — 99204 OFFICE O/P NEW MOD 45 MIN: CPT | Mod: 95 | Performed by: INTERNAL MEDICINE

## 2022-08-16 ASSESSMENT — PAIN SCALES - GENERAL: PAINLEVEL: NO PAIN (0)

## 2022-08-16 NOTE — PROGRESS NOTES
Krystina is a 36 year old who is being evaluated via a billable video visit.      How would you like to obtain your AVS? MyChart  If the video visit is dropped, the invitation should be resent by: Send to e-mail at: [email or web address]  Will anyone else be joining your video visit? No       Yulissa Harleen      Video-Visit Details    Video Start Time: 200    Type of service:  Video Visit    Video End Time:240    Originating Location (pt. Location): Home    Distant Location (provider location):  [facility] [place]     Platform used for Video Visit: Vidtel

## 2022-08-16 NOTE — PATIENT INSTRUCTIONS
It was a pleasure taking care of you today.  I've included a brief summary of our discussion and care plan from today's visit below.  Please review this information with your primary care provider.  _______________________________________________________________________    My recommendations are summarized as follows:    Please schedule an upper endoscopy with Bravo (pH test) OFF of PPI therapy  Please discontinue the omeprazole at least 2 weeks before your endoscopy and for the duration of the wireless pH test  Attempt to taper off of the omeprazole by taking the medication every other day for 1-2 weeks before discontinuing altogether. Simultaneously, begin taking over the counter pepcid once daily and continue that medication until 2 weeks before your endoscopy.   Please contact me via [employer] 2 weeks after you have transitioned off of the omeprazole to provide an update on your symptoms and to gauge your symptom control  Obtain blood work for celiac disease based on your symptoms of gas    To schedule endoscopic procedures you may call: [phone]  To schedule radiology tests you may call: [phone]  To schedule an ENT appointment you may call: [phone]    Please call my nurse Shivani ([phone]), Ismael ([phone]) with any questions or concerns.  If you were seen through the [facility] please feel free to reach out to Argenis at [phone]   --    Return to GI Clinic in 4 months to review your progress.    _______________________________________________________________________    Who do I call with any questions after my visit?  Please be in touch if there are any further questions that arise following today's visit.  There are multiple ways to contact your gastroenterology care team.      During business hours, you may reach a Gastroenterology nurse at [phone] and choose option 3.       To schedule or reschedule an appointment, please call [phone].     You can always send a secure  message through [employer].  [employer] messages are answered by your nurse or doctor typically within 24 hours.  Please allow extra time on weekends and holidays.      For urgent/emergent questions after business hours, you may reach the on-call GI Fellow by contacting the [facility]  at [phone].     How will I get the results of any tests ordered?    You will receive all of your results.  If you have signed up for [email or web address], any tests ordered at your visit will be available to you after your physician reviews them.  Typically this takes 1-2 weeks.  If there are urgent results that require a change in your care plan, your physician or nurse will call you to discuss the next steps.      What is [employer]?  [employer] is a secure way for you to access all of your healthcare records from the [facility].  It is a web based computer program, so you can sign on to it from any location.  It also allows you to send secure messages to your care team.  I recommend signing up for [employer] access if you have not already done so and are comfortable with using a computer.      How to I schedule a follow-up visit?  If you did not schedule a follow-up visit today, please call [phone] to schedule a follow-up office visit.      If you feel you received exceptional care and are interested in supporting the clinical and research goals of Dr. Wheatley or the Division of Gastroenterology, Hepatology, and Nutrition please contact [email or web address] from the [facility] to discuss opportunities to donate.    Sincerely,    Williams Wheatley DO   of Medicine  Director, Esophageal Disorders Program  Division of Gastroenterology, Hepatology, and Nutrition  [facility]

## 2022-08-16 NOTE — LETTER
8/16/2022         RE: Krystina Suazo  [address]  [place] [address]        Dear Colleague,    Thank you for referring your patient, Krystina Suazo, to the [facility]STRO[facility] [place]. Please see a copy of my visit note below.    Gastroenterology Visit for: Krystina Suazo 1985   MRN: [id number]     Reason for Visit:  chief complaint    [facility]: Add[facility]  [facility]  / [place] [id number]  Patient Care Team:  Valeria Daly MD as PCP - General (Internal Medicine)  Valeria Daly MD as Assigned PCP  Sandy Tran MD as Assigned OBGYN Provider  James Velazco MD as Assigned Musculoskeletal Provider  Krystina Young MD as Assigned Surgical Provider    History of Present Illness:   Krystina Suazo is a 36 year old female with a history of GERD who is presenting as a new patient in consultation at the request of Dr. Daly with a chief complaint of heartburn and gas.  ---------------------------------------------------------------  Krystina Suazo states she has been taking 20mg PPI (omeprazole) for heartburn for the past 13 years. Her heartburn has been well controlled on this regimen, but her PCP would like her to stop taking the medication if possible. She takes her PPI in the morning before breakfast, sometimes taking it with her vitamins. Her heartburn was not severe when taking the PPI, but she started on the medicine given her mother's history of esophageal cancer presumed to be related to acid reflux. No side known effects with PPI to date. Has tried stopping the PPI, but her heartburn symptoms return. Has not tried taking Pepcid. No previous upper endoscopy.        Additionally, pt has had problems with severe gas for at least the past 13 years as well. States no constipation, but some bloating. 3-4 soft stools per day, has been her normal. No diarrhea or bloody stools.  She has been unable to associate  these symptoms with any particular food. No history of celiac disease.       History of psoriasis and asthma.   ---------------------------------------------------------------     Krystinageovanna Suazo denies dysphagia, odynophagia, nausea, vomiting, early satiety, abdominal pain, diarrhea, constipation, hematochezia, or melena. No unintentional weight loss.     Family history of colon cancer: none  Wt Readings from Last 5 Encounters:   08/16/22 94.3 kg (208 lb)   07/05/22 98.7 kg (217 lb 8 oz)   03/28/22 101.2 kg (223 lb)   03/18/22 101.6 kg (224 lb)   02/01/22 101.9 kg (224 lb 11.2 oz)        Esophageal Questionnaire(s)    BEDQ Questionnaire  BEDQ Questionnaire: How Often Have You Had the Following? 8/15/2022   Trouble eating solid food (meat, bread, vegetables) 0   Trouble eating soft foods (yogurt, jello, pudding) 0   Trouble swallowing liquids 0   Pain while swallowing 0   Coughing or choking while swallowing foods or liquids 0   Total Score: 0     BEDQ Questionnaire: Discomfort/Pain Ratings 8/15/2022   Eating solid food (meat, bread, vegetables) 0   Eating soft foods (yogurt, jello, pudding) 0   Drinking liquid 0   Total Score: 0       Eckardt Questionnaire  Eckardt Questionnaire 8/15/2022   Dysphagia 0   Regurgitation 1   Retrosternal Pain 1   Weight Loss (kg) 3   Total Score:  5       Promis 10 Questionnaire  No flowsheet data found.        STUDIES & PROCEDURES:    EGD:   Date:  Impression:  Pathology Report:    Colonoscopy:  Date:  Impression:  Pathology Report:     EndoFLIP directed at the UES or LES (8cm (EF-325) balloon length or 16cm (EF-322) balloon length):   Date:  8cm balloon  Balloon inflation Balloon pressure CSA (mm^2) DI (mm^2/mmHg) Dmin (mm) Compliance   20 (ladmark ID)        30        40        50           16cm balloon  Balloon inflation Balloon pressure CSA (mm^2) DI (mm^2/mmHg) Dmin (mm) Compliance   30 (ladmark ID)        40        50        60        70           High Resolution  Manometry:  Date:  Impression:    PH/Impedance:  Date:  Impression:     Bravo:  48 or 96hr  Date:  Impression:    CT:  Date:  Impression:    Esophagram:  Date:  Impression:     Prior medical records were reviewed including, but not limited to, notes from referring providers, lab work, radiographic tests, and other diagnostic tests. Pertinent results were summarized above.     History     Past Medical History:   Diagnosis Date     Asthma      Depression      Depressive disorder     Diagnosed around      Hypertension 2018     Uterine fibroid in pregnancy 2020     Vasovagal syncope        Past Surgical History:   Procedure Laterality Date     CARPAL TUNNEL RELEASE RT/LT Bilateral       SECTION N/A 2018    Procedure:  SECTION;   section;  Surgeon: Jay Yanez MD;  Location: [facility] OR      SECTION N/A 10/27/2020    Procedure: REPEAT  SECTION;  Surgeon: Aníbal Pearson MD;  Location: [facility] L+D     GYN SURGERY  2018         HERNIORRHAPHY UMBILICAL N/A 2021    Procedure: Open Umbilical Hernia Repair with Mesh;  Surgeon: Erin Mccarthy MD;  Location: [facility] OR     HYSTERECTOMY TOTAL ABDOMINAL, SALPINGECTOMY N/A 2021    Procedure: TOTAL ABDOMINAL HYSTERECTOMY, BILATERAL SALPINGECTOMY;  Surgeon: Sandy Tran MD;  Location: [facility] OR     HYSTERECTOMY, PAP NO LONGER INDICATED       SINUS SURGERY  2014     TONSILLECTOMY  2014       Social History     Socioeconomic History     Marital status:      Spouse name: Yakov     Number of children: 1     Years of education: 16     Highest education level: Bachelor's degree (e.g., BA, AB, BS)   Occupational History     Occupation:      Occupation: [employer]     Comment: [employer]   Tobacco Use     Smoking status: Never Smoker     Smokeless tobacco: Never Used   Vaping Use     Vaping Use: Never used   Substance and Sexual Activity      Alcohol use: No     Drug use: No     Sexual activity: Yes     Partners: Male     Birth control/protection: Condom   Other Topics Concern     Parent/sibling w/ CABG, MI or angioplasty before 65F 55M? No   Social History Narrative     Not on file     Social Determinants of Health     Financial Resource Strain: Low Risk      Difficulty of Paying Living Expenses: Not very hard   Food Insecurity: No Food Insecurity     Worried About Running Out of Food in the Last Year: Never true     Ran Out of Food in the Last Year: Never true   Transportation Needs: No Transportation Needs     Lack of Transportation (Medical): No     Lack of Transportation (Non-Medical): No   Physical Activity: Insufficiently Active     Days of Exercise per Week: 4 days     Minutes of Exercise per Session: 30 min   Stress: No Stress Concern Present     Feeling of Stress : Only a little   Social Connections: Unknown     Frequency of Communication with Friends and Family: Three times a week     Frequency of Social Gatherings with Friends and Family: Once a week     Attends Sabianist Services: Patient refused     Active Member of Clubs or Organizations: Yes     Attends Club or Organization Meetings: Not on file     Marital Status:    Intimate Partner Violence: Not on file   Housing Stability: Low Risk      Unable to Pay for Housing in the Last Year: No     Number of Places Lived in the Last Year: 1     Unstable Housing in the Last Year: No       Family History   Problem Relation Age of Onset     Esophageal Cancer Mother 57     Diabetes Mother         Type 2     Hypertension Mother      Other Cancer Mother         Esophageal     Depression Mother      Obesity Mother      Asthma Father      Glaucoma No family hx of      Macular Degeneration No family hx of      Breast Cancer No family hx of      Colon Cancer No family hx of      Family history reviewed and edited as appropriate    Medications and Allergies:     Outpatient Encounter Medications as of  8/16/2022   Medication Sig Dispense Refill     albuterol (PROAIR HFA/PROVENTIL HFA/VENTOLIN HFA) 108 (90 Base) MCG/ACT inhaler INHALE 2 PUFFS INTO THE LUNGS EVERY 4 HOURS AS NEEDED FOR SHORTNESS OF BREATH / DYSPNEA OR WHEEZING 8.5 g 11     buPROPion (WELLBUTRIN XL) 300 MG 24 hr tablet Take 1 tablet (300 mg) by mouth every morning 90 tablet 1     clindamycin (CLEOCIN T) 1 % external lotion Twice daily to acne rash on thighs as needed. 60 mL 4     clobetasol (TEMOVATE) 0.05 % external solution        ipratropium (ATROVENT) 0.03 % nasal spray Spray 2 sprays into both nostrils every 8 hours as needed for rhinitis Patient will call when needs filled. 30 mL 11     omeprazole (PRILOSEC) 20 MG DR capsule TAKE ONE CAPSULE BY MOUTH ONCE DAILY 90 capsule 3     sertraline (ZOLOFT) 100 MG tablet Take 2 tablets (200 mg) by mouth daily 180 tablet 4     triamcinolone (KENALOG) 0.1 % external ointment Twice daily to eczema rash on the arms and legs until clear, then as needed. 80 g 2     Prenatal Vit-Fe Fumarate-FA (PRENATAL MULTIVITAMIN PLUS IRON) 27-0.8 MG TABS per tablet Take 1 tablet by mouth daily 100 tablet 3     No facility-administered encounter medications on file as of 8/16/2022.        Allergies   Allergen Reactions     Dust Mite Extract      Pollen and animal dander.     Seasonal Allergies         Review of systems:  A full 10 point review of systems was obtained and was negative except for the pertinent positives and negatives stated within the HPI.    Objective Findings:   Physical Exam:    Constitutional: Wt 94.3 kg (208 lb)   LMP 12/08/2021   BMI 30.72 kg/m    General: Alert, cooperative, no distress, well-appearing  Head: Atraumatic, normocephalic, no obvious abnormalities   Eyes: EOMI, Sclera anicteric, no obvious conjunctival hemorrhage   Nose: normal appearing nares, no obvious rhinorrhea   Throat: Not examined  Respiratory: No respiratory distress. Speaking in full sentences.    Cardiovascular: Not  examined  Gastrointestinal: Not examined  Musculoskeletal: normal appearing range of motion without obvious strength deficit.  Skin: No jaundice, no obvious rash  Neurologic: AAOx3, no obvious neurologic abnormality  Psychiatric: Normal Affect, appropriate mood  Extremities: Not examined     Labs, Radiology, Pathology     Lab Results   Component Value Date    WBC 6.4 2022    WBC 7.7 2021    WBC 8.9 2020    HGB 13.5 2022    HGB 11.9 2021    HGB 14.1 2021     2022     2021     2020    CHOL 185 2022    CHOL 198 2019    TRIG 133 2022    TRIG 111 2019    HDL 57 2022    HDL 52 2019    ALT 14 2018    ALT 16 2018    ALT 18 2018    AST 14 2018    AST 10 2018    AST 14 2018     2018     2018    BUN 13 2018    BUN 13 2018    CO2 22 2018    CO2 22 2018    TSH 1.74 2022    TSH 1.70 2019        Liver Function Studies -   Recent Labs   Lab Test 18  0720 18  1016 18  1530   PROTTOTAL  --   --  6.0*   ALBUMIN  --   --  2.6*   BILITOTAL  --   --  0.1*   ALKPHOS  --   --  351*   AST 14   < > 14   ALT 14   < > 18    < > = values in this interval not displayed.          Patient Active Problem List    Diagnosis Date Noted     Family history of esophageal cancer 2022     Priority: Medium     Mom with years of heartburn.       Family history of diabetes mellitus type II 2022     Priority: Medium     S/P MINAL (total abdominal hysterectomy) 2021     Priority: Medium     Umbilical hernia without obstruction and without gangrene 2021     Priority: Medium     Added automatically from request for surgery [id number]       Previous  section 2020     Priority: Medium     Added automatically from request for surgery [id number]       Vasovagal syncope 2018     Priority: Medium     With shots,  lab draws, getting eyes dilated.        COURTNEY (generalized anxiety disorder) 05/23/2017     Priority: Medium     Major depressive disorder, recurrent episode, moderate (H) 05/23/2017     Priority: Medium     Allergic rhinitis 01/21/2009     Priority: Medium     Overview:   Rhinitis Allergic  NOS       Mild intermittent asthma without complication 01/21/2009     Priority: Medium      Assessment and Plan   Assessment:    Krystina Suazo is 36 year old female with a history of GERD who is presenting as a new patient in consultation at the request of Dr. Daly with a chief complaint of heartburn and gas.    1. Heartburn       Orders Placed This Encounter   Procedures     IgA [LAB73]     Deamidated Giladin Peptide Karen IgA IgG [SAB7896]     Tissue transglutaminase karen IgA and IgG [KGQ2315]     Adult GI  Referral - Procedure Only      #GERD/heartburn  Pt with 13+ year history of GERD, well controlled with 20mg PPI daily. Maternal history of esophageal cancer presumed due to acid reflux. Heartburn symptoms have returned after trials of stopping PPI. Pt has not previously tried Pepcid. Given length of symptoms, and dependency on PPI, EGD with Bravo OFF therapy warranted. Additionally, reasonable to trial Pepcid as an alternate therapy.  Instructions provided on a PPI taper.     #Excessive gas/bloating   Pt also with long history of self-described excessive gas with associated bloating. No known food triggers. Could potentially be a side effect of PPI. However, given history of autoimmune disease (Psoriasis) will rule out celiac.       Plan:  1. Please schedule an upper endoscopy with Bravo (pH test) OFF of PPI therapy  2. Please discontinue the omeprazole at least 2 weeks before your endoscopy and for the duration of the wireless pH test  3. Attempt to taper off of the omeprazole by taking the medication every other day for 1-2 weeks before discontinuing altogether. Simultaneously, begin taking over the  counter pepcid once daily and continue that medication until 2 weeks before your endoscopy.   4. Please contact me via [employer]ent[employer] 2 weeks after you have transitioned off of the omeprazole to provide an update on your symptoms and to gauge your symptom control  5. Obtain blood work for celiac disease based on your symptoms of gas    Shane Purdy, MS4   Patient discussed and seen with Dr. Wheatley      Follow up plan:   Return to clinic 4 months and as needed.    The risks and benefits of my recommendations, as well as other treatment options were discussed with the patient and any available family today. All questions were answered.     o Follow up: As planned above. Today, I personally spent 20 minutes in direct face to face time with the patient, of which greater than 50% of the time was spent in patient education and counseling as described above. Approximately 31 minutes were spent on indirect care associated with the patient's consultation including but not limited to review of: patient medical records to date, clinic visits, hospital records, lab results, imaging studies, procedural documentation, and coordinating care with other providers. The findings from this review are summarized in the above note. All of the above accounted for a cumulative time of 51 minutes and was performed on the date of service.     The patient verbalized understanding of the plan and was appreciative for the time spent and information provided during the office visit.     Attending Attestation:    I was present with the medical student who participated in the service and in the documentation of the note. I  have verified the history and personally performed the physical exam and medical decision making. I agree with the  assessment and plan of care as documented in the note and have edited as necessary. I saw and evaluated the patient and agree with the findings and plan of care as documented in the note.    In summary, the patient  is an 36 year old female with a history of heartburn, psoriasis, asthma. We have been consulted to assess the patient's heartburn and PPI use.    The patient was seen in video telemedicine consultation regarding her symptoms of reflux/heartburn.  She has been on long-term PPI low-dose for the past 13 years with perceived well control of her symptoms.  Because of the chronicity of her symptoms and the family history in her mother of esophageal cancer it would be worthwhile to pursue upper endoscopy off PPI therapy.  Additionally, because of the chronicity of her symptoms and an inability to come off of PPI we will plan to perform a Bravo off therapy.    Again because of the chronicity of her PPI use and previous inability to stop the medication I provided a planned taper of 1 to 2 weeks every other day PPI with simultaneous use of Pepcid over-the-counter.  She was instructed about rebound hyper acid secretion and that those symptoms should level out after 1 to 2 weeks after cessation of PPI.    The risks and benefits of PPI use were discussed including but not limited to: kidney injury, dementia, fracture, C. Difficile, and community acquired pneumonia.  Recent literature suggests a possible relationship between PPI use with a greater likelihood of reporting a positive COVID-19 test (Almario CV, Sana WD, Ivy BMR. Am J Gastroenterol 2020;115:1959-6836).This was discussed with the patient in detail. The quality of research studies was described (retrospective vs prospective). Data from recent review article provided (Dioni COSTELLO, Felice PO. Proton Pump Inhibitors in 2021: Pros, Cons, and Everything in Between. [employer]. May 2021. Doi:10.1177/[id number]). The value of joint decision making was emphasized to ensure that the medication is utilized in the appropriate clinical setting.     We discussed the pathophysiology of GERD including but not limited to a discussion on transient lower esophageal sphincter  relaxations (TLESRs), hiatal hernia, weight and pressure dynamics. We discussed the management of reflux disease inclusive of weight loss, dietary and lifestyle modification, H2 blockers, proton pump inhibitors (PPIs), and antireflux procedures.    Her bloating and gas could be related to celiac disease vs medication side effect given the temporal relationship. Celiac serologies were ordered and biopsies will be obtained during endoscopy.    Overall time spent discussing, thinking, reviewing the chart including available imaging and labs, and evaluating the patient was 51 minutes of which greater than 50% of the time was spent on counseling and coordination of care.    Patient was seen August 16, 2022    Williams Wheatley DO   of Medicine  Director, Esophageal Disorders Program  Division of Gastroenterology, Hepatology, and Nutrition  [facility]       Documentation assisted by voice recognition and documentation system.

## 2022-08-16 NOTE — PROGRESS NOTES
Gastroenterology Visit for: Krystina Suazo 1985   MRN: [id number]     Reason for Visit:  chief complaint    Referred by: [place]  / [address] [facility]  / [place] [address]  Patient Care Team:  Valeria Daly MD as PCP - General (Internal Medicine)  Valeria Daly MD as Assigned PCP  Sandy Tran MD as Assigned OBGYN Provider  James Velazco MD as Assigned Musculoskeletal Provider  Krystina Young MD as Assigned Surgical Provider    History of Present Illness:   Krystina Suazo is a 36 year old female with a history of GERD who is presenting as a new patient in consultation at the request of Dr. Daly with a chief complaint of heartburn and gas.  ---------------------------------------------------------------  Krystina Suazo states she has been taking 20mg PPI (omeprazole) for heartburn for the past 13 years. Her heartburn has been well controlled on this regimen, but her PCP would like her to stop taking the medication if possible. She takes her PPI in the morning before breakfast, sometimes taking it with her vitamins. Her heartburn was not severe when taking the PPI, but she started on the medicine given her mother's history of esophageal cancer presumed to be related to acid reflux. No side known effects with PPI to date. Has tried stopping the PPI, but her heartburn symptoms return. Has not tried taking Pepcid. No previous upper endoscopy.        Additionally, pt has had problems with severe gas for at least the past 13 years as well. States no constipation, but some bloating. 3-4 soft stools per day, has been her normal. No diarrhea or bloody stools.  She has been unable to associate these symptoms with any particular food. No history of celiac disease.       History of psoriasis and asthma.   ---------------------------------------------------------------     Krystina Suazo denies dysphagia, odynophagia, nausea, vomiting, early satiety, abdominal  pain, diarrhea, constipation, hematochezia, or melena. No unintentional weight loss.     Family history of colon cancer: none  Wt Readings from Last 5 Encounters:   08/16/22 94.3 kg (208 lb)   07/05/22 98.7 kg (217 lb 8 oz)   03/28/22 101.2 kg (223 lb)   03/18/22 101.6 kg (224 lb)   02/01/22 101.9 kg (224 lb 11.2 oz)        Esophageal Questionnaire(s)    BEDQ Questionnaire  BEDQ Questionnaire: How Often Have You Had the Following? 8/15/2022   Trouble eating solid food (meat, bread, vegetables) 0   Trouble eating soft foods (yogurt, jello, pudding) 0   Trouble swallowing liquids 0   Pain while swallowing 0   Coughing or choking while swallowing foods or liquids 0   Total Score: 0     BEDQ Questionnaire: Discomfort/Pain Ratings 8/15/2022   Eating solid food (meat, bread, vegetables) 0   Eating soft foods (yogurt, jello, pudding) 0   Drinking liquid 0   Total Score: 0       Eckardt Questionnaire  Eckardt Questionnaire 8/15/2022   Dysphagia 0   Regurgitation 1   Retrosternal Pain 1   Weight Loss (kg) 3   Total Score:  5       Promis 10 Questionnaire  No flowsheet data found.        STUDIES & PROCEDURES:    EGD:   Date:  Impression:  Pathology Report:    Colonoscopy:  Date:  Impression:  Pathology Report:     EndoFLIP directed at the UES or LES (8cm (EF-325) balloon length or 16cm (EF-322) balloon length):   Date:  8cm balloon  Balloon inflation Balloon pressure CSA (mm^2) DI (mm^2/mmHg) Dmin (mm) Compliance   20 (ladmark ID)        30        40        50           16cm balloon  Balloon inflation Balloon pressure CSA (mm^2) DI (mm^2/mmHg) Dmin (mm) Compliance   30 (ladmark ID)        40        50        60        70           High Resolution Manometry:  Date:  Impression:    PH/Impedance:  Date:  Impression:     Bravo:  48 or 96hr  Date:  Impression:    CT:  Date:  Impression:    Esophagram:  Date:  Impression:     Prior medical records were reviewed including, but not limited to, notes from referring providers, lab  work, radiographic tests, and other diagnostic tests. Pertinent results were summarized above.     History     Past Medical History:   Diagnosis Date     Asthma      Depression      Depressive disorder     Diagnosed around      Hypertension 2018     Uterine fibroid in pregnancy 2020     Vasovagal syncope        Past Surgical History:   Procedure Laterality Date     CARPAL TUNNEL RELEASE RT/LT Bilateral       SECTION N/A 2018    Procedure:  SECTION;   section;  Surgeon: Jay Yanez MD;  Location: [facility] OR      SECTION N/A 10/27/2020    Procedure: REPEAT  SECTION;  Surgeon: Aníbal Pearson MD;  Location: [facility] L+D     GYN SURGERY  2018         HERNIORRHAPHY UMBILICAL N/A 2021    Procedure: Open Umbilical Hernia Repair with Mesh;  Surgeon: Erin Mccarthy MD;  Location: [facility] OR     HYSTERECTOMY TOTAL ABDOMINAL, SALPINGECTOMY N/A 2021    Procedure: TOTAL ABDOMINAL HYSTERECTOMY, BILATERAL SALPINGECTOMY;  Surgeon: Sandy Tran MD;  Location: [facility] OR     HYSTERECTOMY, PAP NO LONGER INDICATED       SINUS SURGERY  2014     TONSILLECTOMY         Social History     Socioeconomic History     Marital status:      Spouse name: Yakov     Number of children: 1     Years of education: 16     Highest education level: Bachelor's degree (e.g., BA, AB, BS)   Occupational History     Occupation:      Occupation: [employer]     Comment: [employer]   Tobacco Use     Smoking status: Never Smoker     Smokeless tobacco: Never Used   Vaping Use     Vaping Use: Never used   Substance and Sexual Activity     Alcohol use: No     Drug use: No     Sexual activity: Yes     Partners: Male     Birth control/protection: Condom   Other Topics Concern     Parent/sibling w/ CABG, MI or angioplasty before 65F 55M? No   Social History Narrative     Not on file     Social Determinants of Health      Financial Resource Strain: Low Risk      Difficulty of Paying Living Expenses: Not very hard   Food Insecurity: No Food Insecurity     Worried About Running Out of Food in the Last Year: Never true     Ran Out of Food in the Last Year: Never true   Transportation Needs: No Transportation Needs     Lack of Transportation (Medical): No     Lack of Transportation (Non-Medical): No   Physical Activity: Insufficiently Active     Days of Exercise per Week: 4 days     Minutes of Exercise per Session: 30 min   Stress: No Stress Concern Present     Feeling of Stress : Only a little   Social Connections: Unknown     Frequency of Communication with Friends and Family: Three times a week     Frequency of Social Gatherings with Friends and Family: Once a week     Attends Quaker Services: Patient refused     Active Member of Clubs or Organizations: Yes     Attends Club or Organization Meetings: Not on file     Marital Status:    Intimate Partner Violence: Not on file   Housing Stability: Low Risk      Unable to Pay for Housing in the Last Year: No     Number of Places Lived in the Last Year: 1     Unstable Housing in the Last Year: No       Family History   Problem Relation Age of Onset     Esophageal Cancer Mother 57     Diabetes Mother         Type 2     Hypertension Mother      Other Cancer Mother         Esophageal     Depression Mother      Obesity Mother      Asthma Father      Glaucoma No family hx of      Macular Degeneration No family hx of      Breast Cancer No family hx of      Colon Cancer No family hx of      Family history reviewed and edited as appropriate    Medications and Allergies:     Outpatient Encounter Medications as of 8/16/2022   Medication Sig Dispense Refill     albuterol (PROAIR HFA/PROVENTIL HFA/VENTOLIN HFA) 108 (90 Base) MCG/ACT inhaler INHALE 2 PUFFS INTO THE LUNGS EVERY 4 HOURS AS NEEDED FOR SHORTNESS OF BREATH / DYSPNEA OR WHEEZING 8.5 g 11     buPROPion (WELLBUTRIN XL) 300 MG 24 hr  tablet Take 1 tablet (300 mg) by mouth every morning 90 tablet 1     clindamycin (CLEOCIN T) 1 % external lotion Twice daily to acne rash on thighs as needed. 60 mL 4     clobetasol (TEMOVATE) 0.05 % external solution        ipratropium (ATROVENT) 0.03 % nasal spray Spray 2 sprays into both nostrils every 8 hours as needed for rhinitis Patient will call when needs filled. 30 mL 11     omeprazole (PRILOSEC) 20 MG DR capsule TAKE ONE CAPSULE BY MOUTH ONCE DAILY 90 capsule 3     sertraline (ZOLOFT) 100 MG tablet Take 2 tablets (200 mg) by mouth daily 180 tablet 4     triamcinolone (KENALOG) 0.1 % external ointment Twice daily to eczema rash on the arms and legs until clear, then as needed. 80 g 2     Prenatal Vit-Fe Fumarate-FA (PRENATAL MULTIVITAMIN PLUS IRON) 27-0.8 MG TABS per tablet Take 1 tablet by mouth daily 100 tablet 3     No facility-administered encounter medications on file as of 8/16/2022.        Allergies   Allergen Reactions     Dust Mite Extract      Pollen and animal dander.     Seasonal Allergies         Review of systems:  A full 10 point review of systems was obtained and was negative except for the pertinent positives and negatives stated within the HPI.    Objective Findings:   Physical Exam:    Constitutional: Wt 94.3 kg (208 lb)   LMP 12/08/2021   BMI 30.72 kg/m    General: Alert, cooperative, no distress, well-appearing  Head: Atraumatic, normocephalic, no obvious abnormalities   Eyes: EOMI, Sclera anicteric, no obvious conjunctival hemorrhage   Nose: normal appearing nares, no obvious rhinorrhea   Throat: Not examined  Respiratory: No respiratory distress. Speaking in full sentences.    Cardiovascular: Not examined  Gastrointestinal: Not examined  Musculoskeletal: normal appearing range of motion without obvious strength deficit.  Skin: No jaundice, no obvious rash  Neurologic: AAOx3, no obvious neurologic abnormality  Psychiatric: Normal Affect, appropriate mood  Extremities: Not  examined     Labs, Radiology, Pathology     Lab Results   Component Value Date    WBC 6.4 2022    WBC 7.7 2021    WBC 8.9 2020    HGB 13.5 2022    HGB 11.9 2021    HGB 14.1 2021     2022     2021     2020    CHOL 185 2022    CHOL 198 2019    TRIG 133 2022    TRIG 111 2019    HDL 57 2022    HDL 52 2019    ALT 14 2018    ALT 16 2018    ALT 18 2018    AST 14 2018    AST 10 2018    AST 14 2018     2018     2018    BUN 13 2018    BUN 13 2018    CO2 22 2018    CO2 22 2018    TSH 1.74 2022    TSH 1.70 2019        Liver Function Studies -   Recent Labs   Lab Test 18  0720 18  1016 18  1530   PROTTOTAL  --   --  6.0*   ALBUMIN  --   --  2.6*   BILITOTAL  --   --  0.1*   ALKPHOS  --   --  351*   AST 14   < > 14   ALT 14   < > 18    < > = values in this interval not displayed.          Patient Active Problem List    Diagnosis Date Noted     Family history of esophageal cancer 2022     Priority: Medium     Mom with years of heartburn.       Family history of diabetes mellitus type II 2022     Priority: Medium     S/P MINAL (total abdominal hysterectomy) 2021     Priority: Medium     Umbilical hernia without obstruction and without gangrene 2021     Priority: Medium     Added automatically from request for surgery [id number]       Previous  section 2020     Priority: Medium     Added automatically from request for surgery [id number]       Vasovagal syncope 2018     Priority: Medium     With shots, lab draws, getting eyes dilated.        COURTNEY (generalized anxiety disorder) 2017     Priority: Medium     Major depressive disorder, recurrent episode, moderate (H) 2017     Priority: Medium     Allergic rhinitis 2009     Priority: Medium     Overview:   Rhinitis  Allergic  NOS       Mild intermittent asthma without complication 01/21/2009     Priority: Medium      Assessment and Plan   Assessment:    Krystina Suazo is 36 year old female with a history of GERD who is presenting as a new patient in consultation at the request of Dr. Daly with a chief complaint of heartburn and gas.    1. Heartburn       Orders Placed This Encounter   Procedures     IgA [LAB73]     Deamidated Giladin Peptide Karen IgA IgG [TGW6872]     Tissue transglutaminase karen IgA and IgG [OSL0945]     Adult GI  Referral - Procedure Only      #GERD/heartburn  Pt with 13+ year history of GERD, well controlled with 20mg PPI daily. Maternal history of esophageal cancer presumed due to acid reflux. Heartburn symptoms have returned after trials of stopping PPI. Pt has not previously tried Pepcid. Given length of symptoms, and dependency on PPI, EGD with Bravo OFF therapy warranted. Additionally, reasonable to trial Pepcid as an alternate therapy.  Instructions provided on a PPI taper.     #Excessive gas/bloating   Pt also with long history of self-described excessive gas with associated bloating. No known food triggers. Could potentially be a side effect of PPI. However, given history of autoimmune disease (Psoriasis) will rule out celiac.       Plan:  1. Please schedule an upper endoscopy with Bravo (pH test) OFF of PPI therapy  2. Please discontinue the omeprazole at least 2 weeks before your endoscopy and for the duration of the wireless pH test  3. Attempt to taper off of the omeprazole by taking the medication every other day for 1-2 weeks before discontinuing altogether. Simultaneously, begin taking over the counter pepcid once daily and continue that medication until 2 weeks before your endoscopy.   4. Please contact me via [employer] 2 weeks after you have transitioned off of the omeprazole to provide an update on your symptoms and to gauge your symptom control  5. Obtain blood work for  celiac disease based on your symptoms of gas    Shane Ricardojose Purdy, MS4   Patient discussed and seen with Dr. Wheatley      Follow up plan:   Return to clinic 4 months and as needed.    The risks and benefits of my recommendations, as well as other treatment options were discussed with the patient and any available family today. All questions were answered.     o Follow up: As planned above. Today, I personally spent 20 minutes in direct face to face time with the patient, of which greater than 50% of the time was spent in patient education and counseling as described above. Approximately 31 minutes were spent on indirect care associated with the patient's consultation including but not limited to review of: patient medical records to date, clinic visits, hospital records, lab results, imaging studies, procedural documentation, and coordinating care with other providers. The findings from this review are summarized in the above note. All of the above accounted for a cumulative time of 51 minutes and was performed on the date of service.     The patient verbalized understanding of the plan and was appreciative for the time spent and information provided during the office visit.     Attending Attestation:    I was present with the medical student who participated in the service and in the documentation of the note. I  have verified the history and personally performed the physical exam and medical decision making. I agree with the  assessment and plan of care as documented in the note and have edited as necessary. I saw and evaluated the patient and agree with the findings and plan of care as documented in the note.    In summary, the patient is an 36 year old female with a history of heartburn, psoriasis, asthma. We have been consulted to assess the patient's heartburn and PPI use.    The patient was seen in video telemedicine consultation regarding her symptoms of reflux/heartburn.  She has been on long-term PPI  low-dose for the past 13 years with perceived well control of her symptoms.  Because of the chronicity of her symptoms and the family history in her mother of esophageal cancer it would be worthwhile to pursue upper endoscopy off PPI therapy.  Additionally, because of the chronicity of her symptoms and an inability to come off of PPI we will plan to perform a Bravo off therapy.    Again because of the chronicity of her PPI use and previous inability to stop the medication I provided a planned taper of 1 to 2 weeks every other day PPI with simultaneous use of Pepcid over-the-counter.  She was instructed about rebound hyper acid secretion and that those symptoms should level out after 1 to 2 weeks after cessation of PPI.    The risks and benefits of PPI use were discussed including but not limited to: kidney injury, dementia, fracture, C. Difficile, and community acquired pneumonia.  Recent literature suggests a possible relationship between PPI use with a greater likelihood of reporting a positive COVID-19 test (Migdalia CV, Sana WD, Ivy BMR. Am J Gastroenterol 2020;115:3278-3886).This was discussed with the patient in detail. The quality of research studies was described (retrospective vs prospective). Data from recent review article provided (Dioni COSTELLO, Felice PO. Proton Pump Inhibitors in 2021: Pros, Cons, and Everything in Between. [employer]. May 2021. Doi:10.1177/[id number]). The value of joint decision making was emphasized to ensure that the medication is utilized in the appropriate clinical setting.     We discussed the pathophysiology of GERD including but not limited to a discussion on transient lower esophageal sphincter relaxations (TLESRs), hiatal hernia, weight and pressure dynamics. We discussed the management of reflux disease inclusive of weight loss, dietary and lifestyle modification, H2 blockers, proton pump inhibitors (PPIs), and antireflux procedures.    Her bloating and gas could be  related to celiac disease vs medication side effect given the temporal relationship. Celiac serologies were ordered and biopsies will be obtained during endoscopy.    Overall time spent discussing, thinking, reviewing the chart including available imaging and labs, and evaluating the patient was 51 minutes of which greater than 50% of the time was spent on counseling and coordination of care.    Patient was seen August 16, 2022    Williams Wheatley DO   of Medicine  Director, Esophageal Disorders Program  Division of Gastroenterology, Hepatology, and Nutrition  [facility]       Documentation assisted by voice recognition and documentation system.

## 2022-08-23 ENCOUNTER — HOSPITAL ENCOUNTER (OUTPATIENT)
Facility: AMBULATORY SURGERY CENTER | Age: 37
End: 2022-08-23
Attending: INTERNAL MEDICINE
Payer: COMMERCIAL

## 2022-08-23 ENCOUNTER — TELEPHONE (OUTPATIENT)
Dept: GASTROENTEROLOGY | Facility: CLINIC | Age: 37
End: 2022-08-23

## 2022-08-23 NOTE — TELEPHONE ENCOUNTER
Screening Questions    BlueKIND OF PREP RedLOCATION [review exclusion criteria] GreenSEDATION TYPE    N Have you had a positive covid test in the last 90 days?   If yes, what date?     Y Do you have a legal guardian or medical Power of ?  Are you able to give consent for your medical care?  (Sedation review/consideration needed)    1. Y Are you active on mychart?     2. BCBS What insurance is in the chart?      3.    MABLE OLIVER Ordering/Referring Provider:     4.    30.7 BMI [BMI OVER 40-EXTENDED PREP]  If greater than 40 review exclusion criteria [PAC APPT IF (MAC) @ [facility]]      5.  Respiratory Screening:  [If yes to any of the following HOSPITAL setting only]     N      Do you use daily home oxygen?   N      Do you have mod to severe Obstructive Sleep Apnea?  [OKAY @ [facility] SH PH RI]   N      Do you have Pulmonary Hypertension?      N      Do you have UNCONTROLLED asthma?        6.    N Have you had a heart or lung transplant?       7.    N Are you currently on dialysis? [If yes, G-PREP & HOSPITAL setting only]     8.    N Do you have chronic kidney disease? [If yes, G-PREP]    9.    N Have you had a stroke or Transient ischemic attack (TIA - aka  mini stroke ) within 6 months? (If yes, please review exclusion criteria)         N  In the past 6 months, have you had any heart related issues including cardiomyopathy or heart attack?          N  If yes, did it require cardiac stenting or other implantable device?       10   N Do you have any implantable devices in your body (pacemaker, defib, LVAD)? (If yes, please review exclusion criteria)    11.  N Do you take nitroglycerin?          N If yes, how often?  (If yes, HOSPITAL setting ONLY)    12.  N Are you currently taking any blood thinners?           [IF YES, INFORM PATIENT TO FOLLOW UP W/ ORDERING PROVIDER FOR BRIDGING INSTRUCTIONS]     13.  N Do you take Phentermine?      Yes-> Hold for 7 days before procedure.  Please consult your prescribing  provider if you have questions about holding this medication.    14.  N Are you taking any prescription pain medications on a routine schedule? [If yes, EXTENDED PREP.] [If yes, MAC]    15.  N Do you have a diagnosis of diabetes?[If yes, G-PREP]    16.   N    [FEMALES] are you currently pregnant?           N     If yes, how many weeks?     17.  N Do you have any chemical dependencies such as alcohol, street drugs, or methadone? [If yes, MAC]    18.  N Do you have any history of post-traumatic stress syndrome, severe anxiety or history of psychosis? [If yes, MAC]    19.  Y Do you transfer independently? (If NO, please HOSPITAL setting  only)     20.    On a regular basis do you go 3-5 days between bowel movements?[ If yes, EXTENDED PREP.]    21.   Preferred LOCAL Pharmacy for Pre Prescription:        [facility] [place], [place] - [address]                               Scheduling Details       Caller:   (Please ask for phone number if not scheduled by patient)    Type of Procedure Scheduled: Upper Endoscopy with BRAVO [EGD BRAVO]     Which Colonoscopy Prep was Sent:   [facility] PATIENTS & GRONATALEE'S PATIENTS NEEDS EXTENDED PREP    Date of Procedure: 12/1  Surgeon: MELODY  [place] [facility] [place]: MAC    Conscious Sedation- Needs  for 6 hours after the procedure  MAC/General-Needs  for 24 hours after procedure    Y Pre-op Required at [facility] [place] [place] and OR for MAC sedation:        (Advise patient they will need a pre-op WITH IN 30 DAYS prior to procedure -)      Y Informed patient they will need an adult          Cannot take any type of public or medical transportation alone    Y Confirmed Nurse will call to complete assessment     HOME Pre-Procedure Covid test to be completed at [facility] or Externally:      Additional comments:

## 2022-08-30 ENCOUNTER — LAB (OUTPATIENT)
Dept: LAB | Facility: CLINIC | Age: 37
End: 2022-08-30
Payer: COMMERCIAL

## 2022-08-30 DIAGNOSIS — R12 HEARTBURN: ICD-10-CM

## 2022-08-30 PROCEDURE — 86364 TISS TRNSGLTMNASE EA IG CLAS: CPT

## 2022-08-30 PROCEDURE — 82784 ASSAY IGA/IGD/IGG/IGM EACH: CPT

## 2022-08-30 PROCEDURE — 86258 DGP ANTIBODY EACH IG CLASS: CPT

## 2022-08-30 PROCEDURE — 36415 COLL VENOUS BLD VENIPUNCTURE: CPT

## 2022-08-31 LAB
GLIADIN IGA SER-ACNC: 0.5 U/ML
GLIADIN IGG SER-ACNC: <0.6 U/ML
IGA SERPL-MCNC: 102 MG/DL (ref 84–499)
TTG IGA SER-ACNC: 0.2 U/ML
TTG IGG SER-ACNC: <0.6 U/ML

## 2022-09-07 ENCOUNTER — OFFICE VISIT (OUTPATIENT)
Dept: URGENT CARE | Facility: URGENT CARE | Age: 37
End: 2022-09-07
Payer: COMMERCIAL

## 2022-09-07 ENCOUNTER — ANCILLARY PROCEDURE (OUTPATIENT)
Dept: GENERAL RADIOLOGY | Facility: CLINIC | Age: 37
End: 2022-09-07
Attending: PHYSICIAN ASSISTANT
Payer: COMMERCIAL

## 2022-09-07 VITALS
OXYGEN SATURATION: 98 % | RESPIRATION RATE: 20 BRPM | SYSTOLIC BLOOD PRESSURE: 124 MMHG | DIASTOLIC BLOOD PRESSURE: 74 MMHG | HEART RATE: 78 BPM | TEMPERATURE: 98 F

## 2022-09-07 DIAGNOSIS — M25.531 RIGHT WRIST PAIN: Primary | ICD-10-CM

## 2022-09-07 DIAGNOSIS — M25.531 RIGHT WRIST PAIN: ICD-10-CM

## 2022-09-07 PROCEDURE — 99213 OFFICE O/P EST LOW 20 MIN: CPT | Performed by: PHYSICIAN ASSISTANT

## 2022-09-07 PROCEDURE — 73110 X-RAY EXAM OF WRIST: CPT | Mod: TC | Performed by: RADIOLOGY

## 2022-09-07 NOTE — PATIENT INSTRUCTIONS
No acute fracture or dislocation seen on x-ray, per my read. I will call you if the radiology report differs from my own.   RICE therapy, including (rest, ice, compression, elevation)   Over-the-counter analgesics (Tylenol or Ibuprofen) as needed.   Follow-up with Orthopedics / sports medicine if symptoms worsen or do not improve over the next several days   Seek emergency care if you develop severe pain/swelling, inability to move extremity, numbness / tingling, weakness, skin paleness, or icy cold extremity.

## 2022-09-07 NOTE — PROGRESS NOTES
Assessment & Plan     1. Right wrist pain  Suspect sprain.  No acute fracture or dislocation seen on x-ray, pending radiology report. Patient is neurovascularly intact.  Advised RICE therapy, including (rest, ice, compression, elevation)   Over-the-counter analgesics (Tylenol or Ibuprofen) as needed.   Follow-up with Orthopedics / sports medicine in one week if symptoms worsen or do not improve.   Seek emergency care if you develop severe pain/swelling, inability to move extremity, numbness / tingling, weakness, skin paleness, or icy cold extremity.      - XR Wrist Right G/E 3 Views; Future      Return in about 5 days (around 2022), or if symptoms worsen or fail to improve, for Ortho.    Diagnosis and treatment plan was reviewed with patient and/or family.   Patient verbalizes understanding. All questions were addressed and answered.     CHEL Shah [facility] [place]    CHIEF COMPLAINT:   Chief Complaint   Patient presents with     Musculoskeletal Problem     R wrist pain pt wants X/rays      Subjective     Krystina is a 37 year old female who presents to clinic today for evaluation of right wrist pain.  Patient sustained a sprain approximately 3 weeks ago after falling in a bounce house. She had been using a brace and symptoms were improving. She woke up this AM and pain was significantly worse. She took NSAIDs. Initially she felt some numbness, but that has since resolved.   Patient denies having fever, chills, pale or cold extremity.       Past Medical History:   Diagnosis Date     Asthma      Depression      Depressive disorder     Diagnosed around      Hypertension 2018     Uterine fibroid in pregnancy 2020     Vasovagal syncope      Past Surgical History:   Procedure Laterality Date     CARPAL TUNNEL RELEASE RT/LT Bilateral       SECTION N/A 2018    Procedure:  SECTION;   section;  Surgeon: Jay Yanez MD;  Location:  [facility] OR      SECTION N/A 10/27/2020    Procedure: REPEAT  SECTION;  Surgeon: Aníbal Pearson MD;  Location: [facility] L+D     GYN SURGERY  2018         HERNIORRHAPHY UMBILICAL N/A 2021    Procedure: Open Umbilical Hernia Repair with Mesh;  Surgeon: Erin Mccarthy MD;  Location: [facility] OR     HYSTERECTOMY TOTAL ABDOMINAL, SALPINGECTOMY N/A 2021    Procedure: TOTAL ABDOMINAL HYSTERECTOMY, BILATERAL SALPINGECTOMY;  Surgeon: Sandy Tran MD;  Location: [facility] OR     HYSTERECTOMY, PAP NO LONGER INDICATED       SINUS SURGERY       TONSILLECTOMY       Social History     Tobacco Use     Smoking status: Never Smoker     Smokeless tobacco: Never Used   Substance Use Topics     Alcohol use: No     Current Outpatient Medications   Medication     albuterol (PROAIR HFA/PROVENTIL HFA/VENTOLIN HFA) 108 (90 Base) MCG/ACT inhaler     buPROPion (WELLBUTRIN XL) 300 MG 24 hr tablet     clindamycin (CLEOCIN T) 1 % external lotion     clobetasol (TEMOVATE) 0.05 % external solution     ipratropium (ATROVENT) 0.03 % nasal spray     omeprazole (PRILOSEC) 20 MG DR capsule     sertraline (ZOLOFT) 100 MG tablet     triamcinolone (KENALOG) 0.1 % external ointment     No current facility-administered medications for this visit.     Allergies   Allergen Reactions     Dust Mite Extract      Pollen and animal dander.     Seasonal Allergies        10 point ROS of systems were all negative except for pertinent positives noted in my HPI.      Exam:   /74   Pulse 78   Temp 98  F (36.7  C) (Tympanic)   Resp 20   LMP 2021   SpO2 98%   Gen: healthy,alert,no distress  Extremity: TTP in radial aspect of left wrist. No erythema, bruising or swelling noted.  There is not compromise to the distal circulation.  Pulses are +2 and CRT is brisk  SKIN: no suspicious lesions or rashes  NEURO: Normal strength and tone, sensory exam grossly normal, mentation intact and speech normal    XR --  negative per my read

## 2022-09-11 ENCOUNTER — HEALTH MAINTENANCE LETTER (OUTPATIENT)
Age: 37
End: 2022-09-11

## 2022-09-23 ENCOUNTER — IMMUNIZATION (OUTPATIENT)
Dept: PEDIATRICS | Facility: CLINIC | Age: 37
End: 2022-09-23
Payer: COMMERCIAL

## 2022-09-23 ENCOUNTER — TELEPHONE (OUTPATIENT)
Dept: GASTROENTEROLOGY | Facility: CLINIC | Age: 37
End: 2022-09-23

## 2022-09-23 DIAGNOSIS — Z23 HIGH PRIORITY FOR 2019-NCOV VACCINE: ICD-10-CM

## 2022-09-23 DIAGNOSIS — Z23 NEED FOR PROPHYLACTIC VACCINATION AND INOCULATION AGAINST INFLUENZA: Primary | ICD-10-CM

## 2022-09-23 PROCEDURE — 0124A COVID-19,PF,PFIZER BOOSTER BIVALENT: CPT

## 2022-09-23 PROCEDURE — 91312 COVID-19,PF,PFIZER BOOSTER BIVALENT: CPT

## 2022-09-23 PROCEDURE — 90686 IIV4 VACC NO PRSV 0.5 ML IM: CPT

## 2022-09-23 PROCEDURE — 99207 PR NO CHARGE NURSE ONLY: CPT

## 2022-09-23 PROCEDURE — 90471 IMMUNIZATION ADMIN: CPT

## 2022-09-23 NOTE — CONFIDENTIAL NOTE
Caller: Krystina Suazo    Procedure: EGD with Bravo    Date, Location, and Surgeon of Procedure Cancelled[place] [facility]    Ordering Provider:Dioni    Reason for cancel (please be detailed, any staff messages or encounters to note?): pt needed a new date and tiime        Rescheduled: [place] [facility] [place] April or May.      If rescheduled:    Date:    Location:    Prep Resent: (changes to prep?)   Covid Test Rescheduled:   Note any change or update to original order/sedation:     Message sent to Diana Killian to review in 1 month for MAC time.

## 2022-10-27 ENCOUNTER — PRE VISIT (OUTPATIENT)
Dept: GASTROENTEROLOGY | Facility: CLINIC | Age: 37
End: 2022-10-27

## 2022-11-02 ENCOUNTER — MYC MEDICAL ADVICE (OUTPATIENT)
Dept: GASTROENTEROLOGY | Facility: CLINIC | Age: 37
End: 2022-11-02

## 2022-11-02 DIAGNOSIS — R12 HEARTBURN: Primary | ICD-10-CM

## 2022-11-09 RX ORDER — FAMOTIDINE 40 MG/1
40 TABLET, FILM COATED ORAL DAILY
Qty: 90 TABLET | Refills: 3 | Status: SHIPPED | OUTPATIENT
Start: 2022-11-09 | End: 2023-09-25

## 2022-11-15 ENCOUNTER — E-VISIT (OUTPATIENT)
Dept: URGENT CARE | Facility: CLINIC | Age: 37
End: 2022-11-15
Payer: COMMERCIAL

## 2022-11-15 DIAGNOSIS — J01.90 ACUTE BACTERIAL SINUSITIS: Primary | ICD-10-CM

## 2022-11-15 DIAGNOSIS — B96.89 ACUTE BACTERIAL SINUSITIS: Primary | ICD-10-CM

## 2022-11-15 PROCEDURE — 99421 OL DIG E/M SVC 5-10 MIN: CPT | Performed by: EMERGENCY MEDICINE

## 2022-11-15 NOTE — PATIENT INSTRUCTIONS
Dear Krystina Suazo    After reviewing your responses, I've been able to diagnose you with  a sinus infection caused by bacteria.?     Based on your responses and diagnosis, I have prescribed augmentin to treat your symptoms. I have sent this to your pharmacy.?     It is also important to stay well hydrated, get lots of rest and take over-the-counter decongestants,?tylenol?or ibuprofen if you?are able to?take those medications per your primary care provider to help relieve discomfort.?     It is important that you take?all of?your prescribed medication even if your symptoms are improving after a few doses.? Taking?all of?your medicine helps prevent the symptoms from returning.?     If your symptoms worsen, you develop severe headache, vomiting, high fever (>102), or are not improving in 7 days, please contact your primary care provider for an appointment or visit any of our convenient Walk-in Care or Urgent Care Centers to be seen which can be found on our website?here.?     Thanks again for choosing?us?as your health care partner,?   ?  Michael Cummins MD?

## 2022-11-28 ENCOUNTER — E-VISIT (OUTPATIENT)
Dept: URGENT CARE | Facility: CLINIC | Age: 37
End: 2022-11-28

## 2022-11-28 DIAGNOSIS — R09.81 NASAL CONGESTION: Primary | ICD-10-CM

## 2022-11-28 PROCEDURE — 99207 PR NON-BILLABLE SERV PER CHARTING: CPT | Performed by: FAMILY MEDICINE

## 2022-11-28 NOTE — PATIENT INSTRUCTIONS
Dear Krystina Suazo,    We are sorry you are not feeling well. Based on the responses you provided, it is recommended that you be seen in-person in urgent care so we can better evaluate your symptoms. Please click here to find the nearest urgent care location to you.   You will not be charged for this Visit. Thank you for trusting us with your care.    Lynette Grover MD

## 2022-11-30 ENCOUNTER — OFFICE VISIT (OUTPATIENT)
Dept: URGENT CARE | Facility: URGENT CARE | Age: 37
End: 2022-11-30
Payer: COMMERCIAL

## 2022-11-30 VITALS
DIASTOLIC BLOOD PRESSURE: 79 MMHG | RESPIRATION RATE: 20 BRPM | TEMPERATURE: 98 F | SYSTOLIC BLOOD PRESSURE: 133 MMHG | OXYGEN SATURATION: 97 % | HEART RATE: 78 BPM

## 2022-11-30 DIAGNOSIS — R05.1 ACUTE COUGH: ICD-10-CM

## 2022-11-30 DIAGNOSIS — J11.1 INFLUENZA-LIKE ILLNESS: Primary | ICD-10-CM

## 2022-11-30 DIAGNOSIS — J01.90 ACUTE SINUSITIS WITH COEXISTING CONDITION REQUIRING PROPHYLACTIC TREATMENT: ICD-10-CM

## 2022-11-30 DIAGNOSIS — J22 LOWER RESPIRATORY TRACT INFECTION: ICD-10-CM

## 2022-11-30 DIAGNOSIS — Z20.822 SUSPECTED 2019 NOVEL CORONAVIRUS INFECTION: ICD-10-CM

## 2022-11-30 DIAGNOSIS — J45.21 MILD INTERMITTENT ASTHMA WITH ACUTE EXACERBATION: ICD-10-CM

## 2022-11-30 LAB
DEPRECATED S PYO AG THROAT QL EIA: NEGATIVE
FLUAV AG SPEC QL IA: NEGATIVE
FLUBV AG SPEC QL IA: NEGATIVE

## 2022-11-30 PROCEDURE — 87804 INFLUENZA ASSAY W/OPTIC: CPT

## 2022-11-30 PROCEDURE — 99214 OFFICE O/P EST MOD 30 MIN: CPT | Mod: CS | Performed by: FAMILY MEDICINE

## 2022-11-30 PROCEDURE — U0003 INFECTIOUS AGENT DETECTION BY NUCLEIC ACID (DNA OR RNA); SEVERE ACUTE RESPIRATORY SYNDROME CORONAVIRUS 2 (SARS-COV-2) (CORONAVIRUS DISEASE [COVID-19]), AMPLIFIED PROBE TECHNIQUE, MAKING USE OF HIGH THROUGHPUT TECHNOLOGIES AS DESCRIBED BY CMS-2020-01-R: HCPCS | Performed by: FAMILY MEDICINE

## 2022-11-30 PROCEDURE — 87651 STREP A DNA AMP PROBE: CPT | Performed by: FAMILY MEDICINE

## 2022-11-30 PROCEDURE — U0005 INFEC AGEN DETEC AMPLI PROBE: HCPCS | Performed by: FAMILY MEDICINE

## 2022-11-30 RX ORDER — BENZONATATE 200 MG/1
200 CAPSULE ORAL 3 TIMES DAILY PRN
Qty: 30 CAPSULE | Refills: 0 | Status: SHIPPED | OUTPATIENT
Start: 2022-11-30 | End: 2023-09-25

## 2022-11-30 RX ORDER — CODEINE PHOSPHATE AND GUAIFENESIN 10; 100 MG/5ML; MG/5ML
2 SOLUTION ORAL EVERY 6 HOURS PRN
Qty: 118 ML | Refills: 0 | Status: SHIPPED | OUTPATIENT
Start: 2022-11-30 | End: 2023-09-25

## 2022-11-30 RX ORDER — PREDNISONE 20 MG/1
40 TABLET ORAL DAILY
Qty: 10 TABLET | Refills: 0 | Status: SHIPPED | OUTPATIENT
Start: 2022-11-30 | End: 2022-12-05

## 2022-11-30 RX ORDER — OSELTAMIVIR PHOSPHATE 75 MG/1
75 CAPSULE ORAL 2 TIMES DAILY
Qty: 10 CAPSULE | Refills: 0 | Status: CANCELLED | OUTPATIENT
Start: 2022-11-30 | End: 2022-12-05

## 2022-11-30 RX ORDER — DOXYCYCLINE 100 MG/1
100 CAPSULE ORAL 2 TIMES DAILY
Qty: 20 CAPSULE | Refills: 0 | Status: SHIPPED | OUTPATIENT
Start: 2022-11-30 | End: 2022-12-10

## 2022-12-01 ENCOUNTER — TELEPHONE (OUTPATIENT)
Dept: ORTHOPEDICS | Facility: CLINIC | Age: 37
End: 2022-12-01

## 2022-12-01 LAB
GROUP A STREP BY PCR: NOT DETECTED
SARS-COV-2 RNA RESP QL NAA+PROBE: NEGATIVE

## 2022-12-01 NOTE — PATIENT INSTRUCTIONS
Okay to take ibuprofen 200 mg - 4 tablets (800 mg) every 8 hours as needed.  Okay to take tylenol 500 mg - 2 tablets (1000 mg) every 6-8 hours as needed, do not exceed 3000 mg in 24 hours.  Take tessalon perles and robitussin with codeine to help with cough    Take full course of Doxycycline for sinus infection and bronchitis/lower respiratory tract infection if symptoms 10 days or longer    Continue with inhalers  Take prednisone burst if asthma symptoms worsens.    Quarantine while awaiting COVID test result  We will contact you if the throat culture is positive for strep.

## 2022-12-01 NOTE — PROGRESS NOTES
SUBJECTIVE:   Krystina Suazo is a 37 year old female presenting with a chief complaint of chills, sore throat.  No fever.  Onset of symptoms was 4 day(s) ago.  Course of illness is worsening.    Severity moderate  Current and Associated symptoms: cough, sore thtroat  Treatment measures tried include Tylenol/Ibuprofen, OTC Cough med, Fluids and Rest.  Predisposing factors include exposure to influenza and HX of asthma, recent illness    Exposed to influenza over the [employer] gathering.  Developed cough, congestion initially, has sore throat.      Was on antibiotic for sinus infection 11/15, given Augmentin for 7 days and was improving.    Past Medical History:   Diagnosis Date     Asthma      Depression      Depressive disorder     Diagnosed around 2010     Hypertension 8/30/2018     Uterine fibroid in pregnancy 4/20/2020     Vasovagal syncope      Current Outpatient Medications   Medication Sig Dispense Refill     albuterol (PROAIR HFA/PROVENTIL HFA/VENTOLIN HFA) 108 (90 Base) MCG/ACT inhaler INHALE 2 PUFFS INTO THE LUNGS EVERY 4 HOURS AS NEEDED FOR SHORTNESS OF BREATH / DYSPNEA OR WHEEZING 8.5 g 11     buPROPion (WELLBUTRIN XL) 300 MG 24 hr tablet Take 1 tablet (300 mg) by mouth every morning 90 tablet 1     clindamycin (CLEOCIN T) 1 % external lotion Twice daily to acne rash on thighs as needed. 60 mL 4     clobetasol (TEMOVATE) 0.05 % external solution        famotidine (PEPCID) 40 MG tablet Take 1 tablet (40 mg) by mouth daily 90 tablet 3     ipratropium (ATROVENT) 0.03 % nasal spray Spray 2 sprays into both nostrils every 8 hours as needed for rhinitis Patient will call when needs filled. 30 mL 11     omeprazole (PRILOSEC) 20 MG DR capsule TAKE ONE CAPSULE BY MOUTH ONCE DAILY 90 capsule 3     sertraline (ZOLOFT) 100 MG tablet Take 2 tablets (200 mg) by mouth daily 180 tablet 4     triamcinolone (KENALOG) 0.1 % external ointment Twice daily to eczema rash on the arms and legs until clear, then as  needed. 80 g 2     Social History     Tobacco Use     Smoking status: Never     Smokeless tobacco: Never   Substance Use Topics     Alcohol use: No       ROS:  Review of systems negative except as stated above.    OBJECTIVE:  /79   Pulse 78   Temp 98  F (36.7  C)   Resp 20   LMP 12/08/2021   SpO2 97%   GENERAL APPEARANCE: healthy, alert and no distress  EYES: EOMI,  PERRL, conjunctiva clear  HENT: ear canals and TM's normal.  Nose and mouth without ulcers, erythema or lesions  RESP: lungs clear to auscultation - no rales, rhonchi or wheezes  CV: regular rates and rhythm, normal S1 S2, no murmur noted  PSYCH: mentation appears normal and affect normal/bright    Results for orders placed or performed in visit on 11/30/22   Streptococcus A Rapid Screen w/Reflex to PCR     Status: Normal    Specimen: Throat; Swab   Result Value Ref Range    Group A Strep antigen Negative Negative   Influenza A/B antigen     Status: Normal    Specimen: Nasopharyngeal; Swab   Result Value Ref Range    Influenza A antigen Negative Negative    Influenza B antigen Negative Negative    Narrative    Test results must be correlated with clinical data. If necessary, results should be confirmed by a molecular assay or viral culture.       ASSESSMENT/PLAN:  (J11.1) Influenza-like illness  (primary encounter diagnosis)  Plan: symptomatic treatment    (Z20.822) Suspected 2019 novel coronavirus infection  Plan: Symptomatic; Unknown COVID-19 Virus         (Coronavirus) by PCR Nose, Streptococcus A         Rapid Screen w/Reflex to PCR, Influenza A/B         antigen, Group A Streptococcus PCR Throat Swab            (J01.90) Acute sinusitis with coexisting condition requiring prophylactic treatment  Plan: doxycycline hyclate (VIBRAMYCIN) 100 MG         capsule, predniSONE (DELTASONE) 20 MG tablet            (J22) Lower respiratory tract infection  Plan: doxycycline hyclate (VIBRAMYCIN) 100 MG capsule            (R05.1) Acute cough  Plan:  guaiFENesin-codeine (ROBITUSSIN AC) 100-10         MG/5ML solution, benzonatate (TESSALON) 200 MG         capsule            (J45.21) Mild intermittent asthma with acute exacerbation  Plan: predniSONE (DELTASONE) 20 MG tablet        Continue with inhalers      Reassurance given, discussed that symptom presentation most likely influenza even in setting of negative flu screen due to close exposure.  Patient is on day 4 which is outside effective Tamiflu treatment, recommend symptomatic treatment at this time.  RX tessalon perles and RX lea AC given to help with cough.  RX Doxycycline given to treat sinus infection and lower respiratory tract infection if symptoms last for 10 days or more.  RX prednisone burst given to help with asthma flare.    Will follow up on throat culture and treat if positive for strep.  COVID screen obtained as symptoms overlap with COVID infection, quarantine while awaiting result.    Follow up with primary provider if no improvement of symptoms in 1-2 weeks    Clifford Bah MD  November 30, 2022 8:23 PM

## 2022-12-01 NOTE — TELEPHONE ENCOUNTER
LVM for return call to discuss appointment that was scheduled for 12/2/22. Provider is not available, and appointment will need to be rescheduled.  Patient has pending Covid-19 test, if positive patient should reschedule to 10 days after positive test.     Phone Number patient can be reached at:  Home number on file [phone] (home)    Lenore El, ATC

## 2022-12-05 NOTE — TELEPHONE ENCOUNTER
Patient rescheduled appointment with Dr. Higginbotham on 5/6/22.     Closing encounter.     Lenore El, ATC

## 2022-12-06 ENCOUNTER — ANCILLARY PROCEDURE (OUTPATIENT)
Dept: GENERAL RADIOLOGY | Facility: CLINIC | Age: 37
End: 2022-12-06
Attending: ORTHOPAEDIC SURGERY
Payer: COMMERCIAL

## 2022-12-06 ENCOUNTER — OFFICE VISIT (OUTPATIENT)
Dept: ORTHOPEDICS | Facility: CLINIC | Age: 37
End: 2022-12-06
Payer: COMMERCIAL

## 2022-12-06 VITALS — WEIGHT: 208 LBS | BODY MASS INDEX: 30.72 KG/M2 | SYSTOLIC BLOOD PRESSURE: 120 MMHG | DIASTOLIC BLOOD PRESSURE: 70 MMHG

## 2022-12-06 DIAGNOSIS — S69.81XA TFCC (TRIANGULAR FIBROCARTILAGE COMPLEX) INJURY, RIGHT, INITIAL ENCOUNTER: ICD-10-CM

## 2022-12-06 DIAGNOSIS — M25.531 CHRONIC WRIST PAIN, RIGHT: ICD-10-CM

## 2022-12-06 DIAGNOSIS — M25.531 CHRONIC WRIST PAIN, RIGHT: Primary | ICD-10-CM

## 2022-12-06 DIAGNOSIS — G89.29 CHRONIC WRIST PAIN, RIGHT: ICD-10-CM

## 2022-12-06 DIAGNOSIS — G89.29 CHRONIC WRIST PAIN, RIGHT: Primary | ICD-10-CM

## 2022-12-06 PROCEDURE — 73110 X-RAY EXAM OF WRIST: CPT | Mod: TC | Performed by: RADIOLOGY

## 2022-12-06 PROCEDURE — 99203 OFFICE O/P NEW LOW 30 MIN: CPT | Performed by: ORTHOPAEDIC SURGERY

## 2022-12-06 NOTE — LETTER
2022         RE: Krystina Suazo  [address]  [place] [address]        Dear Colleague,    Thank you for referring your patient, Krystina Suazo, to the [facility] [place]. Please see a copy of my visit note below.    S: Patient is a 37 year old, right hand dominant female seen today in consultation for right wrist.  They report onset of symptoms 2022, she reports injury when in the bounce [place] with her little boys. She reports one of the boys came down onto the wrist.  Pain is located to the middle/ ulnar aspect. She reports that her pain has slightly improved, but continues to linger.  Increased pain with lifting, carrying, and when driving.  Painful with weightbearing. She denies weakness of the hand, but limited by pain.  No obvious swelling.  They have tried the following therapies: wrist bracing, rest, occasional icing. She notes acute use of OTC pain relievers, NSaids at time of onset, has tapered these therapies.      Current pain level: 1-2/10, Worst pain level: 8/10.     Patient is currently working desk work.   Patient has history of carpal tunnel syndrome, prior carpal tunnel release- has ergonomic accommodations.         Patient Active Problem List   Diagnosis     COURTNEY (generalized anxiety disorder)     Major depressive disorder, recurrent episode, moderate (H)     Allergic rhinitis     Mild intermittent asthma without complication     Vasovagal syncope     Previous  section     Umbilical hernia without obstruction and without gangrene     S/P MINAL (total abdominal hysterectomy)     Family history of esophageal cancer     Family history of diabetes mellitus type II            Past Medical History:   Diagnosis Date     Asthma      Depression      Depressive disorder     Diagnosed around      Hypertension 2018     Uterine fibroid in pregnancy 2020     Vasovagal syncope             Past Surgical History:   Procedure Laterality  Date     CARPAL TUNNEL RELEASE RT/LT Bilateral       SECTION N/A 2018    Procedure:  SECTION;   section;  Surgeon: Jay Yanez MD;  Location: [facility] OR      SECTION N/A 10/27/2020    Procedure: REPEAT  SECTION;  Surgeon: Aníbal Pearson MD;  Location: [facility] L+D     GYN SURGERY  2018         HERNIORRHAPHY UMBILICAL N/A 2021    Procedure: Open Umbilical Hernia Repair with Mesh;  Surgeon: Erin Mccarthy MD;  Location: [facility] OR     HYSTERECTOMY TOTAL ABDOMINAL, SALPINGECTOMY N/A 2021    Procedure: TOTAL ABDOMINAL HYSTERECTOMY, BILATERAL SALPINGECTOMY;  Surgeon: Sandy Tran MD;  Location: [facility] OR     HYSTERECTOMY, PAP NO LONGER INDICATED       SINUS SURGERY       TONSILLECTOMY              Social History     Tobacco Use     Smoking status: Never     Smokeless tobacco: Never   Substance Use Topics     Alcohol use: No            Family History   Problem Relation Age of Onset     Esophageal Cancer Mother 57     Diabetes Mother         Type 2     Hypertension Mother      Other Cancer Mother         Esophageal     Depression Mother      Obesity Mother      Asthma Father      Glaucoma No family hx of      Macular Degeneration No family hx of      Breast Cancer No family hx of      Colon Cancer No family hx of                Allergies   Allergen Reactions     Dust Mite Extract      Pollen and animal dander.     Seasonal Allergies             Current Outpatient Medications   Medication Sig Dispense Refill     albuterol (PROAIR HFA/PROVENTIL HFA/VENTOLIN HFA) 108 (90 Base) MCG/ACT inhaler INHALE 2 PUFFS INTO THE LUNGS EVERY 4 HOURS AS NEEDED FOR SHORTNESS OF BREATH / DYSPNEA OR WHEEZING 8.5 g 11     benzonatate (TESSALON) 200 MG capsule Take 1 capsule (200 mg) by mouth 3 times daily as needed for cough 30 capsule 0     buPROPion (WELLBUTRIN XL) 300 MG 24 hr tablet Take 1 tablet (300 mg) by mouth every morning 90 tablet 1      clindamycin (CLEOCIN T) 1 % external lotion Twice daily to acne rash on thighs as needed. 60 mL 4     clobetasol (TEMOVATE) 0.05 % external solution        doxycycline hyclate (VIBRAMYCIN) 100 MG capsule Take 1 capsule (100 mg) by mouth 2 times daily for 10 days 20 capsule 0     famotidine (PEPCID) 40 MG tablet Take 1 tablet (40 mg) by mouth daily 90 tablet 3     guaiFENesin-codeine (ROBITUSSIN AC) 100-10 MG/5ML solution Take 10 mLs by mouth every 6 hours as needed for cough 118 mL 0     ipratropium (ATROVENT) 0.03 % nasal spray Spray 2 sprays into both nostrils every 8 hours as needed for rhinitis Patient will call when needs filled. 30 mL 11     omeprazole (PRILOSEC) 20 MG DR capsule TAKE ONE CAPSULE BY MOUTH ONCE DAILY 90 capsule 3     sertraline (ZOLOFT) 100 MG tablet Take 2 tablets (200 mg) by mouth daily 180 tablet 4     triamcinolone (KENALOG) 0.1 % external ointment Twice daily to eczema rash on the arms and legs until clear, then as needed. 80 g 2          Review Of Systems  Skin: negative  Eyes: negative  Ears/Nose/Throat: negative  Respiratory: No shortness of breath, dyspnea on exertion, cough, or hemoptysis    O: Physical Exam: Tender to palpation about Distal radial/ ulnar joint R UE and over the TFCC.  CMS intact.  Negative Tinel's.  Adequate radial/ulnar deviation wrist, palmar/dorsiflexion.  No crepitus.    Lab:  Vit D 33    Images:  XR WRIST RIGHT G/E 3 VIEWS 12/6/2022 10:18 AM     HISTORY: Chronic wrist pain, right; Chronic wrist pain, right     COMPARISON: None.                                                                      IMPRESSION: No fracture. Normal alignment. No degenerative changes          A:  Possible TFCC injury RUE    P:  MRI R wrist  See back after study  Notify if exacerbation symptoms           In addition to the above assessment and plan each active problem on Krystina's problem list was evaluated today. This included the questioning of Krystina for any medication  problems. We will continue the current treatment plan for these active problems except as noted.        Again, thank you for allowing me to participate in the care of your patient.        Sincerely,        EVELIO BROCK MD

## 2022-12-06 NOTE — PATIENT INSTRUCTIONS
Thank you for choosing [facility]    Dr. Higginbotham Locations:    [facility]   [address], [address]  [place], [place] [address] [place] [address]   Appointments: [phone] Appointments: [phone]   Fax: [phone] Fax: [phone]       Follow up: ~1 month, after completion of MRI  Please call [phone] to schedule your follow up appointment.     MRI of the right wrist has been ordered.   The [facility] team will call you to schedule your imaging exam in the next 1-2 days. You can also call them directly at [facility] [phone] ([address], [address], [place], [place]) to schedule your appointment.       For any questions please contact my office, [phone].

## 2022-12-08 ENCOUNTER — OFFICE VISIT (OUTPATIENT)
Dept: FAMILY MEDICINE | Facility: CLINIC | Age: 37
End: 2022-12-08
Payer: COMMERCIAL

## 2022-12-08 VITALS
RESPIRATION RATE: 17 BRPM | OXYGEN SATURATION: 96 % | DIASTOLIC BLOOD PRESSURE: 60 MMHG | HEIGHT: 69 IN | WEIGHT: 206 LBS | SYSTOLIC BLOOD PRESSURE: 130 MMHG | TEMPERATURE: 97.5 F | HEART RATE: 85 BPM | BODY MASS INDEX: 30.51 KG/M2

## 2022-12-08 DIAGNOSIS — M62.08 DIASTASIS RECTI: Primary | ICD-10-CM

## 2022-12-08 PROCEDURE — 99213 OFFICE O/P EST LOW 20 MIN: CPT | Performed by: PHYSICIAN ASSISTANT

## 2022-12-08 ASSESSMENT — PATIENT HEALTH QUESTIONNAIRE - PHQ9
SUM OF ALL RESPONSES TO PHQ QUESTIONS 1-9: 1
SUM OF ALL RESPONSES TO PHQ QUESTIONS 1-9: 1
10. IF YOU CHECKED OFF ANY PROBLEMS, HOW DIFFICULT HAVE THESE PROBLEMS MADE IT FOR YOU TO DO YOUR WORK, TAKE CARE OF THINGS AT HOME, OR GET ALONG WITH OTHER PEOPLE: NOT DIFFICULT AT ALL

## 2022-12-08 NOTE — PROGRESS NOTES
"  Assessment & Plan     Diastasis recti - discussed body mechanics, avoidance of heavy lifting. Recommend discussion with general surgery, she is in agreement.   - Adult General Surg Referral      No follow-ups on file.    Chanelle Christian PA-C  [facility]      Ana Flaherty is a 37 year old, presenting for the following health issues:  Hernia    Having worse pain for the past few weeks  Ongoing since having to carry her kids out of the library after a tantrum  Had to lie down for the rest of that day due to pain  Worse again at [facility] - questions whether she had been doing more lifting that week  Worse with bending/lifting  Complaint is pain, no bulging  S/p umbilical hernia repair w/ mesh 2021 as well as abdominal hysterectomy 2021    History of Present Illness       Reason for visit:  Pain around the site of a previous hernia surgery    She eats 2-3 servings of fruits and vegetables daily.She consumes 0 sweetened beverage(s) daily.She exercises with enough effort to increase her heart rate 10 to 19 minutes per day.  She exercises with enough effort to increase her heart rate 4 days per week.   She is taking medications regularly.    Today's PHQ-9         PHQ-9 Total Score: 1    PHQ-9 Q9 Thoughts of better off dead/self-harm past 2 weeks :   Not at all    How difficult have these problems made it for you to do your work, take care of things at home, or get along with other people: Not difficult at all     Review of Systems         Objective    /60 (BP Location: Left arm, Patient Position: Sitting, Cuff Size: Adult Large)   Pulse 85   Temp 97.5  F (36.4  C) (Temporal)   Resp 17   Ht 1.753 m (5' 9\")   Wt 93.4 kg (206 lb)   LMP 2021   SpO2 96%   BMI 30.42 kg/m    Body mass index is 30.42 kg/m .  Physical Exam   GENERAL: healthy, alert and no distress  ABDOMEN: soft, nontender, without hepatosplenomegaly or masses and well-healed incision low  " "scar, umbilical hernia scar. Superior to umbilicus at midline there is bulging present with \"crunch\" and completely reduces with relaxation  PSYCH: mentation appears normal, affect normal/bright  "

## 2022-12-20 ENCOUNTER — TELEPHONE (OUTPATIENT)
Dept: GASTROENTEROLOGY | Facility: CLINIC | Age: 37
End: 2022-12-20

## 2022-12-20 ENCOUNTER — VIRTUAL VISIT (OUTPATIENT)
Dept: GASTROENTEROLOGY | Facility: CLINIC | Age: 37
End: 2022-12-20
Payer: COMMERCIAL

## 2022-12-20 ENCOUNTER — HOSPITAL ENCOUNTER (OUTPATIENT)
Facility: AMBULATORY SURGERY CENTER | Age: 37
End: 2022-12-20
Attending: INTERNAL MEDICINE
Payer: COMMERCIAL

## 2022-12-20 DIAGNOSIS — R12 HEARTBURN: Primary | ICD-10-CM

## 2022-12-20 DIAGNOSIS — R11.10 REGURGITATION OF FOOD: ICD-10-CM

## 2022-12-20 PROCEDURE — 99215 OFFICE O/P EST HI 40 MIN: CPT | Mod: 95 | Performed by: INTERNAL MEDICINE

## 2022-12-20 NOTE — PROGRESS NOTES
Krystina is a 37 year old who is being evaluated via a billable video visit.      How would you like to obtain your AVS? MyChart  If the video visit is dropped, the invitation should be resent by: Send to e-mail at: [email or web address]  Will anyone else be joining your video visit? No        Video-Visit Details    Video Start Time: 1:19 PM    Type of service:  Video Visit    Video End Time:1:48    Originating Location (pt. Location): Home        Distant Location (provider location):  Off-site    Platform used for Video Visit: [facility]     Gastroenterology Visit for: Krystina Suazo 1985   MRN: [id number]     Reason for Visit:  chief complaint    Referred by: [place]  / [facility]  / [place] [id number]  Patient Care Team:  Valeria Daly MD as PCP - General (Internal Medicine)  Valeria Daly MD as Assigned PCP  Sandy Tran MD as Assigned OBGYN Provider  James Velazco MD as Assigned Musculoskeletal Provider  Krystina Young MD as Assigned Surgical Provider  Williams Wheatley DO as Assigned Gastroenterology Provider    History of Present Illness:   Krystina Suazo is a 37 year old female with a history of GERD who is presenting as a follow up patient in consultation at the request of Dr. Daly with a chief complaint of heartburn and bloating.    12/20/22     Currently the patient is off of PPI and taking pepcid 40mg daily. She feels that her symptoms are better than when on 20mg pepcid. She felt that her heartburn is manageable. When she bends over shortly after eating she has regurgitation. PPI didn't seem to control regurgitation by the time of our last visit. Heartburn has been well controlled. She has noticed some regurgitation with tomato sauces. In the morning it feels like she has increased reflux. Umbilical hernia fixed 1.5 years ago. Bloating and gas has been better. Did not correlate with PPI cessation.  Unable to associate with foods or  activity. Symptoms occur randomly. Does not have flatulence associated. She rests and allows it to pass. Has predominantly bloating with minimal to no distention.   ---------------------------------------------------------------  8/16/22  Krystina Suazo states she has been taking 20mg PPI (omeprazole) for heartburn for the past 13 years. Her heartburn has been well controlled on this regimen, but her PCP would like her to stop taking the medication if possible. She takes her PPI in the morning before breakfast, sometimes taking it with her vitamins. Her heartburn was not severe when taking the PPI, but she started on the medicine given her mother's history of esophageal cancer presumed to be related to acid reflux. No side known effects with PPI to date. Has tried stopping the PPI, but her heartburn symptoms return. Has not tried taking Pepcid. No previous upper endoscopy.        Additionally, pt has had problems with severe gas for at least the past 13 years as well. States no constipation, but some bloating. 3-4 soft stools per day, has been her normal. No diarrhea or bloody stools.  She has been unable to associate these symptoms with any particular food. No history of celiac disease.       History of psoriasis and asthma.   ---------------------------------------------------------------     Krystina Suazo denies dysphagia, odynophagia, nausea, vomiting, early satiety, abdominal pain, diarrhea, constipation, hematochezia, or melena. No unintentional weight loss.     Family history of colon cancer: none  Wt Readings from Last 5 Encounters:   12/08/22 93.4 kg (206 lb)   12/06/22 94.3 kg (208 lb)   08/16/22 94.3 kg (208 lb)   07/05/22 98.7 kg (217 lb 8 oz)   03/28/22 101.2 kg (223 lb)        Esophageal Questionnaire(s)    BEDQ Questionnaire  BEDQ Questionnaire: How Often Have You Had the Following? 8/15/2022 12/13/2022   Trouble eating solid food (meat, bread, vegetables) 0 0   Trouble eating soft foods  (yogurt, jello, pudding) 0 0   Trouble swallowing liquids 0 0   Pain while swallowing 0 0   Coughing or choking while swallowing foods or liquids 0 0   Total Score: 0 0     BEDQ Questionnaire: Discomfort/Pain Ratings 8/15/2022 12/13/2022   Eating solid food (meat, bread, vegetables) 0 0   Eating soft foods (yogurt, jello, pudding) 0 0   Drinking liquid 0 0   Total Score: 0 0       Eckardt Questionnaire  Eckardt Questionnaire 8/15/2022 12/13/2022   Dysphagia 0 0   Regurgitation 1 1   Retrosternal Pain 1 1   Weight Loss (kg) 3 0   Total Score:  5 2       Promis 10 Questionnaire  PROMIS 10 FLOWSHEET DATA 12/13/2022   In general, would you say your health is: 4   In general, would you say your quality of life is: 4   In general, how would you rate your physical health? 3   In general, how would you rate your mental health, including your mood and your ability to think? 3   In general, how would you rate your satisfaction with your social activities and relationships? 4   In general, please rate how well you carry out your usual social activities and roles. (This includes activities at home, at work and in your community, and responsibilities as a parent, child, spouse, employee, friend, etc.) 4   To what extent are you able to carry out your everyday physical activities such as walking, climbing stairs, carrying groceries, or moving a chair? 5   In the past 7 days, how often have you been bothered by emotional problems such as feeling anxious, depressed, or irritable? 3   In the past 7 days, how would you rate your fatigue on average? 3   In the past 7 days, how would you rate your pain on average, where 0 means no pain, and 10 means worst imaginable pain? 2   Mental health question re-calculation - no clinical value 3   Physical health question re-calculation - no clinical value 3   Pain question re-calculation - no clinical value 4   Global Mental Health Score 14   Global Physical Health Score 15   PROMIS TOTAL -  SUBSCORES 29           STUDIES & PROCEDURES:    EGD:   Date:  Impression:  Pathology Report:    Colonoscopy:  Date:  Impression:  Pathology Report:     EndoFLIP directed at the UES or LES (8cm (EF-325) balloon length or 16cm (EF-322) balloon length):   Date:  8cm balloon  Balloon inflation Balloon pressure CSA (mm^2) DI (mm^2/mmHg) Dmin (mm) Compliance   20 (ladmark ID)        30        40        50           16cm balloon  Balloon inflation Balloon pressure CSA (mm^2) DI (mm^2/mmHg) Dmin (mm) Compliance   30 (ladmark ID)        40        50        60        70           High Resolution Manometry:  Date:  Impression:    PH/Impedance:  Date:  Impression:     Bravo:  48 or 96hr  Date:  Impression:    CT:  Date:  Impression:    Esophagram:  Date:  Impression:     Prior medical records were reviewed including, but not limited to, notes from referring providers, lab work, radiographic tests, and other diagnostic tests. Pertinent results were summarized above.     History     Past Medical History:   Diagnosis Date     Asthma      Depression      Depressive disorder     Diagnosed around      Hypertension 2018     Uterine fibroid in pregnancy 2020     Vasovagal syncope        Past Surgical History:   Procedure Laterality Date     CARPAL TUNNEL RELEASE RT/LT Bilateral       SECTION N/A 2018    Procedure:  SECTION;   section;  Surgeon: Jay Yanez MD;  Location: [facility] OR      SECTION N/A 10/27/2020    Procedure: REPEAT  SECTION;  Surgeon: Aníbal Pearson MD;  Location:  L[facility]D     GYN SURGERY  2018         HERNIORRHAPHY UMBILICAL N/A 2021    Procedure: Open Umbilical Hernia Repair with Mesh;  Surgeon: Erin Mccarthy MD;  Location: [facility] OR     HYSTERECTOMY TOTAL ABDOMINAL, SALPINGECTOMY N/A 2021    Procedure: TOTAL ABDOMINAL HYSTERECTOMY, BILATERAL SALPINGECTOMY;  Surgeon: Sandy Tran MD;  Location: [facility] OR      HYSTERECTOMY, PAP NO LONGER INDICATED       SINUS SURGERY  2014     TONSILLECTOMY  2014       Social History     Socioeconomic History     Marital status:      Spouse name: Yakov     Number of children: 1     Years of education: 16     Highest education level: Bachelor's degree (e.g., BA, AB, BS)   Occupational History     Occupation:      Occupation: [employer]     Comment: [employer]   Tobacco Use     Smoking status: Never     Smokeless tobacco: Never   Vaping Use     Vaping Use: Never used   Substance and Sexual Activity     Alcohol use: No     Drug use: No     Sexual activity: Yes     Partners: Male     Birth control/protection: Condom   Other Topics Concern     Parent/sibling w/ CABG, MI or angioplasty before 65F 55M? No   Social History Narrative     Not on file     Social Determinants of Health     Financial Resource Strain: Low Risk      Difficulty of Paying Living Expenses: Not very hard   Food Insecurity: No Food Insecurity     Worried About Running Out of Food in the Last Year: Never true     Ran Out of Food in the Last Year: Never true   Transportation Needs: No Transportation Needs     Lack of Transportation (Medical): No     Lack of Transportation (Non-Medical): No   Physical Activity: Not on file   Stress: Not on file   Social Connections: Not on file   Intimate Partner Violence: Not on file   Housing Stability: Not on file       Family History   Problem Relation Age of Onset     Esophageal Cancer Mother 57     Diabetes Mother         Type 2     Hypertension Mother      Other Cancer Mother         Esophageal     Depression Mother      Obesity Mother      Asthma Father      Glaucoma No family hx of      Macular Degeneration No family hx of      Breast Cancer No family hx of      Colon Cancer No family hx of      Family history reviewed and edited as appropriate    Medications and Allergies:     Outpatient Encounter Medications as of 12/20/2022    Medication Sig Dispense Refill     albuterol (PROAIR HFA/PROVENTIL HFA/VENTOLIN HFA) 108 (90 Base) MCG/ACT inhaler INHALE 2 PUFFS INTO THE LUNGS EVERY 4 HOURS AS NEEDED FOR SHORTNESS OF BREATH / DYSPNEA OR WHEEZING 8.5 g 11     benzonatate (TESSALON) 200 MG capsule Take 1 capsule (200 mg) by mouth 3 times daily as needed for cough 30 capsule 0     buPROPion (WELLBUTRIN XL) 300 MG 24 hr tablet Take 1 tablet (300 mg) by mouth every morning 90 tablet 1     clindamycin (CLEOCIN T) 1 % external lotion Twice daily to acne rash on thighs as needed. 60 mL 4     clobetasol (TEMOVATE) 0.05 % external solution Apply topically 2 times daily as needed (dermatitis) 50 mL 1     famotidine (PEPCID) 40 MG tablet Take 1 tablet (40 mg) by mouth daily 90 tablet 3     guaiFENesin-codeine (ROBITUSSIN AC) 100-10 MG/5ML solution Take 10 mLs by mouth every 6 hours as needed for cough 118 mL 0     ipratropium (ATROVENT) 0.03 % nasal spray Spray 2 sprays into both nostrils every 8 hours as needed for rhinitis Patient will call when needs filled. 30 mL 11     omeprazole (PRILOSEC) 20 MG DR capsule TAKE ONE CAPSULE BY MOUTH ONCE DAILY 90 capsule 3     sertraline (ZOLOFT) 100 MG tablet Take 2 tablets (200 mg) by mouth daily 180 tablet 4     triamcinolone (KENALOG) 0.1 % external ointment Twice daily to eczema rash on the arms and legs until clear, then as needed. 80 g 2     No facility-administered encounter medications on file as of 12/20/2022.        No Known Allergies     Review of systems:  A full 10 point review of systems was obtained and was negative except for the pertinent positives and negatives stated within the HPI.    Objective Findings:   Physical Exam:    Constitutional: LMP 12/08/2021   General: Alert, cooperative, no distress, well-appearing  Head: Atraumatic, normocephalic, no obvious abnormalities   Eyes: EOMI, Sclera anicteric, no obvious conjunctival hemorrhage   Nose: normal appearing nares, no obvious rhinorrhea    Throat: Not examined  Respiratory: No respiratory distress. Speaking in full sentences.    Cardiovascular: Not examined  Gastrointestinal: Not examined  Musculoskeletal: normal appearing range of motion without obvious strength deficit.  Skin: No jaundice, no obvious rash  Neurologic: AAOx3, no obvious neurologic abnormality  Psychiatric: Normal Affect, appropriate mood  Extremities: Not examined     Labs, Radiology, Pathology     Lab Results   Component Value Date    WBC 6.4 03/18/2022    WBC 7.7 07/21/2021    WBC 8.9 08/13/2020    HGB 13.5 03/18/2022    HGB 11.9 12/22/2021    HGB 14.1 07/21/2021     03/18/2022     07/21/2021     08/13/2020    CHOL 185 07/05/2022    CHOL 198 04/25/2019    TRIG 133 07/05/2022    TRIG 111 04/25/2019    HDL 57 07/05/2022    HDL 52 04/25/2019    ALT 14 09/06/2018    ALT 16 09/05/2018    ALT 18 09/04/2018    AST 14 09/06/2018    AST 10 09/05/2018    AST 14 09/04/2018     09/04/2018     08/30/2018    BUN 13 09/04/2018    BUN 13 08/30/2018    CO2 22 09/04/2018    CO2 22 08/30/2018    TSH 1.74 03/18/2022    TSH 1.70 03/13/2019        Liver Function Studies -   Recent Labs   Lab Test 09/06/18  0720 09/05/18  1016 09/04/18  1530   PROTTOTAL  --   --  6.0*   ALBUMIN  --   --  2.6*   BILITOTAL  --   --  0.1*   ALKPHOS  --   --  351*   AST 14   < > 14   ALT 14   < > 18    < > = values in this interval not displayed.          Patient Active Problem List    Diagnosis Date Noted     Heartburn 12/20/2022     Priority: Medium     Regurgitation of food 12/20/2022     Priority: Medium     Family history of esophageal cancer 07/05/2022     Priority: Medium     Mom with years of heartburn.       Family history of diabetes mellitus type II 07/05/2022     Priority: Medium     S/P MINAL (total abdominal hysterectomy) 12/22/2021     Priority: Medium     Umbilical hernia without obstruction and without gangrene 06/23/2021     Priority: Medium     Added automatically from  request for surgery [id number]       Previous  section 2020     Priority: Medium     Added automatically from request for surgery [id number]       Vasovagal syncope 2018     Priority: Medium     With shots, lab draws, getting eyes dilated.        COURTNEY (generalized anxiety disorder) 2017     Priority: Medium     Major depressive disorder, recurrent episode, moderate (H) 2017     Priority: Medium     Allergic rhinitis 2009     Priority: Medium     Overview:   Rhinitis Allergic  NOS       Mild intermittent asthma without complication 2009     Priority: Medium      Assessment and Plan   Assessment:    Krystina Suazo is 37 year old female with a history of GERD who is presenting as a follow up patient in consultation at the request of Dr. Daly with a chief complaint of heartburn and bloating.    The patient currently is off of PPI and her symptoms of heartburn are mostly controlled with Pepcid 40mg daily. She continues to have regurgitation symptoms. Interestingly she had been on PPI for many years which lost effectiveness. She has never undergone endoscopy. I have recommended upper endoscopy and Bravo OFF PPI therapy. Possible pathophysiology of her loss of response to PPI could be development of a hiatal hernia. She is actively trying to lose weight. She also avoids specific foods that trigger/worsen her reflux symptoms such as tomato sauce. Depending on endoscopy and bravo she may benefit from antireflux surgery.    Her bloating symptom could be functional/DGBI in nature. She does not have significant distention associated nor does she have increased flatulence or belching. She has been unable to identify triggers. We discussed a trial of IBgard with a future consideration of bentyl as needed. Celiac serologies are negative.    1. Heartburn    2. Regurgitation of food       No orders of the defined types were placed in this encounter.     Plan:  1. Please schedule the  endoscopy and bravo OFF therapy  2. You may wish to try Ibgard which is over the counter for the bloating symptom  3. In the future we can try bentyl which is an antispasmodic      Follow up plan:   Return to clinic 6 months and as needed.    The risks and benefits of my recommendations, as well as other treatment options were discussed with the patient and any available family today. All questions were answered.     o Follow up: As planned above. Today, I personally spent 29 minutes in direct face to face time with the patient, of which greater than 50% of the time was spent in patient education and counseling as described above. Approximately 15 minutes were spent on indirect care associated with the patient's consultation including but not limited to review of: patient medical records to date, clinic visits, hospital records, lab results, imaging studies, procedural documentation, and coordinating care with other providers. The findings from this review are summarized in the above note. All of the above accounted for a cumulative time of 44 minutes and was performed on the date of service.     The patient verbalized understanding of the plan and was appreciative for the time spent and information provided during the office visit.     Williams Wheatley DO   of Medicine  Director, Esophageal Disorders Program  Division of Gastroenterology, Hepatology, and Nutrition  [facility]    Documentation assisted by voice recognition and documentation system.

## 2022-12-20 NOTE — TELEPHONE ENCOUNTER
reschedule    Screening Questions  BLUE  KIND OF PREP RED  LOCATION [review exclusion criteria] GREEN  SEDATION TYPE        y Are you active on mychart?       [facility] Ordering/Referring Provider?        [employer] What type of coverage do you have?      n Have you had a positive covid test in the last 14 days?     30.7 1. BMI  [BMI 40+ - review exclusion criteria]    y  2. Are you able to give consent for your medical care? [IF NO,RN REVIEW]        n  3. Are you taking any prescription pain medications on a routine schedule?      n  3a. EXTENDED PREP What kind of prescription?     n 4. Do you have any chemical dependencies such as alcohol, street drugs, or methadone?    n 5. Do you have any history of post-traumatic stress syndrome, severe anxiety or history of psychosis?      **If yes 3- 5 , please schedule with MAC sedation.**          IF YES TO ANY 6 - 10 - HOSPITAL SETTING ONLY.     n 6.   Do you need assistance transferring?     n 7.   Have you had a heart or lung transplant?    n 8.   Are you currently on dialysis?   n 9.   Do you use daily home oxygen?   n 10. Do you take nitroglycerin?   10a. n If yes, how often?     11. [FEMALES]  n Are you currently pregnant?    11a. n If yes, how many weeks? [ Greater than 12 weeks, OR NEEDED]    n 12. Do you have Pulmonary Hypertension? *NEED PAC APPT AT [facility]*     n 13. [review exclusion criteria]  Do you have any implantable devices in your body (pacemaker, defib, LVAD)?    n 14. In the past 6 months, have you had any heart related issues including cardiomyopathy or heart attack?     14a. n If yes, did it require cardiac stenting if so when?     n 15. Have you had a stroke or Transient ischemic attack (TIA - aka  mini stroke ) within 6 months?      n 16. Do you have mod to severe Obstructive Sleep Apnea?  [Hospital only - Ok at [place]]    n 17. Do you have SEVERE AND UNCONTROLLED asthma? *NEED PAC APPT AT [facility]*     n 18. Are you currently taking any blood thinners?     " 18a. If yes, inform patient to \"follow up w/ ordering provider for bridging instructions.\"    n 19. Do you take the medication Phentermine?    19a. If yes, \"Hold for 7 days before procedure.  Please consult your prescribing provider if you have questions about holding this medication.\"     n  20. Do you have chronic kidney disease?      n  21. Do you have a diagnosis of diabetes?       22. On a regular basis do you go 3-5 days between bowel movements?      23. Preferred LOCAL Pharmacy for Pre Prescription    [ LIST ONLY ONE PHARMACY]        [facility] [place] - [address] [id number] - [place] - [address]  AT [address]        - CLOSING REMINDERS -    Informed patient they will need an adult    Cannot take any type of public or medical transportation alone    Conscious Sedation- Needs  for 6 hours after the procedure       MAC/General-Needs  for 24 hours after procedure    Pre-Procedure Covid test to be completed [ESSC PCR Testing Required]    Confirmed Nurse will call to complete assessment       - SCHEDULING DETAILS -  n Hospital Setting Required? If yes, what is the exclusion?:    Dioni  Surgeon    3/27/23  Date of Procedure  Upper Endoscopy with BRAVO [EGD BRAVO]  Type of Procedure Scheduled  [facility]        MAC Sedation Type     n PAC / Pre-op Required                 "

## 2022-12-20 NOTE — PATIENT INSTRUCTIONS
It was a pleasure taking care of you today.  I've included a brief summary of our discussion and care plan from today's visit below.  Please review this information with your primary care provider.  _______________________________________________________________________    My recommendations are summarized as follows:    Please schedule the endoscopy and bravo OFF therapy  You may wish to try Ibgard which is over the counter for the bloating symptom  In the future we can try bentyl which is an antispasmodic    To schedule endoscopic procedures you may call: [phone]  To schedule radiology tests you may call: [phone]  To schedule an ENT appointment you may call: [phone]    Please call my nurse Shivani ([phone]), Ismael ([phone]) with any questions or concerns.  If you were seen through the [facility] please feel free to reach out to Argenis at [phone]   --    Return to GI Clinic in 6 months to review your progress.    _______________________________________________________________________    Who do I call with any questions after my visit?  Please be in touch if there are any further questions that arise following today's visit.  There are multiple ways to contact your gastroenterology care team.      During business hours, you may reach a Gastroenterology nurse at [phone] and choose option 3.       To schedule or reschedule an appointment, please call [phone].     You can always send a secure message through [employer].  [employer] messages are answered by your nurse or doctor typically within 24 hours.  Please allow extra time on weekends and holidays.      For urgent/emergent questions after business hours, you may reach the on-call GI Fellow by contacting the [facility] at [phone].     How will I get the results of any tests ordered?    You will receive all of your results.  If you have signed up for MyChart, any tests ordered at your visit will be  available to you after your physician reviews them.  Typically this takes 1-2 weeks.  If there are urgent results that require a change in your care plan, your physician or nurse will call you to discuss the next steps.      What is [employer]?  [employer] is a secure way for you to access all of your healthcare records from the [facility].  It is a web based computer program, so you can sign on to it from any location.  It also allows you to send secure messages to your care team.  I recommend signing up for [employer] access if you have not already done so and are comfortable with using a computer.      How to I schedule a follow-up visit?  If you did not schedule a follow-up visit today, please call [phone] to schedule a follow-up office visit.      If you feel you received exceptional care and are interested in supporting the clinical and research goals of Dr. Wheatley or the Division of Gastroenterology, Hepatology, and Nutrition please contact [email or web address] from the [facility] to discuss opportunities to donate.    Sincerely,    Williams Wheatley DO   of Medicine  Director, Esophageal Disorders Program  Division of Gastroenterology, Hepatology, and Nutrition  [facility]

## 2022-12-20 NOTE — LETTER
12/20/2022         RE: Krystina Suazo  [address]  [place] [address]        Dear Colleague,    Thank you for referring your patient, Krystina Suazo, to the [facility] [place]. Please see a copy of my visit note below.    Gastroenterology Visit for: Krystina Suazo 1985   MRN: [id number]     Reason for Visit:  chief complaint    Referred by: [place]  / [facility]  / [place] [id number]  Patient Care Team:  Valeria Daly MD as PCP - General (Internal Medicine)  Valeria Daly MD as Assigned PCP  Sandy Tran MD as Assigned OBGYN Provider  James Velazco MD as Assigned Musculoskeletal Provider  Krystina Young MD as Assigned Surgical Provider  Williams Wheatley DO as Assigned Gastroenterology Provider    History of Present Illness:   Krystina Suazo is a 37 year old female with a history of GERD who is presenting as a follow up patient in consultation at the request of Dr. Daly with a chief complaint of heartburn and bloating.    12/20/22     Currently the patient is off of PPI and taking pepcid 40mg daily. She feels that her symptoms are better than when on 20mg pepcid. She felt that her heartburn is manageable. When she bends over shortly after eating she has regurgitation. PPI didn't seem to control regurgitation by the time of our last visit. Heartburn has been well controlled. She has noticed some regurgitation with tomato sauces. In the morning it feels like she has increased reflux. Umbilical hernia fixed 1.5 years ago. Bloating and gas has been better. Did not correlate with PPI cessation.  Unable to associate with foods or activity. Symptoms occur randomly. Does not have flatulence associated. She rests and allows it to pass. Has predominantly bloating with minimal to no distention.   ---------------------------------------------------------------  8/16/22  Krystina Suazo states she has been  taking 20mg PPI (omeprazole) for heartburn for the past 13 years. Her heartburn has been well controlled on this regimen, but her PCP would like her to stop taking the medication if possible. She takes her PPI in the morning before breakfast, sometimes taking it with her vitamins. Her heartburn was not severe when taking the PPI, but she started on the medicine given her mother's history of esophageal cancer presumed to be related to acid reflux. No side known effects with PPI to date. Has tried stopping the PPI, but her heartburn symptoms return. Has not tried taking Pepcid. No previous upper endoscopy.        Additionally, pt has had problems with severe gas for at least the past 13 years as well. States no constipation, but some bloating. 3-4 soft stools per day, has been her normal. No diarrhea or bloody stools.  She has been unable to associate these symptoms with any particular food. No history of celiac disease.       History of psoriasis and asthma.   ---------------------------------------------------------------     Krystina Suazo denies dysphagia, odynophagia, nausea, vomiting, early satiety, abdominal pain, diarrhea, constipation, hematochezia, or melena. No unintentional weight loss.     Family history of colon cancer: none  Wt Readings from Last 5 Encounters:   12/08/22 93.4 kg (206 lb)   12/06/22 94.3 kg (208 lb)   08/16/22 94.3 kg (208 lb)   07/05/22 98.7 kg (217 lb 8 oz)   03/28/22 101.2 kg (223 lb)        Esophageal Questionnaire(s)    BEDQ Questionnaire  BEDQ Questionnaire: How Often Have You Had the Following? 8/15/2022 12/13/2022   Trouble eating solid food (meat, bread, vegetables) 0 0   Trouble eating soft foods (yogurt, jello, pudding) 0 0   Trouble swallowing liquids 0 0   Pain while swallowing 0 0   Coughing or choking while swallowing foods or liquids 0 0   Total Score: 0 0     BEDQ Questionnaire: Discomfort/Pain Ratings 8/15/2022 12/13/2022   Eating solid food (meat, bread,  vegetables) 0 0   Eating soft foods (yogurt, jello, pudding) 0 0   Drinking liquid 0 0   Total Score: 0 0       Eckardt Questionnaire  Eckardt Questionnaire 8/15/2022 12/13/2022   Dysphagia 0 0   Regurgitation 1 1   Retrosternal Pain 1 1   Weight Loss (kg) 3 0   Total Score:  5 2       Promis 10 Questionnaire  PROMIS 10 FLOWSHEET DATA 12/13/2022   In general, would you say your health is: 4   In general, would you say your quality of life is: 4   In general, how would you rate your physical health? 3   In general, how would you rate your mental health, including your mood and your ability to think? 3   In general, how would you rate your satisfaction with your social activities and relationships? 4   In general, please rate how well you carry out your usual social activities and roles. (This includes activities at home, at work and in your community, and responsibilities as a parent, child, spouse, employee, friend, etc.) 4   To what extent are you able to carry out your everyday physical activities such as walking, climbing stairs, carrying groceries, or moving a chair? 5   In the past 7 days, how often have you been bothered by emotional problems such as feeling anxious, depressed, or irritable? 3   In the past 7 days, how would you rate your fatigue on average? 3   In the past 7 days, how would you rate your pain on average, where 0 means no pain, and 10 means worst imaginable pain? 2   Mental health question re-calculation - no clinical value 3   Physical health question re-calculation - no clinical value 3   Pain question re-calculation - no clinical value 4   Global Mental Health Score 14   Global Physical Health Score 15   PROMIS TOTAL - SUBSCORES 29           STUDIES & PROCEDURES:    EGD:   Date:  Impression:  Pathology Report:    Colonoscopy:  Date:  Impression:  Pathology Report:     EndoFLIP directed at the UES or LES (8cm (EF-325) balloon length or 16cm (EF-322) balloon length):   Date:  8cm  balloon  Balloon inflation Balloon pressure CSA (mm^2) DI (mm^2/mmHg) Dmin (mm) Compliance   20 (ladmark ID)        30        40        50           16cm balloon  Balloon inflation Balloon pressure CSA (mm^2) DI (mm^2/mmHg) Dmin (mm) Compliance   30 (ladmark ID)        40        50        60        70           High Resolution Manometry:  Date:  Impression:    PH/Impedance:  Date:  Impression:     Bravo:  48 or 96hr  Date:  Impression:    CT:  Date:  Impression:    Esophagram:  Date:  Impression:     Prior medical records were reviewed including, but not limited to, notes from referring providers, lab work, radiographic tests, and other diagnostic tests. Pertinent results were summarized above.     History     Past Medical History:   Diagnosis Date     Asthma      Depression      Depressive disorder     Diagnosed around      Hypertension 2018     Uterine fibroid in pregnancy 2020     Vasovagal syncope        Past Surgical History:   Procedure Laterality Date     CARPAL TUNNEL RELEASE RT/LT Bilateral       SECTION N/A 2018    Procedure:  SECTION;   section;  Surgeon: Jay Yanez MD;  Location: [facility] OR      SECTION N/A 10/27/2020    Procedure: REPEAT  SECTION;  Surgeon: Aníbal Pearson MD;  Location: [facility] L+D     GYN SURGERY  2018         HERNIORRHAPHY UMBILICAL N/A 2021    Procedure: Open Umbilical Hernia Repair with Mesh;  Surgeon: Erin Mccarthy MD;  Location: [facility] OR     HYSTERECTOMY TOTAL ABDOMINAL, SALPINGECTOMY N/A 2021    Procedure: TOTAL ABDOMINAL HYSTERECTOMY, BILATERAL SALPINGECTOMY;  Surgeon: Sandy Tran MD;  Location: [facility] OR     HYSTERECTOMY, PAP NO LONGER INDICATED       SINUS SURGERY  2014     TONSILLECTOMY  2014       Social History     Socioeconomic History     Marital status:      Spouse name: Yakov     Number of children: 1     Years of education: 16     Highest education level:  Bachelor's degree (e.g., BA, AB, BS)   Occupational History     Occupation:      Occupation: [employer]     Comment: [employer]   Tobacco Use     Smoking status: Never     Smokeless tobacco: Never   Vaping Use     Vaping Use: Never used   Substance and Sexual Activity     Alcohol use: No     Drug use: No     Sexual activity: Yes     Partners: Male     Birth control/protection: Condom   Other Topics Concern     Parent/sibling w/ CABG, MI or angioplasty before 65F 55M? No   Social History Narrative     Not on file     Social Determinants of Health     Financial Resource Strain: Low Risk      Difficulty of Paying Living Expenses: Not very hard   Food Insecurity: No Food Insecurity     Worried About Running Out of Food in the Last Year: Never true     Ran Out of Food in the Last Year: Never true   Transportation Needs: No Transportation Needs     Lack of Transportation (Medical): No     Lack of Transportation (Non-Medical): No   Physical Activity: Not on file   Stress: Not on file   Social Connections: Not on file   Intimate Partner Violence: Not on file   Housing Stability: Not on file       Family History   Problem Relation Age of Onset     Esophageal Cancer Mother 57     Diabetes Mother         Type 2     Hypertension Mother      Other Cancer Mother         Esophageal     Depression Mother      Obesity Mother      Asthma Father      Glaucoma No family hx of      Macular Degeneration No family hx of      Breast Cancer No family hx of      Colon Cancer No family hx of      Family history reviewed and edited as appropriate    Medications and Allergies:     Outpatient Encounter Medications as of 12/20/2022   Medication Sig Dispense Refill     albuterol (PROAIR HFA/PROVENTIL HFA/VENTOLIN HFA) 108 (90 Base) MCG/ACT inhaler INHALE 2 PUFFS INTO THE LUNGS EVERY 4 HOURS AS NEEDED FOR SHORTNESS OF BREATH / DYSPNEA OR WHEEZING 8.5 g 11     benzonatate (TESSALON) 200 MG capsule Take 1  capsule (200 mg) by mouth 3 times daily as needed for cough 30 capsule 0     buPROPion (WELLBUTRIN XL) 300 MG 24 hr tablet Take 1 tablet (300 mg) by mouth every morning 90 tablet 1     clindamycin (CLEOCIN T) 1 % external lotion Twice daily to acne rash on thighs as needed. 60 mL 4     clobetasol (TEMOVATE) 0.05 % external solution Apply topically 2 times daily as needed (dermatitis) 50 mL 1     famotidine (PEPCID) 40 MG tablet Take 1 tablet (40 mg) by mouth daily 90 tablet 3     guaiFENesin-codeine (ROBITUSSIN AC) 100-10 MG/5ML solution Take 10 mLs by mouth every 6 hours as needed for cough 118 mL 0     ipratropium (ATROVENT) 0.03 % nasal spray Spray 2 sprays into both nostrils every 8 hours as needed for rhinitis Patient will call when needs filled. 30 mL 11     omeprazole (PRILOSEC) 20 MG DR capsule TAKE ONE CAPSULE BY MOUTH ONCE DAILY 90 capsule 3     sertraline (ZOLOFT) 100 MG tablet Take 2 tablets (200 mg) by mouth daily 180 tablet 4     triamcinolone (KENALOG) 0.1 % external ointment Twice daily to eczema rash on the arms and legs until clear, then as needed. 80 g 2     No facility-administered encounter medications on file as of 12/20/2022.        No Known Allergies     Review of systems:  A full 10 point review of systems was obtained and was negative except for the pertinent positives and negatives stated within the HPI.    Objective Findings:   Physical Exam:    Constitutional: [facility] 12/08/2021   General: Alert, cooperative, no distress, well-appearing  Head: Atraumatic, normocephalic, no obvious abnormalities   Eyes: EOMI, Sclera anicteric, no obvious conjunctival hemorrhage   Nose: normal appearing nares, no obvious rhinorrhea   Throat: Not examined  Respiratory: No respiratory distress. Speaking in full sentences.    Cardiovascular: Not examined  Gastrointestinal: Not examined  Musculoskeletal: normal appearing range of motion without obvious strength deficit.  Skin: No jaundice, no obvious  rash  Neurologic: AAOx3, no obvious neurologic abnormality  Psychiatric: Normal Affect, appropriate mood  Extremities: Not examined     Labs, Radiology, Pathology     Lab Results   Component Value Date    WBC 6.4 2022    WBC 7.7 2021    WBC 8.9 2020    HGB 13.5 2022    HGB 11.9 2021    HGB 14.1 2021     2022     2021     2020    CHOL 185 2022    CHOL 198 2019    TRIG 133 2022    TRIG 111 2019    HDL 57 2022    HDL 52 2019    ALT 14 2018    ALT 16 2018    ALT 18 2018    AST 14 2018    AST 10 2018    AST 14 2018     2018     2018    BUN 13 2018    BUN 13 2018    CO2 22 2018    CO2 22 2018    TSH 1.74 2022    TSH 1.70 2019        Liver Function Studies -   Recent Labs   Lab Test 18  0720 18  1016 18  1530   PROTTOTAL  --   --  6.0*   ALBUMIN  --   --  2.6*   BILITOTAL  --   --  0.1*   ALKPHOS  --   --  351*   AST 14   < > 14   ALT 14   < > 18    < > = values in this interval not displayed.          Patient Active Problem List    Diagnosis Date Noted     Heartburn 2022     Priority: Medium     Regurgitation of food 2022     Priority: Medium     Family history of esophageal cancer 2022     Priority: Medium     Mom with years of heartburn.       Family history of diabetes mellitus type II 2022     Priority: Medium     S/P MINAL (total abdominal hysterectomy) 2021     Priority: Medium     Umbilical hernia without obstruction and without gangrene 2021     Priority: Medium     Added automatically from request for surgery [id number]       Previous  section 2020     Priority: Medium     Added automatically from request for surgery [id number]       Vasovagal syncope 2018     Priority: Medium     With shots, lab draws, getting eyes dilated.        COURTNEY  (generalized anxiety disorder) 05/23/2017     Priority: Medium     Major depressive disorder, recurrent episode, moderate (H) 05/23/2017     Priority: Medium     Allergic rhinitis 01/21/2009     Priority: Medium     Overview:   Rhinitis Allergic  NOS       Mild intermittent asthma without complication 01/21/2009     Priority: Medium      Assessment and Plan   Assessment:    Krystina Suazo is 37 year old female with a history of GERD who is presenting as a follow up patient in consultation at the request of Dr. Daly with a chief complaint of heartburn and bloating.    The patient currently is off of PPI and her symptoms of heartburn are mostly controlled with Pepcid 40mg daily. She continues to have regurgitation symptoms. Interestingly she had been on PPI for many years which lost effectiveness. She has never undergone endoscopy. I have recommended upper endoscopy and Bravo OFF PPI therapy. Possible pathophysiology of her loss of response to PPI could be development of a hiatal hernia. She is actively trying to lose weight. She also avoids specific foods that trigger/worsen her reflux symptoms such as tomato sauce. Depending on endoscopy and bravo she may benefit from antireflux surgery.    Her bloating symptom could be functional/DGBI in nature. She does not have significant distention associated nor does she have increased flatulence or belching. She has been unable to identify triggers. We discussed a trial of IBgard with a future consideration of bentyl as needed. Celiac serologies are negative.    1. Heartburn    2. Regurgitation of food       No orders of the defined types were placed in this encounter.     Plan:  1. Please schedule the endoscopy and bravo OFF therapy  2. You may wish to try Ibgard which is over the counter for the bloating symptom  3. In the future we can try bentyl which is an antispasmodic      Follow up plan:   Return to clinic 6 months and as needed.    The risks and benefits  of my recommendations, as well as other treatment options were discussed with the patient and any available family today. All questions were answered.     o Follow up: As planned above. Today, I personally spent 29 minutes in direct face to face time with the patient, of which greater than 50% of the time was spent in patient education and counseling as described above. Approximately 15 minutes were spent on indirect care associated with the patient's consultation including but not limited to review of: patient medical records to date, clinic visits, hospital records, lab results, imaging studies, procedural documentation, and coordinating care with other providers. The findings from this review are summarized in the above note. All of the above accounted for a cumulative time of 44 minutes and was performed on the date of service.     The patient verbalized understanding of the plan and was appreciative for the time spent and information provided during the office visit.     Williams Wheatley DO   of Medicine  Director, Esophageal Disorders Program  Division of Gastroenterology, Hepatology, and Nutrition  [facility]    Documentation assisted by voice recognition and documentation system.

## 2022-12-27 ENCOUNTER — ANCILLARY PROCEDURE (OUTPATIENT)
Dept: MRI IMAGING | Facility: CLINIC | Age: 37
End: 2022-12-27
Attending: ORTHOPAEDIC SURGERY
Payer: COMMERCIAL

## 2022-12-27 DIAGNOSIS — S69.81XA TFCC (TRIANGULAR FIBROCARTILAGE COMPLEX) INJURY, RIGHT, INITIAL ENCOUNTER: ICD-10-CM

## 2022-12-27 DIAGNOSIS — M25.531 CHRONIC WRIST PAIN, RIGHT: ICD-10-CM

## 2022-12-27 DIAGNOSIS — G89.29 CHRONIC WRIST PAIN, RIGHT: ICD-10-CM

## 2022-12-27 PROCEDURE — 73221 MRI JOINT UPR EXTREM W/O DYE: CPT | Mod: RT

## 2023-01-07 DIAGNOSIS — F33.1 MAJOR DEPRESSIVE DISORDER, RECURRENT EPISODE, MODERATE (H): ICD-10-CM

## 2023-01-10 RX ORDER — BUPROPION HYDROCHLORIDE 300 MG/1
TABLET ORAL
Qty: 90 TABLET | Refills: 0 | Status: SHIPPED | OUTPATIENT
Start: 2023-01-10 | End: 2023-04-07

## 2023-01-10 NOTE — TELEPHONE ENCOUNTER
Prescription approved per [facility] Refill Protocol.    Batsheva Berg RN

## 2023-01-11 ENCOUNTER — OFFICE VISIT (OUTPATIENT)
Dept: ORTHOPEDICS | Facility: CLINIC | Age: 38
End: 2023-01-11
Payer: COMMERCIAL

## 2023-01-11 VITALS — SYSTOLIC BLOOD PRESSURE: 128 MMHG | DIASTOLIC BLOOD PRESSURE: 75 MMHG | BODY MASS INDEX: 30.27 KG/M2 | WEIGHT: 205 LBS

## 2023-01-11 DIAGNOSIS — M19.90 INFLAMMATORY ARTHRITIS: Primary | ICD-10-CM

## 2023-01-11 PROCEDURE — 99213 OFFICE O/P EST LOW 20 MIN: CPT | Performed by: ORTHOPAEDIC SURGERY

## 2023-01-11 NOTE — PROGRESS NOTES
S:Patient is a 37 year old, right hand dominant female seen today in follow up for right wrist .    Current pain level: 1/10, Worst pain level: 8/10.  Feels improved overall.                Patient Active Problem List   Diagnosis     COURTNEY (generalized anxiety disorder)     Major depressive disorder, recurrent episode, moderate (H)     Allergic rhinitis     Mild intermittent asthma without complication     Vasovagal syncope     Previous  section     Umbilical hernia without obstruction and without gangrene     S/P MINAL (total abdominal hysterectomy)     Family history of esophageal cancer     Family history of diabetes mellitus type II     Heartburn     Regurgitation of food            Past Medical History:   Diagnosis Date     Asthma      Depression      Depressive disorder     Diagnosed around      Hypertension 2018     Uterine fibroid in pregnancy 2020     Vasovagal syncope             Past Surgical History:   Procedure Laterality Date     CARPAL TUNNEL RELEASE RT/LT Bilateral       SECTION N/A 2018    Procedure:  SECTION;   section;  Surgeon: Jay Yanez MD;  Location: [facility] OR      SECTION N/A 10/27/2020    Procedure: REPEAT  SECTION;  Surgeon: Aníbal Pearson MD;  Location: [facility] L+D     GYN SURGERY  2018         HERNIORRHAPHY UMBILICAL N/A 2021    Procedure: Open Umbilical Hernia Repair with Mesh;  Surgeon: Erin Mccarthy MD;  Location: [facility] OR     HYSTERECTOMY TOTAL ABDOMINAL, SALPINGECTOMY N/A 2021    Procedure: TOTAL ABDOMINAL HYSTERECTOMY, BILATERAL SALPINGECTOMY;  Surgeon: Sandy Tran MD;  Location: [facility] OR     HYSTERECTOMY, PAP NO LONGER INDICATED       SINUS SURGERY  2014     TONSILLECTOMY  2014            Social History     Tobacco Use     Smoking status: Never     Smokeless tobacco: Never   Substance Use Topics     Alcohol use: No            Family History   Problem Relation Age of Onset      Esophageal Cancer Mother 57     Diabetes Mother         Type 2     Hypertension Mother      Other Cancer Mother         Esophageal     Depression Mother      Obesity Mother      Asthma Father      Glaucoma No family hx of      Macular Degeneration No family hx of      Breast Cancer No family hx of      Colon Cancer No family hx of              No Known Allergies         Current Outpatient Medications   Medication Sig Dispense Refill     albuterol (PROAIR HFA/PROVENTIL HFA/VENTOLIN HFA) 108 (90 Base) MCG/ACT inhaler INHALE 2 PUFFS INTO THE LUNGS EVERY 4 HOURS AS NEEDED FOR SHORTNESS OF BREATH / DYSPNEA OR WHEEZING 8.5 g 11     benzonatate (TESSALON) 200 MG capsule Take 1 capsule (200 mg) by mouth 3 times daily as needed for cough 30 capsule 0     buPROPion (WELLBUTRIN XL) 300 MG 24 hr tablet TAKE ONE TABLET BY MOUTH EVERY MORNING 90 tablet 0     clindamycin (CLEOCIN T) 1 % external lotion Twice daily to acne rash on thighs as needed. 60 mL 4     clobetasol (TEMOVATE) 0.05 % external solution Apply topically 2 times daily as needed (dermatitis) 50 mL 1     famotidine (PEPCID) 40 MG tablet Take 1 tablet (40 mg) by mouth daily 90 tablet 3     guaiFENesin-codeine (ROBITUSSIN AC) 100-10 MG/5ML solution Take 10 mLs by mouth every 6 hours as needed for cough 118 mL 0     ipratropium (ATROVENT) 0.03 % nasal spray Spray 2 sprays into both nostrils every 8 hours as needed for rhinitis Patient will call when needs filled. 30 mL 11     omeprazole (PRILOSEC) 20 MG DR capsule TAKE ONE CAPSULE BY MOUTH ONCE DAILY 90 capsule 3     sertraline (ZOLOFT) 100 MG tablet Take 2 tablets (200 mg) by mouth daily 180 tablet 4     triamcinolone (KENALOG) 0.1 % external ointment Twice daily to eczema rash on the arms and legs until clear, then as needed. 80 g 2          Review Of Systems  Skin: negative  Eyes: negative  Ears/Nose/Throat: negative  Respiratory: No shortness of breath, dyspnea on exertion, cough, or hemoptysis    O: physical  exam:  CMS intact.  Adequate palmar and dorsiflexion, radial and ulnar deviation wrist.      Lab:need to update inflammatory labs and arthritic profile    Images:      DATE/TIME: 12/27/2022 10:41 AM     INDICATION: Chronic wrist pain, right, TFCC (triangular fibrocartilage complex) injury, right, initial encounter.  COMPARISON: None.  TECHNIQUE: Unenhanced.     FINDINGS:     TENDONS:   -Extensor compartment tendons: Mild extensor carpi ulnaris tendinopathy without tearing. Mild extensor pollicis longus tendon tendinopathy without tearing. The remaining extensor tendons are negative.   -Flexor compartment tendons: No tear, tendinopathy, or tenosynovitis.     TRIANGULAR FIBROCARTILAGE COMPLEX:   -Articular disc: Intact.  -Peripheral tissue: Sprain of the ulnar attachment of the TFCC. No tearing or retraction.     LIGAMENTS:   -Scapholunate and lunotriquetral ligament: No tear identified on this non-arthrographic study.     JOINTS AND BONES:   -Trace fluid in the DRUJ. No evidence for fracture. Neutral ulnar variance. Normal carpal alignment.     SOFT TISSUES:   -Ganglion/Synovial cyst: Ganglion or synovial cyst along the volar aspect of the wrist adjacent to the confluence of the STT joints.  -Nerves: Visualized median and ulnar nerves appear intact. Guyon's canal is normal.                                                                      IMPRESSION:  1.  Mild extensor carpi ulnaris tendinopathy without tearing.  2.  Mild extensor pollicis longus tendon tendinopathy without tearing.  3.  Sprain of the ulnar attachment of the TFCC without tearing or retraction.  4.  Trace fluid in the DRUJ.  5.  No evidence for acute fracture.  6.  There is a ganglion or synovial cyst along the volar aspect of the wrist adjacent to the STT joint. This measures 0.9 x 0.3 x 1.4 cm.     A: Wrist symptoms improving  Tendinopathy.  Cyst volar wrist.      P: See back after lab.  Continue to increase activity as tolerated.           In  addition to the above assessment and plan each active problem on Krystina's problem list was evaluated today. This included the questioning of Krystina for any medication problems. We will continue the current treatment plan for these active problems except as noted.

## 2023-01-11 NOTE — PATIENT INSTRUCTIONS
Thank you for choosing [facility]    Dr. Higginbotham Locations:    [facility] [address], [address]  [address] [address]   Appointments: [phone] Appointments: [phone]   Fax: [phone] Fax: [phone]       Follow up: as needed Please call [phone] to schedule your follow up appointment.         For any questions please contact my office, [phone].

## 2023-01-11 NOTE — LETTER
2023         RE: Krystina Suazo  [address]  [place] [address]        Dear Colleague,    Thank you for referring your patient, Krystina Suazo, to the [facility] [place]. Please see a copy of my visit note below.    S:Patient is a 37 year old, right hand dominant female seen today in follow up for right wrist .    Current pain level: 1/10, Worst pain level: 8/10.  Feels improved overall.                Patient Active Problem List   Diagnosis     COURTNEY (generalized anxiety disorder)     Major depressive disorder, recurrent episode, moderate (H)     Allergic rhinitis     Mild intermittent asthma without complication     Vasovagal syncope     Previous  section     Umbilical hernia without obstruction and without gangrene     S/P MINAL (total abdominal hysterectomy)     Family history of esophageal cancer     Family history of diabetes mellitus type II     Heartburn     Regurgitation of food            Past Medical History:   Diagnosis Date     Asthma      Depression      Depressive disorder     Diagnosed around      Hypertension 2018     Uterine fibroid in pregnancy 2020     Vasovagal syncope             Past Surgical History:   Procedure Laterality Date     CARPAL TUNNEL RELEASE RT/LT Bilateral       SECTION N/A 2018    Procedure:  SECTION;   section;  Surgeon: Jay Yanez MD;  Location: [facility] OR      SECTION N/A 10/27/2020    Procedure: REPEAT  SECTION;  Surgeon: Aníbal Pearson MD;  Location: [facility] L+D     GYN SURGERY  2018         HERNIORRHAPHY UMBILICAL N/A 2021    Procedure: Open Umbilical Hernia Repair with Mesh;  Surgeon: Erin Mccarthy MD;  Location: [facility] OR     HYSTERECTOMY TOTAL ABDOMINAL, SALPINGECTOMY N/A 2021    Procedure: TOTAL ABDOMINAL HYSTERECTOMY, BILATERAL SALPINGECTOMY;  Surgeon: Sandy Tran MD;  Location: [facility] OR     HYSTERECTOMY, PAP  NO LONGER INDICATED       SINUS SURGERY  2014     TONSILLECTOMY  2014            Social History     Tobacco Use     Smoking status: Never     Smokeless tobacco: Never   Substance Use Topics     Alcohol use: No            Family History   Problem Relation Age of Onset     Esophageal Cancer Mother 57     Diabetes Mother         Type 2     Hypertension Mother      Other Cancer Mother         Esophageal     Depression Mother      Obesity Mother      Asthma Father      Glaucoma No family hx of      Macular Degeneration No family hx of      Breast Cancer No family hx of      Colon Cancer No family hx of              No Known Allergies         Current Outpatient Medications   Medication Sig Dispense Refill     albuterol (PROAIR HFA/PROVENTIL HFA/VENTOLIN HFA) 108 (90 Base) MCG/ACT inhaler INHALE 2 PUFFS INTO THE LUNGS EVERY 4 HOURS AS NEEDED FOR SHORTNESS OF BREATH / DYSPNEA OR WHEEZING 8.5 g 11     benzonatate (TESSALON) 200 MG capsule Take 1 capsule (200 mg) by mouth 3 times daily as needed for cough 30 capsule 0     buPROPion (WELLBUTRIN XL) 300 MG 24 hr tablet TAKE ONE TABLET BY MOUTH EVERY MORNING 90 tablet 0     clindamycin (CLEOCIN T) 1 % external lotion Twice daily to acne rash on thighs as needed. 60 mL 4     clobetasol (TEMOVATE) 0.05 % external solution Apply topically 2 times daily as needed (dermatitis) 50 mL 1     famotidine (PEPCID) 40 MG tablet Take 1 tablet (40 mg) by mouth daily 90 tablet 3     guaiFENesin-codeine (ROBITUSSIN AC) 100-10 MG/5ML solution Take 10 mLs by mouth every 6 hours as needed for cough 118 mL 0     ipratropium (ATROVENT) 0.03 % nasal spray Spray 2 sprays into both nostrils every 8 hours as needed for rhinitis Patient will call when needs filled. 30 mL 11     omeprazole (PRILOSEC) 20 MG DR capsule TAKE ONE CAPSULE BY MOUTH ONCE DAILY 90 capsule 3     sertraline (ZOLOFT) 100 MG tablet Take 2 tablets (200 mg) by mouth daily 180 tablet 4     triamcinolone (KENALOG) 0.1 % external ointment  Twice daily to eczema rash on the arms and legs until clear, then as needed. 80 g 2          Review Of Systems  Skin: negative  Eyes: negative  Ears/Nose/Throat: negative  Respiratory: No shortness of breath, dyspnea on exertion, cough, or hemoptysis    O: physical exam:  CMS intact.  Adequate palmar and dorsiflexion, radial and ulnar deviation wrist.      Lab:need to update inflammatory labs and arthritic profile    Images:      DATE/TIME: 12/27/2022 10:41 AM     INDICATION: Chronic wrist pain, right, TFCC (triangular fibrocartilage complex) injury, right, initial encounter.  COMPARISON: None.  TECHNIQUE: Unenhanced.     FINDINGS:     TENDONS:   -Extensor compartment tendons: Mild extensor carpi ulnaris tendinopathy without tearing. Mild extensor pollicis longus tendon tendinopathy without tearing. The remaining extensor tendons are negative.   -Flexor compartment tendons: No tear, tendinopathy, or tenosynovitis.     TRIANGULAR FIBROCARTILAGE COMPLEX:   -Articular disc: Intact.  -Peripheral tissue: Sprain of the ulnar attachment of the TFCC. No tearing or retraction.     LIGAMENTS:   -Scapholunate and lunotriquetral ligament: No tear identified on this non-arthrographic study.     JOINTS AND BONES:   -Trace fluid in the DRUJ. No evidence for fracture. Neutral ulnar variance. Normal carpal alignment.     SOFT TISSUES:   -Ganglion/Synovial cyst: Ganglion or synovial cyst along the volar aspect of the wrist adjacent to the confluence of the STT joints.  -Nerves: Visualized median and ulnar nerves appear intact. Guyon's canal is normal.                                                                      IMPRESSION:  1.  Mild extensor carpi ulnaris tendinopathy without tearing.  2.  Mild extensor pollicis longus tendon tendinopathy without tearing.  3.  Sprain of the ulnar attachment of the TFCC without tearing or retraction.  4.  Trace fluid in the DRUJ.  5.  No evidence for acute fracture.  6.  There is a ganglion or  synovial cyst along the volar aspect of the wrist adjacent to the STT joint. This measures 0.9 x 0.3 x 1.4 cm.     A: Wrist symptoms improving  Tendinopathy.  Cyst volar wrist.      P: See back after lab.  Continue to increase activity as tolerated.           In addition to the above assessment and plan each active problem on Krystina's problem list was evaluated today. This included the questioning of Krystina for any medication problems. We will continue the current treatment plan for these active problems except as noted.        Again, thank you for allowing me to participate in the care of your patient.        Sincerely,        EVELIO BROCK MD

## 2023-01-30 ENCOUNTER — E-VISIT (OUTPATIENT)
Dept: URGENT CARE | Facility: CLINIC | Age: 38
End: 2023-01-30
Payer: COMMERCIAL

## 2023-01-30 DIAGNOSIS — H10.31 ACUTE BACTERIAL CONJUNCTIVITIS OF RIGHT EYE: Primary | ICD-10-CM

## 2023-01-30 PROCEDURE — 99421 OL DIG E/M SVC 5-10 MIN: CPT | Performed by: PHYSICIAN ASSISTANT

## 2023-01-30 RX ORDER — POLYMYXIN B SULFATE AND TRIMETHOPRIM 1; 10000 MG/ML; [USP'U]/ML
SOLUTION OPHTHALMIC
Qty: 10 ML | Refills: 0 | Status: SHIPPED | OUTPATIENT
Start: 2023-01-30 | End: 2023-02-06

## 2023-01-31 NOTE — PATIENT INSTRUCTIONS
Thank you for choosing us for your care. I have placed an order for a prescription so that you can start treatment. View your full visit summary for details by clicking on the link below. Your pharmacist will able to address any questions you may have about the medication.     If you re not feeling better within 2-3 days, please schedule an appointment.  You can schedule an appointment right here in [facility], or call [phone]  If the visit is for the same symptoms as your eVisit, we ll refund the cost of your eVisit if seen within seven days.

## 2023-02-02 ENCOUNTER — LAB (OUTPATIENT)
Dept: LAB | Facility: CLINIC | Age: 38
End: 2023-02-02
Payer: COMMERCIAL

## 2023-02-02 DIAGNOSIS — M19.90 INFLAMMATORY ARTHRITIS: ICD-10-CM

## 2023-02-02 LAB — ERYTHROCYTE [SEDIMENTATION RATE] IN BLOOD BY WESTERGREN METHOD: 8 MM/HR (ref 0–20)

## 2023-02-02 PROCEDURE — 84550 ASSAY OF BLOOD/URIC ACID: CPT

## 2023-02-02 PROCEDURE — 86038 ANTINUCLEAR ANTIBODIES: CPT

## 2023-02-02 PROCEDURE — 86140 C-REACTIVE PROTEIN: CPT

## 2023-02-02 PROCEDURE — 86200 CCP ANTIBODY: CPT

## 2023-02-02 PROCEDURE — 86431 RHEUMATOID FACTOR QUANT: CPT

## 2023-02-02 PROCEDURE — 86225 DNA ANTIBODY NATIVE: CPT

## 2023-02-02 PROCEDURE — 85379 FIBRIN DEGRADATION QUANT: CPT

## 2023-02-02 PROCEDURE — 36415 COLL VENOUS BLD VENIPUNCTURE: CPT

## 2023-02-02 PROCEDURE — 85652 RBC SED RATE AUTOMATED: CPT

## 2023-02-02 PROCEDURE — 86039 ANTINUCLEAR ANTIBODIES (ANA): CPT

## 2023-02-03 LAB
CRP SERPL-MCNC: <3 MG/L
D DIMER PPP FEU-MCNC: 0.29 UG/ML FEU (ref 0–0.5)
URATE SERPL-MCNC: 3.4 MG/DL (ref 2.4–5.7)

## 2023-02-06 LAB
ANA PAT SER IF-IMP: ABNORMAL
ANA SER QL IF: POSITIVE
ANA TITR SER IF: ABNORMAL {TITER}
RHEUMATOID FACT SER NEPH-ACNC: <7 IU/ML

## 2023-02-08 LAB
CCP AB SER IA-ACNC: 0.8 U/ML
DSDNA AB SER-ACNC: <0.6 IU/ML

## 2023-02-17 ENCOUNTER — TELEPHONE (OUTPATIENT)
Dept: GASTROENTEROLOGY | Facility: CLINIC | Age: 38
End: 2023-02-17
Payer: COMMERCIAL

## 2023-02-17 NOTE — TELEPHONE ENCOUNTER
Caller: Krystina Suazo    Reason for Reschedule/Cancellation (please be detailed, any staff messages or encounters to note?): Dr Wheatley on paternity leave. [facility] for call back to reschedule, case moved into depo.      Prior to reschedule please review:    Ordering Provider:Williams Wheatley, DO    Sedation per order:MAC    Does patient have any ASC Exclusions, please identify?: NO      Notes on Cancelled Procedure:    Procedure:Upper Endoscopy with BRAVO [EGD BRAVO]    Date: 3/27    [facility]; [address] [place] [address], [address] Floor, [place] [address]    Surgeon: MELODY        Rescheduled: No

## 2023-02-20 NOTE — TELEPHONE ENCOUNTER
2nd and Final Attempt -[Both # tried if avail] lvm Letter sent, closed loop with provider. Case canceled.

## 2023-04-03 ENCOUNTER — OFFICE VISIT (OUTPATIENT)
Dept: ORTHOPEDICS | Facility: CLINIC | Age: 38
End: 2023-04-03
Payer: COMMERCIAL

## 2023-04-03 VITALS — BODY MASS INDEX: 30.27 KG/M2 | WEIGHT: 205 LBS | DIASTOLIC BLOOD PRESSURE: 70 MMHG | SYSTOLIC BLOOD PRESSURE: 125 MMHG

## 2023-04-03 DIAGNOSIS — M19.031 PRIMARY ARTHROSIS OF RIGHT DISTAL RADIOULNAR JOINT: Primary | ICD-10-CM

## 2023-04-03 PROCEDURE — 99213 OFFICE O/P EST LOW 20 MIN: CPT | Performed by: ORTHOPAEDIC SURGERY

## 2023-04-03 NOTE — PATIENT INSTRUCTIONS
Thank you for choosing [facility]    Dr. Higginbotham Locations:    [facility] [address], [address]  [address] [address]   Appointments: [phone] Appointments: [phone]   Fax: [phone] Fax: [phone]       Follow up: 1 year Please call [phone] to schedule your follow up appointment.

## 2023-04-03 NOTE — LETTER
4/3/2023         RE: Krystina Suazo  [address]  [place] [address]        Dear Colleague,    Thank you for referring your patient, Krystina Suazo, to the [facility] [place]. Please see a copy of my visit note below.    S:Patient is a 37 year old,  female seen today in follow up    They were previously evaluated on 23, Since this visit they report patient does not have much pain, she still has carpal; tunnel syndrome pain every once in awhile. She continues to wear her brace as needed                Patient Active Problem List   Diagnosis     COURTNEY (generalized anxiety disorder)     Major depressive disorder, recurrent episode, moderate (H)     Allergic rhinitis     Mild intermittent asthma without complication     Vasovagal syncope     Previous  section     Umbilical hernia without obstruction and without gangrene     S/P MINAL (total abdominal hysterectomy)     Family history of esophageal cancer     Family history of diabetes mellitus type II     Heartburn     Regurgitation of food            Past Medical History:   Diagnosis Date     Asthma      Depression      Depressive disorder     Diagnosed around      Hypertension 2018     Uterine fibroid in pregnancy 2020     Vasovagal syncope             Past Surgical History:   Procedure Laterality Date     CARPAL TUNNEL RELEASE RT/LT Bilateral       SECTION N/A 2018    Procedure:  SECTION;   section;  Surgeon: Jay Yanez MD;  [facility]: [facility]      SECTION N/A 10/27/2020    Procedure: REPEAT  SECTION;  Surgeon: Aníbal Pearson MD;  Location: [facility] L+D     GYN SURGERY  2018         HERNIORRHAPHY UMBILICAL N/A 2021    Procedure: Open Umbilical Hernia Repair with Mesh;  Surgeon: Erin Mccarthy MD;  Location: [facility] OR     HYSTERECTOMY TOTAL ABDOMINAL, SALPINGECTOMY N/A 2021    Procedure: TOTAL ABDOMINAL  HYSTERECTOMY, BILATERAL SALPINGECTOMY;  Surgeon: Sandy Tran MD;  Location: [facility]     HYSTERECTOMY, PAP NO LONGER INDICATED       SINUS SURGERY  2014     TONSILLECTOMY  2014            Social History     Tobacco Use     Smoking status: Never     Smokeless tobacco: Never   Vaping Use     Vaping status: Never Used   Substance Use Topics     Alcohol use: No            Family History   Problem Relation Age of Onset     Esophageal Cancer Mother 57     Diabetes Mother         Type 2     Hypertension Mother      Other Cancer Mother         Esophageal     Depression Mother      Obesity Mother      Asthma Father      Glaucoma No family hx of      Macular Degeneration No family hx of      Breast Cancer No family hx of      Colon Cancer No family hx of              No Known Allergies         Current Outpatient Medications   Medication Sig Dispense Refill     albuterol (PROAIR HFA/PROVENTIL HFA/VENTOLIN HFA) 108 (90 Base) MCG/ACT inhaler INHALE 2 PUFFS INTO THE LUNGS EVERY 4 HOURS AS NEEDED FOR SHORTNESS OF BREATH / DYSPNEA OR WHEEZING 8.5 g 11     benzonatate (TESSALON) 200 MG capsule Take 1 capsule (200 mg) by mouth 3 times daily as needed for cough 30 capsule 0     buPROPion (WELLBUTRIN XL) 300 MG 24 hr tablet TAKE ONE TABLET BY MOUTH EVERY MORNING 90 tablet 0     clindamycin (CLEOCIN T) 1 % external lotion Twice daily to acne rash on thighs as needed. 60 mL 4     clobetasol (TEMOVATE) 0.05 % external solution Apply topically 2 times daily as needed (dermatitis) 50 mL 1     famotidine (PEPCID) 40 MG tablet Take 1 tablet (40 mg) by mouth daily 90 tablet 3     guaiFENesin-codeine (ROBITUSSIN AC) 100-10 MG/5ML solution Take 10 mLs by mouth every 6 hours as needed for cough 118 mL 0     ipratropium (ATROVENT) 0.03 % nasal spray Spray 2 sprays into both nostrils every 8 hours as needed for rhinitis Patient will call when needs filled. 30 mL 11     omeprazole (PRILOSEC) 20 MG DR capsule TAKE ONE CAPSULE BY MOUTH ONCE DAILY  90 capsule 3     sertraline (ZOLOFT) 100 MG tablet Take 2 tablets (200 mg) by mouth daily 180 tablet 4     triamcinolone (KENALOG) 0.1 % external ointment Twice daily to eczema rash on the arms and legs until clear, then as needed. 80 g 2          Review Of Systems  Skin: negative  Eyes: negative  Ears/Nose/Throat: negative  Respiratory: No shortness of breath, dyspnea on exertion, cough, or hemoptysis    O: Physical exam:  No tenderness over ECU or EPL.  Fusiform swelling not very apparent over volar STT.  Adequate ROM.    Lab:  LAUREN 1:160       Images:EXAM: MR WRIST RIGHT W/O CONTRAST  LOCATION: [facility]  DATE/TIME: 12/27/2022 10:41 AM     INDICATION: Chronic wrist pain, right, TFCC (triangular fibrocartilage complex) injury, right, initial encounter.  COMPARISON: None.  TECHNIQUE: Unenhanced.     FINDINGS:     TENDONS:   -Extensor compartment tendons: Mild extensor carpi ulnaris tendinopathy without tearing. Mild extensor pollicis longus tendon tendinopathy without tearing. The remaining extensor tendons are negative.   -Flexor compartment tendons: No tear, tendinopathy, or tenosynovitis.     TRIANGULAR FIBROCARTILAGE COMPLEX:   -Articular disc: Intact.  -Peripheral tissue: Sprain of the ulnar attachment of the TFCC. No tearing or retraction.     LIGAMENTS:   -Scapholunate and lunotriquetral ligament: No tear identified on this non-arthrographic study.     JOINTS AND BONES:   -Trace fluid in the DRUJ. No evidence for fracture. Neutral ulnar variance. Normal carpal alignment.     SOFT TISSUES:   -Ganglion/Synovial cyst: Ganglion or synovial cyst along the volar aspect of the wrist adjacent to the confluence of the STT joints.  -Nerves: Visualized median and ulnar nerves appear intact. Guyon's canal is normal.                                                                      IMPRESSION:  1.  Mild extensor carpi ulnaris tendinopathy without tearing.  2.  Mild extensor pollicis longus  tendon tendinopathy without tearing.  3.  Sprain of the ulnar attachment of the TFCC without tearing or retraction.  4.  Trace fluid in the DRUJ.  5.  No evidence for acute fracture.  6.  There is a ganglion or synovial cyst along the volar aspect of the wrist adjacent to the STT joint. This measures 0.9 x 0.3 x 1.4 cm.    A:  R wrist volar cyst and DRUJ injury with some tendinosis R wrist, seems to be improving.    P:  Repeat MRI R wrist in a year if symptomatic.  Repeat labs in a year regardless.  Notify if exacerbation symptoms  See back one year           In addition to the above assessment and plan each active problem on Krystina's problem list was evaluated today. This included the questioning of Krystina for any medication problems. We will continue the current treatment plan for these active problems except as noted.        Again, thank you for allowing me to participate in the care of your patient.        Sincerely,        EVELIO BROCK MD

## 2023-04-03 NOTE — PROGRESS NOTES
S:Patient is a 37 year old,  female seen today in follow up    They were previously evaluated on 23, Since this visit they report patient does not have much pain, she still has carpal; tunnel syndrome pain every once in awhile. She continues to wear her brace as needed                Patient Active Problem List   Diagnosis     COURTNEY (generalized anxiety disorder)     Major depressive disorder, recurrent episode, moderate (H)     Allergic rhinitis     Mild intermittent asthma without complication     Vasovagal syncope     Previous  section     Umbilical hernia without obstruction and without gangrene     S/P MINAL (total abdominal hysterectomy)     Family history of esophageal cancer     Family history of diabetes mellitus type II     Heartburn     Regurgitation of food            Past Medical History:   Diagnosis Date     Asthma      Depression      Depressive disorder     Diagnosed around      Hypertension 2018     Uterine fibroid in pregnancy 2020     Vasovagal syncope             Past Surgical History:   Procedure Laterality Date     CARPAL TUNNEL RELEASE RT/LT Bilateral       SECTION N/A 2018    Procedure:  SECTION;   section;  Surgeon: Jay Yanez MD;  [facility]      SECTION N/A 10/27/2020    Procedure: REPEAT  SECTION;  Surgeon: Aníbal Pearson MD;  [facility]: [facility] L[facility]D     GYN SURGERY  2018         HERNIORRHAPHY UMBILICAL N/A 2021    Procedure: Open Umbilical Hernia Repair with Mesh;  Surgeon: Erin Mccarthy MD;  Location: [facility]     HYSTERECTOMY TOTAL ABDOMINAL, SALPINGECTOMY N/A 2021    Procedure: TOTAL ABDOMINAL HYSTERECTOMY, BILATERAL SALPINGECTOMY;  Surgeon: Sandy Tran MD;  Location: [facility] OR     HYSTERECTOMY, PAP NO LONGER INDICATED       SINUS SURGERY       TONSILLECTOMY  2014            Social History     Tobacco Use     Smoking status: Never     Smokeless tobacco: Never    Vaping Use     Vaping status: Never Used   Substance Use Topics     Alcohol use: No            Family History   Problem Relation Age of Onset     Esophageal Cancer Mother 57     Diabetes Mother         Type 2     Hypertension Mother      Other Cancer Mother         Esophageal     Depression Mother      Obesity Mother      Asthma Father      Glaucoma No family hx of      Macular Degeneration No family hx of      Breast Cancer No family hx of      Colon Cancer No family hx of              No Known Allergies         Current Outpatient Medications   Medication Sig Dispense Refill     albuterol (PROAIR HFA/PROVENTIL HFA/VENTOLIN HFA) 108 (90 Base) MCG/ACT inhaler INHALE 2 PUFFS INTO THE LUNGS EVERY 4 HOURS AS NEEDED FOR SHORTNESS OF BREATH / DYSPNEA OR WHEEZING 8.5 g 11     benzonatate (TESSALON) 200 MG capsule Take 1 capsule (200 mg) by mouth 3 times daily as needed for cough 30 capsule 0     buPROPion (WELLBUTRIN XL) 300 MG 24 hr tablet TAKE ONE TABLET BY MOUTH EVERY MORNING 90 tablet 0     clindamycin (CLEOCIN T) 1 % external lotion Twice daily to acne rash on thighs as needed. 60 mL 4     clobetasol (TEMOVATE) 0.05 % external solution Apply topically 2 times daily as needed (dermatitis) 50 mL 1     famotidine (PEPCID) 40 MG tablet Take 1 tablet (40 mg) by mouth daily 90 tablet 3     guaiFENesin-codeine (ROBITUSSIN AC) 100-10 MG/5ML solution Take 10 mLs by mouth every 6 hours as needed for cough 118 mL 0     ipratropium (ATROVENT) 0.03 % nasal spray Spray 2 sprays into both nostrils every 8 hours as needed for rhinitis Patient will call when needs filled. 30 mL 11     omeprazole (PRILOSEC) 20 MG DR capsule TAKE ONE CAPSULE BY MOUTH ONCE DAILY 90 capsule 3     sertraline (ZOLOFT) 100 MG tablet Take 2 tablets (200 mg) by mouth daily 180 tablet 4     triamcinolone (KENALOG) 0.1 % external ointment Twice daily to eczema rash on the arms and legs until clear, then as needed. 80 g 2          Review Of Systems  Skin:  negative  Eyes: negative  Ears/Nose/Throat: negative  Respiratory: No shortness of breath, dyspnea on exertion, cough, or hemoptysis    O: Physical exam:  No tenderness over ECU or EPL.  Fusiform swelling not very apparent over volar STT.  Adequate ROM.    Lab:  LAUREN 1:160       Images:EXAM: MR WRIST RIGHT W/O CONTRAST  LOCATION: [facility]  DATE/TIME: 12/27/2022 10:41 AM     INDICATION: Chronic wrist pain, right, TFCC (triangular fibrocartilage complex) injury, right, initial encounter.  COMPARISON: None.  TECHNIQUE: Unenhanced.     FINDINGS:     TENDONS:   -Extensor compartment tendons: Mild extensor carpi ulnaris tendinopathy without tearing. Mild extensor pollicis longus tendon tendinopathy without tearing. The remaining extensor tendons are negative.   -Flexor compartment tendons: No tear, tendinopathy, or tenosynovitis.     TRIANGULAR FIBROCARTILAGE COMPLEX:   -Articular disc: Intact.  -Peripheral tissue: Sprain of the ulnar attachment of the TFCC. No tearing or retraction.     LIGAMENTS:   -Scapholunate and lunotriquetral ligament: No tear identified on this non-arthrographic study.     JOINTS AND BONES:   -Trace fluid in the DRUJ. No evidence for fracture. Neutral ulnar variance. Normal carpal alignment.     SOFT TISSUES:   -Ganglion/Synovial cyst: Ganglion or synovial cyst along the volar aspect of the wrist adjacent to the confluence of the STT joints.  -Nerves: Visualized median and ulnar nerves appear intact. Guyon's canal is normal.                                                                      IMPRESSION:  1.  Mild extensor carpi ulnaris tendinopathy without tearing.  2.  Mild extensor pollicis longus tendon tendinopathy without tearing.  3.  Sprain of the ulnar attachment of the TFCC without tearing or retraction.  4.  Trace fluid in the DRUJ.  5.  No evidence for acute fracture.  6.  There is a ganglion or synovial cyst along the volar aspect of the wrist adjacent to  the STT joint. This measures 0.9 x 0.3 x 1.4 cm.    A:  R wrist volar cyst and DRUJ injury with some tendinosis R wrist, seems to be improving.    P:  Repeat MRI R wrist in a year if symptomatic.  Repeat labs in a year regardless.  Notify if exacerbation symptoms  See back one year           In addition to the above assessment and plan each active problem on Krystina's problem list was evaluated today. This included the questioning of Krystina for any medication problems. We will continue the current treatment plan for these active problems except as noted.

## 2023-04-06 ENCOUNTER — MYC REFILL (OUTPATIENT)
Dept: PEDIATRICS | Facility: CLINIC | Age: 38
End: 2023-04-06
Payer: COMMERCIAL

## 2023-04-06 DIAGNOSIS — F33.1 MAJOR DEPRESSIVE DISORDER, RECURRENT EPISODE, MODERATE (H): ICD-10-CM

## 2023-04-07 ENCOUNTER — TELEPHONE (OUTPATIENT)
Dept: GASTROENTEROLOGY | Facility: CLINIC | Age: 38
End: 2023-04-07
Payer: COMMERCIAL

## 2023-04-07 NOTE — TELEPHONE ENCOUNTER
Caller: Krystina Suazo     Reason for Reschedule/Cancellation (please be detailed, any staff messages or encounters to note?): Dr Wheatley on paternity leave. [facility] for call back to reschedule, case moved into depo.        Prior to reschedule please review:    Ordering Provider:Williams Wheatley,     Sedation per order:MAC    Does patient have any ASC Exclusions, please identify?: NO        Notes on Cancelled Procedure:    Procedure:Upper Endoscopy with BRAVO [EGD BRAVO]    Date: 3/27    Location:[facility]; [address] [place] [address]    Surgeon: DIONI      Rescheduled: Yes    Procedure: Upper Endoscopy with BRAVO [EGD BRAVO]     Date: 7/10    Location:[facility]; [address] [place] [address], [address] [place] [address]    Surgeon: Dioni    Sedation Level Scheduled  MAC,  Reason for Sedation Level order    Prep/Instructions updated and sent:

## 2023-04-10 RX ORDER — BUPROPION HYDROCHLORIDE 300 MG/1
300 TABLET ORAL EVERY MORNING
Qty: 90 TABLET | Refills: 0 | OUTPATIENT
Start: 2023-04-10

## 2023-04-10 NOTE — TELEPHONE ENCOUNTER
Duplicate - buPROPion (WELLBUTRIN XL) 300 MG 24 hr tablet 90 tablet 0 4/7/2023     Batsheva Berg RN

## 2023-04-12 ENCOUNTER — E-VISIT (OUTPATIENT)
Dept: URGENT CARE | Facility: CLINIC | Age: 38
End: 2023-04-12
Payer: COMMERCIAL

## 2023-04-12 DIAGNOSIS — J01.90 ACUTE BACTERIAL SINUSITIS: Primary | ICD-10-CM

## 2023-04-12 DIAGNOSIS — B96.89 ACUTE BACTERIAL SINUSITIS: Primary | ICD-10-CM

## 2023-04-12 PROCEDURE — 99421 OL DIG E/M SVC 5-10 MIN: CPT | Performed by: PHYSICIAN ASSISTANT

## 2023-04-12 NOTE — PATIENT INSTRUCTIONS
1.  Take antibiotic according to instructions, with food to prevent stomach upset. If you are prone to stomach upset with antibiotics, I recommend adding a probiotic to this regimen.  Culturelle is a trusted brand.  2.  I recommend using Mucinex to help thin mucus secretions.  Adding a nasal steroid spray such as Flonase can also be helpful with clearing sinus congestion.  3.  Take Tylenol 650mg every 4 hours or ibuprofen 600mg every 6 hours by mouth for pain/fever.  Do not exceed 4000mg of acetaminophen or 2400mg of ibuprofen from any source in a 24 hour period.  Taking Tylenol and ibuprofen together may be helpful in reducing pain.   4.  Follow-up if you not having any improvement in your symptoms over the next 5 days.    Sinusitis  The sinuses are air-filled spaces within the bones of the face. They connect to the inside of the nose. Sinusitis is an inflammation of the tissue that lines the sinuses. Sinusitis can occur during a cold. It can also happen due to allergies to pollens and other particles in the air. Sinusitis can cause symptoms of sinus congestion and a feeling of fullness. A sinus infection causes fever, headache, and facial pain. There is often green or yellow fluid draining from the nose or into the back of the throat (post-nasal drip). You have been given antibiotics to treat this condition.  Home care    Take the full course of antibiotics as instructed. Do not stop taking them, even when you feel better.    Drink plenty of water, hot tea, and other liquids. This may help thin nasal mucus. It also may help your sinuses drain fluids.    Heat may help soothe painful areas of your face. Use a towel soaked in hot water. Or,  the shower and direct the warm spray onto your face. Using a vaporizer along with a menthol rub at night may also help soothe symptoms.     An expectorant with guaifenesin may help thin nasal mucus and help your sinuses drain fluids.    You can use an over-the-counter  decongestant, unless a similar medicine was prescribed to you. Nasal sprays work the fastest. Use one that contains phenylephrine or oxymetazoline. First blow your nose gently. Then use the spray. Do not use these medicines more often than directed on the label. If you do, your symptoms may get worse. You may also take pills that contain pseudoephedrine. Don t use products that combine multiple medicines. This is because side effects may be increased. Read labels. You can also ask the pharmacist for help. (People with high blood pressure should not use decongestants. They can raise blood pressure.)    Over-the-counter antihistamines may help if allergies contributed to your sinusitis.      Do not use nasal rinses or irrigation during an acute sinus infection, unless your healthcare provider tells you to. Rinsing may spread the infection to other areas in your sinuses.    Use acetaminophen or ibuprofen to control pain, unless another pain medicine was prescribed to you. If you have chronic liver or kidney disease or ever had a stomach ulcer, talk with your healthcare provider before using these medicines. (Aspirin should never be taken by anyone under age 18 who is ill with a fever. It may cause severe liver damage.)    Don't smoke. This can make symptoms worse.  Follow-up care  Follow up with your healthcare provider or our staff if you are better in 1 week.  When to seek medical advice  Call your healthcare provider if any of these occur:    Facial pain or headache that gets worse    Stiff neck    Unusual drowsiness or confusion    Swelling of your forehead or eyelids    Vision problems, such as blurred or double vision    Fever of 100.4 F (38 C) or higher, or as directed by your healthcare provider    Seizure    Breathing problems    Symptoms don't go away in 10 days  Prevention  Here are steps you can take to help prevent an infection:    Keep good hand washing habits.    Don t have close contact with people who  have sore throats, colds, or other upper respiratory infections.    Don t smoke, and stay away from secondhand smoke.    Stay up to date with of your vaccines.  Date Last Reviewed: 11/1/2017 2000-2017 [employer]. [address] [place] [address], [place] [address]. All rights reserved. This information is not intended as a substitute for professional medical care. Always follow your healthcare professional's instructions.

## 2023-06-26 ENCOUNTER — TELEPHONE (OUTPATIENT)
Dept: GASTROENTEROLOGY | Facility: CLINIC | Age: 38
End: 2023-06-26

## 2023-06-26 NOTE — TELEPHONE ENCOUNTER
Pre assessment completed for upcoming procedure.      Procedure details:    Patient scheduled for Upper endoscopy with BRAVO capsule placement on 7/10/23.     Arrival time: 0715. Procedure time 0815    Pre op exam needed? N/A    [facility] [address] [place] [address], [address] Floor, [place] [address]    Sedation type: MAC    Indication for procedure: heartburn     COVID policy reviewed.    Designated  policy reviewed. Instructed to have someone stay 24 hours post procedure.       Chart review:     Electronic implanted devices? No    Diabetic? No      Medication review:    Anticoagulants? No    NSAIDS? Yes.  Ibuprofen (Advil, Motrin).  Holding interval of 1 day before procedure.    Other medication HOLDING recommendations:  PPIs- Hold 7 days prior to scheduled procedure patient is currently only taking pepcid, will hold x 7 days      Prep for procedure:     Prep instructions sent via [employer]     Reviewed procedure prep instructions.     Patient verbalized understanding and had no questions or concerns at this time.        Candy Maher RN  Endoscopy Procedure Pre Assessment RN  [phone] option 4

## 2023-06-30 ENCOUNTER — PATIENT OUTREACH (OUTPATIENT)
Dept: GASTROENTEROLOGY | Facility: CLINIC | Age: 38
End: 2023-06-30
Payer: COMMERCIAL

## 2023-07-02 ENCOUNTER — MYC REFILL (OUTPATIENT)
Dept: PEDIATRICS | Facility: CLINIC | Age: 38
End: 2023-07-02
Payer: COMMERCIAL

## 2023-07-02 DIAGNOSIS — F33.1 MAJOR DEPRESSIVE DISORDER, RECURRENT EPISODE, MODERATE (H): ICD-10-CM

## 2023-07-05 ENCOUNTER — PATIENT OUTREACH (OUTPATIENT)
Dept: GASTROENTEROLOGY | Facility: CLINIC | Age: 38
End: 2023-07-05
Payer: COMMERCIAL

## 2023-07-05 RX ORDER — BUPROPION HYDROCHLORIDE 300 MG/1
300 TABLET ORAL EVERY MORNING
Qty: 90 TABLET | Refills: 1 | Status: SHIPPED | OUTPATIENT
Start: 2023-07-05 | End: 2023-10-23

## 2023-07-05 NOTE — TELEPHONE ENCOUNTER
Reached out to pt again re: Bravo study.  Left detailed vm for call back to set up return visit and go over information and instructions.  Bravo Paperwork given to Endoscopy staff for Monday morning EGD with Bravo.

## 2023-07-05 NOTE — TELEPHONE ENCOUNTER
Routing refill request to provider for review/approval because:  Patient needs to be seen because it has been more than 6 months since last office visit.  PHQ-9 not current    Trinity PINZON RN, BSN, PHN  [facility]  [phone]

## 2023-07-06 DIAGNOSIS — F33.1 MAJOR DEPRESSIVE DISORDER, RECURRENT EPISODE, MODERATE (H): ICD-10-CM

## 2023-07-06 NOTE — TELEPHONE ENCOUNTER
Pending Prescriptions:                       Disp   Refills    buPROPion (WELLBUTRIN XL) 300 MG 24 hr ta*90 tab*1            Sig: Take 1 tablet (300 mg) by mouth every morning

## 2023-07-07 ENCOUNTER — ANESTHESIA EVENT (OUTPATIENT)
Dept: SURGERY | Facility: AMBULATORY SURGERY CENTER | Age: 38
End: 2023-07-07
Payer: COMMERCIAL

## 2023-07-07 RX ORDER — BUPROPION HYDROCHLORIDE 300 MG/1
300 TABLET ORAL EVERY MORNING
Qty: 90 TABLET | Refills: 1 | OUTPATIENT
Start: 2023-07-07

## 2023-07-10 ENCOUNTER — HOSPITAL ENCOUNTER (OUTPATIENT)
Facility: AMBULATORY SURGERY CENTER | Age: 38
Discharge: HOME | End: 2023-07-10
Attending: INTERNAL MEDICINE
Payer: COMMERCIAL

## 2023-07-10 ENCOUNTER — ANESTHESIA (OUTPATIENT)
Dept: SURGERY | Facility: AMBULATORY SURGERY CENTER | Age: 38
End: 2023-07-10
Payer: COMMERCIAL

## 2023-07-10 VITALS
HEIGHT: 69 IN | RESPIRATION RATE: 16 BRPM | OXYGEN SATURATION: 98 % | HEART RATE: 80 BPM | DIASTOLIC BLOOD PRESSURE: 59 MMHG | SYSTOLIC BLOOD PRESSURE: 120 MMHG | TEMPERATURE: 97.7 F | WEIGHT: 200 LBS | BODY MASS INDEX: 29.62 KG/M2

## 2023-07-10 VITALS — HEART RATE: 93 BPM

## 2023-07-10 LAB — UPPER GI ENDOSCOPY: NORMAL

## 2023-07-10 PROCEDURE — 88305 TISSUE EXAM BY PATHOLOGIST: CPT | Mod: TC | Performed by: INTERNAL MEDICINE

## 2023-07-10 PROCEDURE — 43239 EGD BIOPSY SINGLE/MULTIPLE: CPT | Performed by: INTERNAL MEDICINE

## 2023-07-10 PROCEDURE — 88305 TISSUE EXAM BY PATHOLOGIST: CPT | Mod: 26 | Performed by: PATHOLOGY

## 2023-07-10 PROCEDURE — 91035 G-ESOPH REFLX TST W/ELECTROD: CPT | Mod: TC | Performed by: INTERNAL MEDICINE

## 2023-07-10 RX ORDER — NALOXONE HYDROCHLORIDE 0.4 MG/ML
0.2 INJECTION, SOLUTION INTRAMUSCULAR; INTRAVENOUS; SUBCUTANEOUS
Status: DISCONTINUED | OUTPATIENT
Start: 2023-07-10 | End: 2023-07-11 | Stop reason: HOSPADM

## 2023-07-10 RX ORDER — ONDANSETRON 4 MG/1
4 TABLET, ORALLY DISINTEGRATING ORAL EVERY 6 HOURS PRN
Status: DISCONTINUED | OUTPATIENT
Start: 2023-07-10 | End: 2023-07-11 | Stop reason: HOSPADM

## 2023-07-10 RX ORDER — FENTANYL CITRATE 50 UG/ML
25 INJECTION, SOLUTION INTRAMUSCULAR; INTRAVENOUS EVERY 5 MIN PRN
Status: DISCONTINUED | OUTPATIENT
Start: 2023-07-10 | End: 2023-07-10 | Stop reason: HOSPADM

## 2023-07-10 RX ORDER — FLUMAZENIL 0.1 MG/ML
0.2 INJECTION, SOLUTION INTRAVENOUS
Status: ACTIVE | OUTPATIENT
Start: 2023-07-10 | End: 2023-07-10

## 2023-07-10 RX ORDER — ONDANSETRON 2 MG/ML
4 INJECTION INTRAMUSCULAR; INTRAVENOUS EVERY 6 HOURS PRN
Status: DISCONTINUED | OUTPATIENT
Start: 2023-07-10 | End: 2023-07-11 | Stop reason: HOSPADM

## 2023-07-10 RX ORDER — BUPROPION HYDROCHLORIDE 300 MG/1
300 TABLET ORAL EVERY MORNING
Qty: 90 TABLET | Refills: 1 | OUTPATIENT
Start: 2023-07-10

## 2023-07-10 RX ORDER — ONDANSETRON 2 MG/ML
4 INJECTION INTRAMUSCULAR; INTRAVENOUS
Status: DISCONTINUED | OUTPATIENT
Start: 2023-07-10 | End: 2023-07-10 | Stop reason: HOSPADM

## 2023-07-10 RX ORDER — ONDANSETRON 4 MG/1
4 TABLET, ORALLY DISINTEGRATING ORAL EVERY 30 MIN PRN
Status: DISCONTINUED | OUTPATIENT
Start: 2023-07-10 | End: 2023-07-10 | Stop reason: HOSPADM

## 2023-07-10 RX ORDER — HYDROMORPHONE HYDROCHLORIDE 1 MG/ML
0.2 INJECTION, SOLUTION INTRAMUSCULAR; INTRAVENOUS; SUBCUTANEOUS EVERY 5 MIN PRN
Status: DISCONTINUED | OUTPATIENT
Start: 2023-07-10 | End: 2023-07-10 | Stop reason: HOSPADM

## 2023-07-10 RX ORDER — LIDOCAINE HYDROCHLORIDE 20 MG/ML
INJECTION, SOLUTION INFILTRATION; PERINEURAL PRN
Status: DISCONTINUED | OUTPATIENT
Start: 2023-07-10 | End: 2023-07-10

## 2023-07-10 RX ORDER — ONDANSETRON 2 MG/ML
4 INJECTION INTRAMUSCULAR; INTRAVENOUS EVERY 30 MIN PRN
Status: DISCONTINUED | OUTPATIENT
Start: 2023-07-10 | End: 2023-07-10 | Stop reason: HOSPADM

## 2023-07-10 RX ORDER — SODIUM CHLORIDE, SODIUM LACTATE, POTASSIUM CHLORIDE, CALCIUM CHLORIDE 600; 310; 30; 20 MG/100ML; MG/100ML; MG/100ML; MG/100ML
INJECTION, SOLUTION INTRAVENOUS CONTINUOUS
Status: DISCONTINUED | OUTPATIENT
Start: 2023-07-10 | End: 2023-07-10 | Stop reason: HOSPADM

## 2023-07-10 RX ORDER — HYDROMORPHONE HYDROCHLORIDE 1 MG/ML
0.4 INJECTION, SOLUTION INTRAMUSCULAR; INTRAVENOUS; SUBCUTANEOUS EVERY 5 MIN PRN
Status: DISCONTINUED | OUTPATIENT
Start: 2023-07-10 | End: 2023-07-10 | Stop reason: HOSPADM

## 2023-07-10 RX ORDER — SODIUM CHLORIDE, SODIUM LACTATE, POTASSIUM CHLORIDE, CALCIUM CHLORIDE 600; 310; 30; 20 MG/100ML; MG/100ML; MG/100ML; MG/100ML
INJECTION, SOLUTION INTRAVENOUS CONTINUOUS PRN
Status: DISCONTINUED | OUTPATIENT
Start: 2023-07-10 | End: 2023-07-10

## 2023-07-10 RX ORDER — PROPOFOL 10 MG/ML
INJECTION, EMULSION INTRAVENOUS PRN
Status: DISCONTINUED | OUTPATIENT
Start: 2023-07-10 | End: 2023-07-10

## 2023-07-10 RX ORDER — PROPOFOL 10 MG/ML
INJECTION, EMULSION INTRAVENOUS CONTINUOUS PRN
Status: DISCONTINUED | OUTPATIENT
Start: 2023-07-10 | End: 2023-07-10

## 2023-07-10 RX ORDER — CETIRIZINE HYDROCHLORIDE 10 MG/1
10 TABLET ORAL DAILY
COMMUNITY

## 2023-07-10 RX ORDER — NALOXONE HYDROCHLORIDE 0.4 MG/ML
0.4 INJECTION, SOLUTION INTRAMUSCULAR; INTRAVENOUS; SUBCUTANEOUS
Status: DISCONTINUED | OUTPATIENT
Start: 2023-07-10 | End: 2023-07-11 | Stop reason: HOSPADM

## 2023-07-10 RX ORDER — LIDOCAINE 40 MG/G
CREAM TOPICAL
Status: DISCONTINUED | OUTPATIENT
Start: 2023-07-10 | End: 2023-07-10 | Stop reason: HOSPADM

## 2023-07-10 RX ORDER — FENTANYL CITRATE 50 UG/ML
50 INJECTION, SOLUTION INTRAMUSCULAR; INTRAVENOUS EVERY 5 MIN PRN
Status: DISCONTINUED | OUTPATIENT
Start: 2023-07-10 | End: 2023-07-10 | Stop reason: HOSPADM

## 2023-07-10 RX ORDER — PROCHLORPERAZINE MALEATE 10 MG
10 TABLET ORAL EVERY 6 HOURS PRN
Status: DISCONTINUED | OUTPATIENT
Start: 2023-07-10 | End: 2023-07-11 | Stop reason: HOSPADM

## 2023-07-10 RX ADMIN — PROPOFOL 60 MG: 10 INJECTION, EMULSION INTRAVENOUS at 08:39

## 2023-07-10 RX ADMIN — PROPOFOL 200 MCG/KG/MIN: 10 INJECTION, EMULSION INTRAVENOUS at 08:31

## 2023-07-10 RX ADMIN — LIDOCAINE HYDROCHLORIDE 100 MG: 20 INJECTION, SOLUTION INFILTRATION; PERINEURAL at 08:31

## 2023-07-10 RX ADMIN — PROPOFOL 100 MG: 10 INJECTION, EMULSION INTRAVENOUS at 08:31

## 2023-07-10 RX ADMIN — SODIUM CHLORIDE, SODIUM LACTATE, POTASSIUM CHLORIDE, CALCIUM CHLORIDE: 600; 310; 30; 20 INJECTION, SOLUTION INTRAVENOUS at 07:27

## 2023-07-10 NOTE — ANESTHESIA CARE TRANSFER NOTE
Patient: Krystina Suazo    Procedure: Procedure(s):  EGD, gastroesophageal reflux test with mucosal PH electrode with BIOPSY       Diagnosis: Heartburn [R12]  Diagnosis Additional Information: No value filed.    Anesthesia Type:   MAC     Note:    Oropharynx: oropharynx clear of all foreign objects  Level of Consciousness: awake  Oxygen Supplementation: room air    Independent Airway: airway patency satisfactory and stable  Dentition: dentition unchanged  Vital Signs Stable: post-procedure vital signs reviewed and stable    Patient transferred to: Phase II  Comments: VSS and WNL, comfortable, no PONV, report to Venus SCHROEDER RN  Handoff Report: Identifed the Patient, Identified the Reponsible Provider, Reviewed the pertinent medical history, Discussed the surgical course, Reviewed Intra-OP anesthesia mangement and issues during anesthesia, Set expectations for post-procedure period and Allowed opportunity for questions and acknowledgement of understanding      Vitals:  Vitals Value Taken Time   BP     Temp     Pulse     Resp     SpO2         Electronically Signed By: JAMIN Castrejon CRNA  July 10, 2023  8:48 AM   Right ear hearing screen completed date: 2023  Right ear screen method: EOAE (evoked otoacoustic emission)  Right ear screen result: Passed  Right ear screen comment: N/A    Left ear hearing screen completed date: 2023  Left ear screen method: EOAE (evoked otoacoustic emission)  Left ear screen result: Passed  Left ear screen comments: N/A

## 2023-07-10 NOTE — ANESTHESIA POSTPROCEDURE EVALUATION
Patient: Krystina Suazo    Procedure: Procedure(s):  EGD, gastroesophageal reflux test with mucosal PH electrode with BIOPSY       Anesthesia Type:  MAC    Note:  Disposition: Outpatient   Postop Pain Control: Uneventful            Sign Out: Well controlled pain   PONV: No   Neuro/Psych: Uneventful            Sign Out: Acceptable/Baseline neuro status   Airway/Respiratory: Uneventful            Sign Out: Acceptable/Baseline resp. status   CV/Hemodynamics: Uneventful            Sign Out: Acceptable CV status; No obvious hypovolemia; No obvious fluid overload   Other NRE: NONE   DID A NON-ROUTINE EVENT OCCUR? No           Last vitals:  Vitals Value Taken Time   /59 07/10/23 0915   Temp 36.5  C (97.7  F) 07/10/23 0915   Pulse     Resp 16 07/10/23 0915   SpO2 98 % 07/10/23 0915       Electronically Signed By: Belinda Velarde MD  July 10, 2023  3:11 PM

## 2023-07-10 NOTE — ANESTHESIA PREPROCEDURE EVALUATION
Anesthesia Pre-Procedure Evaluation    Patient: Krystina Suazo   MRN: [id number] : 1985        Procedure : Procedure(s):  EGD, gastroesophageal reflux test with mucosal PH electrode          Past Medical History:   Diagnosis Date     Asthma      Depression      Depressive disorder     Diagnosed around      Hypertension 2018     Uterine fibroid in pregnancy 2020     Vasovagal syncope       Past Surgical History:   Procedure Laterality Date     CARPAL TUNNEL RELEASE RT/LT Bilateral       SECTION N/A 2018    Procedure:  SECTION;   section;  Surgeon: Jay Yanez MD;  Location: [facility] OR      SECTION N/A 10/27/2020    Procedure: REPEAT  SECTION;  Surgeon: Aníbal Pearson MD;  Location: [facility] L+D     GYN SURGERY  2018         HERNIORRHAPHY UMBILICAL N/A 2021    Procedure: Open Umbilical Hernia Repair with Mesh;  Surgeon: Erin Mccarthy MD;  Location: [facility] OR     HYSTERECTOMY TOTAL ABDOMINAL, SALPINGECTOMY N/A 2021    Procedure: TOTAL ABDOMINAL HYSTERECTOMY, BILATERAL SALPINGECTOMY;  Surgeon: Sandy Tran MD;  Location: [facility] OR     HYSTERECTOMY, PAP NO LONGER INDICATED       SINUS SURGERY  2014     TONSILLECTOMY        No Known Allergies   Social History     Tobacco Use     Smoking status: Never     Smokeless tobacco: Never   Substance Use Topics     Alcohol use: No      Wt Readings from Last 1 Encounters:   23 93 kg (205 lb)              OUTSIDE LABS:  CBC:   Lab Results   Component Value Date    WBC 6.4 2022    WBC 7.7 2021    HGB 13.5 2022    HGB 11.9 2021    HCT 41.5 2022    HCT 42.1 2021     2022     2021     BMP:   Lab Results   Component Value Date     2018     2018    POTASSIUM 3.9 2018    POTASSIUM 4.2 2018    CHLORIDE 106 2018    CHLORIDE 107 2018    CO2 22 2018    CO2 22  08/30/2018    BUN 13 09/04/2018    BUN 13 08/30/2018    CR 0.66 12/23/2021    CR 0.47 (L) 10/27/2020     (H) 07/05/2022    GLC 92 12/23/2021     COAGS: No results found for: PTT, INR, FIBR  POC:   Lab Results   Component Value Date    HCG Negative 12/22/2021     HEPATIC:   Lab Results   Component Value Date    ALBUMIN 2.6 (L) 09/04/2018    PROTTOTAL 6.0 (L) 09/04/2018    ALT 14 09/06/2018    AST 14 09/06/2018    ALKPHOS 351 (H) 09/04/2018    BILITOTAL 0.1 (L) 09/04/2018     OTHER:   Lab Results   Component Value Date    A1C 5.3 04/25/2019    LORNA 8.6 09/04/2018    MAG 4.2 (H) 09/06/2018    TSH 1.74 03/18/2022    SED 8 02/02/2023       Anesthesia Plan    ASA Status:  2      Anesthesia Type: MAC.   Induction: Intravenous.   Maintenance: TIVA.        Consents            Postoperative Care    Pain management: Multi-modal analgesia.   PONV prophylaxis: Background Propofol Infusion, Ondansetron (or other 5HT-3)     Comments:                Belinda Velarde MD

## 2023-07-10 NOTE — H&P
Krystinageovanna Suazo  [id number]  female  37 year old      Reason for procedure/surgery: egd bravo    Patient Active Problem List   Diagnosis     COURTNEY (generalized anxiety disorder)     Major depressive disorder, recurrent episode, moderate (H)     Allergic rhinitis     Mild intermittent asthma without complication     Vasovagal syncope     Previous  section     Umbilical hernia without obstruction and without gangrene     S/P MINAL (total abdominal hysterectomy)     Family history of esophageal cancer     Family history of diabetes mellitus type II     Heartburn     Regurgitation of food       Past Surgical History:    Past Surgical History:   Procedure Laterality Date     CARPAL TUNNEL RELEASE RT/LT Bilateral       SECTION N/A 2018    Procedure:  SECTION;   section;  Surgeon: Jay Yanez MD;  Location: [facility]      SECTION N/A 10/27/2020    Procedure: REPEAT  SECTION;  Surgeon: Aníbal Pearson MD;  Location: [facility] L[facility]     GYN SURGERY  2018         HERNIORRHAPHY UMBILICAL N/A 2021    Procedure: Open Umbilical Hernia Repair with Mesh;  Surgeon: Erin Mccarthy MD;  Location: [facility]     HYSTERECTOMY TOTAL ABDOMINAL, SALPINGECTOMY N/A 2021    Procedure: TOTAL ABDOMINAL HYSTERECTOMY, BILATERAL SALPINGECTOMY;  Surgeon: Sandy Tran MD;  Location: [facility]     HYSTERECTOMY, PAP NO LONGER INDICATED       SINUS SURGERY  2014     TONSILLECTOMY         Past Medical History:   Past Medical History:   Diagnosis Date     Asthma      Depression      Depressive disorder     Diagnosed around      Hypertension 2018     Uterine fibroid in pregnancy 2020     Vasovagal syncope        Social History:   Social History     Tobacco Use     Smoking status: Never     Smokeless tobacco: Never   Substance Use Topics     Alcohol use: No       Family History:   Family History   Problem Relation Age of Onset     Esophageal Cancer  Mother 57     Diabetes Mother         Type 2     Hypertension Mother      Other Cancer Mother         Esophageal     Depression Mother      Obesity Mother      Asthma Father      Glaucoma No family hx of      Macular Degeneration No family hx of      Breast Cancer No family hx of      Colon Cancer No family hx of        Allergies: No Known Allergies    Active Medications:   Current Outpatient Medications   Medication Sig Dispense Refill     albuterol (PROAIR HFA/PROVENTIL HFA/VENTOLIN HFA) 108 (90 Base) MCG/ACT inhaler INHALE 2 PUFFS INTO THE LUNGS EVERY 4 HOURS AS NEEDED FOR SHORTNESS OF BREATH / DYSPNEA OR WHEEZING 8.5 g 11     buPROPion (WELLBUTRIN XL) 300 MG 24 hr tablet Take 1 tablet (300 mg) by mouth every morning 90 tablet 1     cetirizine (ZYRTEC) 10 MG tablet Take 10 mg by mouth daily       clindamycin (CLEOCIN T) 1 % external lotion Twice daily to acne rash on thighs as needed. 60 mL 4     clobetasol (TEMOVATE) 0.05 % external solution Apply topically 2 times daily as needed (dermatitis) 50 mL 1     famotidine (PEPCID) 40 MG tablet Take 1 tablet (40 mg) by mouth daily 90 tablet 3     ipratropium (ATROVENT) 0.03 % nasal spray Spray 2 sprays into both nostrils every 8 hours as needed for rhinitis Patient will call when needs filled. 30 mL 11     omeprazole (PRILOSEC) 20 MG DR capsule TAKE ONE CAPSULE BY MOUTH ONCE DAILY 90 capsule 3     sertraline (ZOLOFT) 100 MG tablet Take 2 tablets (200 mg) by mouth daily 180 tablet 4     triamcinolone (KENALOG) 0.1 % external ointment Twice daily to eczema rash on the arms and legs until clear, then as needed. 80 g 2     benzonatate (TESSALON) 200 MG capsule Take 1 capsule (200 mg) by mouth 3 times daily as needed for cough 30 capsule 0     guaiFENesin-codeine (ROBITUSSIN AC) 100-10 MG/5ML solution Take 10 mLs by mouth every 6 hours as needed for cough 118 mL 0       Systemic Review:   CONSTITUTIONAL: NEGATIVE for fever, chills, change in weight  ENT/MOUTH: NEGATIVE for  "ear, mouth and throat problems  RESP: NEGATIVE for significant cough or SOB  CV: NEGATIVE for chest pain, palpitations or peripheral edema    Physical Examination:   Vital Signs: /68 (BP Location: Right arm)   Pulse 80   Temp 97.3  F (36.3  C) (Temporal)   Resp 18   Ht 1.753 m (5' 9\")   Wt 90.7 kg (200 lb)   LMP 12/08/2021   SpO2 96%   BMI 29.53 kg/m    GENERAL: healthy, alert and no distress  NECK: no adenopathy, no asymmetry, masses, or scars  RESP: lungs clear to auscultation - no rales, rhonchi or wheezes  CV: regular rate and rhythm, normal S1 S2, no S3 or S4, no murmur, click or rub, no peripheral edema and peripheral pulses strong  ABDOMEN: soft, nontender, no hepatosplenomegaly, no masses and bowel sounds normal  MS: no gross musculoskeletal defects noted, no edema    Plan: Appropriate to proceed as scheduled.      Williams Wheatley DO  7/10/2023    PCP:  Valeria Daly"

## 2023-07-11 LAB
PATH REPORT.COMMENTS IMP SPEC: NORMAL
PATH REPORT.COMMENTS IMP SPEC: NORMAL
PATH REPORT.FINAL DX SPEC: NORMAL
PATH REPORT.GROSS SPEC: NORMAL
PATH REPORT.MICROSCOPIC SPEC OTHER STN: NORMAL
PATH REPORT.RELEVANT HX SPEC: NORMAL
PHOTO IMAGE: NORMAL

## 2023-07-14 ENCOUNTER — ALLIED HEALTH/NURSE VISIT (OUTPATIENT)
Dept: GASTROENTEROLOGY | Facility: CLINIC | Age: 38
End: 2023-07-14
Payer: COMMERCIAL

## 2023-07-14 DIAGNOSIS — R12 HEARTBURN: Primary | ICD-10-CM

## 2023-07-14 DIAGNOSIS — R11.10 REGURGITATION OF FOOD: ICD-10-CM

## 2023-07-14 NOTE — PROGRESS NOTES
Bravo monitor and diary returned; accuracy of entries verified with patient.   Woo study completion confirmed.  Data uploaded and sent to reading provider for interpretation.  Reading Provider:  Dr. Williams Wheatley

## 2023-07-31 DIAGNOSIS — F33.1 MAJOR DEPRESSIVE DISORDER, RECURRENT EPISODE, MODERATE (H): ICD-10-CM

## 2023-08-02 RX ORDER — SERTRALINE HYDROCHLORIDE 100 MG/1
200 TABLET, FILM COATED ORAL DAILY
Qty: 180 TABLET | Refills: 0 | Status: SHIPPED | OUTPATIENT
Start: 2023-08-02 | End: 2023-10-23

## 2023-08-02 NOTE — TELEPHONE ENCOUNTER
Medication is being filled for 1 time refill only due to:  Patient needs to be seen because it has been more than one year since last visit.  Prescription approved per [facility] Refill Protocol.  Routed to  for scheduling  Maria Isabel Ferrell RN, BSN  [facility]

## 2023-09-20 ENCOUNTER — IMMUNIZATION (OUTPATIENT)
Dept: PEDIATRICS | Facility: CLINIC | Age: 38
End: 2023-09-20
Payer: COMMERCIAL

## 2023-09-20 DIAGNOSIS — Z23 NEED FOR PROPHYLACTIC VACCINATION AND INOCULATION AGAINST INFLUENZA: Primary | ICD-10-CM

## 2023-09-20 PROCEDURE — 90471 IMMUNIZATION ADMIN: CPT

## 2023-09-20 PROCEDURE — 90686 IIV4 VACC NO PRSV 0.5 ML IM: CPT

## 2023-09-20 PROCEDURE — 99207 PR NO CHARGE NURSE ONLY: CPT

## 2023-09-25 ENCOUNTER — OFFICE VISIT (OUTPATIENT)
Dept: GASTROENTEROLOGY | Facility: CLINIC | Age: 38
End: 2023-09-25
Payer: COMMERCIAL

## 2023-09-25 VITALS
SYSTOLIC BLOOD PRESSURE: 133 MMHG | BODY MASS INDEX: 30.36 KG/M2 | DIASTOLIC BLOOD PRESSURE: 72 MMHG | HEIGHT: 69 IN | HEART RATE: 90 BPM | WEIGHT: 205 LBS

## 2023-09-25 DIAGNOSIS — R12 HEARTBURN: ICD-10-CM

## 2023-09-25 PROCEDURE — 99215 OFFICE O/P EST HI 40 MIN: CPT | Performed by: PHYSICIAN ASSISTANT

## 2023-09-25 RX ORDER — FAMOTIDINE 20 MG/1
20 TABLET, FILM COATED ORAL DAILY
Qty: 90 TABLET | Refills: 1 | Status: SHIPPED | OUTPATIENT
Start: 2023-09-25 | End: 2024-01-22

## 2023-09-25 ASSESSMENT — PAIN SCALES - GENERAL: PAINLEVEL: NO PAIN (0)

## 2023-09-25 NOTE — PROGRESS NOTES
Gastroenterology Visit for: Krystina Suazo 1985   MRN: [id number]     Reason for Visit:  chief complaint    Referred by: Self  / No address on file  Patient Care Team:  Valeria Daly MD as PCP - General (Internal Medicine)  Valeria Daly MD as Assigned PCP  Krystina Young MD as Assigned Surgical Provider  Williams Wheatley DO as Assigned Gastroenterology Provider  Freddie Higginbotham MD as Assigned Musculoskeletal Provider  Erin Engle PA-C as Physician Assistant (Gastroenterology)    History of Present Illness:   Krystina Suazo is a 38 year old female with significant past medical history pertinent for depression, anxiety, asthma, allergic rhinitis, vasovagal syncope who is presenting follow-up patient however is a new patient to myself who was previously seen by Dr. Williams Wheatley with a chief complaint of heartburn/regurgitation and intermittent bloating.    Interval History September 25, 2023:    Currently Michelle is taking Pepcid 20 mg once daily with adequate control of her reflux symptoms. Stating she has had 1-2 episodes of heartburn/regurgitation that has occurred since her last office visit after consumption of a trigger food.     She notes having swallowing difficulties occurring at once every other month. Triggers include waffles and dry breads.  When this happens she says she states the food just passes slowly and the sensation is self relieving.    Additionally, she reports having bloating that has been on going since she was a teenager can occur maybe once per quarter. Explaining this is occurring 3-4 times per year.  Symptoms are controlled with GasX. No association with oral intake, defecation or postural movements.  The bloating may last a few hours however then subsides.    Currently she is stooling once daily that is soft and formed.    Denies unintentional weight loss, nausea, emesis, abdominal pain, early satiation, early satiety, diarrhea, constipation (< 3  stools per week), nocturnal stooling, incontinence of feces, melena, hematochezia and BRBR.     Mother had esophageal cancer in her 50s.  She reports that she had a history of untreated acid reflux for many years prior to this diagnosis.     No additional family history or GI related malignancy (esophageal, gastric, pancreatic, liver or colon) or family history of IBD/celiac disease.     --------------------------------------------------------------------------------------------------------------------------------------------------------------------------------------  12/20/22 Dr. Williams Wheatley      Currently the patient is off of PPI and taking pepcid 40mg daily. She feels that her symptoms are better than when on 20mg pepcid. She felt that her heartburn is manageable. When she bends over shortly after eating she has regurgitation. PPI didn't seem to control regurgitation by the time of our last visit. Heartburn has been well controlled. She has noticed some regurgitation with tomato sauces. In the morning it feels like she has increased reflux. Umbilical hernia fixed 1.5 years ago. Bloating and gas has been better. Did not correlate with PPI cessation.  Unable to associate with foods or activity. Symptoms occur randomly. Does not have flatulence associated. She rests and allows it to pass. Has predominantly bloating with minimal to no distention.   ----------------------------------------------------------------------------------------------------------------------------------------------------------------------------------------------  8/16/22 Dr. Williams Suazo states she has been taking 20mg PPI (omeprazole) for heartburn for the past 13 years. Her heartburn has been well controlled on this regimen, but her PCP would like her to stop taking the medication if possible. She takes her PPI in the morning before breakfast, sometimes taking it with her vitamins. Her heartburn was not severe when  taking the PPI, but she started on the medicine given her mother's history of esophageal cancer presumed to be related to acid reflux. No side known effects with PPI to date. Has tried stopping the PPI, but her heartburn symptoms return. Has not tried taking Pepcid. No previous upper endoscopy.         Additionally, pt has had problems with severe gas for at least the past 13 years as well. States no constipation, but some bloating. 3-4 soft stools per day, has been her normal. No diarrhea or bloody stools.  She has been unable to associate these symptoms with any particular food. No history of celiac disease.       History of psoriasis and asthma.     Esophageal Questionnaire(s)    BEDQ Questionnaire      8/15/2022     2:13 PM 12/13/2022     9:47 AM 9/18/2023    12:10 PM   BEDQ Questionnaire: How Often Have You Had the Following?   Trouble eating solid food (meat, bread, vegetables) 0 0 1   Trouble eating soft foods (yogurt, jello, pudding) 0 0 0   Trouble swallowing liquids 0 0 0   Pain while swallowing 0 0 0   Coughing or choking while swallowing foods or liquids 0 0 0   Total Score: 0 0 1         8/15/2022     2:13 PM 12/13/2022     9:47 AM 9/18/2023    12:10 PM   BEDQ Questionnaire: Discomfort/Pain Ratings   Eating solid food (meat, bread, vegetables) 0 0 2   Eating soft foods (yogurt, jello, pudding) 0 0 0   Drinking liquid 0 0 0   Total Score: 0 0 2       Eckardt Questionnaire      8/15/2022     2:14 PM 12/13/2022     9:48 AM 9/18/2023    12:11 PM   Eckardt Questionnaire   Dysphagia 0 0 1   Regurgitation 1 1 0   Retrosternal Pain 1 1 1   Weight Loss (kg) 3 0 0   Total Score:  5 2 2       Promis 10 Questionnaire      12/13/2022     9:49 AM 9/18/2023    12:12 PM   PROMIS 10 FLOWSHEET DATA   In general, would you say your health is: 4 4   In general, would you say your quality of life is: 4 4   In general, how would you rate your physical health? 3 3   In general, how would you rate your mental health, including  your mood and your ability to think? 3 3   In general, how would you rate your satisfaction with your social activities and relationships? 4 4   In general, please rate how well you carry out your usual social activities and roles. (This includes activities at home, at work and in your community, and responsibilities as a parent, child, spouse, employee, friend, etc.) 4 4   To what extent are you able to carry out your everyday physical activities such as walking, climbing stairs, carrying groceries, or moving a chair? 5 5   In the past 7 days, how often have you been bothered by emotional problems such as feeling anxious, depressed, or irritable? 3 3   In the past 7 days, how would you rate your fatigue on average? 3 2   In the past 7 days, how would you rate your pain on average, where 0 means no pain, and 10 means worst imaginable pain? 2 0   Mental health question re-calculation - no clinical value 3 3   Physical health question re-calculation - no clinical value 3 4   Pain question re-calculation - no clinical value 4 5   Global Mental Health Score 14 14   Global Physical Health Score 15 17   PROMIS TOTAL - SUBSCORES 29 31           STUDIES & PROCEDURES:    EGD:     7/10/2023   Findings:       The examined esophagus was normal. The BRAVO capsule with delivery        system was introduced through the mouth and advanced into the esophagus,        such that the BRAVO pH capsule was positioned 34 cm from the incisors,        which was 6 cm proximal to the GE junction. Suction was applied to the        well of the BRAVO pH capsule to suck in the adjacent mucosa of the        esophagus using the external vacuum pump for 30 seconds. The BRAVO pH        capsule was then deployed by depressing the plunger on top of the handle        to advance the locking pin into the mucosa, thereby attaching the        capsule to the esophagus. The plunger was then rotated a quarter turn        clockwise to release the capsule from  the delivery system. The delivery        system was then withdrawn. Endoscopy was utilized for probe placement        and diagnostic evaluation.        The Z-line was irregular and was found 40 cm from the incisors.        The gastroesophageal flap valve was visualized endoscopically and        classified as Hill Grade I (prominent fold, tight to endoscope).        The entire examined stomach was normal. Biopsies were taken with a cold        forceps for histology. Verification of patient identification for the        specimen was done. Estimated blood loss: none.        The duodenal bulb, first portion of the duodenum and second portion of        the duodenum were normal.                                                                                    Impression:            - Normal esophagus.                          - Z-line irregular, 40 cm from the incisors.                          - Gastroesophageal flap valve classified as Hill Grade                          I (prominent fold, tight to endoscope).                          - Normal stomach. Biopsied.                          - Normal duodenal bulb, first portion of the duodenum                          and second portion of the duodenum.                          - The BRAVO pH capsule was positioned 34 cm from the                          incisors, which was 6 cm proximal to the GE junction.     Final Diagnosis   A. GASTRIC BIOPSIES:  -Gastric mucosa with no significant histologic abnormality  -Negative for intestinal metaplasia and dysplasia  -No H. pylori-like organisms identified on routine stain       Colonoscopy:     EndoFLIP directed at the UES or LES (8cm (EF-325) balloon length or 16cm (EF-322) balloon length):   Date:  8cm balloon  Balloon inflation Balloon pressure CSA (mm^2) DI (mm^2/mmHg) Dmin (mm) Compliance   20 (ladmark ID)        30        40        50           16cm balloon  Balloon inflation Balloon pressure CSA (mm^2) DI (mm^2/mmHg) Dmin  (mm) Compliance   30 (ladmark ID)        40        50        60        70           High Resolution Manometry:    PH/Impedance:     Bravo:    7/14/2023 Bravo (wireless pH)     This was an intermediate study OFF therapy.  The overall % acid exposure of 4.2% is intermediate and DeMeester overall of 16.0 was positive possibly indicating ongoing acid reflux.  Day 2 and 4 had positive acid exposure times suggesting a positive study for reflux despite the normal overall acid exposure time. There was 1 occurrence of regurgitation, 1 of heartburn, and 5 of chest pain symptoms. SI was 0 (0/1), 0 (0/1), and 0 (0/5) respectively overall for acid reflux which indicates a less than complete correlation. The SAP was 0, 0, 0 respectively indicating a less than complete correlation. Interpret within clinical context.     Day 1  3.3% acid exposure, DeMeester Score 10.8   Day 2  7.6% acid exposure, DeMeester Score 20.4   Day 3  0.6% acid exposure, DeMeester Score 2.7   Day 4  6.3% acid exposure, DeMeester Score 25.3     This study was interpreted on 8.3.23     Wording of procedure description and interpretation was adapted from [employer] Weekly Motility Conference (2018). Analysis of the data was self-performed.     CT:    6/17/2021 CT AP W Contrast   IMPRESSION:   1. Diastases recti with small fat-containing umbilical hernia with  mild fat stranding of the herniated fat, could be due to mild  inflammation/strangulation of the herniated fat.  2. Large fibroid uterus with a dominant fibroid measuring  approximately 10.2 cm in largest dimension.    Esophagram:    FL VSS:     GES:    U/S:     XRAY:    Other:       Prior medical records were reviewed including, but not limited to, notes from referring providers, lab work, radiographic tests, and other diagnostic tests. Pertinent results were summarized above.     History     Past Medical History:   Diagnosis Date     Asthma      Depression       Depressive disorder     Diagnosed around      Hypertension 2018     Uterine fibroid in pregnancy 2020     Vasovagal syncope        Past Surgical History:   Procedure Laterality Date     CARPAL TUNNEL RELEASE RT/LT Bilateral       SECTION N/A 2018    Procedure:  SECTION;   section;  Surgeon: Jay Yanez MD;  Location: [facility] OR      SECTION N/A 10/27/2020    Procedure: REPEAT  SECTION;  Surgeon: Aníbal Pearson MD;  Location: [facility] L+D     GYN SURGERY  2018         HERNIORRHAPHY UMBILICAL N/A 2021    Procedure: Open Umbilical Hernia Repair with Mesh;  Surgeon: Erin Mccarthy MD;  Location: [facility] OR     HYSTERECTOMY TOTAL ABDOMINAL, SALPINGECTOMY N/A 2021    Procedure: TOTAL ABDOMINAL HYSTERECTOMY, BILATERAL SALPINGECTOMY;  Surgeon: Sandy Tran MD;  Location: [facility] OR     HYSTERECTOMY, PAP NO LONGER INDICATED       SINUS SURGERY       TONSILLECTOMY         Social History     Socioeconomic History     Marital status:      Spouse name: Yakov     Number of children: 1     Years of education: 16     Highest education level: Bachelor's degree (e.g., BA, AB, BS)   Occupational History     Occupation:      Occupation: [employer]     Comment: [employer]   Tobacco Use     Smoking status: Never     Smokeless tobacco: Never   Vaping Use     Vaping Use: Never used   Substance and Sexual Activity     Alcohol use: No     Drug use: No     Sexual activity: Yes     Partners: Male     Birth control/protection: Condom   Other Topics Concern     Parent/sibling w/ CABG, MI or angioplasty before 65F 55M? No   Social History Narrative     Not on file     Social Determinants of Health     Financial Resource Strain: Low Risk  (2021)    Overall Financial Resource Strain (CARDIA)      Difficulty of Paying Living Expenses: Not very hard   Food Insecurity: No Food Insecurity  (12/13/2021)    Hunger Vital Sign      Worried About Running Out of Food in the Last Year: Never true      Ran Out of Food in the Last Year: Never true   Transportation Needs: No Transportation Needs (12/13/2021)    PRAPARE - Transportation      Lack of Transportation (Medical): No      Lack of Transportation (Non-Medical): No   Physical Activity: Insufficiently Active (12/13/2021)    Exercise Vital Sign      Days of Exercise per Week: 4 days      Minutes of Exercise per Session: 30 min   Stress: No Stress Concern Present (12/13/2021)    [employer] - Occupational Stress Questionnaire      Feeling of Stress : Only a little   Social Connections: Unknown (12/13/2021)    Social Connection and Isolation Panel [NHANES]      Frequency of Communication with Friends and Family: Three times a week      Frequency of Social Gatherings with Friends and Family: Once a week      Attends Jainism Services: Patient refused      Active Member of Clubs or Organizations: Yes      Attends Club or Organization Meetings: Not on file      Marital Status:    Interpersonal Safety: Not on file   Housing Stability: Low Risk  (12/13/2021)    Housing Stability Vital Sign      Unable to Pay for Housing in the Last Year: No      Number of Places Lived in the Last Year: 1      Unstable Housing in the Last Year: No       Family History   Problem Relation Age of Onset     Esophageal Cancer Mother 57     Diabetes Mother         Type 2     Hypertension Mother      Other Cancer Mother         Esophageal     Depression Mother      Obesity Mother      Asthma Father      Glaucoma No family hx of      Macular Degeneration No family hx of      Breast Cancer No family hx of      Colon Cancer No family hx of      Family history reviewed and edited as appropriate    Medications and Allergies:     Outpatient Encounter Medications as of 9/25/2023   Medication Sig Dispense Refill     albuterol (PROAIR HFA/PROVENTIL  HFA/VENTOLIN HFA) 108 (90 Base) MCG/ACT inhaler INHALE 2 PUFFS INTO THE LUNGS EVERY 4 HOURS AS NEEDED FOR SHORTNESS OF BREATH / DYSPNEA OR WHEEZING 8.5 g 11     benzonatate (TESSALON) 200 MG capsule Take 1 capsule (200 mg) by mouth 3 times daily as needed for cough 30 capsule 0     buPROPion (WELLBUTRIN XL) 300 MG 24 hr tablet Take 1 tablet (300 mg) by mouth every morning 90 tablet 1     cetirizine (ZYRTEC) 10 MG tablet Take 10 mg by mouth daily       clindamycin (CLEOCIN T) 1 % external lotion Twice daily to acne rash on thighs as needed. 60 mL 4     clobetasol (TEMOVATE) 0.05 % external solution Apply topically 2 times daily as needed (dermatitis) 50 mL 1     famotidine (PEPCID) 40 MG tablet Take 1 tablet (40 mg) by mouth daily 90 tablet 3     guaiFENesin-codeine (ROBITUSSIN AC) 100-10 MG/5ML solution Take 10 mLs by mouth every 6 hours as needed for cough 118 mL 0     ipratropium (ATROVENT) 0.03 % nasal spray Spray 2 sprays into both nostrils every 8 hours as needed for rhinitis Patient will call when needs filled. 30 mL 11     omeprazole (PRILOSEC) 20 MG DR capsule TAKE ONE CAPSULE BY MOUTH ONCE DAILY 90 capsule 3     sertraline (ZOLOFT) 100 MG tablet Take 2 tablets (200 mg) by mouth daily 180 tablet 0     triamcinolone (KENALOG) 0.1 % external ointment Twice daily to eczema rash on the arms and legs until clear, then as needed. 80 g 2     No facility-administered encounter medications on file as of 9/25/2023.        No Known Allergies     Review of systems:  A full 10 point review of systems was obtained and was negative except for the pertinent positives and negatives stated within the HPI.    Objective Findings:   Physical Exam:    Constitutional: [facility] 12/08/2021   General: Alert, cooperative, no distress, well-appearing  Head: Atraumatic, normocephalic, no obvious abnormalities   Eyes: Sclera anicteric, no obvious conjunctival hemorrhage   Nose: Nares normal, no obvious malformation, no obvious rhinorrhea    Respiratory: Resting comfortably, no apparent distress, no cough.   Gastrointestinal: Soft, round nondistended  Skin: No jaundice, no obvious rash  Neurologic: AAOx3, no obvious neurologic abnormality  Psychiatric: Normal Affect, appropriate mood  Extremities: No obvious edema, no obvious malformation     Labs, Radiology, Pathology     Lab Results   Component Value Date    WBC 6.4 2022    WBC 7.7 2021    WBC 8.9 2020    HGB 13.5 2022    HGB 11.9 2021    HGB 14.1 2021     2022     2021     2020    CHOL 185 2022    CHOL 198 2019    TRIG 133 2022    TRIG 111 2019    HDL 57 2022    HDL 52 2019    ALT 14 2018    ALT 16 2018    ALT 18 2018    AST 14 2018    AST 10 2018    AST 14 2018     2018     2018    BUN 13 2018    BUN 13 2018    CO2 22 2018    CO2 22 2018    TSH 1.74 2022    TSH 1.70 2019        Liver Function Studies -   Recent Labs   Lab Test 18  0720 18  1016 18  1530   PROTTOTAL  --   --  6.0*   ALBUMIN  --   --  2.6*   BILITOTAL  --   --  0.1*   ALKPHOS  --   --  351*   AST 14   < > 14   ALT 14   < > 18    < > = values in this interval not displayed.          Patient Active Problem List    Diagnosis Date Noted     Heartburn 2022     Priority: Medium     Regurgitation of food 2022     Priority: Medium     Family history of esophageal cancer 2022     Priority: Medium     Mom with years of heartburn.       Family history of diabetes mellitus type II 2022     Priority: Medium     S/P MINAL (total abdominal hysterectomy) 2021     Priority: Medium     Umbilical hernia without obstruction and without gangrene 2021     Priority: Medium     Added automatically from request for surgery [id number]       Previous  section 2020     Priority: Medium     Added  automatically from request for surgery [id number]       Vasovagal syncope 02/23/2018     Priority: Medium     With shots, lab draws, getting eyes dilated.        COURTNEY (generalized anxiety disorder) 05/23/2017     Priority: Medium     Major depressive disorder, recurrent episode, moderate (H) 05/23/2017     Priority: Medium     Allergic rhinitis 01/21/2009     Priority: Medium     Overview:   Rhinitis Allergic  NOS       Mild intermittent asthma without complication 01/21/2009     Priority: Medium      Assessment and Plan   Assessment/Plan:    Krystina Suazo is a 38 year old female with significant past medical history pertinent for depression, anxiety, asthma, allergic rhinitis, vasovagal syncope who is presenting follow-up patient however is a new patient to myself who was previously seen by Dr. Williams Wheatley with a chief complaint of heartburn/regurgitation and intermittent bloating.    #GERD   #Dysphagia  #Bloating  Krystina presents today with symptoms of both heartburn/regurgitation that is currently well controlled with Pepcid 20 mg once daily.  She also reports significant intentional weight loss and has noted and improvement in symptoms as she continues to lose weight. In the past 6 months she has had 1 or 2 isolated episodes of heartburn/regurgitation after the consumption of trigger foods.  Additionally she reports solid food dysphagia occurring once at most every other month.  She underwent EGD 7/10/2023 that was unremarkable.  There were no structural abnormalities of the esophagus to explain ongoing symptoms.  She also underwent 96-hour Bravo pH testing at the same time that was positive for acid exposure on day 2 and day 4 with 7.6% and 6.3% acid exposure respectively.  She had one occurrence of regurgitation, one occurrence of heartburn in 5 occurrences of chest pain all of which had SI/SAP of 0.    In regards to her dysphagia additional evaluation with time barium esophagram and high-resolution  manometry reviewed.  However with the infrequency of the symptoms and mild severity patient wishes to defer any additional evaluation at this time.    As for her bloating symptoms this is transient lasting at most a few hours and occurring once per quarter or 2 - 3 times per year.  Likely this is multifactorial secondary to diet, bowel habits and likely has a functional component/DBGI. Prior biopsies for H. pylori have been negative and celiac serologies have also been negative.  Bloating lifestyle modifications were reviewed and additional information was provided within the AVS.    - Continue Famotide 20 mg once daily   - Reflux friendly lifestyle modifications as directed within the AVS    Follow up plan:   Return to clinic 6 months as needed.    The risks and benefits of my recommendations, as well as other treatment options were discussed with the patient and any available family today. All questions were answered.     o Follow up: As planned above. Today, I personally spent 26 minutes in direct face to face time with the patient, of which greater than 50% of the time was spent in patient education and counseling as described above. Approximately 16 minutes were spent on indirect care associated with the patient's consultation including but not limited to review of: patient medical records to date, clinic visits, hospital records, lab results, imaging studies, procedural documentation, and coordinating care with other providers. The findings from this review are summarized in the above note. All of the above accounted for a cumulative time of 42 minutes and was performed on the date of service.     The patient verbalized understanding of the plan and was appreciative for the time spent and information provided during the office visit.           Erin Engle PA-C  Division of Gastroenterology, Hepatology, and Nutrition  [facility]       Documentation assisted by voice recognition and documentation  system.

## 2023-09-25 NOTE — PATIENT INSTRUCTIONS
It was a pleasure taking care of you today.  I've included a brief summary of our discussion and care plan from today's visit below.  Please review this information with your primary care provider.  _______________________________________________________________________    My recommendations are summarized as follows:    - Continue Famotide 20 mg once daily as you are   - Reflux friendly lifestyle modifications as directed within the AVS    The initial approach in treating these symptoms is to make lifestyle/dietary adjustments and/or meet with a registered dietician. Patients are advised to have regular meal patterns, avoid large meals, reduce intake of insoluble fibers/fat/caffeine as well as alcohol. Studies have shown that following an IBS diet or exclusion of high gas producing foods has a significant impact on symptoms. Foods that have been shown to increase bloating/distension/flatulence include beans, cabbage, onions, celery, carrots, raisins, etc. It is also recommended to avoid artificial sweeteners including sorbitol, truvia, maltitol, mannitol, xylitol, glycerol and lactilol. Significant symptomatic improvement has been seen in patients who adhere to a low fermentable oligo-, di-, and monosaccharides and polyols (FODMAPs) diet or in other select cases the elimination of lactose/fructose. We typically do not perform routine food allergy testing. Other treatment options include the use of enteric coated peppermint (IBGuard) or Simethicone.       Bloating Lifestyle Modifications:   -- Patients are advised to have regular meal patterns, avoid large meals, reduce intake of insoluble fibers/fat/caffeine as well as alcohol  -- Avoidance of artificial sweeteners including sorbitol, truvia, maltitol, mannitol, xylitol, glycerol and lactilol.   -- Studies have shown that following an IBS diet or exclusion of high gas producing foods has a significant impact on symptoms. Foods that have been shown to increase  bloating/distension/flatulence include beans, cabbage, onions, celery, carrots, raisins, etc. Significant symptomatic improvement has been seen in patients who adhere to a low fermentable oligo-, di-, and monosaccharides and polyols (FODMAPs) diet. Please review this link regarding a low FODMAP diet provided by the [facility]:[email or web address]   -- Many patients do benefit from discussion with a registered dietician to discuss these dietary options  -- Other treatment options include the use of enteric coated peppermint (IBGuard) or Simethicone (GasX).     -- Attempt to make lifestyle changes to limit aerophagia (swallowing of air) which consists of elimination of carbonated beverages, straws, gum and hard candy. As well attempt to eat slow and avoid gulping/eating fast.   -- Recommend an increase in physical activity/exercise. Multiple studies regarding bloating have shown that an increase in physical activity can improve symptoms. Please start by walking 10 minutes three times daily        To schedule endoscopic procedures you may call: [phone]  To schedule radiology (imaging) tests you may call: [phone]  To schedule an ENT appointment you may call: [phone]    Please call my nurse Shivani ([phone])Ismael ([phone]) with any questions or concerns.      Return to GI Clinic in 6 months to review your progress.    _______________________________________________________________________    Who do I call with any questions after my visit?  Please be in touch if there are any further questions that arise following today's visit.  There are multiple ways to contact your gastroenterology care team.      During business hours, you may reach a Gastroenterology nurse at [phone] and choose option 3.       To schedule or reschedule an appointment, please call [phone].     You can always send a secure message through "[employer].  "[employer] messages are answered by your nurse or  doctor typically within 24 hours.  Please allow extra time on weekends and holidays.      For urgent/emergent questions after business hours, you may reach the on-call GI Fellow by contacting the [facility]  at [phone].     How will I get the results of any tests ordered?    You will receive all of your results.  If you have signed up for [employer], any tests ordered at your visit will be available to you after your physician reviews them.  Typically this takes 1-2 weeks.  If there are urgent results that require a change in your care plan, your physician or nurse will call you to discuss the next steps.      What is Walk-inhart?  [employer] is a secure way for you to access all of your healthcare records from the [facility].  It is a web based computer program, so you can sign on to it from any location.  It also allows you to send secure messages to your care team.  I recommend signing up for [employer] access if you have not already done so and are comfortable with using a computer.      How to I schedule a follow-up visit?  If you did not schedule a follow-up visit today, please call [phone] to schedule a follow-up office visit.      If you feel you received exceptional care and are interested in supporting the clinical and research goals of Erin Engle PA-C or the Division of Gastroenterology, Hepatology, and Nutrition please contact [email or web address] from the [facility] to discuss opportunities to donate.    Sincerely,    Erin Engle PA-C  Division of Gastroenterology, Hepatology, and Nutrition  [facility]

## 2023-09-25 NOTE — LETTER
9/25/2023         RE: Krystina Suazo  [address]  [place] [address]        Dear Colleague,    Thank you for referring your patient, Krystina Suazo, to the [facility] [place]. Please see a copy of my visit note below.    Gastroenterology Visit for: Krystina Suazo 1985   MRN: [id number]     Reason for Visit:  chief complaint    Referred by: Self  / No address on file  Patient Care Team:  Valeria Daly MD as PCP - General (Internal Medicine)  Valeria Daly MD as Assigned PCP  Krystina Young MD as Assigned Surgical Provider  Williams Wheatley DO as Assigned Gastroenterology Provider  Freddie Higginbotham MD as Assigned Musculoskeletal Provider  Erin Engle PA-C as Physician Assistant (Gastroenterology)    History of Present Illness:   Krystina Suazo is a 38 year old female with significant past medical history pertinent for depression, anxiety, asthma, allergic rhinitis, vasovagal syncope who is presenting follow-up patient however is a new patient to myself who was previously seen by Dr. Williams Wheatley with a chief complaint of heartburn/regurgitation and intermittent bloating.    Interval History September 25, 2023:    Currently Michelle is taking Pepcid 20 mg once daily with adequate control of her reflux symptoms. Stating she has had 1-2 episodes of heartburn/regurgitation that has occurred since her last office visit after consumption of a trigger food.     She notes having swallowing difficulties occurring at once every other month. Triggers include waffles and dry breads.  When this happens she says she states the food just passes slowly and the sensation is self relieving.    Additionally, she reports having bloating that has been on going since she was a teenager can occur maybe once per quarter. Explaining this is occurring 3-4 times per year.  Symptoms are controlled with GasX. No association with oral intake, defecation or  postural movements.  The bloating may last a few hours however then subsides.    Currently she is stooling once daily that is soft and formed.    Denies unintentional weight loss, nausea, emesis, abdominal pain, early satiation, early satiety, diarrhea, constipation (< 3 stools per week), nocturnal stooling, incontinence of feces, melena, hematochezia and BRBR.     Mother had esophageal cancer in her 50s.  She reports that she had a history of untreated acid reflux for many years prior to this diagnosis.     No additional family history or GI related malignancy (esophageal, gastric, pancreatic, liver or colon) or family history of IBD/celiac disease.     --------------------------------------------------------------------------------------------------------------------------------------------------------------------------------------  12/20/22 Dr. Williams Wheatley      Currently the patient is off of PPI and taking pepcid 40mg daily. She feels that her symptoms are better than when on 20mg pepcid. She felt that her heartburn is manageable. When she bends over shortly after eating she has regurgitation. PPI didn't seem to control regurgitation by the time of our last visit. Heartburn has been well controlled. She has noticed some regurgitation with tomato sauces. In the morning it feels like she has increased reflux. Umbilical hernia fixed 1.5 years ago. Bloating and gas has been better. Did not correlate with PPI cessation.  Unable to associate with foods or activity. Symptoms occur randomly. Does not have flatulence associated. She rests and allows it to pass. Has predominantly bloating with minimal to no distention.   ----------------------------------------------------------------------------------------------------------------------------------------------------------------------------------------------  8/16/22 Dr. Williams Suazo states she has been taking 20mg PPI (omeprazole) for heartburn  for the past 13 years. Her heartburn has been well controlled on this regimen, but her PCP would like her to stop taking the medication if possible. She takes her PPI in the morning before breakfast, sometimes taking it with her vitamins. Her heartburn was not severe when taking the PPI, but she started on the medicine given her mother's history of esophageal cancer presumed to be related to acid reflux. No side known effects with PPI to date. Has tried stopping the PPI, but her heartburn symptoms return. Has not tried taking Pepcid. No previous upper endoscopy.         Additionally, pt has had problems with severe gas for at least the past 13 years as well. States no constipation, but some bloating. 3-4 soft stools per day, has been her normal. No diarrhea or bloody stools.  She has been unable to associate these symptoms with any particular food. No history of celiac disease.       History of psoriasis and asthma.     Esophageal Questionnaire(s)    BEDQ Questionnaire      8/15/2022     2:13 PM 12/13/2022     9:47 AM 9/18/2023    12:10 PM   BEDQ Questionnaire: How Often Have You Had the Following?   Trouble eating solid food (meat, bread, vegetables) 0 0 1   Trouble eating soft foods (yogurt, jello, pudding) 0 0 0   Trouble swallowing liquids 0 0 0   Pain while swallowing 0 0 0   Coughing or choking while swallowing foods or liquids 0 0 0   Total Score: 0 0 1         8/15/2022     2:13 PM 12/13/2022     9:47 AM 9/18/2023    12:10 PM   BEDQ Questionnaire: Discomfort/Pain Ratings   Eating solid food (meat, bread, vegetables) 0 0 2   Eating soft foods (yogurt, jello, pudding) 0 0 0   Drinking liquid 0 0 0   Total Score: 0 0 2       Eckardt Questionnaire      8/15/2022     2:14 PM 12/13/2022     9:48 AM 9/18/2023    12:11 PM   Eckardt Questionnaire   Dysphagia 0 0 1   Regurgitation 1 1 0   Retrosternal Pain 1 1 1   Weight Loss (kg) 3 0 0   Total Score:  5 2 2       Promis 10 Questionnaire      12/13/2022     9:49 AM  9/18/2023    12:12 PM   PROMIS 10 FLOWSHEET DATA   In general, would you say your health is: 4 4   In general, would you say your quality of life is: 4 4   In general, how would you rate your physical health? 3 3   In general, how would you rate your mental health, including your mood and your ability to think? 3 3   In general, how would you rate your satisfaction with your social activities and relationships? 4 4   In general, please rate how well you carry out your usual social activities and roles. (This includes activities at home, at work and in your community, and responsibilities as a parent, child, spouse, employee, friend, etc.) 4 4   To what extent are you able to carry out your everyday physical activities such as walking, climbing stairs, carrying groceries, or moving a chair? 5 5   In the past 7 days, how often have you been bothered by emotional problems such as feeling anxious, depressed, or irritable? 3 3   In the past 7 days, how would you rate your fatigue on average? 3 2   In the past 7 days, how would you rate your pain on average, where 0 means no pain, and 10 means worst imaginable pain? 2 0   Mental health question re-calculation - no clinical value 3 3   Physical health question re-calculation - no clinical value 3 4   Pain question re-calculation - no clinical value 4 5   Global Mental Health Score 14 14   Global Physical Health Score 15 17   PROMIS TOTAL - SUBSCORES 29 31           STUDIES & PROCEDURES:    EGD:     7/10/2023   Findings:       The examined esophagus was normal. The BRAVO capsule with delivery        system was introduced through the mouth and advanced into the esophagus,        such that the BRAVO pH capsule was positioned 34 cm from the incisors,        which was 6 cm proximal to the GE junction. Suction was applied to the        well of the BRAVO pH capsule to suck in the adjacent mucosa of the        esophagus using the external vacuum pump for 30 seconds. The BRAVO pH         capsule was then deployed by depressing the plunger on top of the handle        to advance the locking pin into the mucosa, thereby attaching the        capsule to the esophagus. The plunger was then rotated a quarter turn        clockwise to release the capsule from the delivery system. The delivery        system was then withdrawn. Endoscopy was utilized for probe placement        and diagnostic evaluation.        The Z-line was irregular and was found 40 cm from the incisors.        The gastroesophageal flap valve was visualized endoscopically and        classified as Hill Grade I (prominent fold, tight to endoscope).        The entire examined stomach was normal. Biopsies were taken with a cold        forceps for histology. Verification of patient identification for the        specimen was done. Estimated blood loss: none.        The duodenal bulb, first portion of the duodenum and second portion of        the duodenum were normal.                                                                                    Impression:            - Normal esophagus.                          - Z-line irregular, 40 cm from the incisors.                          - Gastroesophageal flap valve classified as Hill Grade                          I (prominent fold, tight to endoscope).                          - Normal stomach. Biopsied.                          - Normal duodenal bulb, first portion of the duodenum                          and second portion of the duodenum.                          - The BRAVO pH capsule was positioned 34 cm from the                          incisors, which was 6 cm proximal to the GE junction.     Final Diagnosis   A. GASTRIC BIOPSIES:  -Gastric mucosa with no significant histologic abnormality  -Negative for intestinal metaplasia and dysplasia  -No H. pylori-like organisms identified on routine stain       Colonoscopy:     EndoFLIP directed at the UES or LES (8cm (EF-325) balloon length  or 16cm (EF-322) balloon length):   Date:  8cm balloon  Balloon inflation Balloon pressure CSA (mm^2) DI (mm^2/mmHg) Dmin (mm) Compliance   20 (ladmark ID)        30        40        50           16cm balloon  Balloon inflation Balloon pressure CSA (mm^2) DI (mm^2/mmHg) Dmin (mm) Compliance   30 (ladmark ID)        40        50        60        70           High Resolution Manometry:    PH/Impedance:     Bravo:    7/14/2023 Bravo (wireless pH)     This was an intermediate study OFF therapy.  The overall % acid exposure of 4.2% is intermediate and DeMeester overall of 16.0 was positive possibly indicating ongoing acid reflux.  Day 2 and 4 had positive acid exposure times suggesting a positive study for reflux despite the normal overall acid exposure time. There was 1 occurrence of regurgitation, 1 of heartburn, and 5 of chest pain symptoms. SI was 0 (0/1), 0 (0/1), and 0 (0/5) respectively overall for acid reflux which indicates a less than complete correlation. The SAP was 0, 0, 0 respectively indicating a less than complete correlation. Interpret within clinical context.     Day 1  3.3% acid exposure, DeMeester Score 10.8   Day 2  7.6% acid exposure, DeMeester Score 20.4   Day 3  0.6% acid exposure, DeMeester Score 2.7   Day 4  6.3% acid exposure, DeMeester Score 25.3     This study was interpreted on 8.3.23     Wording of procedure description and interpretation was adapted from [facility] [employer] Weekly Motility Conference (2018). Analysis of the data was self-performed.     CT:    6/17/2021 CT AP W Contrast   IMPRESSION:   1. Diastases recti with small fat-containing umbilical hernia with  mild fat stranding of the herniated fat, could be due to mild  inflammation/strangulation of the herniated fat.  2. Large fibroid uterus with a dominant fibroid measuring  approximately 10.2 cm in largest dimension.    Esophagram:    FL VSS:     GES:    U/S:     XRAY:    Other:       Prior medical  records were reviewed including, but not limited to, notes from referring providers, lab work, radiographic tests, and other diagnostic tests. Pertinent results were summarized above.     History     Past Medical History:   Diagnosis Date    Asthma     Depression     Depressive disorder     Diagnosed around     Hypertension 2018    Uterine fibroid in pregnancy 2020    Vasovagal syncope        Past Surgical History:   Procedure Laterality Date    CARPAL TUNNEL RELEASE RT/LT Bilateral      SECTION N/A 2018    Procedure:  SECTION;   section;  Surgeon: Jay Yanez MD;  Location: [facility] OR     SECTION N/A 10/27/2020    Procedure: REPEAT  SECTION;  Surgeon: Aníbal Pearson MD;  Location: [facility] L+D    GYN SURGERY  2018        HERNIORRHAPHY UMBILICAL N/A 2021    Procedure: Open Umbilical Hernia Repair with Mesh;  Surgeon: Erin Mccarthy MD;  Location: [facility] OR    HYSTERECTOMY TOTAL ABDOMINAL, SALPINGECTOMY N/A 2021    Procedure: TOTAL ABDOMINAL HYSTERECTOMY, BILATERAL SALPINGECTOMY;  Surgeon: Sandy Tran MD;  Location: [facility] OR    HYSTERECTOMY, PAP NO LONGER INDICATED      SINUS SURGERY  2014    TONSILLECTOMY  2014       Social History     Socioeconomic History    Marital status:      Spouse name: Yakov    Number of children: 1    Years of education: 16    Highest education level: Bachelor's degree (e.g., BA, AB, BS)   Occupational History    Occupation:     Occupation: [employer]     Comment: [employer]   Tobacco Use    Smoking status: Never    Smokeless tobacco: Never   Vaping Use    Vaping Use: Never used   Substance and Sexual Activity    Alcohol use: No    Drug use: No    Sexual activity: Yes     Partners: Male     Birth control/protection: Condom   Other Topics Concern    Parent/sibling w/ CABG, MI or angioplasty before 65F 55M? No   Social History Narrative    Not  on file     Social Determinants of Health     Financial Resource Strain: Low Risk  (12/13/2021)    Overall Financial Resource Strain (CARDIA)     Difficulty of Paying Living Expenses: Not very hard   Food Insecurity: No Food Insecurity (12/13/2021)    Hunger Vital Sign     Worried About Running Out of Food in the Last Year: Never true     Ran Out of Food in the Last Year: Never true   Transportation Needs: No Transportation Needs (12/13/2021)    PRAPARE - Transportation     Lack of Transportation (Medical): No     Lack of Transportation (Non-Medical): No   Physical Activity: Insufficiently Active (12/13/2021)    Exercise Vital Sign     Days of Exercise per Week: 4 days     Minutes of Exercise per Session: 30 min   Stress: No Stress Concern Present (12/13/2021)    [employer] - Occupational Stress Questionnaire     Feeling of Stress : Only a little   Social Connections: Unknown (12/13/2021)    Social Connection and Isolation Panel [NHANES]     Frequency of Communication with Friends and Family: Three times a week     Frequency of Social Gatherings with Friends and Family: Once a week     Attends [employer] Services: Patient refused     Active Member of Clubs or Organizations: Yes     Attends Club or Organization Meetings: Not on file     Marital Status:    Interpersonal Safety: Not on file   Housing Stability: Low Risk  (12/13/2021)    Housing Stability Vital Sign     Unable to Pay for Housing in the Last Year: No     Number of Places Lived in the Last Year: 1     Unstable Housing in the Last Year: No       Family History   Problem Relation Age of Onset    Esophageal Cancer Mother 57    Diabetes Mother         Type 2    Hypertension Mother     Other Cancer Mother         Esophageal    Depression Mother     Obesity Mother     Asthma Father     Glaucoma No family hx of     Macular Degeneration No family hx of     Breast Cancer No family hx of     Colon Cancer No family hx of       Family history reviewed and edited as appropriate    Medications and Allergies:     Outpatient Encounter Medications as of 9/25/2023   Medication Sig Dispense Refill    albuterol (PROAIR HFA/PROVENTIL HFA/VENTOLIN HFA) 108 (90 Base) MCG/ACT inhaler INHALE 2 PUFFS INTO THE LUNGS EVERY 4 HOURS AS NEEDED FOR SHORTNESS OF BREATH / DYSPNEA OR WHEEZING 8.5 g 11    benzonatate (TESSALON) 200 MG capsule Take 1 capsule (200 mg) by mouth 3 times daily as needed for cough 30 capsule 0    buPROPion (WELLBUTRIN XL) 300 MG 24 hr tablet Take 1 tablet (300 mg) by mouth every morning 90 tablet 1    cetirizine (ZYRTEC) 10 MG tablet Take 10 mg by mouth daily      clindamycin (CLEOCIN T) 1 % external lotion Twice daily to acne rash on thighs as needed. 60 mL 4    clobetasol (TEMOVATE) 0.05 % external solution Apply topically 2 times daily as needed (dermatitis) 50 mL 1    famotidine (PEPCID) 40 MG tablet Take 1 tablet (40 mg) by mouth daily 90 tablet 3    guaiFENesin-codeine (ROBITUSSIN AC) 100-10 MG/5ML solution Take 10 mLs by mouth every 6 hours as needed for cough 118 mL 0    ipratropium (ATROVENT) 0.03 % nasal spray Spray 2 sprays into both nostrils every 8 hours as needed for rhinitis Patient will call when needs filled. 30 mL 11    omeprazole (PRILOSEC) 20 MG DR capsule TAKE ONE CAPSULE BY MOUTH ONCE DAILY 90 capsule 3    sertraline (ZOLOFT) 100 MG tablet Take 2 tablets (200 mg) by mouth daily 180 tablet 0    triamcinolone (KENALOG) 0.1 % external ointment Twice daily to eczema rash on the arms and legs until clear, then as needed. 80 g 2     No facility-administered encounter medications on file as of 9/25/2023.        No Known Allergies     Review of systems:  A full 10 point review of systems was obtained and was negative except for the pertinent positives and negatives stated within the HPI.    Objective Findings:   Physical Exam:    Constitutional: [facility] 12/08/2021   General: Alert, cooperative, no distress,  well-appearing  Head: Atraumatic, normocephalic, no obvious abnormalities   Eyes: Sclera anicteric, no obvious conjunctival hemorrhage   Nose: Nares normal, no obvious malformation, no obvious rhinorrhea   Respiratory: Resting comfortably, no apparent distress, no cough.   Gastrointestinal: Soft, round nondistended  Skin: No jaundice, no obvious rash  Neurologic: AAOx3, no obvious neurologic abnormality  Psychiatric: Normal Affect, appropriate mood  Extremities: No obvious edema, no obvious malformation     Labs, Radiology, Pathology     Lab Results   Component Value Date    WBC 6.4 03/18/2022    WBC 7.7 07/21/2021    WBC 8.9 08/13/2020    HGB 13.5 03/18/2022    HGB 11.9 12/22/2021    HGB 14.1 07/21/2021     03/18/2022     07/21/2021     08/13/2020    CHOL 185 07/05/2022    CHOL 198 04/25/2019    TRIG 133 07/05/2022    TRIG 111 04/25/2019    HDL 57 07/05/2022    HDL 52 04/25/2019    ALT 14 09/06/2018    ALT 16 09/05/2018    ALT 18 09/04/2018    AST 14 09/06/2018    AST 10 09/05/2018    AST 14 09/04/2018     09/04/2018     08/30/2018    BUN 13 09/04/2018    BUN 13 08/30/2018    CO2 22 09/04/2018    CO2 22 08/30/2018    TSH 1.74 03/18/2022    TSH 1.70 03/13/2019        Liver Function Studies -   Recent Labs   Lab Test 09/06/18  0720 09/05/18  1016 09/04/18  1530   PROTTOTAL  --   --  6.0*   ALBUMIN  --   --  2.6*   BILITOTAL  --   --  0.1*   ALKPHOS  --   --  351*   AST 14   < > 14   ALT 14   < > 18    < > = values in this interval not displayed.          Patient Active Problem List    Diagnosis Date Noted    Heartburn 12/20/2022     Priority: Medium    Regurgitation of food 12/20/2022     Priority: Medium    Family history of esophageal cancer 07/05/2022     Priority: Medium     Mom with years of heartburn.      Family history of diabetes mellitus type II 07/05/2022     Priority: Medium    S/P MINAL (total abdominal hysterectomy) 12/22/2021     Priority: Medium    Umbilical hernia  without obstruction and without gangrene 2021     Priority: Medium     Added automatically from request for surgery [id number]      Previous  section 2020     Priority: Medium     Added automatically from request for surgery [id number]      Vasovagal syncope 2018     Priority: Medium     With shots, lab draws, getting eyes dilated.       COURTNEY (generalized anxiety disorder) 2017     Priority: Medium    Major depressive disorder, recurrent episode, moderate (H) 2017     Priority: Medium    Allergic rhinitis 2009     Priority: Medium     Overview:   Rhinitis Allergic  NOS      Mild intermittent asthma without complication 2009     Priority: Medium      Assessment and Plan   Assessment/Plan:    Krystina Suazo is a 38 year old female with significant past medical history pertinent for depression, anxiety, asthma, allergic rhinitis, vasovagal syncope who is presenting follow-up patient however is a new patient to myself who was previously seen by Dr. Williams Wheatley with a chief complaint of heartburn/regurgitation and intermittent bloating.    #GERD   #Dysphagia  #Bloating  Krystina presents today with symptoms of both heartburn/regurgitation that is currently well controlled with Pepcid 20 mg once daily.  She also reports significant intentional weight loss and has noted and improvement in symptoms as she continues to lose weight. In the past 6 months she has had 1 or 2 isolated episodes of heartburn/regurgitation after the consumption of trigger foods.  Additionally she reports solid food dysphagia occurring once at most every other month.  She underwent EGD 7/10/2023 that was unremarkable.  There were no structural abnormalities of the esophagus to explain ongoing symptoms.  She also underwent 96-hour Bravo pH testing at the same time that was positive for acid exposure on day 2 and day 4 with 7.6% and 6.3% acid exposure respectively.  She had one occurrence of  regurgitation, one occurrence of heartburn in 5 occurrences of chest pain all of which had SI/SAP of 0.    In regards to her dysphagia additional evaluation with time barium esophagram and high-resolution manometry reviewed.  However with the infrequency of the symptoms and mild severity patient wishes to defer any additional evaluation at this time.    As for her bloating symptoms this is transient lasting at most a few hours and occurring once per quarter or 2 - 3 times per year.  Likely this is multifactorial secondary to diet, bowel habits and likely has a functional component/DBGI. Prior biopsies for H. pylori have been negative and celiac serologies have also been negative.  Bloating lifestyle modifications were reviewed and additional information was provided within the AVS.    - Continue Famotide 20 mg once daily   - Reflux friendly lifestyle modifications as directed within the AVS    Follow up plan:   Return to clinic 6 months as needed.    The risks and benefits of my recommendations, as well as other treatment options were discussed with the patient and any available family today. All questions were answered.     Follow up: As planned above. Today, I personally spent 26 minutes in direct face to face time with the patient, of which greater than 50% of the time was spent in patient education and counseling as described above. Approximately 16 minutes were spent on indirect care associated with the patient's consultation including but not limited to review of: patient medical records to date, clinic visits, hospital records, lab results, imaging studies, procedural documentation, and coordinating care with other providers. The findings from this review are summarized in the above note. All of the above accounted for a cumulative time of 42 minutes and was performed on the date of service.     The patient verbalized understanding of the plan and was appreciative for the time spent and information provided  during the office visit.       Documentation assisted by voice recognition and documentation system.          Again, thank you for allowing me to participate in the care of your patient.      Sincerely,    Erin Engle PA-C

## 2023-09-25 NOTE — NURSING NOTE
"Chief Complaint   Patient presents with    Follow Up       Vitals:    09/25/23 1457   BP: 133/72   Pulse: 90   Weight: 93 kg (205 lb)   Height: 1.753 m (5' 9\")       Body mass index is 30.27 kg/m .    Ani Cruz MA    "

## 2023-10-07 ENCOUNTER — HEALTH MAINTENANCE LETTER (OUTPATIENT)
Age: 38
End: 2023-10-07

## 2023-10-20 ASSESSMENT — ANXIETY QUESTIONNAIRES
6. BECOMING EASILY ANNOYED OR IRRITABLE: SEVERAL DAYS
4. TROUBLE RELAXING: NOT AT ALL
7. FEELING AFRAID AS IF SOMETHING AWFUL MIGHT HAPPEN: NOT AT ALL
2. NOT BEING ABLE TO STOP OR CONTROL WORRYING: NOT AT ALL
1. FEELING NERVOUS, ANXIOUS, OR ON EDGE: NOT AT ALL
3. WORRYING TOO MUCH ABOUT DIFFERENT THINGS: SEVERAL DAYS
5. BEING SO RESTLESS THAT IT IS HARD TO SIT STILL: NOT AT ALL
IF YOU CHECKED OFF ANY PROBLEMS ON THIS QUESTIONNAIRE, HOW DIFFICULT HAVE THESE PROBLEMS MADE IT FOR YOU TO DO YOUR WORK, TAKE CARE OF THINGS AT HOME, OR GET ALONG WITH OTHER PEOPLE: NOT DIFFICULT AT ALL
GAD7 TOTAL SCORE: 2
GAD7 TOTAL SCORE: 2

## 2023-10-20 ASSESSMENT — ASTHMA QUESTIONNAIRES: ACT_TOTALSCORE: 25

## 2023-10-23 ENCOUNTER — VIRTUAL VISIT (OUTPATIENT)
Dept: PEDIATRICS | Facility: CLINIC | Age: 38
End: 2023-10-23
Payer: COMMERCIAL

## 2023-10-23 DIAGNOSIS — R12 HEARTBURN: ICD-10-CM

## 2023-10-23 DIAGNOSIS — F33.1 MAJOR DEPRESSIVE DISORDER, RECURRENT EPISODE, MODERATE (H): Primary | ICD-10-CM

## 2023-10-23 DIAGNOSIS — F41.1 GAD (GENERALIZED ANXIETY DISORDER): ICD-10-CM

## 2023-10-23 PROCEDURE — 99213 OFFICE O/P EST LOW 20 MIN: CPT | Mod: VID | Performed by: INTERNAL MEDICINE

## 2023-10-23 RX ORDER — SERTRALINE HYDROCHLORIDE 100 MG/1
200 TABLET, FILM COATED ORAL DAILY
Qty: 180 TABLET | Refills: 0 | Status: SHIPPED | OUTPATIENT
Start: 2023-10-23 | End: 2024-01-22

## 2023-10-23 RX ORDER — BUPROPION HYDROCHLORIDE 300 MG/1
300 TABLET ORAL EVERY MORNING
Qty: 90 TABLET | Refills: 1 | Status: SHIPPED | OUTPATIENT
Start: 2023-10-23 | End: 2024-01-22

## 2023-10-23 NOTE — PATIENT INSTRUCTIONS
"FODMAP diet, you could try    Characteristics and sources of common FODMAPs    Word that corresponds to letter in acronym Compounds in this category Foods that contain these compounds   F Fermentable   O Oligosaccharides Fructans, galacto-oligosaccharides Wheat, barley, rye, onion, bea, white part of spring onion, garlic, shallots, artichokes, beetroot, fennel, peas, chicory, pistachio, cashews, legumes, lentils, and chickpeas   D Disaccharides Lactose Milk, custard, ice cream, and yogurt   M Monosaccharides \"Free fructose\" (fructose in excess of glucose) Apples, pears, mangoes, cherries, watermelon, asparagus, sugar snap peas, honey, high-fructose corn syrup   A And   P Polyols Sorbitol, mannitol, maltitol, and xylitol Apples, pears, apricots, cherries, nectarines, peaches, plums, watermelon, mushrooms, cauliflower, artificially sweetened chewing gum and confectionery   FODMAPs: fermentable oligosaccharides, disaccharides, monosaccharides, and polyols.  Adapted by permission from [employer]: American Journal of Gastroenterology. Anna SJ, Lomer MC, Mejia LA. Short-chain carbohydrates and functional gastrointestinal disorders. Am J Gastroenterol 2013; 108:707. Copyright   2013. www.nature.com/ajg.  Graphic 74302 Version 2.0    2023 UpToDate, Inc. and/or its affiliates. All Rights Reserved.  "

## 2023-10-23 NOTE — PROGRESS NOTES
Krystina is a 38 year old who is being evaluated via a billable video visit.      How would you like to obtain your AVS? MyChart  If the video visit is dropped, the invitation should be resent by: Text to cell phone: [phone]  Will anyone else be joining your video visit? No      Assessment & Plan     Major depressive disorder, recurrent episode, moderate (H)  Stable, well-controlled.   - buPROPion (WELLBUTRIN XL) 300 MG 24 hr tablet; Take 1 tablet (300 mg) by mouth every morning  - sertraline (ZOLOFT) 100 MG tablet; Take 2 tablets (200 mg) by mouth daily    COURTNEY (generalized anxiety disorder)  Also well-controlled. Will refill until preventive visit    Heartburn  Well-controlled on pepcid. Does still have bloating and gas discomfort from time to time. Significant episodes only 2-4 times a year. Reviewed GI evaluation. Discussed option to try low FODMAP diet as she continues to track her symptoms. Otherwise she will let us know if any issues.      See Patient Instructions    Valeria Daly MD  [facility]   Krystina is a 38 year old, presenting for the following health issues:  Mental Health Problem and Recheck Medication      10/23/2023     6:54 AM   Additional Questions   Roomed by DIZA Moran   Accompanied by TOAN         10/23/2023     6:54 AM   Patient Reported Additional Medications   Patient reports taking the following new medications NA       Mental Health Problem    History of Present Illness       Mental Health Follow-up:  Patient presents to follow-up on Depression & Anxiety.Patient's depression since last visit has been:  Medium  The patient is not having other symptoms associated with depression.  Patient's anxiety since last visit has been:  Good  The patient is not having other symptoms associated with anxiety.  Any significant life events: No  Patient is not feeling anxious or having panic attacks.  Patient has no concerns about alcohol or drug use.    She  eats 2-3 servings of fruits and vegetables daily.She consumes 0 sweetened beverage(s) daily.She exercises with enough effort to increase her heart rate 20 to 29 minutes per day.  She exercises with enough effort to increase her heart rate 5 days per week.   She is taking medications regularly.     Heartburn well controlled off PPI, now on 20 mg famotidine. Did take more than 6 months to have her heartburn settle down after stopping PPI. Last few months much better. Less than twice weekly.    Review of Systems   Constitutional, HEENT, cardiovascular, pulmonary, gi and gu systems are negative, except as otherwise noted.      Objective           Vitals:  No vitals were obtained today due to virtual visit.    Physical Exam   GENERAL: Healthy, alert and no distress  EYES: Eyes grossly normal to inspection.  No discharge or erythema, or obvious scleral/conjunctival abnormalities.  RESP: No audible wheeze, cough, or visible cyanosis.  No visible retractions or increased work of breathing.    SKIN: Visible skin clear. No significant rash, abnormal pigmentation or lesions.  NEURO: Cranial nerves grossly intact.  Mentation and speech appropriate for age.  PSYCH: Mentation appears normal, affect normal/bright, judgement and insight intact, normal speech and appearance well-groomed.    Reviewed EGD results.      Video-Visit Details    Type of service:  Video Visit     Originating Location (pt. Location): Home    Distant Location (provider location):  On-site  Platform used for Video Visit: [employer]

## 2023-11-15 ENCOUNTER — E-VISIT (OUTPATIENT)
Dept: PEDIATRICS | Facility: CLINIC | Age: 38
End: 2023-11-15
Payer: COMMERCIAL

## 2023-11-15 DIAGNOSIS — J01.91 ACUTE RECURRENT SINUSITIS, UNSPECIFIED LOCATION: Primary | ICD-10-CM

## 2023-11-15 PROCEDURE — 99421 OL DIG E/M SVC 5-10 MIN: CPT | Performed by: INTERNAL MEDICINE

## 2023-11-15 RX ORDER — DOXYCYCLINE 100 MG/1
100 CAPSULE ORAL 2 TIMES DAILY
Qty: 20 CAPSULE | Refills: 0 | Status: SHIPPED | OUTPATIENT
Start: 2023-11-15 | End: 2024-01-22

## 2023-11-20 ENCOUNTER — TRANSFERRED RECORDS (OUTPATIENT)
Dept: PEDIATRICS | Facility: CLINIC | Age: 38
End: 2023-11-20
Payer: COMMERCIAL

## 2023-12-11 ENCOUNTER — TRANSFERRED RECORDS (OUTPATIENT)
Dept: HEALTH INFORMATION MANAGEMENT | Facility: CLINIC | Age: 38
End: 2023-12-11
Payer: COMMERCIAL

## 2024-01-18 ASSESSMENT — ENCOUNTER SYMPTOMS
HEADACHES: 0
PALPITATIONS: 0
PARESTHESIAS: 0
FEVER: 0
BREAST MASS: 0
COUGH: 0
WEAKNESS: 0
NAUSEA: 0
FREQUENCY: 0
ABDOMINAL PAIN: 0
EYE PAIN: 0
NERVOUS/ANXIOUS: 0
HEMATOCHEZIA: 0
DIARRHEA: 0
SORE THROAT: 0
HEARTBURN: 0
HEMATURIA: 0
JOINT SWELLING: 0
ARTHRALGIAS: 0
DYSURIA: 0
DIZZINESS: 0
CHILLS: 0
MYALGIAS: 0
CONSTIPATION: 0
SHORTNESS OF BREATH: 0

## 2024-01-22 ENCOUNTER — OFFICE VISIT (OUTPATIENT)
Dept: PEDIATRICS | Facility: CLINIC | Age: 39
End: 2024-01-22
Payer: COMMERCIAL

## 2024-01-22 VITALS
WEIGHT: 211.2 LBS | RESPIRATION RATE: 16 BRPM | SYSTOLIC BLOOD PRESSURE: 114 MMHG | BODY MASS INDEX: 30.24 KG/M2 | HEART RATE: 69 BPM | TEMPERATURE: 98.9 F | OXYGEN SATURATION: 98 % | HEIGHT: 70 IN | DIASTOLIC BLOOD PRESSURE: 77 MMHG

## 2024-01-22 DIAGNOSIS — Z13.1 SCREENING FOR DIABETES MELLITUS: ICD-10-CM

## 2024-01-22 DIAGNOSIS — Z13.220 SCREENING CHOLESTEROL LEVEL: ICD-10-CM

## 2024-01-22 DIAGNOSIS — L40.9 SCALP PSORIASIS: ICD-10-CM

## 2024-01-22 DIAGNOSIS — L73.9 FOLLICULITIS: ICD-10-CM

## 2024-01-22 DIAGNOSIS — F33.1 MAJOR DEPRESSIVE DISORDER, RECURRENT EPISODE, MODERATE (H): ICD-10-CM

## 2024-01-22 DIAGNOSIS — R76.8 POSITIVE ANTINUCLEAR ANTIBODY: ICD-10-CM

## 2024-01-22 DIAGNOSIS — R12 HEARTBURN: ICD-10-CM

## 2024-01-22 DIAGNOSIS — Z00.00 ROUTINE GENERAL MEDICAL EXAMINATION AT A HEALTH CARE FACILITY: Primary | ICD-10-CM

## 2024-01-22 LAB
CHOLEST SERPL-MCNC: 223 MG/DL
FASTING STATUS PATIENT QL REPORTED: YES
FASTING STATUS PATIENT QL REPORTED: YES
GLUCOSE SERPL-MCNC: 88 MG/DL (ref 70–99)
HDLC SERPL-MCNC: 61 MG/DL
LDLC SERPL CALC-MCNC: 132 MG/DL
NONHDLC SERPL-MCNC: 162 MG/DL
TRIGL SERPL-MCNC: 151 MG/DL

## 2024-01-22 PROCEDURE — 36415 COLL VENOUS BLD VENIPUNCTURE: CPT | Performed by: INTERNAL MEDICINE

## 2024-01-22 PROCEDURE — 90471 IMMUNIZATION ADMIN: CPT | Performed by: INTERNAL MEDICINE

## 2024-01-22 PROCEDURE — 90746 HEPB VACCINE 3 DOSE ADULT IM: CPT | Performed by: INTERNAL MEDICINE

## 2024-01-22 PROCEDURE — 82947 ASSAY GLUCOSE BLOOD QUANT: CPT | Performed by: INTERNAL MEDICINE

## 2024-01-22 PROCEDURE — 80061 LIPID PANEL: CPT | Performed by: INTERNAL MEDICINE

## 2024-01-22 PROCEDURE — 99396 PREV VISIT EST AGE 40-64: CPT | Mod: 25 | Performed by: INTERNAL MEDICINE

## 2024-01-22 RX ORDER — FAMOTIDINE 20 MG/1
20 TABLET, FILM COATED ORAL 2 TIMES DAILY
Qty: 180 TABLET | Refills: 4 | Status: SHIPPED | OUTPATIENT
Start: 2024-01-22

## 2024-01-22 RX ORDER — SERTRALINE HYDROCHLORIDE 100 MG/1
200 TABLET, FILM COATED ORAL DAILY
Qty: 180 TABLET | Refills: 4 | Status: SHIPPED | OUTPATIENT
Start: 2024-01-22

## 2024-01-22 RX ORDER — BUPROPION HYDROCHLORIDE 300 MG/1
300 TABLET ORAL EVERY MORNING
Qty: 90 TABLET | Refills: 4 | Status: SHIPPED | OUTPATIENT
Start: 2024-01-22

## 2024-01-22 RX ORDER — CLINDAMYCIN PHOSPHATE 10 UG/ML
LOTION TOPICAL
Qty: 60 ML | Refills: 4 | Status: SHIPPED | OUTPATIENT
Start: 2024-01-22

## 2024-01-22 RX ORDER — CLOBETASOL PROPIONATE 0.5 MG/ML
SOLUTION TOPICAL 2 TIMES DAILY PRN
Qty: 50 ML | Refills: 1 | Status: SHIPPED | OUTPATIENT
Start: 2024-01-22

## 2024-01-22 ASSESSMENT — ENCOUNTER SYMPTOMS
EYE PAIN: 0
JOINT SWELLING: 0
FEVER: 0
CHILLS: 0
SORE THROAT: 0
HEADACHES: 0
FREQUENCY: 0
COUGH: 0
NERVOUS/ANXIOUS: 0
ABDOMINAL PAIN: 0
HEMATURIA: 0
DIARRHEA: 0
DIZZINESS: 0
DYSURIA: 0
PALPITATIONS: 0
ARTHRALGIAS: 0
WEAKNESS: 0
MYALGIAS: 0
SHORTNESS OF BREATH: 0
CONSTIPATION: 0
NAUSEA: 0

## 2024-01-22 ASSESSMENT — PAIN SCALES - GENERAL: PAINLEVEL: NO PAIN (0)

## 2024-01-22 NOTE — PROGRESS NOTES
Preventive Care Visit  [facility] JACQUELIN Daly MD, Internal Medicine  Jan 22, 2024       SUBJECTIVE:   Krystina is a 38 year old, presenting for the following:  Physical (Fasting ) and Recheck Medication (Med renewals )        1/22/2024     9:09 AM   Additional Questions   Roomed by DIAZ Moran   Accompanied by TOAN         1/22/2024     9:09 AM   Patient Reported Additional Medications   Patient reports taking the following new medications NA     Healthy Habits:     Getting at least 3 servings of Calcium per day:  Yes    Bi-annual eye exam:  Yes    Dental care twice a year:  Yes    Sleep apnea or symptoms of sleep apnea:  None    Diet:  Regular (no restrictions)    Frequency of exercise:  2-3 days/week    Duration of exercise:  15-30 minutes    Taking medications regularly:  Yes    Medication side effects:  None    Additional concerns today:  Yes      Answers submitted by the patient for this visit:  Annual Preventive Visit (Submitted on 1/18/2024)  Chief Complaint: Annual Exam:  Blood in stool: No  heartburn: No  peripheral edema: No  mood changes: No  Skin sensation changes: No  tenderness: No  breast mass: No  breast discharge: No    Had EGD,  was able to go off omeprazole. Now symptoms controlled with once daily pepcid.      Have you ever done Advance Care Planning? (For example, a Health Directive, POLST, or a discussion with a medical provider or your loved ones about your wishes): No, advance care planning information given to patient to review.  Patient declined advance care planning discussion at this time.    Social History     Tobacco Use    Smoking status: Never     Passive exposure: Never    Smokeless tobacco: Never   Substance Use Topics    Alcohol use: No           1/18/2024    11:03 AM   Alcohol Use   Prescreen: >3 drinks/day or >7 drinks/week? No          No data to display              Reviewed orders with patient.  Reviewed health maintenance and updated orders  "accordingly - Yes  Lab work is in process    Breast Cancer Screenin/13/2021     9:14 AM   Breast CA Risk Assessment (FHS-7)   Do you have a family history of breast, colon, or ovarian cancer? No / Unknown       Mammogram Screening - Offered annual screening and updated Health Maintenance for [place] plan based on risk factor consideration  Pertinent mammograms are reviewed under the imaging tab.    History of abnormal Pap smear: Status post benign hysterectomy. Health Maintenance and Surgical History updated.      Latest Ref Rng & Units 4/10/2019     3:08 PM 4/10/2019     2:30 PM 2016    12:00 AM   PAP / HPV   PAP (Historical)  NIL      HPV 16 DNA NEG^Negative  Negative     HPV 18 DNA NEG^Negative  Negative     Other HR HPV NEG^Negative  Negative     PAP-ABSTRACT    See Scanned Document           This result is from an external source.     Reviewed and updated as needed this visit by clinical staff   Tobacco  Allergies  Meds              Reviewed and updated as needed this visit by Provider                    Review of Systems   Constitutional:  Negative for chills and fever.   HENT:  Negative for congestion, ear pain, hearing loss and sore throat.    Eyes:  Negative for pain and visual disturbance.   Respiratory:  Negative for cough and shortness of breath.    Cardiovascular:  Negative for chest pain and palpitations.   Gastrointestinal:  Negative for abdominal pain, constipation, diarrhea and nausea.   Genitourinary:  Negative for dysuria, frequency, genital sores, hematuria, pelvic pain, urgency, vaginal bleeding and vaginal discharge.   Musculoskeletal:  Negative for arthralgias, joint swelling and myalgias.   Skin:  Negative for rash.   Neurological:  Negative for dizziness, weakness and headaches.   Psychiatric/Behavioral:  The patient is not nervous/anxious.        OBJECTIVE:   /77   Pulse 69   Temp 98.9  F (37.2  C) (Tympanic)   Resp 16   Ht 1.765 m (5' 9.5\")   Wt 95.8 kg (211 " "lb 3.2 oz)   LMP 12/08/2021   SpO2 98%   BMI 30.74 kg/m     Estimated body mass index is 30.74 kg/m  as calculated from the following:    Height as of this encounter: 1.765 m (5' 9.5\").    Weight as of this encounter: 95.8 kg (211 lb 3.2 oz).  Physical Exam  GENERAL: alert and no distress  NECK: no adenopathy, no asymmetry, masses, or scars  RESP: lungs clear to auscultation - no rales, rhonchi or wheezes  CV: regular rate and rhythm, normal S1 S2, no S3 or S4, no murmur, click or rub, no peripheral edema  ABDOMEN: soft, nontender, no hepatosplenomegaly, no masses and bowel sounds normal  MS: no gross musculoskeletal defects noted, no edema    Diagnostic Test Results:  Labs reviewed in Epic    ASSESSMENT/PLAN:     1. Routine general medical examination at a health care facility    - HEPATITIS B, ADULT 20+ (ENGERIX-B/RECOMBIVAX HB)    2. Major depressive disorder, recurrent episode, moderate (H)  Doing well  - sertraline (ZOLOFT) 100 MG tablet; Take 2 tablets (200 mg) by mouth daily  Dispense: 180 tablet; Refill: 4  - buPROPion (WELLBUTRIN XL) 300 MG 24 hr tablet; Take 1 tablet (300 mg) by mouth every morning  Dispense: 90 tablet; Refill: 4    3. Heartburn  Has tolerated stopping PPI. Once daily pepcid generally enough, occsionally takes bid  - famotidine (PEPCID) 20 MG tablet; Take 1 tablet (20 mg) by mouth 2 times daily  Dispense: 180 tablet; Refill: 4    4. Scalp psoriasis  refill  - clobetasol (TEMOVATE) 0.05 % external solution; Apply topically 2 times daily as needed (dermatitis)  Dispense: 50 mL; Refill: 1    5. Folliculitis  Occasional issues. refill  - clindamycin (CLEOCIN T) 1 % external lotion; Twice daily to acne rash on thighs as needed.  Dispense: 60 mL; Refill: 4    6. Positive antinuclear antibody  No current joint concerns. Will continue to monitor    7. Screening cholesterol level    - Lipid panel reflex to direct LDL Fasting; Future  - Lipid panel reflex to direct LDL Fasting    8. Screening for " "diabetes mellitus    - Glucose; Future  - Glucose    Counseling  Reviewed preventive health counseling, as reflected in patient instructions      BMI  Estimated body mass index is 30.74 kg/m  as calculated from the following:    Height as of this encounter: 1.765 m (5' 9.5\").    Weight as of this encounter: 95.8 kg (211 lb 3.2 oz).   Weight management plan: Discussed healthy diet and exercise guidelines      She reports that she has never smoked. She has never been exposed to tobacco smoke. She has never used smokeless tobacco.          Signed Electronically by: Valeria Daly MD  "

## 2024-02-23 ENCOUNTER — ALLIED HEALTH/NURSE VISIT (OUTPATIENT)
Dept: PEDIATRICS | Facility: CLINIC | Age: 39
End: 2024-02-23
Payer: COMMERCIAL

## 2024-02-23 DIAGNOSIS — Z23 ENCOUNTER FOR IMMUNIZATION: Primary | ICD-10-CM

## 2024-02-23 PROCEDURE — 90746 HEPB VACCINE 3 DOSE ADULT IM: CPT

## 2024-02-23 PROCEDURE — 99207 PR NO CHARGE NURSE ONLY: CPT

## 2024-02-23 PROCEDURE — 90471 IMMUNIZATION ADMIN: CPT

## 2024-02-23 ASSESSMENT — ASTHMA QUESTIONNAIRES: ACT_TOTALSCORE: 25

## 2024-02-23 ASSESSMENT — PATIENT HEALTH QUESTIONNAIRE - PHQ9: SUM OF ALL RESPONSES TO PHQ QUESTIONS 1-9: 1

## 2024-02-23 NOTE — LETTER
My Asthma Action Plan    Name: Krystina Suazo   YOB: 1985  Date: 2/23/2024   My doctor: WARNER MO/LPN   My clinic: [facility]        My Rescue Medicine:   Albuterol inhaler (Proair/Ventolin/Proventil HFA)  2-4 puffs EVERY 4 HOURS as needed. Use a spacer if recommended by your provider.   My Asthma Severity:   Mild intermittent asthma without complication  Know your asthma triggers: exercise or sports  None          GREEN ZONE   Good Control  I feel good  No cough or wheeze  Can work, sleep and play without asthma symptoms       Take your asthma control medicine every day.     If exercise triggers your asthma, take your rescue medication  15 minutes before exercise or sports, and  During exercise if you have asthma symptoms  Spacer to use with inhaler: If you have a spacer, make sure to use it with your inhaler             YELLOW ZONE Getting Worse  I have ANY of these:  I do not feel good  Cough or wheeze  Chest feels tight  Wake up at night   Keep taking your Green Zone medications  Start taking your rescue medicine:  every 20 minutes for up to 1 hour. Then every 4 hours for 24-48 hours.  If you stay in the Yellow Zone for more than 12-24 hours, contact your doctor.  If you do not return to the Green Zone in 12-24 hours or you get worse, start taking your oral steroid medicine if prescribed by your provider.           RED ZONE Medical Alert - Get Help  I have ANY of these:  I feel awful  Medicine is not helping  Breathing getting harder  Trouble walking or talking  Nose opens wide to breathe       Take your rescue medicine NOW  If your provider has prescribed an oral steroid medicine, start taking it NOW  Call your doctor NOW  If you are still in the Red Zone after 20 minutes and you have not reached your doctor:  Take your rescue medicine again and  Call 911 or go to the emergency room right away    See your regular doctor within 2 weeks of an Emergency Room or Urgent Care visit  for follow-up treatment.          Annual Reminders:  Meet with Asthma Educator,  Flu Shot in the Fall, consider Pneumonia Vaccination for patients with asthma (aged 19 and older).    Pharmacy: [facility] - [address]     Electronically signed by WARNER NEELY   Date: 02/23/24                    Asthma Triggers  How To Control Things That Make Your Asthma Worse    Triggers are things that make your asthma worse.  Look at the list below to help you find your triggers and   what you can do about them. You can help prevent asthma flare-ups by staying away from your triggers.      Trigger                                                          What you can do   Cigarette Smoke  Tobacco smoke can make asthma worse. Do not allow smoking in your home, car or around you.  Be sure no one smokes at a child s day care or school.  If you smoke, ask your health care provider for ways to help you quit.  Ask family members to quit too.  Ask your health care provider for a referral to Quit Plan to help you quit smoking, or call 8-189-687-PLAN.     Colds, Flu, Bronchitis  These are common triggers of asthma. Wash your hands often.  Don t touch your eyes, nose or mouth.  Get a flu shot every year.     Dust Mites  These are tiny bugs that live in cloth or carpet. They are too small to see. Wash sheets and blankets in hot water every week.   Encase pillows and mattress in dust mite proof covers.  Avoid having carpet if you can. If you have carpet, vacuum weekly.   Use a dust mask and HEPA vacuum.   Pollen and Outdoor Mold  Some people are allergic to trees, grass, or weed pollen, or molds. Try to keep your windows closed.  Limit time out doors when pollen count is high.   Ask you health care provider about taking medicine during allergy season.     Animal Dander  Some people are allergic to skin flakes, urine or saliva from pets with fur or feathers. Keep pets with fur or feathers out of your home.     If you can t keep the pet outdoors, then keep the pet out of your bedroom.  Keep the bedroom door closed.  Keep pets off cloth furniture and away from stuffed toys.     Mice, Rats, and Cockroaches  Some people are allergic to the waste from these pests.   Cover food and garbage.  Clean up spills and food crumbs.  Store grease in the refrigerator.   Keep food out of the bedroom.   Indoor Mold  This can be a trigger if your home has high moisture. Fix leaking faucets, pipes, or other sources of water.   Clean moldy surfaces.  Dehumidify basement if it is damp and smelly.   Smoke, Strong Odors, and Sprays  These can reduce air quality. Stay away from strong odors and sprays, such as perfume, powder, hair spray, paints, smoke incense, paint, cleaning products, candles and new carpet.   Exercise or Sports  Some people with asthma have this trigger. Be active!  Ask your doctor about taking medicine before sports or exercise to prevent symptoms.    Warm up for 5-10 minutes before and after sports or exercise.     Other Triggers of Asthma  Cold air:  Cover your nose and mouth with a scarf.  Sometimes laughing or crying can be a trigger.  Some medicines and food can trigger asthma.

## 2024-02-23 NOTE — PROGRESS NOTES
Prior to immunization administration, verified patients identity using patient s name and date of birth. Please see Immunization Activity for additional information.     Screening Questionnaire for Adult Immunization    Are you sick today?   No   Do you have allergies to medications, food, a vaccine component or latex?   No   Have you ever had a serious reaction after receiving a vaccination?   No   Do you have a long-term health problem with heart, lung, kidney, or metabolic disease (e.g., diabetes), asthma, a blood disorder, no spleen, complement component deficiency, a cochlear implant, or a spinal fluid leak?  Are you on long-term aspirin therapy?   No   Do you have cancer, leukemia, HIV/AIDS, or any other immune system problem?   No   Do you have a parent, brother, or sister with an immune system problem?   No   In the past 3 months, have you taken medications that affect  your immune system, such as prednisone, other steroids, or anticancer drugs; drugs for the treatment of rheumatoid arthritis, Crohn s disease, or psoriasis; or have you had radiation treatments?   No   Have you had a seizure, or a brain or other nervous system problem?   No   During the past year, have you received a transfusion of blood or blood    products, or been given immune (gamma) globulin or antiviral drug?   No   For women: Are you pregnant or is there a chance you could become       pregnant during the next month?   No   Have you received any vaccinations in the past 4 weeks?   No     Immunization questionnaire answers were all negative.    I have reviewed the following standing orders:   This patient is due and qualifies for the Hepatitis B vaccine.    Click here for Hepatitis B Standing Order    I have reviewed the vaccines inclusion and exclusion criteria; No concerns regarding eligibility.     Patient instructed to remain in clinic for 15 minutes afterwards, and to report any adverse reactions.     Screening performed by  Reema Peralta CMA on 2/23/2024 at 9:06 AM.

## 2024-02-24 ENCOUNTER — HEALTH MAINTENANCE LETTER (OUTPATIENT)
Age: 39
End: 2024-02-24

## 2024-04-03 ENCOUNTER — E-VISIT (OUTPATIENT)
Dept: URGENT CARE | Facility: CLINIC | Age: 39
End: 2024-04-03
Payer: COMMERCIAL

## 2024-04-03 DIAGNOSIS — J02.9 SORE THROAT: Primary | ICD-10-CM

## 2024-04-03 PROCEDURE — 99421 OL DIG E/M SVC 5-10 MIN: CPT | Performed by: NURSE PRACTITIONER

## 2024-04-03 NOTE — PATIENT INSTRUCTIONS
Dear Krystina,    After reviewing your responses, I would like you to come in for a swab to make sure we treat you correctly. This swab is to evaluate you for possible Strep Throat, and should be scheduled for today or tomorrow. Please use the Schedule Now button in [employer] to schedule your swab. Otherwise, click this link to schedule a lab only appointment.    Lab appointments are not available at most locations on the weekends. If no Lab Only appointment is available, you should be seen in any of our convenient Urgent Care Centers for an in person visit, which can be found on our website here.    You will receive instructions with your results in [employer] once they are available.     If your symptoms worsen, you develop difficulty breathing, difficulty with drinking enough to stay hydrated, difficulty swallowing your saliva or have fevers for more than 5 days, please contact your primary care provider for an appointment or visit an Urgent Care Center to be seen.      Thanks again for choosing us as your health care partner.   Laci Ceja, J LUIS  Sore Throat: Care Instructions  Overview     Infection by bacteria or a virus causes most sore throats. Cigarette smoke, dry air, air pollution, allergies, and yelling can also cause a sore throat. Sore throats can be painful and annoying. Fortunately, most sore throats go away on their own. If you have a bacterial infection, your doctor may prescribe antibiotics.  Follow-up care is a key part of your treatment and safety. Be sure to make and go to all appointments, and call your doctor if you are having problems. It's also a good idea to know your test results and keep a list of the medicines you take.  How can you care for yourself at home?  If your doctor prescribed antibiotics, take them as directed. Do not stop taking them just because you feel better. You need to take the full course of antibiotics.  Gargle with warm salt water several times a day to help reduce  "swelling and relieve pain. Mix 1/2 teaspoon of salt in 1 cup of warm water.  Take an over-the-counter pain medicine, such as acetaminophen (Tylenol), ibuprofen (Advil, Motrin), or naproxen (Aleve). Read and follow all instructions on the label.  Be careful when taking over-the-counter cold or flu medicines and Tylenol at the same time. Many of these medicines have acetaminophen, which is Tylenol. Read the labels to make sure that you are not taking more than the recommended dose. Too much acetaminophen (Tylenol) can be harmful.  Drink plenty of fluids. Fluids may help soothe an irritated throat. Hot fluids, such as tea or soup, may help decrease throat pain.  Use over-the-counter throat lozenges to soothe pain. Regular cough drops or hard candy may also help. These should not be given to young children because of the risk of choking.  Do not smoke or allow others to smoke around you. If you need help quitting, talk to your doctor about stop-smoking programs and medicines. These can increase your chances of quitting for good.  Use a vaporizer or humidifier to add moisture to your bedroom. Follow the directions for cleaning the machine.  When should you call for help?   Call your doctor now or seek immediate medical care if:    You have trouble breathing.     Your sore throat gets much worse on one side.     You have new or worse trouble swallowing.     You have a new or higher fever.   Watch closely for changes in your health, and be sure to contact your doctor if you do not get better as expected.  Where can you learn more?  Go to https://www.[employer]net/patiented  Enter U420 in the search box to learn more about \"Sore Throat: Care Instructions.\"  Current as of: September 27, 2023               Content Version: 14.0    1948-4621 [employer].   Care instructions adapted under license by your healthcare professional. If you have questions about a medical condition or this instruction, always ask your " healthcare professional. [employer] disclaims any warranty or liability for your use of this information.

## 2024-04-04 ENCOUNTER — LAB (OUTPATIENT)
Dept: LAB | Facility: CLINIC | Age: 39
End: 2024-04-04
Payer: COMMERCIAL

## 2024-04-04 DIAGNOSIS — J02.9 SORE THROAT: ICD-10-CM

## 2024-04-04 LAB
DEPRECATED S PYO AG THROAT QL EIA: NEGATIVE
GROUP A STREP BY PCR: NOT DETECTED

## 2024-04-04 PROCEDURE — 87651 STREP A DNA AMP PROBE: CPT

## 2024-04-25 NOTE — TELEPHONE ENCOUNTER
Addended by: SANDY CLEMONS on: 4/25/2024 10:40 AM     Modules accepted: Orders     Letter is in process. Marquita Ramirez LPN

## 2024-04-28 ENCOUNTER — E-VISIT (OUTPATIENT)
Dept: PEDIATRICS | Facility: CLINIC | Age: 39
End: 2024-04-28
Payer: COMMERCIAL

## 2024-04-28 DIAGNOSIS — J01.90 ACUTE BACTERIAL SINUSITIS: Primary | ICD-10-CM

## 2024-04-28 DIAGNOSIS — B96.89 ACUTE BACTERIAL SINUSITIS: Primary | ICD-10-CM

## 2024-04-28 PROCEDURE — 99421 OL DIG E/M SVC 5-10 MIN: CPT | Performed by: INTERNAL MEDICINE

## 2024-04-29 RX ORDER — GUAIFENESIN 1200 MG/1
1200 TABLET, EXTENDED RELEASE ORAL 2 TIMES DAILY
Qty: 60 TABLET | Refills: 0 | Status: SHIPPED | OUTPATIENT
Start: 2024-04-29 | End: 2024-09-17

## 2024-04-29 RX ORDER — IPRATROPIUM BROMIDE 42 UG/1
2 SPRAY, METERED NASAL 4 TIMES DAILY
Qty: 15 ML | Refills: 0 | Status: SHIPPED | OUTPATIENT
Start: 2024-04-29

## 2024-04-29 NOTE — PATIENT INSTRUCTIONS
Acute Sinusitis: Care Instructions  Overview     Acute sinusitis is an inflammation of the mucous membranes inside the nose and sinuses. Sinuses are the hollow spaces in your skull around the eyes and nose. Acute sinusitis often follows a cold. Acute sinusitis causes thick, discolored mucus that drains from the nose or down the back of the throat. It also can cause pain and pressure in your head and face along with a stuffy or blocked nose.  In most cases, sinusitis gets better on its own in 1 to 2 weeks. But some mild symptoms may last for several weeks. Sometimes antibiotics are needed if there is a bacterial infection.  Follow-up care is a key part of your treatment and safety. Be sure to make and go to all appointments, and call your doctor if you are having problems. It's also a good idea to know your test results and keep a list of the medicines you take.  How can you care for yourself at home?  Use saline (saltwater) nasal washes. This can help keep your nasal passages open and wash out mucus and allergens.  You can buy saline nose washes at a grocery store or drugstore. Follow the instructions on the package.  You can make your own at home. Add 1 teaspoon of non-iodized salt and 1 teaspoon of baking soda to 2 cups of distilled or boiled and cooled water. Fill a squeeze bottle or a nasal cleansing pot (such as a neti pot) with the nasal wash. Then put the tip into your nostril, and lean over the sink. With your mouth open, gently squirt the liquid. Repeat on the other side.  Try a decongestant nasal spray like oxymetazoline (Afrin). Do not use it for more than 3 days in a row. Using it for more than 3 days can make your congestion worse.  If needed, take an over-the-counter pain medicine, such as acetaminophen (Tylenol), ibuprofen (Advil, Motrin), or naproxen (Aleve). Read and follow all instructions on the label.  If the doctor prescribed antibiotics, take them as directed. Do not stop taking them just  "because you feel better. You need to take the full course of antibiotics.  Be careful when taking over-the-counter cold or flu medicines and Tylenol at the same time. Many of these medicines have acetaminophen, which is Tylenol. Read the labels to make sure that you are not taking more than the recommended dose. Too much acetaminophen (Tylenol) can be harmful.  Try a steroid nasal spray. It may help with your symptoms.  Breathe warm, moist air. You can use a steamy shower, a hot bath, or a sink filled with hot water. Avoid cold, dry air. Using a humidifier in your home may help. Follow the directions for cleaning the machine.  When should you call for help?   Call your doctor now or seek immediate medical care if:    You have new or worse swelling, redness, or pain in your face or around one or both of your eyes.     You have double vision or a change in your vision.     You have a high fever.     You have a severe headache and a stiff neck.     You have mental changes, such as feeling confused or much less alert.   Watch closely for changes in your health, and be sure to contact your doctor if:    You are not getting better as expected.   Where can you learn more?  Go to https://www.[employer].net/patiented  Enter I933 in the search box to learn more about \"Acute Sinusitis: Care Instructions.\"  Current as of: September 27, 2023               Content Version: 14.0    4769-2944 [employer].   Care instructions adapted under license by your healthcare professional. If you have questions about a medical condition or this instruction, always ask your healthcare professional. [employer] disclaims any warranty or liability for your use of this information.      You may want to try a nasal lavage (also known as nasal irrigation). You can find over-the-counter products, such as Neti-Pot, at retail locations or make your own at home. Instructions for homemade nasal lavage and more information on " the process are available online at http://www.aafp.org/afp/2009/1115/p1121.html.    Dear Krystina Suazo    After reviewing your responses, I've been able to diagnose you with Acute bacterial sinusitis.      Based on your responses and diagnosis, I have prescribed   Orders Placed This Encounter   Medications     guaiFENesin 1200 MG TB12     Sig: Take 1 tablet (1,200 mg) by mouth 2 times daily     Dispense:  60 tablet     Refill:  0     ipratropium (ATROVENT) 0.06 % nasal spray     Sig: Spray 2 sprays into both nostrils 4 times daily     Dispense:  15 mL     Refill:  0     amoxicillin-clavulanate (AUGMENTIN) 875-125 MG tablet     Sig: Take 1 tablet by mouth 2 times daily for 14 days     Dispense:  28 tablet     Refill:  0      to treat your symptoms. I have sent this to your pharmacy.?     It is also important to stay well hydrated, get lots of rest and take over-the-counter decongestants,?tylenol?or ibuprofen if you?are able to?take those medications per your primary care provider to help relieve discomfort.?     It is important that you take?all of?your prescribed medication even if your symptoms are improving after a few doses.? Taking?all of?your medicine helps prevent the symptoms from returning.?     If your symptoms worsen, you develop severe headache, vomiting, high fever (>102), or are not improving in 7 days, please contact your primary care provider for an appointment or visit any of our convenient Walk-in Care or Urgent Care Centers to be seen which can be found on our website?here.?     Thanks again for choosing?us?as your health care partner,?   ?  Valeria Daly MD?

## 2024-06-27 ENCOUNTER — OFFICE VISIT (OUTPATIENT)
Dept: OPTOMETRY | Facility: CLINIC | Age: 39
End: 2024-06-27
Payer: COMMERCIAL

## 2024-06-27 DIAGNOSIS — H52.13 MYOPIA OF BOTH EYES: Primary | ICD-10-CM

## 2024-06-27 PROCEDURE — 92015 DETERMINE REFRACTIVE STATE: CPT | Performed by: OPTOMETRIST

## 2024-06-27 PROCEDURE — 92004 COMPRE OPH EXAM NEW PT 1/>: CPT | Performed by: OPTOMETRIST

## 2024-06-27 ASSESSMENT — VISUAL ACUITY
OS_CC: 20/20-2
OD_CC+: -1
OD_CC: 20/25
METHOD: SNELLEN - LINEAR
OS_CC: 20/20
OS_SC: 20/20
CORRECTION_TYPE: GLASSES
OD_SC: 20/20
OD_CC: 20/30
OS_CC+: -2

## 2024-06-27 ASSESSMENT — REFRACTION_MANIFEST
OD_ADD: +0.50
OD_SPHERE: -1.50
OS_SPHERE: -1.50
OD_CYLINDER: SPHERE
OD_SPHERE: -0.50
OS_AXIS: 165
OD_AXIS: 039
OD_CYLINDER: +0.50
OS_CYLINDER: +0.25
OS_CYLINDER: +0.25
METHOD_AUTOREFRACTION: 1
OS_SPHERE: -0.75
OS_AXIS: 143

## 2024-06-27 ASSESSMENT — CUP TO DISC RATIO
OS_RATIO: 0.2
OD_RATIO: 0.15

## 2024-06-27 ASSESSMENT — CONF VISUAL FIELD
OS_NORMAL: 1
OS_SUPERIOR_TEMPORAL_RESTRICTION: 0
OD_INFERIOR_TEMPORAL_RESTRICTION: 0
OD_NORMAL: 1
OS_INFERIOR_NASAL_RESTRICTION: 0
METHOD: COUNTING FINGERS
OS_INFERIOR_TEMPORAL_RESTRICTION: 0
OD_INFERIOR_NASAL_RESTRICTION: 0
OD_SUPERIOR_NASAL_RESTRICTION: 0
OD_SUPERIOR_TEMPORAL_RESTRICTION: 0
OS_SUPERIOR_NASAL_RESTRICTION: 0

## 2024-06-27 ASSESSMENT — KERATOMETRY
OS_AXISANGLE2_DEGREES: 166
OD_AXISANGLE_DEGREES: 68
OD_AXISANGLE2_DEGREES: 158
OS_K2POWER_DIOPTERS: 44.62
OD_K2POWER_DIOPTERS: 44.50
OS_AXISANGLE_DEGREES: 76
OD_K1POWER_DIOPTERS: 43.25
OS_K1POWER_DIOPTERS: 43.25

## 2024-06-27 ASSESSMENT — TONOMETRY
OD_IOP_MMHG: 16
OS_IOP_MMHG: 14
IOP_METHOD: APPLANATION

## 2024-06-27 ASSESSMENT — REFRACTION_WEARINGRX
SPECS_TYPE: SVL
OD_CYLINDER: SPHERE
OS_CYLINDER: SPHERE
OS_SPHERE: -1.00
OD_SPHERE: -1.00

## 2024-06-27 ASSESSMENT — SLIT LAMP EXAM - LIDS
COMMENTS: NORMAL
COMMENTS: NORMAL

## 2024-06-27 ASSESSMENT — EXTERNAL EXAM - LEFT EYE: OS_EXAM: NORMAL

## 2024-06-27 ASSESSMENT — EXTERNAL EXAM - RIGHT EYE: OD_EXAM: NORMAL

## 2024-06-27 NOTE — PROGRESS NOTES
Chief Complaint   Patient presents with    Annual Eye Exam        Last Eye Exam: 2021  Dilated Previously: Yes, side effects of dilation explained today    What are you currently using to see?  Glasses - SV distance        Distance Vision Acuity: Satisfied with vision with glasses     Near Vision Acuity: Not satisfied with glasses     Eye Comfort: good  Do you use eye drops? : No      Joan Owens - Optometric Assistant           Medical, surgical and family histories reviewed and updated 6/27/2024.     Past history of operculated hole , monitored not needing treatment   OBJECTIVE: See Ophthalmology exam    ASSESSMENT:    ICD-10-CM    1. Myopia of both eyes  H52.13 EYE EXAM (SIMPLE-NONBILLABLE)     REFRACTION        Less nearsighted , emerging presbyopia   PLAN:   Updated prescription   Cleo Duran OD

## 2024-06-27 NOTE — LETTER
6/27/2024      Krystina Suazo  [address]  [place] [address]      Dear Colleague,    Thank you for referring your patient, Krystina Suazo, to the [facility] [place]. Please see a copy of my visit note below.    Chief Complaint   Patient presents with     Annual Eye Exam        Last Eye Exam: 2021  Dilated Previously: Yes, side effects of dilation explained today    What are you currently using to see?  Glasses - SV distance        Distance Vision Acuity: Satisfied with vision with glasses     Near Vision Acuity: Not satisfied with glasses     Eye Comfort: good  Do you use eye drops? : No      Joan Owens - Optometric Assistant           Medical, surgical and family histories reviewed and updated 6/27/2024.     Past history of operculated hole , monitored not needing treatment   OBJECTIVE: See Ophthalmology exam    ASSESSMENT:    ICD-10-CM    1. Myopia of both eyes  H52.13 EYE EXAM (SIMPLE-NONBILLABLE)     REFRACTION        Less nearsighted , emerging presbyopia   PLAN:   Updated prescription   Cleo Duran OD     Again, thank you for allowing me to participate in the care of your patient.        Sincerely,        Cleo Duran, OD

## 2024-07-01 ENCOUNTER — HOSPITAL ENCOUNTER (OUTPATIENT)
Dept: ULTRASOUND IMAGING | Facility: CLINIC | Age: 39
Discharge: HOME | End: 2024-07-01
Attending: PHYSICIAN ASSISTANT | Admitting: PHYSICIAN ASSISTANT
Payer: COMMERCIAL

## 2024-07-01 ENCOUNTER — OFFICE VISIT (OUTPATIENT)
Dept: URGENT CARE | Facility: URGENT CARE | Age: 39
End: 2024-07-01
Payer: COMMERCIAL

## 2024-07-01 ENCOUNTER — OFFICE VISIT (OUTPATIENT)
Dept: PEDIATRICS | Facility: CLINIC | Age: 39
End: 2024-07-01
Attending: FAMILY MEDICINE
Payer: COMMERCIAL

## 2024-07-01 VITALS
TEMPERATURE: 99 F | DIASTOLIC BLOOD PRESSURE: 70 MMHG | WEIGHT: 208 LBS | BODY MASS INDEX: 30.28 KG/M2 | OXYGEN SATURATION: 98 % | RESPIRATION RATE: 18 BRPM | HEART RATE: 70 BPM | SYSTOLIC BLOOD PRESSURE: 107 MMHG

## 2024-07-01 VITALS
DIASTOLIC BLOOD PRESSURE: 81 MMHG | OXYGEN SATURATION: 98 % | WEIGHT: 208 LBS | BODY MASS INDEX: 30.28 KG/M2 | TEMPERATURE: 97.5 F | SYSTOLIC BLOOD PRESSURE: 130 MMHG | HEART RATE: 82 BPM

## 2024-07-01 DIAGNOSIS — M79.662 PAIN OF LEFT CALF: ICD-10-CM

## 2024-07-01 DIAGNOSIS — M79.662 PAIN OF LEFT CALF: Primary | ICD-10-CM

## 2024-07-01 PROCEDURE — 99207 PR FIRST ORDER ACUTE REFERRAL: CPT | Performed by: FAMILY MEDICINE

## 2024-07-01 PROCEDURE — 99214 OFFICE O/P EST MOD 30 MIN: CPT | Performed by: PHYSICIAN ASSISTANT

## 2024-07-01 PROCEDURE — 93971 EXTREMITY STUDY: CPT | Mod: LT

## 2024-07-01 RX ORDER — IBUPROFEN 200 MG
600 TABLET ORAL
Status: SHIPPED
Start: 2024-07-01 | End: 2024-07-06

## 2024-07-01 ASSESSMENT — PAIN SCALES - GENERAL: PAINLEVEL: MILD PAIN (3)

## 2024-07-01 NOTE — PROGRESS NOTES
Assessment & Plan     Pain of left calf  - Referral to Acute and Diagnostic Services (Day of diagnostic / First order acute)     Given the duration of discomfort without any obvious signs of infection and no injuries or trauma to suggest contusion or muscle strain I feel she warrants further workup to rule out a deep vein thrombosis.  Patient was accepted for further management at [place] acute diagnostic services     Charlie Carson MD   [place] [facility]    Ana Flaherty is a 38 year old female who presents to clinic today for the following health issues:  Chief Complaint   Patient presents with    Urgent Care    Trauma     Left calf pain x's 2 weeks, burning last week , no busies or swollen       HPI    2 weeks calf pain no obvious swelling. Not at the knee joint. Doesn't hurt to walk on toes. No discoloration noted. Not reporting muscle cramps    No personal or FH of blood clots  Hx hysterectomy , maintains both ovaries    No long distance travel    Non-smoker.     No shortness of breath/chest discomfort    Patient Active Problem List    Diagnosis Date Noted    Heartburn 2022     Priority: Medium    Regurgitation of food 2022     Priority: Medium    Family history of esophageal cancer 2022     Priority: Medium     Mom with years of heartburn.      Family history of diabetes mellitus type II 2022     Priority: Medium    S/P MINAL (total abdominal hysterectomy) 2021     Priority: Medium    Umbilical hernia without obstruction and without gangrene 2021     Priority: Medium     Added automatically from request for surgery [id number]      Previous  section 2020     Priority: Medium     Added automatically from request for surgery [id number]      Vasovagal syncope 2018     Priority: Medium     With shots, lab draws, getting eyes dilated.       COURTNEY (generalized anxiety disorder) 2017     Priority: Medium    Major depressive disorder, recurrent  episode, moderate (H) 05/23/2017     Priority: Medium    Allergic rhinitis 01/21/2009     Priority: Medium     Overview:   Rhinitis Allergic  NOS      Mild intermittent asthma without complication 01/21/2009     Priority: Medium       Current Outpatient Medications   Medication Sig Dispense Refill    albuterol (PROAIR HFA/PROVENTIL HFA/VENTOLIN HFA) 108 (90 Base) MCG/ACT inhaler INHALE 2 PUFFS INTO THE LUNGS EVERY 4 HOURS AS NEEDED FOR SHORTNESS OF BREATH / DYSPNEA OR WHEEZING 8.5 g 11    buPROPion (WELLBUTRIN XL) 300 MG 24 hr tablet Take 1 tablet (300 mg) by mouth every morning 90 tablet 4    cetirizine (ZYRTEC) 10 MG tablet Take 10 mg by mouth daily      clindamycin (CLEOCIN T) 1 % external lotion Twice daily to acne rash on thighs as needed. 60 mL 4    clobetasol (TEMOVATE) 0.05 % external solution Apply topically 2 times daily as needed (dermatitis) 50 mL 1    famotidine (PEPCID) 20 MG tablet Take 1 tablet (20 mg) by mouth 2 times daily 180 tablet 4    guaiFENesin 1200 MG TB12 Take 1 tablet (1,200 mg) by mouth 2 times daily 60 tablet 0    ipratropium (ATROVENT) 0.06 % nasal spray Spray 2 sprays into both nostrils 4 times daily 15 mL 0    sertraline (ZOLOFT) 100 MG tablet Take 2 tablets (200 mg) by mouth daily 180 tablet 4    triamcinolone (KENALOG) 0.1 % external ointment Twice daily to eczema rash on the arms and legs until clear, then as needed. 80 g 2     No current facility-administered medications for this visit.         Objective    /81   Pulse 82   Temp 97.5  F (36.4  C) (Tympanic)   Wt 94.3 kg (208 lb)   LMP 12/08/2021   SpO2 98%   BMI 30.28 kg/m    Physical Exam         GEN: pleasant, no distress  CV: HDS  Pulm: non-labored  Legs: no asymmetric swelling. No pain behind the knees. Midline of posterior left calf reported area of soreness  Gait; normal, able to toe walk without difficulty    No results found for any visits on 07/01/24.                  The use of Dragon/[employer] dictation  services may have been used to construct the content in this note; any grammatical or spelling errors are non-intentional. Please contact the author of this note directly if you are in need of any clarification.

## 2024-07-01 NOTE — RESULT ENCOUNTER NOTE
Results discussed directly with patient while patient was present. Any further details documented in the note.   Sanjuanita Soares PA-C

## 2024-07-01 NOTE — PROGRESS NOTES
Acute and Diagnostic Services Clinic Visit    Assessment & Plan     Pain of left calf  Stat ultrasound reassuring.  Likely muscular strain.  Recommend ibuprofen and scheduled dosing with meals for GI protection x 5 days.  Gentle stretching encouraged.  Compression stockings and elevation also encouraged.  Therapeutic Exercises with writing the alphabet with great toe recommended bilaterally.  Orthopedic referral placed in the case that symptoms do not resolve or worsen.  All questions answered to the patient satisfaction.  - Referral to Acute and Diagnostic Services (Day of diagnostic / First order acute)  - US Lower Extremity Venous Duplex Left  - ibuprofen (ADVIL/MOTRIN) 200 MG tablet  - Orthopedic  Referral      31  minutes were spent doing chart review, history and exam, documentation and further activities per the note.      Return in about 1 week (around 7/8/2024) for ortho evaluation if worsening/not improving.      Sanjuanita Soares MBA, MS, PA-C  [facility]      Ana Flaherty is a 38 year old, presenting for the following health issues:  Leg Pain (Left leg pain X 2 weeks)        1/22/2024     9:09 AM   Additional Questions   Roomed by DIAZ Moran   Accompanied by TOAN CAR     Evaluation for possible DVT  Onset/Duration: X 2 weeks  Description:       Location: left lower extremity       Redness: no        Pain: 3/10       Warmth: no        Joint swelling no   Progression of symptoms better, sitting long periods can cause increased pain, notes she works from home  Accompanying signs and symptoms:       Fevers: no        Numbness/tingling/weakness: no        Chest pain/pleurisy: no        Shortness of breath: no   History        Trauma: no         Recent travel/when: no         Previous history of DVT: no         Family history of DVT: no         Recent surgery: no   Aggravating factors include: sitting long periods  (works from home)  Did notice  slight discomfort after a short walk as well  Therapies tried and outcome: rest/inactivity, ice, and elevating, Compression stockings have helped  Prior surgery on arteries of veins in this area: No          Review of Systems  Constitutional, HEENT, cardiovascular, pulmonary, GI, , musculoskeletal, neuro, skin, endocrine and psych systems are negative, except as otherwise noted.      Objective    /70 (BP Location: Left arm, Patient Position: Chair, Cuff Size: Adult Large)   Pulse 70   Temp 99  F (37.2  C) (Oral)   Resp 18   Wt 94.3 kg (208 lb)   LMP 12/08/2021   SpO2 98%   BMI 30.28 kg/m    Body mass index is 30.28 kg/m .  Physical Exam   GENERAL: alert and no distress  EYES: Eyes grossly normal to inspection, PERRL and conjunctivae and sclerae normal  RESP: lungs clear to auscultation - no rales, rhonchi or wheezes  CV: regular rate and rhythm, normal S1 S2, no S3 or S4, no murmur, click or rub, no peripheral edema  MS: no gross musculoskeletal defects noted, no edema, very mild discomfort with deep compression of the left calf muscle, no palpable cord or deformity appreciated  SKIN: no suspicious lesions or rashes  NEURO: Normal strength and tone, mentation intact and speech normal  PSYCH: mentation appears normal, affect normal/bright    Results for orders placed or performed during the hospital encounter of 07/01/24   US Lower Extremity Venous Duplex Left     Status: None    Narrative    VENOUS ULTRASOUND LEFT LOWER EXTREMITY  7/1/2024 12:49 PM     HISTORY: Left calf pain x 2 weeks.    COMPARISON: None.    TECHNIQUE: Color Doppler and spectral waveform analysis performed  throughout the deep veins of the left lower extremity.    FINDINGS: The left common femoral, proximal great saphenous, femoral,  and popliteal veins demonstrate normal blood flow, compression, and  augmentation. Posterior tibial and peroneal veins are compressible.  Contralateral right common femoral vein is patent.       Impression    IMPRESSION: Negative for DVT in the left lower extremity.     HARITHA FRASER MD         SYSTEM ID:  [id number]           Signed Electronically by: Sanjuanita Soares PA-C     Mood stabilizer meds Mood stabilizer meds

## 2024-07-19 ENCOUNTER — OFFICE VISIT (OUTPATIENT)
Dept: ORTHOPEDICS | Facility: CLINIC | Age: 39
End: 2024-07-19
Payer: COMMERCIAL

## 2024-07-19 VITALS
SYSTOLIC BLOOD PRESSURE: 122 MMHG | WEIGHT: 213.3 LBS | HEART RATE: 81 BPM | BODY MASS INDEX: 30.54 KG/M2 | HEIGHT: 70 IN | DIASTOLIC BLOOD PRESSURE: 70 MMHG

## 2024-07-19 DIAGNOSIS — M79.662 PAIN OF LEFT CALF: Primary | ICD-10-CM

## 2024-07-19 DIAGNOSIS — M62.831 MUSCLE SPASM OF LEFT CALF: ICD-10-CM

## 2024-07-19 PROCEDURE — 99204 OFFICE O/P NEW MOD 45 MIN: CPT | Performed by: STUDENT IN AN ORGANIZED HEALTH CARE EDUCATION/TRAINING PROGRAM

## 2024-07-19 NOTE — LETTER
7/19/2024      Krystina Suazo  [address]  [place] [address]      Dear Colleague,    Thank you for referring your patient, Krystina Suazo, to the [facility] [place]. Please see a copy of my visit note below.    ASSESSMENT & PLAN    Krystina was seen today for pain.    Diagnoses and all orders for this visit:    Pain of left calf  -     Orthopedic  Referral  -     Physical Therapy  Referral; Future    Muscle spasm of left calf  -     Physical Therapy  Referral; Future        38 year old female presents with left medial calf pain, worse with sitting without inciting injury or event. Negative doppler for DVT. On exam, has some medial gastroc hypertonicity and tenderness, otherwise benign exam. Given no injury mechanism and symptoms present at rest while seated, low suspicion for MSK pathology. Discussed that we can work on treating her calf spasm and work on ergonomic modifications, however, if no improvement, would get further evaluation of possible venous insufficiency referred pain.     Plan:  -Work on workplace ergonomics (screen and chair height)  -Start home stretching and exercise program   -Start PT  -Can try TENS unit or heat as needed for calf pain  -If no improvement could consider MRI or referral back to PCP for eval of venous insufficiency     Follow-up PRN      Dr. Laurie Quarles, DO, CAQ  [facility]  Sports Medicine     -----  Chief Complaint   Patient presents with     Left Lower Leg - Pain       SUBJECTIVE  Krystina Suazo is a/an 38 year old female who is seen in consultation at the request of  Sanjuanita Soares PA-C for evaluation of left calf.  Onset was 6/1/24. Pain is located posterior calf. Symptoms are worsened by sitting at desk for long periods of time.  She has tried ice, heat, ibuprofen with minimal results. Associated symptoms include none. No numbness/tingling. Remote history of  "back injury. No cramping.     The patient is seen alone    Prior injury/Surgical history of affected joint: None   Social History/Occupation: Desk Work     REVIEW OF SYSTEMS:  Pertinent positives/negative: As stated above in HPI    OBJECTIVE:  /70   Pulse 81   Ht 1.765 m (5' 9.5\")   Wt 96.8 kg (213 lb 4.8 oz)   LMP 12/08/2021   BMI 31.05 kg/m     General: Alert and in no distress  CV: Extremities appear well perfused   Resp: normal respiratory effort  MSK:  Left calf exam  TTP medial gastroc (mild) with some muscle hypertonicity  Full knee and ankle ROM  Intact pedal pulses and cap refill, slightly more robust pulse on right side   Negative leta sign  Intact plantarflexion/dorsiflexion bilaterally  Negative straight leg raise  Sensation intact L4-S1 dermatomes bilaterally    RADIOLOGY:  Final results and radiologist's interpretation available in the [facility] health record.  Images below were personally reviewed and discussed with the patient in the office today.  LLE Doppler 7/1/24  FINDINGS: The left common femoral, proximal great saphenous, femoral,  and popliteal veins demonstrate normal blood flow, compression, and  augmentation. Posterior tibial and peroneal veins are compressible.  Contralateral right common femoral vein is patent.         Review of prior external note(s) from - primary care           Disclaimer: This note consists of text and symbols derived from dictation and/or voice recognition software. As a result, there may be errors in the script that have gone undetected. Please consider this when interpreting information found in this chart.        Again, thank you for allowing me to participate in the care of your patient.        Sincerely,        Laurie Quarles, DO  "

## 2024-07-19 NOTE — PROGRESS NOTES
ASSESSMENT & PLAN    Krystina was seen today for pain.    Diagnoses and all orders for this visit:    Pain of left calf  -     Orthopedic  Referral  -     Physical Therapy  Referral; Future    Muscle spasm of left calf  -     Physical Therapy  Referral; Future        38 year old female presents with left medial calf pain, worse with sitting without inciting injury or event. Negative doppler for DVT. On exam, has some medial gastroc hypertonicity and tenderness, otherwise benign exam. Given no injury mechanism and symptoms present at rest while seated, low suspicion for MSK pathology. Discussed that we can work on treating her calf spasm and work on ergonomic modifications, however, if no improvement, would get further evaluation of possible venous insufficiency referred pain.     Plan:  -Work on workplace ergonomics (screen and chair height)  -Start home stretching and exercise program   -Start PT  -Can try TENS unit or heat as needed for calf pain  -If no improvement could consider MRI or referral back to PCP for eval of venous insufficiency     Follow-up PRN      Dr. Laurie Quarles, DO, CAQ  [facility]  Sports Medicine     -----  Chief Complaint   Patient presents with    Left Lower Leg - Pain       SUBJECTIVE  Krystina Suazo is a/an 38 year old female who is seen in consultation at the request of  Sanjuanita Soares PA-C for evaluation of left calf.  Onset was 6/1/24. Pain is located posterior calf. Symptoms are worsened by sitting at desk for long periods of time.  She has tried ice, heat, ibuprofen with minimal results. Associated symptoms include none. No numbness/tingling. Remote history of back injury. No cramping.     The patient is seen alone    Prior injury/Surgical history of affected joint: None   Social History/Occupation: Desk Work     REVIEW OF SYSTEMS:  Pertinent positives/negative: As stated above in HPI    OBJECTIVE:  /70    "Pulse 81   Ht 1.765 m (5' 9.5\")   Wt 96.8 kg (213 lb 4.8 oz)   LMP 12/08/2021   BMI 31.05 kg/m     General: Alert and in no distress  CV: Extremities appear well perfused   Resp: normal respiratory effort  MSK:  Left calf exam  TTP medial gastroc (mild) with some muscle hypertonicity  Full knee and ankle ROM  Intact pedal pulses and cap refill, slightly more robust pulse on right side   Negative leta sign  Intact plantarflexion/dorsiflexion bilaterally  Negative straight leg raise  Sensation intact L4-S1 dermatomes bilaterally    RADIOLOGY:  Final results and radiologist's interpretation available in the [facility] health record.  Images below were personally reviewed and discussed with the patient in the office today.  LLE Doppler 7/1/24  FINDINGS: The left common femoral, proximal great saphenous, femoral,  and popliteal veins demonstrate normal blood flow, compression, and  augmentation. Posterior tibial and peroneal veins are compressible.  Contralateral right common femoral vein is patent.         Review of prior external note(s) from - primary care           Disclaimer: This note consists of text and symbols derived from dictation and/or voice recognition software. As a result, there may be errors in the script that have gone undetected. Please consider this when interpreting information found in this chart.    "

## 2024-07-19 NOTE — PATIENT INSTRUCTIONS
1. Pain of left calf    2. Muscle spasm of left calf        Plan:  -Work on workplace ergonomics (screen and chair height)  -Start home stretching and exercise program   -Start PT  -Can try TENS unit or heat as needed for calf pain  -If no improvement could consider MRI or referral     If you have any questions or concerns after your appointment, please send my team a [facility] message or call the clinic at [phone]   Thank you for choosing [facility]!    Dr. Laurie Quarles, DO, [facility]  Sports Medicine and Orthopedics    Dr. Quarles's Clinic Locations:   [place] APPOINTMENTS: [phone]      [address] [facility] [address] [facility] RADIOLOGY: [phone]   [place] [address] PHYSICAL THERAPY: [phone]    HAND THERAPY: [phone]   [place] BILLING QUESTIONS: [phone] [address] FAX: [phone]   [place] [address]

## 2024-07-22 ENCOUNTER — ALLIED HEALTH/NURSE VISIT (OUTPATIENT)
Dept: PEDIATRICS | Facility: CLINIC | Age: 39
End: 2024-07-22
Payer: COMMERCIAL

## 2024-07-22 DIAGNOSIS — Z23 ENCOUNTER FOR IMMUNIZATION: Primary | ICD-10-CM

## 2024-07-22 DIAGNOSIS — Z23 NEED FOR VACCINATION: ICD-10-CM

## 2024-07-22 PROCEDURE — 99207 PR NO CHARGE NURSE ONLY: CPT

## 2024-07-22 PROCEDURE — 90746 HEPB VACCINE 3 DOSE ADULT IM: CPT

## 2024-07-22 PROCEDURE — 90471 IMMUNIZATION ADMIN: CPT

## 2024-07-22 NOTE — PROGRESS NOTES
Prior to immunization administration, verified patients identity using patient s name and date of birth. Please see Immunization Activity for additional information.     Screening Questionnaire for Adult Immunization    Are you sick today?   No   Do you have allergies to medications, food, a vaccine component or latex?   No   Have you ever had a serious reaction after receiving a vaccination?   No   Do you have a long-term health problem with heart, lung, kidney, or metabolic disease (e.g., diabetes), asthma, a blood disorder, no spleen, complement component deficiency, a cochlear implant, or a spinal fluid leak?  Are you on long-term aspirin therapy?   No   Do you have cancer, leukemia, HIV/AIDS, or any other immune system problem?   No   Do you have a parent, brother, or sister with an immune system problem?   No   In the past 3 months, have you taken medications that affect  your immune system, such as prednisone, other steroids, or anticancer drugs; drugs for the treatment of rheumatoid arthritis, Crohn s disease, or psoriasis; or have you had radiation treatments?   No   Have you had a seizure, or a brain or other nervous system problem?   No   During the past year, have you received a transfusion of blood or blood    products, or been given immune (gamma) globulin or antiviral drug?   No   For women: Are you pregnant or is there a chance you could become       pregnant during the next month?   No   Have you received any vaccinations in the past 4 weeks?   No     Immunization questionnaire answers were all negative.    I have reviewed the following standing orders:   This patient is due and qualifies for the Hepatitis B vaccine.    Click here for Hepatitis B Standing Order    I have reviewed the vaccines inclusion and exclusion criteria; No concerns regarding eligibility.     Patient instructed to remain in clinic for 15 minutes afterwards, and to report any adverse reactions.     Screening performed by Nisha BONDS  CHELY Champagne on 7/22/2024 at 9:15 AM.

## 2024-07-22 NOTE — PROGRESS NOTES
"Prior to immunization administration, verified patients identity using patient s name and date of birth. Please see Immunization Activity for additional information.     Screening Questionnaire for Adult Immunization    Are you sick today?   {:951612}   Do you have allergies to medications, food, a vaccine component or latex?   {:540733}   Have you ever had a serious reaction after receiving a vaccination?   {:892726}   Do you have a long-term health problem with heart, lung, kidney, or metabolic disease (e.g., diabetes), asthma, a blood disorder, no spleen, complement component deficiency, a cochlear implant, or a spinal fluid leak?  Are you on long-term aspirin therapy?   {:565711}   Do you have cancer, leukemia, HIV/AIDS, or any other immune system problem?   {:076658}   Do you have a parent, brother, or sister with an immune system problem?   {:524309}   In the past 3 months, have you taken medications that affect  your immune system, such as prednisone, other steroids, or anticancer drugs; drugs for the treatment of rheumatoid arthritis, Crohn s disease, or psoriasis; or have you had radiation treatments?   {:087353}   Have you had a seizure, or a brain or other nervous system problem?   {:535921}   During the past year, have you received a transfusion of blood or blood    products, or been given immune (gamma) globulin or antiviral drug?   {:004345}   For women: Are you pregnant or is there a chance you could become       pregnant during the next month?   {:750511}   Have you received any vaccinations in the past 4 weeks?   {:830578}     Immunization questionnaire { :228847::\"answers were all negative.\"}    I have reviewed the following standing orders: {Adult Vaccine Standing Order:226200}    Patient instructed to remain in clinic for 15 minutes afterwards, and to report any adverse reactions.     Screening performed by Nisha Champagne MA on 7/22/2024 at 8:04 AM.        Prior to immunization administration, " "verified patients identity using patient s name and date of birth. Please see Immunization Activity for additional information.     Screening Questionnaire for Adult Immunization    Are you sick today?   {:982405}   Do you have allergies to medications, food, a vaccine component or latex?   {:603214}   Have you ever had a serious reaction after receiving a vaccination?   {:946413}   Do you have a long-term health problem with heart, lung, kidney, or metabolic disease (e.g., diabetes), asthma, a blood disorder, no spleen, complement component deficiency, a cochlear implant, or a spinal fluid leak?  Are you on long-term aspirin therapy?   {:569096}   Do you have cancer, leukemia, HIV/AIDS, or any other immune system problem?   {:888970}   Do you have a parent, brother, or sister with an immune system problem?   {:310725}   In the past 3 months, have you taken medications that affect  your immune system, such as prednisone, other steroids, or anticancer drugs; drugs for the treatment of rheumatoid arthritis, Crohn s disease, or psoriasis; or have you had radiation treatments?   {:702265}   Have you had a seizure, or a brain or other nervous system problem?   {:309023}   During the past year, have you received a transfusion of blood or blood    products, or been given immune (gamma) globulin or antiviral drug?   {:786692}   For women: Are you pregnant or is there a chance you could become       pregnant during the next month?   {:577554}   Have you received any vaccinations in the past 4 weeks?   {:763539}     Immunization questionnaire { :067878::\"answers were all negative.\"}    I have reviewed the following standing orders: {Adult Vaccine Standing Order:008976}    Patient instructed to remain in clinic for 15 minutes afterwards, and to report any adverse reactions.     Screening performed by Nisha Champagne MA on 7/22/2024 at 9:11 AM.        "

## 2024-09-06 ENCOUNTER — IMMUNIZATION (OUTPATIENT)
Dept: PEDIATRICS | Facility: CLINIC | Age: 39
End: 2024-09-06
Payer: COMMERCIAL

## 2024-09-06 DIAGNOSIS — Z23 NEED FOR PROPHYLACTIC VACCINATION AND INOCULATION AGAINST INFLUENZA: Primary | ICD-10-CM

## 2024-09-06 PROCEDURE — 90656 IIV3 VACC NO PRSV 0.5 ML IM: CPT

## 2024-09-06 PROCEDURE — 90471 IMMUNIZATION ADMIN: CPT

## 2024-09-06 PROCEDURE — 99207 PR NO CHARGE NURSE ONLY: CPT

## 2024-09-17 ENCOUNTER — OFFICE VISIT (OUTPATIENT)
Dept: URGENT CARE | Facility: URGENT CARE | Age: 39
End: 2024-09-17
Payer: COMMERCIAL

## 2024-09-17 ENCOUNTER — ANCILLARY PROCEDURE (OUTPATIENT)
Dept: GENERAL RADIOLOGY | Facility: CLINIC | Age: 39
End: 2024-09-17
Attending: PHYSICIAN ASSISTANT
Payer: COMMERCIAL

## 2024-09-17 VITALS
OXYGEN SATURATION: 98 % | RESPIRATION RATE: 16 BRPM | HEART RATE: 75 BPM | DIASTOLIC BLOOD PRESSURE: 72 MMHG | TEMPERATURE: 98.2 F | SYSTOLIC BLOOD PRESSURE: 114 MMHG

## 2024-09-17 DIAGNOSIS — M54.50 ACUTE BILATERAL LOW BACK PAIN WITHOUT SCIATICA: Primary | ICD-10-CM

## 2024-09-17 DIAGNOSIS — M54.50 ACUTE BILATERAL LOW BACK PAIN WITHOUT SCIATICA: ICD-10-CM

## 2024-09-17 PROCEDURE — 72100 X-RAY EXAM L-S SPINE 2/3 VWS: CPT | Mod: TC | Performed by: INTERNAL MEDICINE

## 2024-09-17 PROCEDURE — 99213 OFFICE O/P EST LOW 20 MIN: CPT | Performed by: PHYSICIAN ASSISTANT

## 2024-09-17 RX ORDER — CYCLOBENZAPRINE HCL 10 MG
5-10 TABLET ORAL 3 TIMES DAILY PRN
Qty: 30 TABLET | Refills: 0 | Status: SHIPPED | OUTPATIENT
Start: 2024-09-17 | End: 2024-09-27

## 2024-09-17 RX ORDER — NAPROXEN 500 MG/1
500 TABLET ORAL 2 TIMES DAILY WITH MEALS
Qty: 14 TABLET | Refills: 0 | Status: SHIPPED | OUTPATIENT
Start: 2024-09-17 | End: 2024-09-24

## 2024-09-17 NOTE — PATIENT INSTRUCTIONS
September 17, 2024 [facility] Plan:       1.  Start the prescription anti-inflammatory/pain relieving medication (Naproxen Sodium) I prescribed for you. Do not take over-the-counter Ibuprofen/Motrin/Advil or Aleve while taking this medication.     You may take over-the-counter Tylenol as needed for breakthrough pain. Do not take more than 3,000 mg of Tylenol in 24 hours.     2. Take the Flexeril muscle relaxer three times a day as tolerated.   As we discussed this medication can make some people drowsy. Do not drive while taking this medication until you see how this medication effects you personally. You may want to take a 1/2 tablet during the day and a whole tab at night.      3. Follow-up with your primary care provider or orthopedic doctor for evaluation if your symptom change, worsen or fail to fully resolve with the treatment provided here today.     4. If you develop and severe worsening( such as the below symptoms), you should present directly to the emergency room:     You have a sudden change in your ability to control? your bladder or bowels.  You begin to feel numbness and tingling in your groin   The pain spreads down your leg and into your foot that does not go away with moving positions.  Your toes, feet or leg muscles begin to feel weak.  You feel generally unwell, develop a fever or sick.  Your pain suddenly gets worse.   You develop sudden onset abdominal pain   You have severe, intractable pain

## 2024-09-17 NOTE — PROGRESS NOTES
ASSESSMENT/PLAN:    (M54.50) Acute bilateral low back pain without sciatica  (primary encounter diagnosis)  MDM: Acute bilateral lumbar back pain. Soft tissue strain seems most probable etiology at this time. No radiculopathy. No cauda equina sxs. No systemic sxs to suggest other occult etiologies at this time. Plan is as per outlined below.   Plan: XR Lumbar Spine 2/3 Views, naproxen (NAPROSYN)         500 MG tablet, cyclobenzaprine (FLEXERIL) 10 MG        tablet            AVS/Plan-     September 17, 2024 [facility] Plan:       1.  Start the prescription anti-inflammatory/pain relieving medication (Naproxen Sodium) I prescribed for you. Do not take over-the-counter Ibuprofen/Motrin/Advil or Aleve while taking this medication.     You may take over-the-counter Tylenol as needed for breakthrough pain. Do not take more than 3,000 mg of Tylenol in 24 hours.     2. Take the Flexeril muscle relaxer three times a day as tolerated.   As we discussed this medication can make some people drowsy. Do not drive while taking this medication until you see how this medication effects you personally. You may want to take a 1/2 tablet during the day and a whole tab at night.      3. Follow-up with your primary care provider or orthopedic doctor for evaluation if your symptom change, worsen or fail to fully resolve with the treatment provided here today.     4. If you develop and severe worsening( such as the below symptoms), you should present directly to the emergency room:     You have a sudden change in your ability to control? your bladder or bowels.  You begin to feel numbness and tingling in your groin   The pain spreads down your leg and into your foot that does not go away with moving positions.  Your toes, feet or leg muscles begin to feel weak.  You feel generally unwell, develop a fever or sick.  Your pain suddenly gets worse.   You develop sudden onset abdominal pain   You have severe, intractable pain  "      This progress note has been dictated, with use of voice recognition software. Any grammatical, typographical, or context errors are unintentional and inherent to use of voice recognition software.  ---------------    Chief Complaint   Patient presents with    Back Pain     2 weeks-chronic pain, across lower back radiating to hip, getting worse after bending over       SUBJECTIVE:    Krystina Suazo is a 39 year old female who presents for evaluation of acute onset, bilateral, low back pain. She has some radiation of pain to the buttock/hip area bilaterally. No radiation into upper legs or lower legs.     Patient states her low back felt a bit generally sore 2 weeks ago.  Last week became slightly more sore after she was \"moving things around the house\".  Yesterday, when she reached down to change the dogs ball, she developed sudden severe bilateral back pain (right and left of midline).    Patient has tried home treatment with ibuprofen, massaging heating pad, and TENS unit. She had some left over Flexeril from prior back pain episode (took 10 mg last night and 10 mg this morning)       Mechanism: Positive as per above. Denies any other other overt injury or trauma.      Pain is located: see above HPI   Perceived Pain Level:  Best 2-3/10 laying and 10/10 with changing positions, bending, twisting or lifting      Recent injury: See above HPI   Personal hx of back pain is recurrent self limited- Has never seen ortho or neuro spine specialist. No hx of MR or advance spine imaging   Pain is exacerbated by:  Trunk twisting, lifting and bending forward .  Pain is relieved by: improved but not relieved by alternating ice, heat and OTC NSAID's     Cauda Equina Review: Denies any fecal incontinence, lower extremity numbness, LE tingling, urinary incontinence, saddle area paraesthesias.     Impingement Review: Denies any radicular lower extremity symptoms or acute lower extremity weakness. "     ROS:    CONSTITUTIONAL: negative for, fever orchills   CARDIAC: Denies any CP or SOB. Denies any syncope or near syncope  RESP: Denies any cough, wheezing, SOB or hemoptysis  GI: Denies any abdominal pain. Denies any F/C/N/V/D  SKIN: Denies rash. Denies any vesicles or lesions   URINARY REVIEW:  Denies any dysuria, urinary urgency/frequency, hematuria, fever, chills, nausea, vomiting  RHEUM: Denies any red, warm or swollen joints         Past Medical History:   Diagnosis Date    Asthma     Depression     Depressive disorder     Diagnosed around     Hypertension 2018    Uterine fibroid in pregnancy 2020    Vasovagal syncope        Patient Active Problem List   Diagnosis    COURTNEY (generalized anxiety disorder)    Major depressive disorder, recurrent episode, moderate (H)    Allergic rhinitis    Mild intermittent asthma without complication    Vasovagal syncope    Previous  section    Umbilical hernia without obstruction and without gangrene    S/P MINAL (total abdominal hysterectomy)    Family history of esophageal cancer    Family history of diabetes mellitus type II    Heartburn    Regurgitation of food       Past Surgical History:   Procedure Laterality Date    CARPAL TUNNEL RELEASE RT/LT Bilateral      SECTION N/A 2018    Procedure:  SECTION;   section;  Surgeon: Jay Yanez MD;  Location: [facility] OR     SECTION N/A 10/27/2020    Procedure: REPEAT  SECTION;  Surgeon: Aníbal Pearson MD;  Location: [facility] L+D    GYN SURGERY  2018        HERNIORRHAPHY UMBILICAL N/A 2021    Procedure: Open Umbilical Hernia Repair with Mesh;  Surgeon: Erin Mccarthy MD;  Location: [facility] OR    HYSTERECTOMY TOTAL ABDOMINAL, SALPINGECTOMY N/A 2021    Procedure: TOTAL ABDOMINAL HYSTERECTOMY, BILATERAL SALPINGECTOMY;  Surgeon: Sandy Tran MD;  Location: [facility] OR    HYSTERECTOMY, PAP NO LONGER INDICATED      SINUS SURGERY       TONSILLECTOMY  2014     Current Outpatient Medications   Medication Sig Dispense Refill    albuterol (PROAIR HFA/PROVENTIL HFA/VENTOLIN HFA) 108 (90 Base) MCG/ACT inhaler INHALE 2 PUFFS INTO THE LUNGS EVERY 4 HOURS AS NEEDED FOR SHORTNESS OF BREATH / DYSPNEA OR WHEEZING 8.5 g 11    buPROPion (WELLBUTRIN XL) 300 MG 24 hr tablet Take 1 tablet (300 mg) by mouth every morning 90 tablet 4    cetirizine (ZYRTEC) 10 MG tablet Take 10 mg by mouth daily      clindamycin (CLEOCIN T) 1 % external lotion Twice daily to acne rash on thighs as needed. 60 mL 4    clobetasol (TEMOVATE) 0.05 % external solution Apply topically 2 times daily as needed (dermatitis) 50 mL 1    famotidine (PEPCID) 20 MG tablet Take 1 tablet (20 mg) by mouth 2 times daily 180 tablet 4    ipratropium (ATROVENT) 0.06 % nasal spray Spray 2 sprays into both nostrils 4 times daily 15 mL 0    sertraline (ZOLOFT) 100 MG tablet Take 2 tablets (200 mg) by mouth daily 180 tablet 4    triamcinolone (KENALOG) 0.1 % external ointment Twice daily to eczema rash on the arms and legs until clear, then as needed. 80 g 2     No current facility-administered medications for this visit.             No Known Allergies        OBJECTIVE:  /72   Pulse 75   Temp 98.2  F (36.8  C)   Resp 16   LMP 12/08/2021   SpO2 98%         GENERAL:  Very pleasant, comfortable and generally well appearing.    Back examination: Back symmetric, no curvature.  Positive for bilateral, paravertebral pain on palpation. Neuro:  Gait within normal limits.  Quadriceps and great toe strength good and equal bilaterally.  Patellar  reflexes good and equal bilaterally. Sensation in multiple dermatomal distributions LE good and equal bilaterally.  Limited forward bending due to pain.     STRAIGHT LEG RAISE: Negative.   RESP: lungs clear to auscultation - no rales, rhonchi or wheezes  CV: regular rates and rhythm, normal S1 S2, no murmur noted  ABDOMEN:  soft, nontender, no HSM or masses and  bowel sounds normal  NEURO: Normal strength and tone with no weakness or sensory deficit noted, reflexes normal   SKIN: no suspicious lesions or rashes

## 2024-09-23 ASSESSMENT — ASTHMA QUESTIONNAIRES
ACT_TOTALSCORE: 25
QUESTION_4 LAST FOUR WEEKS HOW OFTEN HAVE YOU USED YOUR RESCUE INHALER OR NEBULIZER MEDICATION (SUCH AS ALBUTEROL): NOT AT ALL
QUESTION_2 LAST FOUR WEEKS HOW OFTEN HAVE YOU HAD SHORTNESS OF BREATH: NOT AT ALL
QUESTION_5 LAST FOUR WEEKS HOW WOULD YOU RATE YOUR ASTHMA CONTROL: COMPLETELY CONTROLLED
QUESTION_3 LAST FOUR WEEKS HOW OFTEN DID YOUR ASTHMA SYMPTOMS (WHEEZING, COUGHING, SHORTNESS OF BREATH, CHEST TIGHTNESS OR PAIN) WAKE YOU UP AT NIGHT OR EARLIER THAN USUAL IN THE MORNING: NOT AT ALL
QUESTION_1 LAST FOUR WEEKS HOW MUCH OF THE TIME DID YOUR ASTHMA KEEP YOU FROM GETTING AS MUCH DONE AT WORK, SCHOOL OR AT HOME: NONE OF THE TIME
ACT_TOTALSCORE: 25

## 2024-09-23 ASSESSMENT — PATIENT HEALTH QUESTIONNAIRE - PHQ9
SUM OF ALL RESPONSES TO PHQ QUESTIONS 1-9: 2
SUM OF ALL RESPONSES TO PHQ QUESTIONS 1-9: 2
10. IF YOU CHECKED OFF ANY PROBLEMS, HOW DIFFICULT HAVE THESE PROBLEMS MADE IT FOR YOU TO DO YOUR WORK, TAKE CARE OF THINGS AT HOME, OR GET ALONG WITH OTHER PEOPLE: NOT DIFFICULT AT ALL

## 2024-09-24 ENCOUNTER — OFFICE VISIT (OUTPATIENT)
Dept: PEDIATRICS | Facility: CLINIC | Age: 39
End: 2024-09-24
Payer: COMMERCIAL

## 2024-09-24 VITALS
SYSTOLIC BLOOD PRESSURE: 125 MMHG | OXYGEN SATURATION: 98 % | DIASTOLIC BLOOD PRESSURE: 77 MMHG | TEMPERATURE: 97.9 F | WEIGHT: 221.4 LBS | BODY MASS INDEX: 32.23 KG/M2 | HEART RATE: 84 BPM | RESPIRATION RATE: 16 BRPM

## 2024-09-24 DIAGNOSIS — M54.50 BILATERAL LOW BACK PAIN WITHOUT SCIATICA, UNSPECIFIED CHRONICITY: Primary | ICD-10-CM

## 2024-09-24 PROCEDURE — 99213 OFFICE O/P EST LOW 20 MIN: CPT | Performed by: PHYSICIAN ASSISTANT

## 2024-09-24 ASSESSMENT — PAIN SCALES - GENERAL: PAINLEVEL: MILD PAIN (2)

## 2024-09-24 NOTE — NURSING NOTE
Pt states had a fall back in high school and been having chronic pain since. Improving a lot over the last  couple days, no longer needing pain meds or muscle relaxers. Cleo Newsome MA on 9/24/2024 at 3:14 PM

## 2024-09-24 NOTE — PROGRESS NOTES
"  Assessment & Plan     Bilateral low back pain without sciatica, unspecified chronicity    - Physical Therapy  Referral; Future      Patient was advised to followup with Physical Therapy for more long term management of acute on chronic low back pain.     MED REC REQUIRED  Post Medication Reconciliation Status:   BMI  Estimated body mass index is 32.23 kg/m  as calculated from the following:    Height as of 7/19/24: 1.765 m (5' 9.5\").    Weight as of this encounter: 100.4 kg (221 lb 6.4 oz).         Subjective   Krystina is a 39 year old, presenting for the following health issues:  Hospital F/U (Urgent Care follow up : Acute bilateral low back pain without sciatica)      9/24/2024     3:14 PM   Additional Questions   Roomed by KM   Accompanied by Self         9/24/2024     3:14 PM   Patient Reported Additional Medications   Patient reports taking the following new medications None     HPI     Here for a followup on her back pain that started last Tuesday (one week ago).  She states that she is doing better.  Patient suffers from chronic back pain due to an injury when she was 13/14 years old.    She is no longer needing the muscle relaxer or Naproxen for this acute flare.  She says that today is her first day off of the medication and she is doing so much better.    She still has some pain in the left side hip and lower back, that she rates a 2/10 without medication.        Review of Systems  Constitutional, neuro, ENT, endocrine, pulmonary, cardiac, gastrointestinal, genitourinary, musculoskeletal, integument and psychiatric systems are negative, except as otherwise noted.      Objective    /77 (BP Location: Right arm, Patient Position: Sitting, Cuff Size: Adult Large)   Pulse 84   Temp 97.9  F (36.6  C) (Tympanic)   Resp 16   Wt 100.4 kg (221 lb 6.4 oz)   LMP 12/08/2021   SpO2 98%   BMI 32.23 kg/m    Body mass index is 32.23 kg/m .  Physical Exam   GENERAL: alert and no distress  EYES: " Eyes grossly normal to inspection, PERRL and conjunctivae and sclerae normal  MS: no gross musculoskeletal defects noted, no edema  NEURO: Normal strength and tone, mentation intact and speech normal  BACK: no CVA tenderness, no paralumbar tenderness  Comprehensive back pain exam:  Range of motion not limited by pain, Lower extremity strength functional and equal on both sides, and Straight leg raise negative bilaterally          Signed Electronically by: Jacquelin Woodard PA-C    Answers submitted by the patient for this visit:  Patient Health Questionnaire (Submitted on 9/23/2024)  If you checked off any problems, how difficult have these problems made it for you to do your work, take care of things at home, or get along with other people?: Not difficult at all  PHQ9 TOTAL SCORE: 2

## 2024-09-24 NOTE — PROGRESS NOTES
{PROVIDER CHARTING PREFERENCE:111897}    Subjective   Krystina is a 39 year old, presenting for the following health issues:  Hospital F/U (Urgent Care follow up : Acute bilateral low back pain without sciatica)      9/24/2024     3:14 PM   Additional Questions   Roomed by KM   Accompanied by Self         9/24/2024     3:14 PM   Patient Reported Additional Medications   Patient reports taking the following new medications None     HPI       ED/UC Followup:    Facility:  [facility]  Date of visit: 9/17/2024  Reason for visit: Acute Bilateral Back Pain without Sciatica   Current Status: ***  {additonal problems for provider to add (Optional):915976}    {ROS Picklists (Optional):311760}      Objective    LMP 12/08/2021   There is no height or weight on file to calculate BMI.  Physical Exam   {Exam List (Optional):924585}    {Diagnostic Test Results (Optional):990523}        Signed Electronically by: Jacquelin Woodard PA-C  {Email feedback regarding this note to [email or web address]   :[id number]}

## 2024-10-25 ENCOUNTER — THERAPY VISIT (OUTPATIENT)
Dept: PHYSICAL THERAPY | Facility: CLINIC | Age: 39
End: 2024-10-25
Attending: PHYSICIAN ASSISTANT
Payer: COMMERCIAL

## 2024-10-25 DIAGNOSIS — M62.831 MUSCLE SPASM OF LEFT CALF: ICD-10-CM

## 2024-10-25 DIAGNOSIS — M79.662 PAIN OF LEFT CALF: ICD-10-CM

## 2024-10-25 DIAGNOSIS — M54.50 BILATERAL LOW BACK PAIN WITHOUT SCIATICA, UNSPECIFIED CHRONICITY: ICD-10-CM

## 2024-10-25 PROCEDURE — 97110 THERAPEUTIC EXERCISES: CPT | Mod: GP | Performed by: PHYSICAL THERAPIST

## 2024-10-25 PROCEDURE — 97161 PT EVAL LOW COMPLEX 20 MIN: CPT | Mod: GP | Performed by: PHYSICAL THERAPIST

## 2024-10-25 PROCEDURE — 97112 NEUROMUSCULAR REEDUCATION: CPT | Mod: GP | Performed by: PHYSICAL THERAPIST

## 2024-10-25 NOTE — PROGRESS NOTES
"PHYSICAL THERAPY EVALUATION  Type of Visit: Evaluation       Fall Risk Screen:  Fall screen completed by: PT  Have you fallen 2 or more times in the past year?: No  Have you fallen and had an injury in the past year?: No  Is patient a fall risk?: No    Subjective       Presenting condition or subjective complaint: Pt fell as a teenager and hurt her low back, ever since that time she has had off/on low back issues.  Pt flared it up in mid september of 2024, pt was standing on her deck and reached down for her dogs water bowl and felt an immediate sharp pain in the central low back radiating across to either side.  Pt needs to do some lifting for work as a  and also has small kids at home that she cares for.  Pt is improving since the injury, need a full week off to just lay low.  Pt is recovering, but is still very fearful of re-injury.  Pt has been through PT previously which was helpful and she would like to get back into her exercise routine.  Date of onset: 09/24/24    Relevant medical history: (Patient-Rptd) Asthma; Depression; Severe headaches   Dates & types of surgery: (Patient-Rptd) Carpal tunnel (both sides); sinus surgery w/tonsil & Adenoids out; hysterectomy    Prior diagnostic imaging/testing results: (Patient-Rptd) X-ray     Prior therapy history for the same diagnosis, illness or injury: (Patient-Rptd) No      Prior Level of Function  Transfers:   Ambulation:   ADL:   IADL:     Living Environment  Social support: (Patient-Rptd) With family members   Type of home: (Patient-Rptd) House   Stairs to enter the home: (Patient-Rptd) Yes (Patient-Rptd) 1 Is there a railing: (Patient-Rptd) Yes     Ramp: (Patient-Rptd) No   Stairs inside the home: (Patient-Rptd) Yes (Patient-Rptd) 2 Is there a railing: (Patient-Rptd) Yes     Help at home: (Patient-Rptd) None  Equipment owned:       Employment: (Patient-Rptd) Yes (Patient-Rptd)  (\"desk job\")  Hobbies/Interests: (Patient-Rptd) hiking, " running, anything with water    Patient goals for therapy: (Patient-Rptd) Build strength around the area so i dont have to worry so much about issues/throwing out.       Objective   LUMBAR SPINE EVALUATION  PAIN: Pain Level at Rest: 1/10  Pain Level with Use: 3/10  Pain Location: lumbar spine  Pain Quality: Aching and Tender  Pain Frequency: intermittent  Pain is Worst: daytime  Pain is Exacerbated By: lifting, bending  Pain is Relieved By: rest  Pain Progression: Improved  INTEGUMENTARY (edema, incisions):   POSTURE: WNL  GAIT:   Weightbearing Status:   Assistive Device(s):   Gait Deviations:   BALANCE/PROPRIOCEPTION:   WEIGHTBEARING ALIGNMENT:   NON-WEIGHTBEARING ALIGNMENT:    ROM:   (Degrees) Left AROM Left PROM  Right AROM Right PROM   Hip Flexion       Hip Extension       Hip Abduction       Hip Adduction       Hip Internal Rotation       Hip External Rotation       Knee Flexion       Knee Extension       Lumbar Side glide Min loss erp on R wnl   Lumbar Flexion wnl   Lumbar Extension Min loss   Pain:   End feel:   PELVIC/SI SCREEN:   STRENGTH:  core strength: fair.  Glute max 4/5 B    MYOTOMES:   DTR S:   CORD SIGNS:   DERMATOMES:   NEURAL TENSION:   FLEXIBILITY:  mild tight hip flexors B  LUMBAR/HIP Special Tests:    PELVIS/SI SPECIAL TESTS:   FUNCTIONAL TESTS:   PALPATION:   SPINAL SEGMENTAL CONCLUSIONS:       Assessment & Plan   CLINICAL IMPRESSIONS  Medical Diagnosis: Bilateral low back pain without sciatica, unspecified chronicity    Treatment Diagnosis: low back pain   Impression/Assessment: Patient is a 39 year old female with low back complaints.  The following significant findings have been identified: Pain, Decreased ROM/flexibility, Decreased strength, Impaired gait, Impaired muscle performance, Decreased activity tolerance, and Impaired posture. These impairments interfere with their ability to perform self care tasks, work tasks, recreational activities, household chores, driving , household  mobility, and community mobility as compared to previous level of function.     Clinical Decision Making (Complexity):  Clinical Presentation: Stable/Uncomplicated  Clinical Presentation Rationale: based on medical and personal factors listed in PT evaluation  Clinical Decision Making (Complexity): Low complexity    PLAN OF CARE  Treatment Interventions:  Interventions: Gait Training, Manual Therapy, Neuromuscular Re-education, Therapeutic Activity, Therapeutic Exercise, Self-Care/Home Management    Long Term Goals     PT Goal 1  Goal Identifier: lifting  Goal Description: Pt will be able to lift 40# pain free  Rationale: to maximize safety and independence with performance of ADLs and functional tasks  Target Date: 12/20/24      Frequency of Treatment: 1x/week  Duration of Treatment: 8 weeks    Recommended Referrals to Other Professionals:   Education Assessment:   Learner/Method: Patient;Listening;Reading;Demonstration;Pictures/Video  Education Comments: Pt instructed on findings of evaluation, expectations regarding frequency/intensity of HEP.    Risks and benefits of evaluation/treatment have been explained.   Patient/Family/caregiver agrees with Plan of Care.     Evaluation Time:     PT Eval, Low Complexity Minutes (69574): 15       Signing Clinician: Aníbal Arroyo PT

## 2024-12-06 PROBLEM — M54.50 BILATERAL LOW BACK PAIN WITHOUT SCIATICA, UNSPECIFIED CHRONICITY: Status: RESOLVED | Noted: 2024-10-25 | Resolved: 2024-12-06

## 2025-01-31 ENCOUNTER — ANCILLARY PROCEDURE (OUTPATIENT)
Dept: GENERAL RADIOLOGY | Facility: CLINIC | Age: 40
End: 2025-01-31
Attending: STUDENT IN AN ORGANIZED HEALTH CARE EDUCATION/TRAINING PROGRAM
Payer: COMMERCIAL

## 2025-01-31 DIAGNOSIS — G89.29 CHRONIC PAIN OF RIGHT KNEE: ICD-10-CM

## 2025-01-31 DIAGNOSIS — M25.561 CHRONIC PAIN OF RIGHT KNEE: ICD-10-CM

## 2025-01-31 PROCEDURE — 73560 X-RAY EXAM OF KNEE 1 OR 2: CPT | Mod: TC | Performed by: RADIOLOGY

## 2025-01-31 PROCEDURE — 73562 X-RAY EXAM OF KNEE 3: CPT | Mod: TC | Performed by: RADIOLOGY

## 2025-04-17 SDOH — HEALTH STABILITY: PHYSICAL HEALTH: ON AVERAGE, HOW MANY MINUTES DO YOU ENGAGE IN EXERCISE AT THIS LEVEL?: 30 MIN

## 2025-04-17 SDOH — HEALTH STABILITY: PHYSICAL HEALTH: ON AVERAGE, HOW MANY DAYS PER WEEK DO YOU ENGAGE IN MODERATE TO STRENUOUS EXERCISE (LIKE A BRISK WALK)?: 3 DAYS

## 2025-04-17 ASSESSMENT — SOCIAL DETERMINANTS OF HEALTH (SDOH): HOW OFTEN DO YOU GET TOGETHER WITH FRIENDS OR RELATIVES?: ONCE A WEEK

## 2025-04-21 ENCOUNTER — OFFICE VISIT (OUTPATIENT)
Dept: PEDIATRICS | Facility: CLINIC | Age: 40
End: 2025-04-21
Attending: INTERNAL MEDICINE
Payer: COMMERCIAL

## 2025-04-21 VITALS
RESPIRATION RATE: 15 BRPM | TEMPERATURE: 97.3 F | SYSTOLIC BLOOD PRESSURE: 123 MMHG | HEART RATE: 75 BPM | DIASTOLIC BLOOD PRESSURE: 74 MMHG | HEIGHT: 70 IN | BODY MASS INDEX: 33.21 KG/M2 | WEIGHT: 232 LBS | OXYGEN SATURATION: 95 %

## 2025-04-21 DIAGNOSIS — L40.9 SCALP PSORIASIS: ICD-10-CM

## 2025-04-21 DIAGNOSIS — Z00.00 ROUTINE GENERAL MEDICAL EXAMINATION AT A HEALTH CARE FACILITY: Primary | ICD-10-CM

## 2025-04-21 DIAGNOSIS — E66.811 CLASS 1 OBESITY WITHOUT SERIOUS COMORBIDITY WITH BODY MASS INDEX (BMI) OF 33.0 TO 33.9 IN ADULT, UNSPECIFIED OBESITY TYPE: ICD-10-CM

## 2025-04-21 DIAGNOSIS — Z13.1 SCREENING FOR DIABETES MELLITUS: ICD-10-CM

## 2025-04-21 DIAGNOSIS — Z12.31 VISIT FOR SCREENING MAMMOGRAM: ICD-10-CM

## 2025-04-21 DIAGNOSIS — R12 HEARTBURN: ICD-10-CM

## 2025-04-21 DIAGNOSIS — J45.20 MILD INTERMITTENT ASTHMA WITHOUT COMPLICATION: ICD-10-CM

## 2025-04-21 DIAGNOSIS — F33.1 MAJOR DEPRESSIVE DISORDER, RECURRENT EPISODE, MODERATE (H): ICD-10-CM

## 2025-04-21 PROCEDURE — 99213 OFFICE O/P EST LOW 20 MIN: CPT | Mod: 25 | Performed by: INTERNAL MEDICINE

## 2025-04-21 PROCEDURE — 3074F SYST BP LT 130 MM HG: CPT | Performed by: INTERNAL MEDICINE

## 2025-04-21 PROCEDURE — 3078F DIAST BP <80 MM HG: CPT | Performed by: INTERNAL MEDICINE

## 2025-04-21 PROCEDURE — 99396 PREV VISIT EST AGE 40-64: CPT | Performed by: INTERNAL MEDICINE

## 2025-04-21 RX ORDER — BUPROPION HYDROCHLORIDE 300 MG/1
300 TABLET ORAL EVERY MORNING
Qty: 90 TABLET | Refills: 4 | Status: SHIPPED | OUTPATIENT
Start: 2025-04-21

## 2025-04-21 RX ORDER — CLOBETASOL PROPIONATE 0.5 MG/ML
SOLUTION TOPICAL 2 TIMES DAILY PRN
Qty: 50 ML | Refills: 1 | Status: SHIPPED | OUTPATIENT
Start: 2025-04-21

## 2025-04-21 RX ORDER — ALBUTEROL SULFATE 90 UG/1
2 INHALANT RESPIRATORY (INHALATION) EVERY 4 HOURS PRN
Qty: 8.5 G | Refills: 11 | Status: SHIPPED | OUTPATIENT
Start: 2025-04-21

## 2025-04-21 RX ORDER — SERTRALINE HYDROCHLORIDE 100 MG/1
200 TABLET, FILM COATED ORAL DAILY
Qty: 180 TABLET | Refills: 4 | Status: CANCELLED | OUTPATIENT
Start: 2025-04-21

## 2025-04-21 RX ORDER — TRIAMCINOLONE ACETONIDE 1 MG/G
OINTMENT TOPICAL
Qty: 80 G | Refills: 2 | Status: CANCELLED | OUTPATIENT
Start: 2025-04-21

## 2025-04-21 RX ORDER — FAMOTIDINE 20 MG/1
20 TABLET, FILM COATED ORAL 2 TIMES DAILY
Qty: 180 TABLET | Refills: 4 | Status: SHIPPED | OUTPATIENT
Start: 2025-04-21

## 2025-04-21 NOTE — PATIENT INSTRUCTIONS
Start fluoxetine 20 mg daily.  Decrease sertraline to 150 mg daily for at least 2 weeks, then to 100 mg daily for 2 weeks.    After 4 weeks, increase fluoxetine to 40 mg daily and decrease sertraline to 50 mg for 2-3 weeks, then stop.     Dr. Lynette Fragoso at [facility] in [place] ([phone]      Patient Education   Preventive Care Advice   This is general advice given by our system to help you stay healthy. However, your care team may have specific advice just for you. Please talk to your care team about your preventive care needs.  Nutrition  Eat 5 or more servings of fruits and vegetables each day.  Try wheat bread, brown rice and whole grain pasta (instead of white bread, rice, and pasta).  Get enough calcium and vitamin D. Check the label on foods and aim for 100% of the RDA (recommended daily allowance).  Lifestyle  Exercise at least 150 minutes each week  (30 minutes a day, 5 days a week).  Do muscle strengthening activities 2 days a week. These help control your weight and prevent disease.  No smoking.  Wear sunscreen to prevent skin cancer.  Have a dental exam and cleaning every 6 months.  Yearly exams  See your health care team every year to talk about:  Any changes in your health.  Any medicines your care team has prescribed.  Preventive care, family planning, and ways to prevent chronic diseases.  Shots (vaccines)   HPV shots (up to age 26), if you've never had them before.  Hepatitis B shots (up to age 59), if you've never had them before.  COVID-19 shot: Get this shot when it's due.  Flu shot: Get a flu shot every year.  Tetanus shot: Get a tetanus shot every 10 years.  Pneumococcal, hepatitis A, and RSV shots: Ask your care team if you need these based on your risk.  Shingles shot (for age 50 and up)  General health tests  Diabetes screening:  Starting at age 35, Get screened for diabetes at least every 3 years.  If you are younger than age 35, ask your care team if you should be  screened for diabetes.  Cholesterol test: At age 39, start having a cholesterol test every 5 years, or more often if advised.  Bone density scan (DEXA): At age 50, ask your care team if you should have this scan for osteoporosis (brittle bones).  Hepatitis C: Get tested at least once in your life.  STIs (sexually transmitted infections)  Before age 24: Ask your care team if you should be screened for STIs.  After age 24: Get screened for STIs if you're at risk. You are at risk for STIs (including HIV) if:  You are sexually active with more than one person.  You don't use condoms every time.  You or a partner was diagnosed with a sexually transmitted infection.  If you are at risk for HIV, ask about PrEP medicine to prevent HIV.  Get tested for HIV at least once in your life, whether you are at risk for HIV or not.  Cancer screening tests  Cervical cancer screening: If you have a cervix, begin getting regular cervical cancer screening tests starting at age 21.  Breast cancer scan (mammogram): If you've ever had breasts, begin having regular mammograms starting at age 40. This is a scan to check for breast cancer.  Colon cancer screening: It is important to start screening for colon cancer at age 45.  Have a colonoscopy test every 10 years (or more often if you're at risk) Or, ask your provider about stool tests like a FIT test every year or Cologuard test every 3 years.  To learn more about your testing options, visit:   .  For help making a decision, visit:   https://bit.ly/wk74720.  Prostate cancer screening test: If you have a prostate, ask your care team if a prostate cancer screening test (PSA) at age 55 is right for you.  Lung cancer screening: If you are a current or former smoker ages 50 to 80, ask your care team if ongoing lung cancer screenings are right for you.  For informational purposes only. Not to replace the advice of your health care provider. Copyright   2023 [employer]. All rights  reserved. Clinically reviewed by the [facility]. [employer] 704432 - REV 01/24.  Learning About Stress  What is stress?     Stress is your body's response to a hard situation. Your body can have a physical, emotional, or mental response. Stress is a fact of life for most people, and it affects everyone differently. What causes stress for you may not be stressful for someone else.  A lot of things can cause stress. You may feel stress when you go on a job interview, take a test, or run a race. This kind of short-term stress is normal and even useful. It can help you if you need to work hard or react quickly. For example, stress can help you finish an important job on time.  Long-term stress is caused by ongoing stressful situations or events. Examples of long-term stress include long-term health problems, ongoing problems at work, or conflicts in your family. Long-term stress can harm your health.  How does stress affect your health?  When you are stressed, your body responds as though you are in danger. It makes hormones that speed up your heart, make you breathe faster, and give you a burst of energy. This is called the fight-or-flight stress response. If the stress is over quickly, your body goes back to normal and no harm is done.  But if stress happens too often or lasts too long, it can have bad effects. Long-term stress can make you more likely to get sick, and it can make symptoms of some diseases worse. If you tense up when you are stressed, you may develop neck, shoulder, or low back pain. Stress is linked to high blood pressure and heart disease.  Stress also harms your emotional health. It can make you leung, tense, or depressed. Your relationships may suffer, and you may not do well at work or school.  What can you do to manage stress?  You can try these things to help manage stress:   Do something active. Exercise or activity can help reduce stress. Walking is a great way to  get started. Even everyday activities such as housecleaning or yard work can help.  Try yoga or jose luis chi. These techniques combine exercise and meditation. You may need some training at first to learn them.  Do something you enjoy. For example, listen to music or go to a movie. Practice your hobby or do volunteer work.  Meditate. This can help you relax, because you are not worrying about what happened before or what may happen in the future.  Do guided imagery. Imagine yourself in any setting that helps you feel calm. You can use online videos, books, or a teacher to guide you.  Do breathing exercises. For example:  From a standing position, bend forward from the waist with your knees slightly bent. Let your arms dangle close to the floor.  Breathe in slowly and deeply as you return to a standing position. Roll up slowly and lift your head last.  Hold your breath for just a few seconds in the standing position.  Breathe out slowly and bend forward from the waist.  Let your feelings out. Talk, laugh, cry, and express anger when you need to. Talking with supportive friends or family, a counselor, or a shruti leader about your feelings is a healthy way to relieve stress. Avoid discussing your feelings with people who make you feel worse.  Write. It may help to write about things that are bothering you. This helps you find out how much stress you feel and what is causing it. When you know this, you can find better ways to cope.  What can you do to prevent stress?  You might try some of these things to help prevent stress:  Manage your time. This helps you find time to do the things you want and need to do.  Get enough sleep. Your body recovers from the stresses of the day while you are sleeping.  Get support. Your family, friends, and community can make a difference in how you experience stress.  Limit your news feed. Avoid or limit time on social media or news that may make you feel stressed.  Do something active.  "Exercise or activity can help reduce stress. Walking is a great way to get started.  Where can you learn more?  Go to https://www.[employer]Â®.net/patiented  Enter N032 in the search box to learn more about \"Learning About Stress.\"  Current as of: October 24, 2024  Content Version: 14.4    6834-7132 [employer].   Care instructions adapted under license by your healthcare professional. If you have questions about a medical condition or this instruction, always ask your healthcare professional. [employer] disclaims any warranty or liability for your use of this information.       "

## 2025-04-21 NOTE — PROGRESS NOTES
Preventive Care Visit  [facility] JACQUELIN Daly MD, Internal Medicine  Apr 21, 2025    Assessment & Plan     Routine general medical examination at a health care facility      Major depressive disorder, recurrent episode, moderate (H)  Increase in symptoms as her job is now in jeopardy with current political climate. Had been feeling well on sertraline 200 mg, had been on for 8 years. Discussed options. I would hope that what she is taking helps her through stressful times like this and she isn't feeling much benefit. Plan cross taper to fluoxetine. Discussed potential side effects similar to sertraline. Possible activation symptoms.  - buPROPion (WELLBUTRIN XL) 300 MG 24 hr tablet; Take 1 tablet (300 mg) by mouth every morning.  - FLUoxetine (PROZAC) 20 MG capsule; Take 1 capsule (20 mg) by mouth daily. For 4 weeks, then increase to 40 mg daily.  - sertraline (ZOLOFT) 50 MG tablet; Sertaline 150 mg daily for 2 weeks, then 100 mg daily for 2 weeks, then 50 mg daily for 2 weeks, then stop.    Mild intermittent asthma without complication  Doing well. Rarely needs  - albuterol (PROAIR HFA/PROVENTIL HFA/VENTOLIN HFA) 108 (90 Base) MCG/ACT inhaler; Inhale 2 puffs into the lungs every 4 hours as needed for shortness of breath or wheezing.    Scalp psoriasis  Active symptoms, using but not daily. Will refill, recommended she see derm  - clobetasol (TEMOVATE) 0.05 % external solution; Apply topically 2 times daily as needed (dermatitis).    Heartburn  refilled  - famotidine (PEPCID) 20 MG tablet; Take 1 tablet (20 mg) by mouth 2 times daily.    Obesity  Frustrated as her weight is up in the last year. Given her current stressors, encouraged healthy diet and exercise to improve her mental health. We can discuss further as needed.    Screening for diabetes mellitus      Visit for screening mammogram    - MA Screening Bilateral w/ Ricco; Future        BMI  Estimated body mass index is 33.77 kg/m  as  "calculated from the following:    Height as of this encounter: 1.765 m (5' 9.5\").    Weight as of this encounter: 105.2 kg (232 lb).   Weight management plan: Discussed healthy diet and exercise guidelines    Counseling  Appropriate preventive services were addressed with this patient via screening, questionnaire, or discussion as appropriate for fall prevention, nutrition, physical activity, Tobacco-use cessation, social engagement, weight loss and cognition.  Checklist reviewing preventive services available has been given to the patient.  Reviewed patient's diet, addressing concerns and/or questions.   She is at risk for lack of exercise and has been provided with information to increase physical activity for the benefit of her well-being.   She is at risk for psychosocial distress and has been provided with information to reduce risk.       Follow-up  Return in about 4 weeks (around 5/19/2025) for Video Visit.    Ana Flaherty is a 39 year old, presenting for the following:  Physical        4/21/2025     2:29 PM   Additional Questions   Roomed by ANA MO          HPI      Very stressed about work. 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 Having difficulty sleeping.     Weight up 30 lbs, lifestyle changes in the last year and stressors.           4/17/2025   General Health   How would you rate your overall physical health? Good   Feel stress (tense, anxious, or unable to sleep) Very much   (!) STRESS CONCERN      4/17/2025   Nutrition   Three or more servings of calcium each day? Yes   Diet: Regular (no restrictions)   How many servings of fruit and vegetables per day? (!) 2-3   How many sweetened beverages each day? 0-1         4/17/2025   Exercise   Days per week of moderate/strenous exercise 3 days   Average minutes spent exercising at this level 30 min         4/17/2025   Social Factors   Frequency of " gathering with friends or relatives Once a week   Worry food won't last until get money to buy more No   Food not last or not have enough money for food? No   Do you have housing? (Housing is defined as stable permanent housing and does not include staying ouside in a car, in a tent, in an abandoned building, in an overnight shelter, or couch-surfing.) Yes   Are you worried about losing your housing? No   Lack of transportation? No   Unable to get utilities (heat,electricity)? No         4/17/2025   Dental   Dentist two times every year? Yes         Today's PHQ-9 Score:       3/2/2025     8:45 PM   PHQ-9 SCORE   PHQ-9 Total Score MyChart 8 (Mild depression)   PHQ-9 Total Score 8        Patient-reported     PATIENT HEALTH QUESTIONNAIRE-9 (PHQ - 9)    Over the last 2 weeks, how often have you been bothered by any of the following problems?    1. Little interest or pleasure in doing things -   2   2. Feeling down, depressed, or hopeless -   2   3. Trouble falling or staying asleep, or sleeping too much -  2   4. Feeling tired or having little energy -   3   5. Poor appetite or overeating -   2   6. Feeling bad about yourself - or that you are a failure or have let yourself or your family down -   2   7. Trouble concentrating on things, such as reading the newspaper or watching television -  2   8. Moving or speaking so slowly that other people could have noticed? Or the opposite - being so fidgety or restless that you have been moving around a lot more than usual  1   9. Thoughts that you would be better off dead or of hurting  yourself in some way  0   Total Score:  16     If you checked off any problems, how difficult have these problems made it for you to do your work, take care of things at home, or get along with other people?    Very Difficult      Developed by Ana Maria Edwards, Echo Moralez, Franky Gilbert and colleagues, with an educational basilio from [employer] No permission required to reproduce,  translate, display or distribute. permission required to reproduce, translate, display or distribute.       COURTNEY-7 ([employer],2002; USED WITH PERMISSION)    Over the last two weeks, how often have you been bothered by the following problems?     1. Feeling nervous, anxious, or on edge:  2  2. Not being able to stop or control worryin  3. Worrying too much about different things:  3  4. Trouble relaxing:  3  5. Being so restless that it is hard to sit still:  2  6. Becoming easily annoyed or irritable:  2  7. Feeling afraid, as if something awful might happen:  2     If you checked off any problems on this questionnaire, how difficult have these problems made it for you to do your work, take care of things at home, or get along with other people?    Very Difficult    COURTNEY 7 Total Score:  16        2025   Substance Use   Alcohol more than 3/day or more than 7/wk No   Do you use any other substances recreationally? No     Social History     Tobacco Use    Smoking status: Never     Passive exposure: Never    Smokeless tobacco: Never   Vaping Use    Vaping status: Never Used   Substance Use Topics    Alcohol use: No    Drug use: No        Mammogram Screening - Mammogram every 1-2 years updated in Health Maintenance based on mutual decision making        2025   STI Screening   New sexual partner(s) since last STI/HIV test? No     History of abnormal Pap smear: Status post hysterectomy with removal of cervix and no history of CIN2 or greater or cervical cancer. Health Maintenance and Surgical History updated.        Latest Ref Rng & Units 4/10/2019     3:08 PM 4/10/2019     2:30 PM 2016    12:00 AM   PAP / HPV   PAP (Historical)  NIL      HPV 16 DNA NEG^Negative  Negative     HPV 18 DNA NEG^Negative  Negative     Other HR HPV NEG^Negative  Negative     PAP-ABSTRACT    See Scanned Document           This result is from an external source.     ASCVD Risk   The 10-year ASCVD risk score (Greg GODDARD,  "et al., 2019) is: 0.6%    Values used to calculate the score:      Age: 39 years      Sex: Female      Is Non- : No      Diabetic: No      Tobacco smoker: No      Systolic Blood Pressure: 123 mmHg      Is BP treated: No      HDL Cholesterol: 61 mg/dL      Total Cholesterol: 223 mg/dL       Reviewed and updated as needed this visit by Provider                    Labs reviewed in EPIC      Review of Systems  Constitutional, HEENT, cardiovascular, pulmonary, gi and gu systems are negative, except as otherwise noted.     Objective    Exam  /74 (BP Location: Right arm, Patient Position: Sitting, Cuff Size: Adult Large)   Pulse 75   Temp 97.3  F (36.3  C) (Temporal)   Resp 15   Ht 1.765 m (5' 9.5\")   Wt 105.2 kg (232 lb)   LMP 12/08/2021   SpO2 95%   BMI 33.77 kg/m     Estimated body mass index is 33.77 kg/m  as calculated from the following:    Height as of this encounter: 1.765 m (5' 9.5\").    Weight as of this encounter: 105.2 kg (232 lb).    Physical Exam  GENERAL: alert and no distress  EYES: Eyes grossly normal to inspection, PERRL and conjunctivae and sclerae normal  NECK: no adenopathy, no asymmetry, masses, or scars  RESP: lungs clear to auscultation - no rales, rhonchi or wheezes  CV: regular rate and rhythm, normal S1 S2, no S3 or S4, no murmur, click or rub, no peripheral edema  ABDOMEN: soft, nontender, no hepatosplenomegaly, no masses and bowel sounds normal  MS: no gross musculoskeletal defects noted, no edema  SKIN: no suspicious lesions or rashes  NEURO: Normal strength and tone, mentation intact and speech normal  PSYCH: mentation appears normal, affect normal/bright, and tearful      Signed Electronically by: Valeria Daly MD    "

## 2025-04-29 ENCOUNTER — HOSPITAL ENCOUNTER (OUTPATIENT)
Dept: MAMMOGRAPHY | Facility: CLINIC | Age: 40
Discharge: HOME | End: 2025-04-29
Attending: INTERNAL MEDICINE | Admitting: INTERNAL MEDICINE
Payer: COMMERCIAL

## 2025-04-29 DIAGNOSIS — Z12.31 VISIT FOR SCREENING MAMMOGRAM: ICD-10-CM

## 2025-04-29 PROCEDURE — 77063 BREAST TOMOSYNTHESIS BI: CPT

## 2025-05-20 ENCOUNTER — VIRTUAL VISIT (OUTPATIENT)
Dept: PEDIATRICS | Facility: CLINIC | Age: 40
End: 2025-05-20
Payer: COMMERCIAL

## 2025-05-20 ENCOUNTER — TELEPHONE (OUTPATIENT)
Dept: PEDIATRICS | Facility: CLINIC | Age: 40
End: 2025-05-20

## 2025-05-20 DIAGNOSIS — F33.1 MAJOR DEPRESSIVE DISORDER, RECURRENT EPISODE, MODERATE (H): ICD-10-CM

## 2025-05-20 PROCEDURE — 98005 SYNCH AUDIO-VIDEO EST LOW 20: CPT | Performed by: INTERNAL MEDICINE

## 2025-05-20 PROCEDURE — 1126F AMNT PAIN NOTED NONE PRSNT: CPT | Mod: 95 | Performed by: INTERNAL MEDICINE

## 2025-05-20 ASSESSMENT — ANXIETY QUESTIONNAIRES
GAD7 TOTAL SCORE: 12
3. WORRYING TOO MUCH ABOUT DIFFERENT THINGS: MORE THAN HALF THE DAYS
2. NOT BEING ABLE TO STOP OR CONTROL WORRYING: SEVERAL DAYS
7. FEELING AFRAID AS IF SOMETHING AWFUL MIGHT HAPPEN: SEVERAL DAYS
8. IF YOU CHECKED OFF ANY PROBLEMS, HOW DIFFICULT HAVE THESE MADE IT FOR YOU TO DO YOUR WORK, TAKE CARE OF THINGS AT HOME, OR GET ALONG WITH OTHER PEOPLE?: VERY DIFFICULT
IF YOU CHECKED OFF ANY PROBLEMS ON THIS QUESTIONNAIRE, HOW DIFFICULT HAVE THESE PROBLEMS MADE IT FOR YOU TO DO YOUR WORK, TAKE CARE OF THINGS AT HOME, OR GET ALONG WITH OTHER PEOPLE: VERY DIFFICULT
1. FEELING NERVOUS, ANXIOUS, OR ON EDGE: MORE THAN HALF THE DAYS
GAD7 TOTAL SCORE: 12
4. TROUBLE RELAXING: MORE THAN HALF THE DAYS
5. BEING SO RESTLESS THAT IT IS HARD TO SIT STILL: MORE THAN HALF THE DAYS
6. BECOMING EASILY ANNOYED OR IRRITABLE: MORE THAN HALF THE DAYS
GAD7 TOTAL SCORE: 12
7. FEELING AFRAID AS IF SOMETHING AWFUL MIGHT HAPPEN: SEVERAL DAYS

## 2025-05-20 ASSESSMENT — PATIENT HEALTH QUESTIONNAIRE - PHQ9
SUM OF ALL RESPONSES TO PHQ QUESTIONS 1-9: 10
SUM OF ALL RESPONSES TO PHQ QUESTIONS 1-9: 10
10. IF YOU CHECKED OFF ANY PROBLEMS, HOW DIFFICULT HAVE THESE PROBLEMS MADE IT FOR YOU TO DO YOUR WORK, TAKE CARE OF THINGS AT HOME, OR GET ALONG WITH OTHER PEOPLE: SOMEWHAT DIFFICULT

## 2025-05-20 NOTE — PROGRESS NOTES
Krystina is a 39 year old who is being evaluated via a billable video visit.    How would you like to obtain your AVS? MyChart  If the video visit is dropped, the invitation should be resent by: Text to cell phone: [phone]  Will anyone else be joining your video visit? No    Assessment & Plan     Major depressive disorder, recurrent episode, moderate (H)  Tolerating cross taper from max dose sertraline to fluoxetine. Not sure if mood is better,  has commented positively. Just started 40 mg fluoxetine dose. Will continue to taper sertraline as planned and follow up in 6 weeks to assess if we need to increase the dose further or make alternate plans.  - FLUoxetine (PROZAC) 20 MG capsule; Take 2 capsules (40 mg) by mouth daily. For 4 weeks, then increase to 40 mg daily.            Follow-up  Return in about 6 weeks (around 7/1/2025) for Video Visit.    Subjective   Krystina is a 39 year old, presenting for the following health issues:  Recheck Medication        5/20/2025     1:13 PM   Additional Questions   Roomed by Chanelle Chaudhari CMA   Accompanied by N/a         5/20/2025     1:13 PM   Patient Reported Additional Medications   Patient reports taking the following new medications N/A     History of Present Illness       Mental Health Follow-up:  Patient presents to follow-up on Depression & Anxiety.Patient's depression since last visit has been:  Better  The patient is not having other symptoms associated with depression.  Patient's anxiety since last visit has been:  Better  The patient is not having other symptoms associated with anxiety.  Any significant life events: No  Patient is not feeling anxious or having panic attacks.  Patient has no concerns about alcohol or drug use.    She eats 2-3 servings of fruits and vegetables daily.She consumes 0 sweetened beverage(s) daily.She exercises with enough effort to increase her heart rate 60 or more minutes per day.  She exercises with enough effort to  increase her heart rate 3 or less days per week.   She is taking medications regularly.        Depression not worsening with cross taper.. Difficult week, but overall feels things are manageable.     Review of Systems  Constitutional, psych systems are negative, except as otherwise noted.      Objective    Vitals - Patient Reported  Pain Score: No Pain (0)      Vitals:  No vitals were obtained today due to virtual visit.    Physical Exam   GENERAL: alert and no distress  EYES: Eyes grossly normal to inspection.  No discharge or erythema, or obvious scleral/conjunctival abnormalities.  RESP: No audible wheeze, cough, or visible cyanosis.    SKIN: Visible skin clear. No significant rash, abnormal pigmentation or lesions.  NEURO: Cranial nerves grossly intact.  Mentation and speech appropriate for age.  PSYCH: Appropriate affect, tone, and pace of words        Video-Visit Details    Type of service:  Video Visit   Originating Location (pt. Location): Home  Distant Location (provider location):  On-site  Platform used for Video Visit: Brisa  Signed Electronically by: Valeria Daly MD

## 2025-06-07 NOTE — PROGRESS NOTES
S: Patient is a 37 year old, right hand dominant female seen today in consultation for right wrist.  They report onset of symptoms 2022, she reports injury when in the bounce house with her little boys. She reports one of the boys came down onto the wrist.  Pain is located to the middle/ ulnar aspect. She reports that her pain has slightly improved, but continues to linger.  Increased pain with lifting, carrying, and when driving.  Painful with weightbearing. She denies weakness of the hand, but limited by pain.  No obvious swelling.  They have tried the following therapies: wrist bracing, rest, occasional icing. She notes acute use of OTC pain relievers, NSaids at time of onset, has tapered these therapies.      Current pain level: 1-2/10, Worst pain level: 8/10.     Patient is currently working desk work.   Patient has history of carpal tunnel syndrome, prior carpal tunnel release- has ergonomic accommodations.         Patient Active Problem List   Diagnosis     COURTNEY (generalized anxiety disorder)     Major depressive disorder, recurrent episode, moderate (H)     Allergic rhinitis     Mild intermittent asthma without complication     Vasovagal syncope     Previous  section     Umbilical hernia without obstruction and without gangrene     S/P MINAL (total abdominal hysterectomy)     Family history of esophageal cancer     Family history of diabetes mellitus type II            Past Medical History:   Diagnosis Date     Asthma      Depression      Depressive disorder     Diagnosed around      Hypertension 2018     Uterine fibroid in pregnancy 2020     Vasovagal syncope             Past Surgical History:   Procedure Laterality Date     CARPAL TUNNEL RELEASE RT/LT Bilateral       SECTION N/A 2018    Procedure:  SECTION;   section;  Surgeon: Jay Yanez MD;  Location: [facility] OR      SECTION N/A 10/27/2020    Procedure: REPEAT  SECTION;   Surgeon: Aníbal Pearson MD;  Location: [facility] L+D     GYN SURGERY  2018         HERNIORRHAPHY UMBILICAL N/A 2021    Procedure: Open Umbilical Hernia Repair with Mesh;  Surgeon: Erin Mccarthy MD;  Location: [facility] OR     HYSTERECTOMY TOTAL ABDOMINAL, SALPINGECTOMY N/A 2021    Procedure: TOTAL ABDOMINAL HYSTERECTOMY, BILATERAL SALPINGECTOMY;  Surgeon: Sandy Tran MD;  Location: [facility] OR     HYSTERECTOMY, PAP NO LONGER INDICATED       SINUS SURGERY       TONSILLECTOMY              Social History     Tobacco Use     Smoking status: Never     Smokeless tobacco: Never   Substance Use Topics     Alcohol use: No            Family History   Problem Relation Age of Onset     Esophageal Cancer Mother 57     Diabetes Mother         Type 2     Hypertension Mother      Other Cancer Mother         Esophageal     Depression Mother      Obesity Mother      Asthma Father      Glaucoma No family hx of      Macular Degeneration No family hx of      Breast Cancer No family hx of      Colon Cancer No family hx of                Allergies   Allergen Reactions     Dust Mite Extract      Pollen and animal dander.     Seasonal Allergies             Current Outpatient Medications   Medication Sig Dispense Refill     albuterol (PROAIR HFA/PROVENTIL HFA/VENTOLIN HFA) 108 (90 Base) MCG/ACT inhaler INHALE 2 PUFFS INTO THE LUNGS EVERY 4 HOURS AS NEEDED FOR SHORTNESS OF BREATH / DYSPNEA OR WHEEZING 8.5 g 11     benzonatate (TESSALON) 200 MG capsule Take 1 capsule (200 mg) by mouth 3 times daily as needed for cough 30 capsule 0     buPROPion (WELLBUTRIN XL) 300 MG 24 hr tablet Take 1 tablet (300 mg) by mouth every morning 90 tablet 1     clindamycin (CLEOCIN T) 1 % external lotion Twice daily to acne rash on thighs as needed. 60 mL 4     clobetasol (TEMOVATE) 0.05 % external solution        doxycycline hyclate (VIBRAMYCIN) 100 MG capsule Take 1 capsule (100 mg) by mouth 2 times daily for 10 days 20  capsule 0     famotidine (PEPCID) 40 MG tablet Take 1 tablet (40 mg) by mouth daily 90 tablet 3     guaiFENesin-codeine (ROBITUSSIN AC) 100-10 MG/5ML solution Take 10 mLs by mouth every 6 hours as needed for cough 118 mL 0     ipratropium (ATROVENT) 0.03 % nasal spray Spray 2 sprays into both nostrils every 8 hours as needed for rhinitis Patient will call when needs filled. 30 mL 11     omeprazole (PRILOSEC) 20 MG DR capsule TAKE ONE CAPSULE BY MOUTH ONCE DAILY 90 capsule 3     sertraline (ZOLOFT) 100 MG tablet Take 2 tablets (200 mg) by mouth daily 180 tablet 4     triamcinolone (KENALOG) 0.1 % external ointment Twice daily to eczema rash on the arms and legs until clear, then as needed. 80 g 2          Review Of Systems  Skin: negative  Eyes: negative  Ears/Nose/Throat: negative  Respiratory: No shortness of breath, dyspnea on exertion, cough, or hemoptysis    O: Physical Exam: Tender to palpation about Distal radial/ ulnar joint R UE and over the TFCC.  CMS intact.  Negative Tinel's.  Adequate radial/ulnar deviation wrist, palmar/dorsiflexion.  No crepitus.    Lab:  Vit D 33    Images:  XR WRIST RIGHT G/E 3 VIEWS 12/6/2022 10:18 AM     HISTORY: Chronic wrist pain, right; Chronic wrist pain, right     COMPARISON: None.                                                                      IMPRESSION: No fracture. Normal alignment. No degenerative changes          A:  Possible TFCC injury RUE    P:  MRI R wrist  See back after study  Notify if exacerbation symptoms           In addition to the above assessment and plan each active problem on Krystina's problem list was evaluated today. This included the questioning of Krystina for any medication problems. We will continue the current treatment plan for these active problems except as noted.     Strong peripheral pulses

## 2025-07-06 ASSESSMENT — ANXIETY QUESTIONNAIRES
5. BEING SO RESTLESS THAT IT IS HARD TO SIT STILL: MORE THAN HALF THE DAYS
GAD7 TOTAL SCORE: 15
7. FEELING AFRAID AS IF SOMETHING AWFUL MIGHT HAPPEN: MORE THAN HALF THE DAYS
GAD7 TOTAL SCORE: 15
2. NOT BEING ABLE TO STOP OR CONTROL WORRYING: MORE THAN HALF THE DAYS
GAD7 TOTAL SCORE: 15
3. WORRYING TOO MUCH ABOUT DIFFERENT THINGS: MORE THAN HALF THE DAYS
4. TROUBLE RELAXING: MORE THAN HALF THE DAYS
IF YOU CHECKED OFF ANY PROBLEMS ON THIS QUESTIONNAIRE, HOW DIFFICULT HAVE THESE PROBLEMS MADE IT FOR YOU TO DO YOUR WORK, TAKE CARE OF THINGS AT HOME, OR GET ALONG WITH OTHER PEOPLE: VERY DIFFICULT
6. BECOMING EASILY ANNOYED OR IRRITABLE: MORE THAN HALF THE DAYS
8. IF YOU CHECKED OFF ANY PROBLEMS, HOW DIFFICULT HAVE THESE MADE IT FOR YOU TO DO YOUR WORK, TAKE CARE OF THINGS AT HOME, OR GET ALONG WITH OTHER PEOPLE?: VERY DIFFICULT
1. FEELING NERVOUS, ANXIOUS, OR ON EDGE: NEARLY EVERY DAY
7. FEELING AFRAID AS IF SOMETHING AWFUL MIGHT HAPPEN: MORE THAN HALF THE DAYS

## 2025-07-07 ENCOUNTER — OFFICE VISIT (OUTPATIENT)
Dept: URGENT CARE | Facility: URGENT CARE | Age: 40
End: 2025-07-07
Payer: COMMERCIAL

## 2025-07-07 VITALS
TEMPERATURE: 98 F | OXYGEN SATURATION: 97 % | DIASTOLIC BLOOD PRESSURE: 73 MMHG | SYSTOLIC BLOOD PRESSURE: 130 MMHG | HEART RATE: 77 BPM | BODY MASS INDEX: 34.82 KG/M2 | HEIGHT: 70 IN | WEIGHT: 243.2 LBS | RESPIRATION RATE: 16 BRPM

## 2025-07-07 DIAGNOSIS — S56.911A FOREARM STRAIN, RIGHT, INITIAL ENCOUNTER: Primary | ICD-10-CM

## 2025-07-07 PROCEDURE — 3075F SYST BP GE 130 - 139MM HG: CPT | Performed by: FAMILY MEDICINE

## 2025-07-07 PROCEDURE — 3078F DIAST BP <80 MM HG: CPT | Performed by: FAMILY MEDICINE

## 2025-07-07 PROCEDURE — 99213 OFFICE O/P EST LOW 20 MIN: CPT | Performed by: FAMILY MEDICINE

## 2025-07-07 NOTE — PATIENT INSTRUCTIONS
No heavy lifting or grasping items with this right hand until symptoms are improved.      If you have increasing discomfort, weakness that develops, swelling of the forearm or loss of sensation --then please seek help right away

## 2025-07-07 NOTE — PROGRESS NOTES
Urgent Care Clinic Visit    Chief Complaint   Patient presents with    Urgent Care     Pt complains of of pain in the right arm X 6 days (no known injuries).               7/7/2025    11:36 AM   Additional Questions   Roomed by luzma guerrier   Accompanied by n/a

## 2025-07-07 NOTE — PROGRESS NOTES
Assessment & Plan     Forearm strain, right, initial encounter    No suggestion of compartment syndrome.  No signs of infection.  Presentation is consistent with a muscular strain affecting the dorsal compartment of the forearm as she is exhibiting discomfort although has intact function with finger extension/wrist extension and resisted supination. Advised no heavy lifting or repetitive motions requiring turning over the forearm.     Charlie Carson MD   [place] [facility]    Ana Flaherty is a 39 year old female who presents to clinic today for the following health issues:  Chief Complaint   Patient presents with    Urgent Care     Pt complains of of pain in the right arm X 6 days (no known injuries).     HPI    Patient noting over the last week that she has had discomfort with certain activities with using her right arm she is right arm dominant.  Has a previous history over 10 years ago doi undergoing carpal tunnel release.  He has not any loss of sensation of her hands.  Associated injuries or any falls.  Wondering if her children sleeping on her forearm exacerbate her symptoms      Patient Active Problem List    Diagnosis Date Noted    Heartburn 2022     Priority: Medium    Regurgitation of food 2022     Priority: Medium    Family history of esophageal cancer 2022     Priority: Medium     Mom with years of heartburn.      Family history of diabetes mellitus type II 2022     Priority: Medium    S/P MINAL (total abdominal hysterectomy) 2021     Priority: Medium    Umbilical hernia without obstruction and without gangrene 2021     Priority: Medium     Added automatically from request for surgery [id number]      Previous  section 2020     Priority: Medium     Added automatically from request for surgery [id number]      Vasovagal syncope 2018     Priority: Medium     With shots, lab draws, getting eyes dilated.       COURTNEY (generalized anxiety disorder)  "05/23/2017     Priority: Medium    Major depressive disorder, recurrent episode, moderate (H) 05/23/2017     Priority: Medium    Allergic rhinitis 01/21/2009     Priority: Medium     Overview:   Rhinitis Allergic  NOS      Mild intermittent asthma without complication 01/21/2009     Priority: Medium       Current Outpatient Medications   Medication Sig Dispense Refill    albuterol (PROAIR HFA/PROVENTIL HFA/VENTOLIN HFA) 108 (90 Base) MCG/ACT inhaler Inhale 2 puffs into the lungs every 4 hours as needed for shortness of breath or wheezing. 8.5 g 11    buPROPion (WELLBUTRIN XL) 300 MG 24 hr tablet Take 1 tablet (300 mg) by mouth every morning. 90 tablet 4    cetirizine (ZYRTEC) 10 MG tablet Take 10 mg by mouth daily      clindamycin (CLEOCIN T) 1 % external lotion Twice daily to acne rash on thighs as needed. 60 mL 4    clobetasol (TEMOVATE) 0.05 % external solution Apply topically 2 times daily as needed (dermatitis). 50 mL 1    famotidine (PEPCID) 20 MG tablet Take 1 tablet (20 mg) by mouth 2 times daily. 180 tablet 4    FLUoxetine (PROZAC) 20 MG capsule Take 1 capsule (20 mg) by mouth daily. 90 capsule 3    FLUoxetine (PROZAC) 20 MG capsule Take 2 capsules (40 mg) by mouth daily. For 4 weeks, then increase to 40 mg daily. 60 capsule 1    ipratropium (ATROVENT) 0.06 % nasal spray Spray 2 sprays into both nostrils 4 times daily 15 mL 0    triamcinolone (KENALOG) 0.1 % external ointment Twice daily to eczema rash on the arms and legs until clear, then as needed. 80 g 2     No current facility-administered medications for this visit.           Objective    /73   Pulse 77   Temp 98  F (36.7  C) (Tympanic)   Resp 16   Ht 1.765 m (5' 9.5\")   Wt 110.3 kg (243 lb 3.2 oz)   LMP 12/08/2021   SpO2 97%   BMI 35.40 kg/m    Physical Exam   As noted above and including:     As above including intact wrist movement in flexion/ulnar/radial deviation  No biceps/triceps weakness  Intact sensation to light touch.   Full " ROM of the wrist and elbow    No results found for any visits on 07/07/25.                  The use of Dragon/PlumTVation services may have been used to construct the content in this note; any grammatical or spelling errors are non-intentional. Please contact the author of this note directly if you are in need of any clarification.

## 2025-07-08 ENCOUNTER — VIRTUAL VISIT (OUTPATIENT)
Dept: PEDIATRICS | Facility: CLINIC | Age: 40
End: 2025-07-08
Payer: COMMERCIAL

## 2025-07-08 DIAGNOSIS — F41.1 GAD (GENERALIZED ANXIETY DISORDER): Primary | ICD-10-CM

## 2025-07-08 DIAGNOSIS — R53.83 OTHER FATIGUE: ICD-10-CM

## 2025-07-08 DIAGNOSIS — M25.531 RIGHT WRIST PAIN: ICD-10-CM

## 2025-07-08 DIAGNOSIS — F33.1 MAJOR DEPRESSIVE DISORDER, RECURRENT EPISODE, MODERATE (H): ICD-10-CM

## 2025-07-08 PROCEDURE — 98006 SYNCH AUDIO-VIDEO EST MOD 30: CPT | Performed by: INTERNAL MEDICINE

## 2025-07-08 RX ORDER — PROPRANOLOL HYDROCHLORIDE 10 MG/1
10 TABLET ORAL 3 TIMES DAILY
Qty: 60 TABLET | Refills: 1 | Status: SHIPPED | OUTPATIENT
Start: 2025-07-08

## 2025-07-08 RX ORDER — HYDROXYZINE HYDROCHLORIDE 25 MG/1
12.5-25 TABLET, FILM COATED ORAL 3 TIMES DAILY PRN
Qty: 30 TABLET | Refills: 1 | Status: SHIPPED | OUTPATIENT
Start: 2025-07-08

## 2025-07-08 ASSESSMENT — PATIENT HEALTH QUESTIONNAIRE - PHQ9: SUM OF ALL RESPONSES TO PHQ QUESTIONS 1-9: 14

## 2025-07-08 NOTE — PROGRESS NOTES
Krystina is a 39 year old who is being evaluated via a billable video visit.    How would you like to obtain your AVS? MyChart  If the video visit is dropped, the invitation should be resent by: Text to cell phone: [phone]  Will anyone else be joining your video visit? No      Assessment & Plan     COURTNEY (generalized anxiety disorder)  Having some increase in anxiety with cross taper to fluoxetine. Could be due to activation effect from med, or could just need more time. Could also be that wellbutrin is contributing to activation.  Discussed prn medications. She would like to hold steady at 40 mg for now. Will plan on increasing to 60 mg if needed.   - propranolol (INDERAL) 10 MG tablet; Take 1 tablet (10 mg) by mouth 3 times daily.  - hydrOXYzine HCl (ATARAX) 25 MG tablet; Take 0.5-1 tablets (12.5-25 mg) by mouth 3 times daily as needed for anxiety.    Other fatigue  Likely due to medications and mood symptoms, with situation stressors    Major depressive disorder, recurrent episode, moderate (H)  See above    Right wrist pain  Somewhat difficult in setting of video visit. Was seen in UC yesterday. Could do short course of nsaids and rest. Discussed using wrist brace, could get OTC. If not resolved in 4 weeks, would likely refer her to ortho.    The longitudinal plan of care for the diagnosis(es)/condition(s) as documented were addressed during this visit. Due to the added complexity in care, I will continue to support Krystina in the subsequent management and with ongoing continuity of care.      Follow-up  Return in about 6 weeks (around 8/19/2025) for Follow Up Visit.    Subjective   Krystina is a 39 year old, presenting for the following health issues:  Follow Up (/)      7/8/2025     1:20 PM   Additional Questions   Roomed by ANA MO     History of Present Illness       Mental Health Follow-up:  Patient presents to follow-up on Depression & Anxiety.Patient's depression since last visit has been:  Bad  The  patient is not having other symptoms associated with depression.  Patient's anxiety since last visit has been:  Worse  The patient is not having other symptoms associated with anxiety.  Any significant life events: job concerns and other  Patient is feeling anxious or having panic attacks.  Patient has no concerns about alcohol or drug use.    She eats 2-3 servings of fruits and vegetables daily.She consumes 0 sweetened beverage(s) daily.She exercises with enough effort to increase her heart rate 10 to 19 minutes per day.  She exercises with enough effort to increase her heart rate 3 or less days per week.   She is taking medications regularly.        In UC yesterday for R forearm pain down fingers and up into shoulder. Some shakiness in R hand more than L. UC thoughts was overuse. Wearing.     Has had a difficult 2 weeks so not sure how much fluoxetine is helping. Overall feels worse since before starting fluoxetine.    Is noticing more anxiety, fixating on whether the bug eggs and started fixating on if they are ticks.    Staying up late working and feels exhausted the next day. Working full time and trying to take.       Review of Systems  Constitutional, HEENT, cardiovascular, pulmonary, gi and gu systems are negative, except as otherwise noted.      Objective           Vitals:  No vitals were obtained today due to virtual visit.    Physical Exam   GENERAL: alert and no distress  EYES: Eyes grossly normal to inspection.  No discharge or erythema, or obvious scleral/conjunctival abnormalities.  RESP: No audible wheeze, cough, or visible cyanosis.    SKIN: Visible skin clear. No significant rash, abnormal pigmentation or lesions.  NEURO: Cranial nerves grossly intact.  Mentation and speech appropriate for age.  PSYCH: Appropriate affect, tone, and pace of words          Video-Visit Details    Type of service:  Video Visit   Originating Location (pt. Location): Home    Distant Location (provider location):   On-site  Platform used for Video Visit: Brisa  Signed Electronically by: Valeria Daly MD

## 2025-07-08 NOTE — PATIENT INSTRUCTIONS
Let me know if wrist isn't back to normal in 4 weeks.  NSAIDS/ibuprofen 400-600mg (two-three tabs) by mouth three times daily for 1-2 weeks.

## 2025-07-29 ENCOUNTER — OFFICE VISIT (OUTPATIENT)
Dept: DERMATOLOGY | Facility: CLINIC | Age: 40
End: 2025-07-29
Payer: COMMERCIAL

## 2025-07-29 DIAGNOSIS — L73.9 FOLLICULITIS: ICD-10-CM

## 2025-07-29 DIAGNOSIS — L40.9 SCALP PSORIASIS: ICD-10-CM

## 2025-07-29 DIAGNOSIS — L30.9 DERMATITIS: ICD-10-CM

## 2025-07-29 PROCEDURE — 99204 OFFICE O/P NEW MOD 45 MIN: CPT | Performed by: DERMATOLOGY

## 2025-07-29 RX ORDER — TRIAMCINOLONE ACETONIDE 1 MG/G
OINTMENT TOPICAL
Qty: 80 G | Refills: 2 | Status: SHIPPED | OUTPATIENT
Start: 2025-07-29

## 2025-07-29 RX ORDER — CLINDAMYCIN PHOSPHATE 10 UG/ML
LOTION TOPICAL
Qty: 60 ML | Refills: 11 | Status: SHIPPED | OUTPATIENT
Start: 2025-07-29

## 2025-07-29 RX ORDER — CLOBETASOL PROPIONATE 0.5 MG/ML
SOLUTION TOPICAL
Qty: 50 ML | Refills: 5 | Status: SHIPPED | OUTPATIENT
Start: 2025-07-29

## 2025-07-29 NOTE — PROGRESS NOTES
Dermatology Clinic Note    Dermatology Problem List:  1. Skin tags  2. Folliculitis  3. 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 Psoriasis      Assessment and Plan:  Plaque psoriasis of scalp and ears. Approx 1% BSA. Recommended clobetasol solution nightly to rash areas. May use triamcinolone ointment BID to area on the preauricular ear.   Folliculitis: Chronic but improved. Continue clindamycin lotion BID.     RTC yearly if refills needed.       Thank you for involving me in this patient's care.     Krystina Young MD   of Dermatology  [facility]      CC: No referring provider defined for this encounter.    Valeria Daly MD    ____________________________________________________________________________________________________________________________________________    CC: Patient presents with:  RECHECK: Annual skin check current concerns are scalp having a flare, has been more stressed tx using  clobetasol lotion       HPI: Krystina Suazo is a 39 year old female presenting for recheck of several skin concerns. Last seen in 22. Notes that she is having scaling on the scalp.  Was given prescription for clobetasol solution by PCP. Clindamycin lotion helps with folliculitis.     Patient Active Problem List   Diagnosis    COURTNEY (generalized anxiety disorder)    Major depressive disorder, recurrent episode, moderate (H)    Allergic rhinitis    Mild intermittent asthma without complication    Vasovagal syncope    Previous  section    Umbilical hernia without obstruction and without gangrene    S/P MINAL (total abdominal hysterectomy)    Family history of esophageal cancer    Family history of diabetes mellitus type II    Heartburn    Regurgitation of food       No Known Allergies        Current Outpatient Medications   Medication Sig Dispense Refill    albuterol (PROAIR HFA/PROVENTIL HFA/VENTOLIN HFA) 108 (90 Base) MCG/ACT inhaler Inhale 2 puffs into  the lungs every 4 hours as needed for shortness of breath or wheezing. 8.5 g 11    buPROPion (WELLBUTRIN XL) 300 MG 24 hr tablet Take 1 tablet (300 mg) by mouth every morning. 90 tablet 4    cetirizine (ZYRTEC) 10 MG tablet Take 10 mg by mouth daily      clindamycin (CLEOCIN T) 1 % external lotion Twice daily to acne rash on thighs as needed. 60 mL 4    clobetasol (TEMOVATE) 0.05 % external solution Apply topically 2 times daily as needed (dermatitis). 50 mL 1    famotidine (PEPCID) 20 MG tablet Take 1 tablet (20 mg) by mouth 2 times daily. 180 tablet 4    FLUoxetine (PROZAC) 20 MG capsule Take 1 capsule (20 mg) by mouth daily. 90 capsule 3    FLUoxetine (PROZAC) 20 MG capsule Take 2 capsules (40 mg) by mouth daily. For 4 weeks, then increase to 40 mg daily. 60 capsule 1    hydrOXYzine HCl (ATARAX) 25 MG tablet Take 0.5-1 tablets (12.5-25 mg) by mouth 3 times daily as needed for anxiety. 30 tablet 1    ipratropium (ATROVENT) 0.06 % nasal spray Spray 2 sprays into both nostrils 4 times daily 15 mL 0    propranolol (INDERAL) 10 MG tablet Take 1 tablet (10 mg) by mouth 3 times daily. 60 tablet 1    triamcinolone (KENALOG) 0.1 % external ointment Twice daily to eczema rash on the arms and legs until clear, then as needed. 80 g 2     No current facility-administered medications for this visit.         EXAM:  [facility] 12/08/2021   GEN: Alert, no distress  SKIN:   --Follicularly based pustules on the medial thighs  --Scaling plaques on the R preauricular and post auricular ear, conchal bowl, inferior occipital scalp

## 2025-07-29 NOTE — LETTER
2025      RE: Krystina Suazo  [address]  [place] [address]     Dear Colleague,    Thank you for the opportunity to participate in the care of your patient, Krystina Suazo, at the [facility] [place] at [facility]. Please see a copy of my visit note below.    Dermatology Clinic Note    Dermatology Problem List:  1. Skin tags  2. Folliculitis  3. 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 Psoriasis      Assessment and Plan:  Plaque psoriasis of scalp and ears. Approx 1% BSA. Recommended clobetasol solution nightly to rash areas. May use triamcinolone ointment BID to area on the preauricular ear.   Folliculitis: Chronic but improved. Continue clindamycin lotion BID.     RTC yearly if refills needed.       Thank you for involving me in this patient's care.     Krystina Young MD   of Dermatology  [facility]      CC: No referring provider defined for this encounter.    Valeria Daly MD    ____________________________________________________________________________________________________________________________________________    CC: Patient presents with:  RECHECK: Annual skin check current concerns are scalp having a flare, has been more stressed tx using  clobetasol lotion       HPI: Krystina Suazo is a 39 year old female presenting for recheck of several skin concerns. Last seen in 22. Notes that she is having scaling on the scalp.  Was given prescription for clobetasol solution by PCP. Clindamycin lotion helps with folliculitis.     Patient Active Problem List   Diagnosis     COURTNEY (generalized anxiety disorder)     Major depressive disorder, recurrent episode, moderate (H)     Allergic rhinitis     Mild intermittent asthma without complication     Vasovagal syncope     Previous  section     Umbilical hernia without obstruction and without gangrene     S/P MINAL (total  abdominal hysterectomy)     Family history of esophageal cancer     Family history of diabetes mellitus type II     Heartburn     Regurgitation of food       No Known Allergies        Current Outpatient Medications   Medication Sig Dispense Refill     albuterol (PROAIR HFA/PROVENTIL HFA/VENTOLIN HFA) 108 (90 Base) MCG/ACT inhaler Inhale 2 puffs into the lungs every 4 hours as needed for shortness of breath or wheezing. 8.5 g 11     buPROPion (WELLBUTRIN XL) 300 MG 24 hr tablet Take 1 tablet (300 mg) by mouth every morning. 90 tablet 4     cetirizine (ZYRTEC) 10 MG tablet Take 10 mg by mouth daily       clindamycin (CLEOCIN T) 1 % external lotion Twice daily to acne rash on thighs as needed. 60 mL 4     clobetasol (TEMOVATE) 0.05 % external solution Apply topically 2 times daily as needed (dermatitis). 50 mL 1     famotidine (PEPCID) 20 MG tablet Take 1 tablet (20 mg) by mouth 2 times daily. 180 tablet 4     FLUoxetine (PROZAC) 20 MG capsule Take 1 capsule (20 mg) by mouth daily. 90 capsule 3     FLUoxetine (PROZAC) 20 MG capsule Take 2 capsules (40 mg) by mouth daily. For 4 weeks, then increase to 40 mg daily. 60 capsule 1     hydrOXYzine HCl (ATARAX) 25 MG tablet Take 0.5-1 tablets (12.5-25 mg) by mouth 3 times daily as needed for anxiety. 30 tablet 1     ipratropium (ATROVENT) 0.06 % nasal spray Spray 2 sprays into both nostrils 4 times daily 15 mL 0     propranolol (INDERAL) 10 MG tablet Take 1 tablet (10 mg) by mouth 3 times daily. 60 tablet 1     triamcinolone (KENALOG) 0.1 % external ointment Twice daily to eczema rash on the arms and legs until clear, then as needed. 80 g 2     No current facility-administered medications for this visit.         EXAM:  LMP 12/08/2021   GEN: Alert, no distress  SKIN:   --Follicularly based pustules on the medial thighs  --Scaling plaques on the R preauricular and post auricular ear, conchal bowl, inferior occipital scalp                      Please do not hesitate to contact  me if you have any questions/concerns.     Sincerely,       Krystina Young MD

## 2025-08-12 ENCOUNTER — MYC MEDICAL ADVICE (OUTPATIENT)
Dept: PEDIATRICS | Facility: CLINIC | Age: 40
End: 2025-08-12
Payer: COMMERCIAL

## 2025-08-12 DIAGNOSIS — F33.1 MAJOR DEPRESSIVE DISORDER, RECURRENT EPISODE, MODERATE (H): ICD-10-CM

## 2025-08-12 DIAGNOSIS — F41.1 GAD (GENERALIZED ANXIETY DISORDER): Primary | ICD-10-CM

## 2025-09-03 ENCOUNTER — TELEPHONE (OUTPATIENT)
Dept: PEDIATRICS | Facility: CLINIC | Age: 40
End: 2025-09-03

## 2025-09-03 ENCOUNTER — VIRTUAL VISIT (OUTPATIENT)
Dept: PEDIATRICS | Facility: CLINIC | Age: 40
End: 2025-09-03
Payer: COMMERCIAL

## 2025-09-03 DIAGNOSIS — F33.1 MAJOR DEPRESSIVE DISORDER, RECURRENT EPISODE, MODERATE (H): Primary | ICD-10-CM

## 2025-09-03 PROCEDURE — 98006 SYNCH AUDIO-VIDEO EST MOD 30: CPT | Performed by: INTERNAL MEDICINE

## 2025-09-03 RX ORDER — ESCITALOPRAM OXALATE 10 MG/1
TABLET ORAL
Qty: 30 TABLET | Refills: 1 | Status: SHIPPED | OUTPATIENT
Start: 2025-09-03

## 2025-09-03 ASSESSMENT — ANXIETY QUESTIONNAIRES
7. FEELING AFRAID AS IF SOMETHING AWFUL MIGHT HAPPEN: SEVERAL DAYS
8. IF YOU CHECKED OFF ANY PROBLEMS, HOW DIFFICULT HAVE THESE MADE IT FOR YOU TO DO YOUR WORK, TAKE CARE OF THINGS AT HOME, OR GET ALONG WITH OTHER PEOPLE?: SOMEWHAT DIFFICULT
GAD7 TOTAL SCORE: 13
1. FEELING NERVOUS, ANXIOUS, OR ON EDGE: MORE THAN HALF THE DAYS
GAD7 TOTAL SCORE: 13
6. BECOMING EASILY ANNOYED OR IRRITABLE: MORE THAN HALF THE DAYS
7. FEELING AFRAID AS IF SOMETHING AWFUL MIGHT HAPPEN: SEVERAL DAYS
IF YOU CHECKED OFF ANY PROBLEMS ON THIS QUESTIONNAIRE, HOW DIFFICULT HAVE THESE PROBLEMS MADE IT FOR YOU TO DO YOUR WORK, TAKE CARE OF THINGS AT HOME, OR GET ALONG WITH OTHER PEOPLE: SOMEWHAT DIFFICULT
5. BEING SO RESTLESS THAT IT IS HARD TO SIT STILL: MORE THAN HALF THE DAYS
GAD7 TOTAL SCORE: 13
2. NOT BEING ABLE TO STOP OR CONTROL WORRYING: MORE THAN HALF THE DAYS
4. TROUBLE RELAXING: MORE THAN HALF THE DAYS
3. WORRYING TOO MUCH ABOUT DIFFERENT THINGS: MORE THAN HALF THE DAYS

## 2025-09-03 ASSESSMENT — ASTHMA QUESTIONNAIRES
QUESTION_2 LAST FOUR WEEKS HOW OFTEN HAVE YOU HAD SHORTNESS OF BREATH: NOT AT ALL
ACT_TOTALSCORE: 25
QUESTION_5 LAST FOUR WEEKS HOW WOULD YOU RATE YOUR ASTHMA CONTROL: COMPLETELY CONTROLLED
QUESTION_3 LAST FOUR WEEKS HOW OFTEN DID YOUR ASTHMA SYMPTOMS (WHEEZING, COUGHING, SHORTNESS OF BREATH, CHEST TIGHTNESS OR PAIN) WAKE YOU UP AT NIGHT OR EARLIER THAN USUAL IN THE MORNING: NOT AT ALL
ACT_TOTALSCORE: 25
QUESTION_4 LAST FOUR WEEKS HOW OFTEN HAVE YOU USED YOUR RESCUE INHALER OR NEBULIZER MEDICATION (SUCH AS ALBUTEROL): NOT AT ALL
QUESTION_1 LAST FOUR WEEKS HOW MUCH OF THE TIME DID YOUR ASTHMA KEEP YOU FROM GETTING AS MUCH DONE AT WORK, SCHOOL OR AT HOME: NONE OF THE TIME

## 2025-09-03 ASSESSMENT — PATIENT HEALTH QUESTIONNAIRE - PHQ9: SUM OF ALL RESPONSES TO PHQ QUESTIONS 1-9: 10

## (undated) DEVICE — PREP CHLORAPREP 26ML TINTED HI-LITE ORANGE 930815

## (undated) DEVICE — GLOVE PROTEXIS W/NEU-THERA 6.5  2D73TE65

## (undated) DEVICE — GLOVE PROTEXIS W/NEU-THERA 7.5  2D73TE75

## (undated) DEVICE — CATH TRAY FOLEY SURESTEP 16FR DRAIN BAG STATOCK A899916

## (undated) DEVICE — ESU LIGASURE IMPACT OPEN SEALER/DVDR CVD LG JAW LF4418

## (undated) DEVICE — LINEN DRAPE 54X72" 5467

## (undated) DEVICE — LINEN HALF SHEET 5512

## (undated) DEVICE — RETR ELEV / UTERINE MANIPULATOR V-CARE MED CUP 60-6085-201A

## (undated) DEVICE — LINEN FULL SHEET 5511

## (undated) DEVICE — PREP CHLORAPREP 26ML TINTED ORANGE  260815

## (undated) DEVICE — SYR 30ML SLIP TIP W/O NDL 302833

## (undated) DEVICE — SU VICRYL 0 TIE 12X18" J906G

## (undated) DEVICE — STOCKING SLEEVE VASOPRESS COMPRESSION CALF MED 18" VP501M

## (undated) DEVICE — SU MONOCRYL 2-0 CT-1 36" UND Y945H

## (undated) DEVICE — SUCTION MITY VAC MUSHROOM CUP 10007LP

## (undated) DEVICE — SOL NACL 0.9% IRRIG 1000ML BOTTLE 2F7124

## (undated) DEVICE — SUCTION MANIFOLD NEPTUNE 2 SYS 1 PORT 702-025-000

## (undated) DEVICE — ADH SKIN CLOSURE PREMIERPRO EXOFIN MICOR HV 0.5ML 3471

## (undated) DEVICE — TRANSFER DEVICE BLOOD NDL HOLDER 364880

## (undated) DEVICE — ESU GROUND PAD ADULT W/CORD E7507

## (undated) DEVICE — ENDO BITE BLOCK ADULT OMNI-BLOC

## (undated) DEVICE — DRSG TELFA ISLAND 4X10"

## (undated) DEVICE — SU CHROMIC 1 CT 27" 803H

## (undated) DEVICE — SUCTION CANISTER MEDIVAC LINER 3000ML W/LID 65651-530

## (undated) DEVICE — SOL WATER IRRIG 500ML BOTTLE 2F7113

## (undated) DEVICE — SOL WATER IRRIG 1000ML BOTTLE 2F7114

## (undated) DEVICE — LINEN BABY BLANKET 5434

## (undated) DEVICE — SPONGE LAP 18X36" 422

## (undated) DEVICE — SUCTION TIP POOLE K770

## (undated) DEVICE — SU VICRYL 0 CT-1 36" J346H

## (undated) DEVICE — LINEN ORTHO ACL PACK 5447

## (undated) DEVICE — BLADE CLIPPER SGL USE 9680

## (undated) DEVICE — DRSG TEGADERM 4X4 3/4" 1626W

## (undated) DEVICE — STPL SKIN 35W APPOSE 8886803712

## (undated) DEVICE — PAD CHUX UNDERPAD 30X36" P3036C

## (undated) DEVICE — GLOVE PROTEXIS BLUE W/NEU-THERA 7.5  2D73EB75

## (undated) DEVICE — GLOVE EXAM NITRILE LG PF LATEX FREE 5064

## (undated) DEVICE — PREP SKIN SCRUB TRAY 4461A

## (undated) DEVICE — ENDO FORCEP BX CAPTURA JUMBO SPIKE 2.8MMX230CM G53042

## (undated) DEVICE — RETR PANNICULUS TRAXI FOR PT POSITIONING PRS-1030

## (undated) DEVICE — SU PLAIN 3-0 CT 27" 852H

## (undated) DEVICE — SU VICRYL 4-0 PS-2 18" UND J496H

## (undated) DEVICE — GLOVE PROTEXIS W/NEU-THERA 7.0  2D73TE70

## (undated) DEVICE — SU VICRYL 0 CT-2 CR 8X18" J727D

## (undated) DEVICE — KIT ENDO TURNOVER/PROCEDURE CARRY-ON 101822

## (undated) DEVICE — DRAPE LAVH/LAPAROSCOPY W/POUCH 29474

## (undated) DEVICE — DRAPE LAP W/ARMBOARD 29410

## (undated) DEVICE — CLEANSER JET LAVAGE IRRISEPT 0.05% CHG IRRISEPT45USA

## (undated) DEVICE — CAP BABY PINK/BLUE IC-2

## (undated) DEVICE — STOCKING SLEEVE VASOPRESS COMPRESSION CALF MED VP501M

## (undated) DEVICE — DRSG STERI STRIP 1/2X4" R1547

## (undated) DEVICE — LINEN TOWEL PACK X10 5473

## (undated) DEVICE — PACK ABD HYST LATEX PB15TBFCR

## (undated) DEVICE — GOWN IMPERVIOUS 2XL BLUE

## (undated) DEVICE — SU PLAIN 2-0 CT-1 27" 843H

## (undated) DEVICE — GLOVE PROTEXIS MICRO 7.0  2D73PM70

## (undated) DEVICE — SPONGE TONSIL W/STRING XLG LATEX FREE

## (undated) DEVICE — SU MONOCRYL 3-0 PS-2 27" Y427H

## (undated) DEVICE — SU PDS II 0 CTX 60" Z990G

## (undated) DEVICE — SU VICRYL 3-0 SH 27" UND J416H

## (undated) DEVICE — SYR 10ML LL W/O NDL 302995

## (undated) DEVICE — SU VICRYL 2-0 CT-2 27" J333H

## (undated) DEVICE — BAG CLEAR TRASH 1.3M 39X33" P4040C

## (undated) DEVICE — GLOVE PROTEXIS BLUE W/NEU-THERA 6.5  2D73EB65

## (undated) DEVICE — PACK C-SECTION LF PL15OTA83B

## (undated) DEVICE — PACK MINOR CUSTOM RIDGES SBA32RMRMA

## (undated) DEVICE — DRAPE MAYO STAND 23X54 8337

## (undated) DEVICE — GLOVE PROTEXIS W/NEU-THERA 5.5  2D73TE55

## (undated) DEVICE — DRAIN PENROSE 0.25"X12" LATEX FREE GR101

## (undated) DEVICE — PREP POVIDONE IODINE SOLUTION 10% 4OZ

## (undated) DEVICE — TUBING SUCTION MEDI-VAC 1/4"X20' N620A

## (undated) DEVICE — STPL SKIN PROXIMATE 35 WIDE PMW35

## (undated) DEVICE — GLOVE PROTEXIS BLUE W/NEU-THERA 7.0  2D73EB70

## (undated) DEVICE — NDL 22GA 1.5"

## (undated) DEVICE — CATH TRAY FOLEY 16FR SILICONE 907416

## (undated) RX ORDER — BUPIVACAINE HYDROCHLORIDE 5 MG/ML
INJECTION, SOLUTION EPIDURAL; INTRACAUDAL
Status: DISPENSED
Start: 2021-07-28

## (undated) RX ORDER — FENTANYL CITRATE 50 UG/ML
INJECTION, SOLUTION INTRAMUSCULAR; INTRAVENOUS
Status: DISPENSED
Start: 2021-07-28

## (undated) RX ORDER — HYDROMORPHONE HCL IN WATER/PF 6 MG/30 ML
PATIENT CONTROLLED ANALGESIA SYRINGE INTRAVENOUS
Status: DISPENSED
Start: 2021-12-22

## (undated) RX ORDER — FENTANYL CITRATE-0.9 % NACL/PF 10 MCG/ML
PLASTIC BAG, INJECTION (ML) INTRAVENOUS
Status: DISPENSED
Start: 2020-10-27

## (undated) RX ORDER — PROPOFOL 10 MG/ML
INJECTION, EMULSION INTRAVENOUS
Status: DISPENSED
Start: 2021-12-22

## (undated) RX ORDER — DEXAMETHASONE SODIUM PHOSPHATE 4 MG/ML
INJECTION, SOLUTION INTRA-ARTICULAR; INTRALESIONAL; INTRAMUSCULAR; INTRAVENOUS; SOFT TISSUE
Status: DISPENSED
Start: 2021-07-28

## (undated) RX ORDER — ONDANSETRON 2 MG/ML
INJECTION INTRAMUSCULAR; INTRAVENOUS
Status: DISPENSED
Start: 2018-09-06

## (undated) RX ORDER — EPHEDRINE SULFATE 50 MG/ML
INJECTION, SOLUTION INTRAMUSCULAR; INTRAVENOUS; SUBCUTANEOUS
Status: DISPENSED
Start: 2021-07-28

## (undated) RX ORDER — DEXAMETHASONE SODIUM PHOSPHATE 4 MG/ML
INJECTION, SOLUTION INTRA-ARTICULAR; INTRALESIONAL; INTRAMUSCULAR; INTRAVENOUS; SOFT TISSUE
Status: DISPENSED
Start: 2020-10-27

## (undated) RX ORDER — CEFAZOLIN SODIUM 2 G/100ML
INJECTION, SOLUTION INTRAVENOUS
Status: DISPENSED
Start: 2021-07-28

## (undated) RX ORDER — CEFAZOLIN SODIUM/WATER 2 G/20 ML
SYRINGE (ML) INTRAVENOUS
Status: DISPENSED
Start: 2021-12-22

## (undated) RX ORDER — ONDANSETRON 2 MG/ML
INJECTION INTRAMUSCULAR; INTRAVENOUS
Status: DISPENSED
Start: 2020-10-27

## (undated) RX ORDER — OXYCODONE HYDROCHLORIDE 5 MG/1
TABLET ORAL
Status: DISPENSED
Start: 2021-07-28

## (undated) RX ORDER — LIDOCAINE HYDROCHLORIDE 10 MG/ML
INJECTION, SOLUTION EPIDURAL; INFILTRATION; INTRACAUDAL; PERINEURAL
Status: DISPENSED
Start: 2021-12-22

## (undated) RX ORDER — FENTANYL CITRATE 50 UG/ML
INJECTION, SOLUTION INTRAMUSCULAR; INTRAVENOUS
Status: DISPENSED
Start: 2021-12-22

## (undated) RX ORDER — MORPHINE SULFATE 1 MG/ML
INJECTION, SOLUTION EPIDURAL; INTRATHECAL; INTRAVENOUS
Status: DISPENSED
Start: 2020-10-27

## (undated) RX ORDER — OXYCODONE HYDROCHLORIDE 5 MG/1
TABLET ORAL
Status: DISPENSED
Start: 2021-12-22

## (undated) RX ORDER — DEXAMETHASONE SODIUM PHOSPHATE 4 MG/ML
INJECTION, SOLUTION INTRA-ARTICULAR; INTRALESIONAL; INTRAMUSCULAR; INTRAVENOUS; SOFT TISSUE
Status: DISPENSED
Start: 2021-12-22

## (undated) RX ORDER — NEOSTIGMINE METHYLSULFATE 1 MG/ML
VIAL (ML) INJECTION
Status: DISPENSED
Start: 2021-12-22

## (undated) RX ORDER — HYDROMORPHONE HCL IN WATER/PF 6 MG/30 ML
PATIENT CONTROLLED ANALGESIA SYRINGE INTRAVENOUS
Status: DISPENSED
Start: 2021-07-28

## (undated) RX ORDER — OXYTOCIN/0.9 % SODIUM CHLORIDE 30/500 ML
PLASTIC BAG, INJECTION (ML) INTRAVENOUS
Status: DISPENSED
Start: 2020-10-27

## (undated) RX ORDER — MORPHINE SULFATE 1 MG/ML
INJECTION, SOLUTION EPIDURAL; INTRATHECAL; INTRAVENOUS
Status: DISPENSED
Start: 2018-09-06

## (undated) RX ORDER — ACETAMINOPHEN 325 MG/1
TABLET ORAL
Status: DISPENSED
Start: 2021-12-22

## (undated) RX ORDER — ONDANSETRON 2 MG/ML
INJECTION INTRAMUSCULAR; INTRAVENOUS
Status: DISPENSED
Start: 2021-12-22

## (undated) RX ORDER — GLYCOPYRROLATE 0.2 MG/ML
INJECTION INTRAMUSCULAR; INTRAVENOUS
Status: DISPENSED
Start: 2021-12-22